# Patient Record
Sex: FEMALE | Race: WHITE | NOT HISPANIC OR LATINO | Employment: FULL TIME | URBAN - METROPOLITAN AREA
[De-identification: names, ages, dates, MRNs, and addresses within clinical notes are randomized per-mention and may not be internally consistent; named-entity substitution may affect disease eponyms.]

---

## 2017-01-03 ENCOUNTER — OFFICE VISIT (OUTPATIENT)
Dept: OBGYN | Facility: CLINIC | Age: 32
End: 2017-01-03
Attending: OBSTETRICS & GYNECOLOGY
Payer: COMMERCIAL

## 2017-01-03 VITALS
HEART RATE: 84 BPM | DIASTOLIC BLOOD PRESSURE: 59 MMHG | BODY MASS INDEX: 24.2 KG/M2 | WEIGHT: 131.5 LBS | HEIGHT: 62 IN | SYSTOLIC BLOOD PRESSURE: 88 MMHG

## 2017-01-03 DIAGNOSIS — O09.892 SUPERVISION OF OTHER HIGH RISK PREGNANCIES, SECOND TRIMESTER: Primary | ICD-10-CM

## 2017-01-03 DIAGNOSIS — Z90.49 S/P TOTAL COLECTOMY: ICD-10-CM

## 2017-01-03 DIAGNOSIS — O34.219 PREVIOUS CESAREAN DELIVERY, ANTEPARTUM CONDITION OR COMPLICATION: ICD-10-CM

## 2017-01-03 PROCEDURE — 99212 OFFICE O/P EST SF 10 MIN: CPT | Mod: ZF

## 2017-01-03 RX ORDER — ACETAMINOPHEN 325 MG/1
975 TABLET ORAL ONCE
Status: CANCELLED | OUTPATIENT
Start: 2017-01-03 | End: 2017-01-03

## 2017-01-03 RX ORDER — SODIUM CHLORIDE, SODIUM LACTATE, POTASSIUM CHLORIDE, CALCIUM CHLORIDE 600; 310; 30; 20 MG/100ML; MG/100ML; MG/100ML; MG/100ML
INJECTION, SOLUTION INTRAVENOUS CONTINUOUS
Status: CANCELLED | OUTPATIENT
Start: 2017-01-03

## 2017-01-03 RX ORDER — LIDOCAINE 40 MG/G
CREAM TOPICAL
Status: CANCELLED | OUTPATIENT
Start: 2017-01-03

## 2017-01-03 NOTE — Clinical Note
1/3/2017       RE: Amber Buckner  [address] [place] [address]     Dear Colleague,    Thank you for referring your patient, Amber Buckner, to the [facility] at [facility]. Please see a copy of my visit note below.    Overall doing well, feeling FM, no cramping or contractions.  Appetite has been good.  Level II was normal, has had a flu shot.  Scheduling request sent for repeat c/s-830 on wed 5/10/17, unsure about BTL at this time.      Again, thank you for allowing me to participate in the care of your patient.      Sincerely,    Michelle Jewell MD

## 2017-01-03 NOTE — NURSING NOTE
Chief Complaint   Patient presents with     Prenatal Care     21 weeks and 1 day     Patient has no concerns.

## 2017-01-03 NOTE — PROGRESS NOTES
Overall doing well, feeling FM, no cramping or contractions.  Appetite has been good.  Level II was normal, has had a flu shot.  Scheduling request sent for repeat c/s-830 on wed 5/10/17, unsure about BTL at this time.

## 2017-01-05 ENCOUNTER — TELEPHONE (OUTPATIENT)
Dept: OBGYN | Facility: CLINIC | Age: 32
End: 2017-01-05

## 2017-02-01 ENCOUNTER — OFFICE VISIT (OUTPATIENT)
Dept: OBGYN | Facility: CLINIC | Age: 32
End: 2017-02-01
Attending: OBSTETRICS & GYNECOLOGY
Payer: COMMERCIAL

## 2017-02-01 VITALS
HEIGHT: 62 IN | SYSTOLIC BLOOD PRESSURE: 113 MMHG | WEIGHT: 138 LBS | DIASTOLIC BLOOD PRESSURE: 70 MMHG | BODY MASS INDEX: 25.4 KG/M2 | HEART RATE: 90 BPM

## 2017-02-01 DIAGNOSIS — O09.92 HRP (HIGH RISK PREGNANCY), SECOND TRIMESTER: Primary | ICD-10-CM

## 2017-02-01 PROCEDURE — 99212 OFFICE O/P EST SF 10 MIN: CPT | Mod: ZF

## 2017-02-01 ASSESSMENT — PAIN SCALES - GENERAL: PAINLEVEL: NO PAIN (0)

## 2017-02-01 NOTE — PROGRESS NOTES
"Return Obstetric Visit Note    Amber Buckner is a 31 year old  at 25w2d with a history of ulcerative colitis s/p total colectomy who presents for regular follow up obstetric visit.     S: Patient has done very well since last being seen. She reports she has been in her usual state of health except for rupture of her rectal fistula, for which she is closely followed by GI. She feels continued fetal movements. She has had only mild contraction-like pain that she attributes to dehydration and stress. ROS is broadly negative.     O:   /70 mmHg  Pulse 90  Ht 1.575 m (5' 2\")  Wt 62.596 kg (138 lb)  BMI 25.23 kg/m2  LMP 2016 (Exact Date)    Gen: pleasant, conversational female patient.   CV: extremities well perfused.   Resp: breathing easily on room air.   Abd: gravid, well healed vertical midline surgical scar beneath umbilicus.     A/P:   Amber Buckner is a 31 year old  woman at 25w2d gestation with no evidence of complications with this pregnancy, whose ulcerative colitis is currently well managed by GI.   #Prenatal Care  - Follow up in 3 weeks for lab work, GCT  - Discussed scheduled C/S to be performed by Dr. Montoya  - Continue routine care    Scribe Disclosure:   I, Kian Miranda MS3, am serving as a scribe; to document services personally performed by Shawanda Luna MD - -based on data collection and the provider's statements to me.     Provider Disclosure:  I agree with above History, Review of Systems, Physical exam and Plan.  I have reviewed the content of the documentation and have edited it as needed. I have personally performed the services documented here and the documentation accurately represents those services and the decisions I have made.      Electronically signed by:  Shawanda Luna MD     "

## 2017-02-01 NOTE — Clinical Note
"2017       RE: Amber Buckner  [address] [place] [address]     Dear Colleague,    Thank you for referring your patient, Amber Buckner, to the [facility] at [facility]. Please see a copy of my visit note below.    Return Obstetric Visit Note    Amber Buckner is a 31 year old  at 25w2d with a history of ulcerative colitis s/p total colectomy who presents for regular follow up obstetric visit.     S: Patient has done very well since last being seen. She reports she has been in her usual state of health except for rupture of her rectal fistula, for which she is closely followed by GI. She feels continued fetal movements. She has had only mild contraction-like pain that she attributes to dehydration and stress. ROS is broadly negative.     O:   /70 mmHg  Pulse 90  Ht 1.575 m (5' 2\")  Wt 62.596 kg (138 lb)  BMI 25.23 kg/m2  LMP 2016 (Exact Date)    Gen: pleasant, conversational female patient.   CV: extremities well perfused.   Resp: breathing easily on room air.   Abd: gravid, well healed vertical midline surgical scar beneath umbilicus.     A/P:   Ambre Buckner is a 31 year old  woman at 25w2d gestation with no evidence of complications with this pregnancy, whose ulcerative colitis is currently well managed by GI.   #Prenatal Care  - Follow up in 3 weeks for lab work, GCT  - Discussed scheduled C/S to be performed by Dr. Montoya  - Continue routine care    Scribe Disclosure:   I, Kian Miranda MS3, am serving as a scribe; to document services personally performed by Shawanda Luna MD - -based on data collection and the provider's statements to me.     Provider Disclosure:  I agree with above History, Review of Systems, Physical exam and Plan.  I have reviewed the content of the documentation and have edited it as needed. I have personally performed the services documented here and the documentation " accurately represents those services and the decisions I have made.      Electronically signed by:  Shawanda Luna MD

## 2017-02-01 NOTE — NURSING NOTE
Chief Complaint   Patient presents with     Prenatal Care     JANETT 25 weeks and 2 days   Elicia Domingo LPN

## 2017-02-23 ENCOUNTER — OFFICE VISIT (OUTPATIENT)
Dept: OBGYN | Facility: CLINIC | Age: 32
End: 2017-02-23
Attending: ADVANCED PRACTICE MIDWIFE
Payer: COMMERCIAL

## 2017-02-23 VITALS
DIASTOLIC BLOOD PRESSURE: 64 MMHG | SYSTOLIC BLOOD PRESSURE: 96 MMHG | HEIGHT: 62 IN | WEIGHT: 139.3 LBS | BODY MASS INDEX: 25.64 KG/M2

## 2017-02-23 DIAGNOSIS — Z23 NEED FOR VACCINATION: ICD-10-CM

## 2017-02-23 DIAGNOSIS — O09.93 HIGH-RISK PREGNANCY, THIRD TRIMESTER: Primary | ICD-10-CM

## 2017-02-23 DIAGNOSIS — Z98.891 HISTORY OF C-SECTION: ICD-10-CM

## 2017-02-23 DIAGNOSIS — F41.8 DEPRESSION WITH ANXIETY: ICD-10-CM

## 2017-02-23 LAB
DEPRECATED CALCIDIOL+CALCIFEROL SERPL-MC: 34 UG/L (ref 20–75)
ERYTHROCYTE [DISTWIDTH] IN BLOOD BY AUTOMATED COUNT: 12.6 % (ref 10–15)
GLUCOSE 1H P 50 G GLC PO SERPL-MCNC: 102 MG/DL (ref 60–129)
HCT VFR BLD AUTO: 33.3 % (ref 35–47)
HGB BLD-MCNC: 11 G/DL (ref 11.7–15.7)
MCH RBC QN AUTO: 34.5 PG (ref 26.5–33)
MCHC RBC AUTO-ENTMCNC: 33 G/DL (ref 31.5–36.5)
MCV RBC AUTO: 104 FL (ref 78–100)
PLATELET # BLD AUTO: 299 10E9/L (ref 150–450)
RBC # BLD AUTO: 3.19 10E12/L (ref 3.8–5.2)
T PALLIDUM IGG+IGM SER QL: NEGATIVE
WBC # BLD AUTO: 5.9 10E9/L (ref 4–11)

## 2017-02-23 PROCEDURE — 82306 VITAMIN D 25 HYDROXY: CPT | Performed by: ADVANCED PRACTICE MIDWIFE

## 2017-02-23 PROCEDURE — 36415 COLL VENOUS BLD VENIPUNCTURE: CPT | Performed by: ADVANCED PRACTICE MIDWIFE

## 2017-02-23 PROCEDURE — 86780 TREPONEMA PALLIDUM: CPT | Performed by: ADVANCED PRACTICE MIDWIFE

## 2017-02-23 PROCEDURE — 90471 IMMUNIZATION ADMIN: CPT | Mod: ZF

## 2017-02-23 PROCEDURE — 99211 OFF/OP EST MAY X REQ PHY/QHP: CPT | Mod: ZF

## 2017-02-23 PROCEDURE — 85027 COMPLETE CBC AUTOMATED: CPT | Performed by: ADVANCED PRACTICE MIDWIFE

## 2017-02-23 PROCEDURE — 82950 GLUCOSE TEST: CPT | Performed by: ADVANCED PRACTICE MIDWIFE

## 2017-02-23 PROCEDURE — 25000125 ZZHC RX 250: Mod: ZF

## 2017-02-23 PROCEDURE — 90715 TDAP VACCINE 7 YRS/> IM: CPT | Mod: ZF

## 2017-02-23 ASSESSMENT — PAIN SCALES - GENERAL: PAINLEVEL: NO PAIN (0)

## 2017-02-23 NOTE — PROGRESS NOTES
" 31 year old, , 28w3d, presents for EOB visit.    Patient concerns: Feeling well. Feels that she has \"turned a corner\" re: her anxiety and depression. Was seeing therapist qweek; now going less frequently- mood is stable. 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 Denies cramping/contractions, vaginal bleeding, discharge or leakage of fluid. Reports +fetal movement - active in the early evening.    PHQ-9 SCORE 10/3/2014 2015 10/21/2016   Total Score 4 0 -   Total Score - - 10   17: PHQ9 =2, GAD7= 3    Education completed today includes breast feeding, [facility] hand out , contraception, counting movements, signs of pre-term labor, when to present to birthplace, post partum depression, GBS and getting enough iron.  Birth preferences reviewed: Repeat  section scheduled for May 8.  Support:     Feeding plans : Breastfeeding    Contraception planned:  Interested in \"semi-permanent\" contraception. Looking into IUD.  The following labs were ordered today:     1 hour glucose, vitamin d, cbc with plts, anti trep  Water birth consent form was not given.    Blood type:   ABO   Date Value Ref Range Status   10/03/2016 A  Final     RH(D)   Date Value Ref Range Status   10/03/2016  Pos  Final     Antibody Screen   Date Value Ref Range Status   10/03/2016 Neg  Final   Rhogam  was notgiven.  TDAP  wasgiven.    A/P:  Encounter Diagnoses   Name Primary?     History of       Need for vaccination      High-risk pregnancy, third trimester Yes     Depression with anxiety      Orders Placed This Encounter   Procedures     TDAP ( BOOSTRIX AGES 10-64)     Glucose 1 Hour     25- OH-Vitamin D     Anti Treponema     CBC with Platelets     EOB education reviewed.  EOB labs ordered- 1 hour glucose testing, cbc with plts, anti-trep, vitamin D.  Tdap given.  Reviewed  labor precautions, fetal movement counts and s/s to call/present to triage.  Continue scheduled prenatal " care, RTC in approx 4 weeks    Wale Purcell, SEPIDEH, EUGENIAM

## 2017-02-23 NOTE — MR AVS SNAPSHOT
After Visit Summary   2017    Amber Buckner    MRN: [id number]           Patient Information     Date Of Birth          1985        Visit Information        Provider Department      2017 8:15 AM Wale Purcell CNM [facility]        Today's Diagnoses     High-risk pregnancy in first trimester    -  1    History of            Follow-ups after your visit        Follow-up notes from your care team     Return in about 4 weeks (around 3/23/2017) for AUDRA WEST.      Your next 10 appointments already scheduled     May 08, 2017   Procedure with Shawanda Luna MD   [facility] (--)    [address]  [facility] [address] [phone]              Who to contact     Please call your clinic at [phone] to:    Ask questions about your health    Make or cancel appointments    Discuss your medicines    Learn about your test results    Speak to your doctor   If you have compliments or concerns about an experience at your clinic, or if you wish to file a complaint, please contact [facility] Patient Relations at [phone] or email us at [email or web address]         Additional Information About Your Visit        MyChart Information     Perform[employer] gives you secure access to your electronic health record. If you see a primary care provider, you can also send messages to your care team and make appointments. If you have questions, please call your primary care clinic.  If you do not have a primary care provider, please call [phone] and they will assist you.      Reclip.It is an electronic gateway that provides easy, online access to your medical records. With [employer]It, you can request a clinic appointment, read your test results, renew a prescription or communicate with your care team.     To access your existing account, please contact your [facility] or call [phone]  "for assistance.        Care EveryWhere ID     This is your Care EveryWhere ID. This could be used by other organizations to access your [place] medical records  [id number]        Your Vitals Were     Height Last Period BMI (Body Mass Index)             1.575 m (5' 2.01\") 08/08/2016 (Exact Date) 25.47 kg/m2          Blood Pressure from Last 3 Encounters:   02/23/17 96/64   02/01/17 113/70   01/03/17 (!) 88/59    Weight from Last 3 Encounters:   02/23/17 63.2 kg (139 lb 4.8 oz)   02/01/17 62.6 kg (138 lb)   01/03/17 59.6 kg (131 lb 8 oz)              We Performed the Following     25- OH-Vitamin D     Anti Treponema     CBC with Platelets     Glucose 1 Hour        Primary Care Provider Office Phone # Fax #    Cinthia Rhett Charles [phone]        [facility] [address] [address]  [facility] [address]        Thank you!     Thank you for choosing [facility]  for your care. Our goal is always to provide you with excellent care. Hearing back from our patients is one way we can continue to improve our services. Please take a few minutes to complete the written survey that you may receive in the mail after your visit with us. Thank you!             Your Updated Medication List - Protect others around you: Learn how to safely use, store and throw away your medicines at www.disposemymeds.org.          This list is accurate as of: 2/23/17  8:57 AM.  Always use your most recent med list.                   Brand Name Dispense Instructions for use    acetaminophen 325 MG tablet    TYLENOL    100 tablet    Take 2 tablets (650 mg) by mouth every 6 hours       cholecalciferol 5000 UNITS Caps      Take 1 capsule by mouth daily.       OMEGA-3 FISH OIL PO      Take 1 g by mouth       PRENATAL VITAMINS PO      Take  by mouth daily.         "

## 2017-02-23 NOTE — LETTER
"2017       RE: Amber Buckner  [address] [place] [address]     Dear Colleague,    Thank you for referring your patient, Amber Buckner, to the [facility] at [facility]. Please see a copy of my visit note below.     31 year old, , 28w3d, presents for EOB visit.    Patient concerns: Feeling well. Feels that she has \"turned a corner\" re: her anxiety and depression. Was seeing therapist qweek; now going less frequently- mood is stable. 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 Denies cramping/contractions, vaginal bleeding, discharge or leakage of fluid. Reports +fetal movement - active in the early evening.    PHQ-9 SCORE 10/3/2014 2015 10/21/2016   Total Score 4 0 -   Total Score - - 10   17: PHQ9 =2, GAD7= 3    Education completed today includes breast feeding, [facility] hand out , contraception, counting movements, signs of pre-term labor, when to present to birthplace, post partum depression, GBS and getting enough iron.  Birth preferences reviewed: Repeat  section scheduled for May 8.  Support:     Feeding plans : Breastfeeding    Contraception planned:  Interested in \"semi-permanent\" contraception. Looking into IUD.  The following labs were ordered today:     1 hour glucose, vitamin d, cbc with plts, anti trep  Water birth consent form was not given.    Blood type:   ABO   Date Value Ref Range Status   10/03/2016 A  Final     RH(D)   Date Value Ref Range Status   10/03/2016  Pos  Final     Antibody Screen   Date Value Ref Range Status   10/03/2016 Neg  Final   Rhogam  was notgiven.  TDAP  wasgiven.    A/P:  Encounter Diagnoses   Name Primary?     History of       Need for vaccination      High-risk pregnancy, third trimester Yes     Depression with anxiety      Orders Placed This Encounter   Procedures     TDAP ( BOOSTRIX AGES 10-64)     Glucose 1 Hour     " 25- OH-Vitamin D     Anti Treponema     CBC with Platelets     EOB education reviewed.  EOB labs ordered- 1 hour glucose testing, cbc with plts, anti-trep, vitamin D.  Tdap given.  Reviewed  labor precautions, fetal movement counts and s/s to call/present to triage.  Continue scheduled prenatal care, RTC in approx 4 weeks    Wale Purcell, SEPIDEH, CNM

## 2017-03-22 ENCOUNTER — OFFICE VISIT (OUTPATIENT)
Dept: OBGYN | Facility: CLINIC | Age: 32
End: 2017-03-22
Attending: MIDWIFE
Payer: COMMERCIAL

## 2017-03-22 ENCOUNTER — TELEPHONE (OUTPATIENT)
Dept: OBGYN | Facility: CLINIC | Age: 32
End: 2017-03-22

## 2017-03-22 VITALS
BODY MASS INDEX: 26 KG/M2 | HEIGHT: 62 IN | WEIGHT: 141.3 LBS | HEART RATE: 84 BPM | SYSTOLIC BLOOD PRESSURE: 104 MMHG | DIASTOLIC BLOOD PRESSURE: 65 MMHG

## 2017-03-22 DIAGNOSIS — O09.93 HIGH-RISK PREGNANCY, THIRD TRIMESTER: Primary | ICD-10-CM

## 2017-03-22 PROBLEM — K51.90 ULCERATIVE COLITIS (H): Status: ACTIVE | Noted: 2017-03-22

## 2017-03-22 PROCEDURE — 99212 OFFICE O/P EST SF 10 MIN: CPT | Mod: ZF

## 2017-03-22 ASSESSMENT — PAIN SCALES - GENERAL: PAINLEVEL: NO PAIN (0)

## 2017-03-22 NOTE — LETTER
"3/22/2017       RE: Amber Buckner  [address] [place] [address]     Dear Colleague,    Thank you for referring your patient, Amber Buckner, to the [facility] at [facility]. Please see a copy of my visit note below.    Subjective:      31 year old  at 32w2d presentst for a routine prenatal appointment.     vaginal bleeding or leakage of fluid.  Had an episode yesterday of  contractions. Good  fetal movement.       No HA, visual changes, RUQ or epigastric pain.   Patient concerns:    Feeling well overall.  Reviewed EOB labs with patient.  TDAP last visit   Objective:  Vitals:    17 0820   BP: 104/65   BP Location: Left arm   Patient Position: Chair   Cuff Size: Adult Regular   Pulse: 84   Weight: 64.1 kg (141 lb 4.8 oz)   Height: 1.575 m (5' 2\")   , see ob flowsheet  Assessment/Plan       ABO   Date Value Ref Range Status   10/03/2016 A  Final     RH(D)   Date Value Ref Range Status   10/03/2016  Pos  Final     Antibody Screen   Date Value Ref Range Status   10/03/2016 Neg  Final   , Rhogam  was notgiven.    - Reviewed total weight gain, encouraged continued healthy diet and exercise.  .  Reviewed importance of daily fetal kick count and why/how to contact provider.    - Reviewed why/how to contact provider if headache/visual changes/RUQ or epigastric pain, decreased fetal movement, vaginal bleeding, leakage of fluid or more than 4 contractions in an hour.     Patient education/orders or handouts today:  PTL signs/symptoms  Reviewed GBS screening at 35-36 wks.    Return to clinic in 3 weeks and prn if questions or concerns.     SEPIDEH Casarez CNM        "

## 2017-03-22 NOTE — TELEPHONE ENCOUNTER
Left message with patient fmla forms completed for her - no fax number-   Where should i send it to?    Forms at stacy's desk

## 2017-03-22 NOTE — MR AVS SNAPSHOT
After Visit Summary   3/22/2017    Amber Buckner    MRN: [id number]           Patient Information     Date Of Birth          1985        Visit Information        Provider Department      3/22/2017 8:15 AM Lis Villalba APRN [facility]        Today's Diagnoses     High-risk pregnancy, third trimester    -  1       Follow-ups after your visit        Follow-up notes from your care team     Return in about 3 weeks (around 4/12/2017).      Your next 10 appointments already scheduled     May 08, 2017   Procedure with Shawanda Luna MD   UR 4[facility] (--)    [address]  [facility] [address] [phone]              Who to contact     Please call your clinic at [phone] to:    Ask questions about your health    Make or cancel appointments    Discuss your medicines    Learn about your test results    Speak to your doctor   If you have compliments or concerns about an experience at your clinic, or if you wish to file a complaint, please contact [facility] Patient Relations at [phone] or email us at [email or web address]         Additional Information About Your Visit        MyChart Information     V[employer] gives you secure access to your electronic health record. If you see a primary care provider, you can also send messages to your care team and make appointments. If you have questions, please call your primary care clinic.  If you do not have a primary care provider, please call [phone] and they will assist you.      V[employer] is an electronic gateway that provides easy, online access to your medical records. With [employer], you can request a clinic appointment, read your test results, renew a prescription or communicate with your care team.     To access your existing account, please contact your [facility] or call [phone] for assistance.        Care EveryWhere ID     This is your  "Care EveryWhere ID. This could be used by other organizations to access your [place] medical records  [id number]        Your Vitals Were     Pulse Height Last Period BMI (Body Mass Index)          84 1.575 m (5' 2\") 08/08/2016 (Exact Date) 25.84 kg/m2         Blood Pressure from Last 3 Encounters:   03/22/17 104/65   02/23/17 96/64   02/01/17 113/70    Weight from Last 3 Encounters:   03/22/17 64.1 kg (141 lb 4.8 oz)   02/23/17 63.2 kg (139 lb 4.8 oz)   02/01/17 62.6 kg (138 lb)              Today, you had the following     No orders found for display       Primary Care Provider Office Phone # Fax #    Cinthia Rhett Charles [phone]        [facility] [address]  [facility] [address]        Thank you!     Thank you for choosing [facility]  for your care. Our goal is always to provide you with excellent care. Hearing back from our patients is one way we can continue to improve our services. Please take a few minutes to complete the written survey that you may receive in the mail after your visit with us. Thank you!             Your Updated Medication List - Protect others around you: Learn how to safely use, store and throw away your medicines at www.disposemymeds.org.          This list is accurate as of: 3/22/17  8:37 AM.  Always use your most recent med list.                   Brand Name Dispense Instructions for use    acetaminophen 325 MG tablet    TYLENOL    100 tablet    Take 2 tablets (650 mg) by mouth every 6 hours       cholecalciferol 5000 UNITS Caps      Take 1 capsule by mouth daily.       OMEGA-3 FISH OIL PO      Take 1 g by mouth       PRENATAL VITAMINS PO      Take  by mouth daily.         "

## 2017-03-22 NOTE — PROGRESS NOTES
"Subjective:      31 year old  at 32w2d presentst for a routine prenatal appointment.     vaginal bleeding or leakage of fluid.  Had an episode yesterday of  contractions. Good  fetal movement.       No HA, visual changes, RUQ or epigastric pain.   Patient concerns:    Feeling well overall.  Reviewed EOB labs with patient.  TDAP last visit   Objective:  Vitals:    17 0820   BP: 104/65   BP Location: Left arm   Patient Position: Chair   Cuff Size: Adult Regular   Pulse: 84   Weight: 64.1 kg (141 lb 4.8 oz)   Height: 1.575 m (5' 2\")   , see ob flowsheet  Assessment/Plan       ABO   Date Value Ref Range Status   10/03/2016 A  Final     RH(D)   Date Value Ref Range Status   10/03/2016  Pos  Final     Antibody Screen   Date Value Ref Range Status   10/03/2016 Neg  Final   , Rhogam  was notgiven.    - Reviewed total weight gain, encouraged continued healthy diet and exercise.  .  Reviewed importance of daily fetal kick count and why/how to contact provider.    - Reviewed why/how to contact provider if headache/visual changes/RUQ or epigastric pain, decreased fetal movement, vaginal bleeding, leakage of fluid or more than 4 contractions in an hour.     Patient education/orders or handouts today:  PTL signs/symptoms  Reviewed GBS screening at 35-36 wks.    Return to clinic in 3 weeks and prn if questions or concerns.     SEPIDEH Casarez CNM    "

## 2017-03-23 ENCOUNTER — TELEPHONE (OUTPATIENT)
Dept: OBGYN | Facility: CLINIC | Age: 32
End: 2017-03-23

## 2017-04-12 ENCOUNTER — OFFICE VISIT (OUTPATIENT)
Dept: OBGYN | Facility: CLINIC | Age: 32
End: 2017-04-12
Attending: ADVANCED PRACTICE MIDWIFE
Payer: COMMERCIAL

## 2017-04-12 VITALS
HEIGHT: 62 IN | WEIGHT: 144.5 LBS | BODY MASS INDEX: 26.59 KG/M2 | SYSTOLIC BLOOD PRESSURE: 103 MMHG | DIASTOLIC BLOOD PRESSURE: 68 MMHG

## 2017-04-12 DIAGNOSIS — O09.93 HIGH-RISK PREGNANCY, THIRD TRIMESTER: Primary | ICD-10-CM

## 2017-04-12 PROCEDURE — 99211 OFF/OP EST MAY X REQ PHY/QHP: CPT | Mod: ZF

## 2017-04-12 PROCEDURE — 87653 STREP B DNA AMP PROBE: CPT | Performed by: ADVANCED PRACTICE MIDWIFE

## 2017-04-12 ASSESSMENT — PAIN SCALES - GENERAL: PAINLEVEL: NO PAIN (0)

## 2017-04-12 NOTE — MR AVS SNAPSHOT
After Visit Summary   4/12/2017    Amber Buckner    MRN: [id number]           Patient Information     Date Of Birth          1985        Visit Information        Provider Department      4/12/2017 8:15 AM Wale Purcell CNM [facility]        Today's Diagnoses     High-risk pregnancy, third trimester    -  1       Follow-ups after your visit        Follow-up notes from your care team     Return in about 1 week (around 4/19/2017) for AUDRA WEST.      Your next 10 appointments already scheduled     Apr 26, 2017  8:45 AM CDT   RETURN OB with Wale Purcell CNM   [facility] ([facility])    [facility] [address]  [address] [facility] [address] [phone]            May 01, 2017  4:30 PM CDT   RETURN OB with Laurie Montoya MD   [facility] ([facility])    [facility] [address]  [address] [facility] [id number] [phone]            May 08, 2017   Procedure with Shawanda Luna MD   UR 4COB (--)    [address] [facility] [address] [phone]              Who to contact     Please call your clinic at [phone] to:    Ask questions about your health    Make or cancel appointments    Discuss your medicines    Learn about your test results    Speak to your doctor   If you have compliments or concerns about an experience at your clinic, or if you wish to file a complaint, please contact [facility] Patient Relations at [phone] or email us at [email or web address]         Additional Information About Your Visit        MyChart Information     [employer] gives you secure access to your electronic health record. If you see a primary care provider, you can also send messages to your care team and make appointments. If you have questions, please call  "your primary care clinic.  If you do not have a primary care provider, please call [phone] and they will assist you.      [employer] is an electronic gateway that provides easy, online access to your medical records. With [employer], you can request a clinic appointment, read your test results, renew a prescription or communicate with your care team.     To access your existing account, please contact your [facility] or call [phone] for assistance.        Care EveryWhere ID     This is your Care EveryWhere ID. This could be used by other organizations to access your [place] medical records  [id number]        Your Vitals Were     Height Last Period BMI (Body Mass Index)             1.575 m (5' 2.01\") 08/08/2016 (Exact Date) 26.42 kg/m2          Blood Pressure from Last 3 Encounters:   04/12/17 103/68   03/22/17 104/65   02/23/17 96/64    Weight from Last 3 Encounters:   04/12/17 65.5 kg (144 lb 8 oz)   03/22/17 64.1 kg (141 lb 4.8 oz)   02/23/17 63.2 kg (139 lb 4.8 oz)              We Performed the Following     Group B strep PCR        Primary Care Provider Office Phone # Fax #    Cinthia Rhett Charles [phone]       [facility] [address] [place] [address]        Thank you!     Thank you for choosing [facility]  for your care. Our goal is always to provide you with excellent care. Hearing back from our patients is one way we can continue to improve our services. Please take a few minutes to complete the written survey that you may receive in the mail after your visit with us. Thank you!             Your Updated Medication List - Protect others around you: Learn how to safely use, store and throw away your medicines at www.disposemymeds.org.          This list is accurate as of: 4/12/17 12:08 PM.  Always use your most recent med list.                   Brand Name Dispense Instructions for use    acetaminophen 325 MG " tablet    TYLENOL    100 tablet    Take 2 tablets (650 mg) by mouth every 6 hours       cholecalciferol 5000 UNITS Caps      Take 1 capsule by mouth daily.       OMEGA-3 FISH OIL PO      Take 1 g by mouth       PRENATAL VITAMINS PO      Take  by mouth daily.

## 2017-04-12 NOTE — NURSING NOTE
Chief Complaint   Patient presents with     Prenatal Care     JANETT 35 weeks and 2 days   35.2 weeks ob visit feeling

## 2017-04-12 NOTE — PROGRESS NOTES
"Subjective:      31 year old  at 35w2d presents for a routine prenatal appointment.     Denies cramping/contractions, vaginal bleeding,  leakage of fluid, or change in vaginal discharge. Reports + fetal movement.       No HA, visual changes, RUQ or epigastric pain.     Patient concerns:  Feeling well overall. Reports occasional dizziness after lunch time- relieved with sitting. Notes that it improves when she eats more nutritious items and has smaller, more frequent snacks throughout the morning. Also improves with increased hydration. Has repeat c/s scheduled for .     Objective:  Vitals:    17 0820   BP: 103/68   Weight: 65.5 kg (144 lb 8 oz)   Height: 1.575 m (5' 2.01\")     See OB flowsheet    Assessment/Plan     Encounter Diagnosis   Name Primary?     High-risk pregnancy, third trimester Yes       PHQ-9 SCORE 10/3/2014 2015 10/21/2016   Total Score 4 0 -   Total Score - - 10     GBS screening: Obtained.  Birth preferences reviewed: Scheduled repeat  section for May 8  Labor signs discussed. Reinforced daily fetal movement counts.  Reviewed why/how to contact provider if headache/visual changes/RUQ or epigastric pain, decreased fetal movement, vaginal bleeding, leakage of fluid.     Continue scheduled prenatal care, RTC in 1 week and prn if questions or concerns.     SEPIDEH Melgar, JENNA  "

## 2017-04-12 NOTE — LETTER
"2017       RE: Amber Buckner  [address] [place] [address]     Dear Colleague,    Thank you for referring your patient, Amber Buckner, to the [facility] at [facility]. Please see a copy of my visit note below.    Subjective:      31 year old  at 35w2d presents for a routine prenatal appointment.     Denies cramping/contractions, vaginal bleeding,  leakage of fluid, or change in vaginal discharge. Reports + fetal movement.       No HA, visual changes, RUQ or epigastric pain.     Patient concerns:  Feeling well overall. Reports occasional dizziness after lunch time- relieved with sitting. Notes that it improves when she eats more nutritious items and has smaller, more frequent snacks throughout the morning. Also improves with increased hydration. Has repeat c/s scheduled for .     Objective:  Vitals:    17 0820   BP: 103/68   Weight: 65.5 kg (144 lb 8 oz)   Height: 1.575 m (5' 2.01\")     See OB flowsheet    Assessment/Plan     Encounter Diagnosis   Name Primary?     High-risk pregnancy, third trimester Yes       PHQ-9 SCORE 10/3/2014 2015 10/21/2016   Total Score 4 0 -   Total Score - - 10     GBS screening: Obtained.  Birth preferences reviewed: Scheduled repeat  section for May 8  Labor signs discussed. Reinforced daily fetal movement counts.  Reviewed why/how to contact provider if headache/visual changes/RUQ or epigastric pain, decreased fetal movement, vaginal bleeding, leakage of fluid.     Continue scheduled prenatal care, RTC in 1 week and prn if questions or concerns.     SEPIDEH Melgar, CNM    "

## 2017-04-13 LAB
GP B STREP DNA SPEC QL NAA+PROBE: ABNORMAL
SPECIMEN SOURCE: ABNORMAL

## 2017-04-14 PROBLEM — O99.820 GBS (GROUP B STREPTOCOCCUS CARRIER), +RV CULTURE, CURRENTLY PREGNANT: Status: ACTIVE | Noted: 2017-04-14

## 2017-04-15 LAB
BACTERIA SPEC CULT: NORMAL
MICRO REPORT STATUS: NORMAL
SPECIMEN SOURCE: NORMAL

## 2017-04-20 ENCOUNTER — TELEPHONE (OUTPATIENT)
Dept: OBGYN | Facility: CLINIC | Age: 32
End: 2017-04-20

## 2017-04-20 NOTE — TELEPHONE ENCOUNTER
Telephone call from Amber and she reports that she is not sure if she is leaking amniotic fluid or urine. She got out of bed this morning and have a small amount of fluid come out of her vagina (she thinks), she smelled this and there was no odor. She then went back to bed and got up again and the same thing happened again but with some mucous in it. States baby is moving well, no bleeding or cramping. I instructed her to empty her bladder and to lie down for 30 minutes stand up and if this happens again she should go to the birth place for evaluation. She verbalized understanding.

## 2017-04-21 ENCOUNTER — TELEPHONE (OUTPATIENT)
Dept: OTHER | Facility: CLINIC | Age: 32
End: 2017-04-21

## 2017-04-21 NOTE — TELEPHONE ENCOUNTER
4/21/2017    Call Regarding Onboarding [employer]    Attempt 1    Message on voicemail     Comments:       Outreach   cnt

## 2017-04-26 ENCOUNTER — OFFICE VISIT (OUTPATIENT)
Dept: OBGYN | Facility: CLINIC | Age: 32
End: 2017-04-26
Attending: ADVANCED PRACTICE MIDWIFE
Payer: COMMERCIAL

## 2017-04-26 VITALS
DIASTOLIC BLOOD PRESSURE: 61 MMHG | WEIGHT: 145 LBS | BODY MASS INDEX: 26.68 KG/M2 | HEIGHT: 62 IN | SYSTOLIC BLOOD PRESSURE: 95 MMHG | HEART RATE: 91 BPM

## 2017-04-26 DIAGNOSIS — O99.820 GBS (GROUP B STREPTOCOCCUS CARRIER), +RV CULTURE, CURRENTLY PREGNANT: ICD-10-CM

## 2017-04-26 DIAGNOSIS — O09.93 HIGH-RISK PREGNANCY, THIRD TRIMESTER: Primary | ICD-10-CM

## 2017-04-26 PROCEDURE — 99212 OFFICE O/P EST SF 10 MIN: CPT | Mod: ZF

## 2017-04-26 ASSESSMENT — PAIN SCALES - GENERAL: PAINLEVEL: NO PAIN (0)

## 2017-04-26 NOTE — LETTER
"2017       RE: Amber Buckner  [email or web address] [place] [address]     Dear Colleague,    Thank you for referring your patient, Amber Buckner, to the [facility] at [facility]. Please see a copy of my visit note below.    Subjective:      31 year old  at 37w2d presents for a routine prenatal appointment.         Patient concerns: Feeling well overall.  Has c/s scheduled for  with Dr. Montoya.  Denies cramping/contractions, vaginal bleeding,  leakage of fluid, or change in vaginal discharge.  Reports + fetal movement. No HA, visual changes, RUQ or epigastric pain.     Objective:  Vitals:    17 0854   BP: 95/61   Pulse: 91   Weight: 65.8 kg (145 lb)   Height: 1.575 m (5' 2\")     See OB flowsheet    Assessment/Plan     Encounter Diagnoses   Name Primary?     High-risk pregnancy, third trimester Yes     GBS (group B Streptococcus carrier), +RV culture, currently pregnant      Discussed measles policy.   Reviewed why/how to contact provider if headache/visual changes/RUQ or epigastric pain, decreased fetal movement, vaginal bleeding, leakage of fluid.  Emphasized importance of presenting to triage for labor s/s or rom.   Reviewed GBS+, no indication for prophylaxis unless labor/rom prior to c/s.   section scheduled for .  Has appointment with md next week.  Continue scheduled prenatal care, RTC in 1 week and prn if questions or concerns.     SEPIDEH Melgar, JENNA      "

## 2017-04-26 NOTE — NURSING NOTE
Chief Complaint   Patient presents with     Prenatal Care     JANETT 37 weeks and 2 days   Elicia Domingo LPN

## 2017-04-26 NOTE — PROGRESS NOTES
"Subjective:      31 year old  at 37w2d presents for a routine prenatal appointment.         Patient concerns: Feeling well overall.  Has c/s scheduled for  with Dr. Montoya.  Denies cramping/contractions, vaginal bleeding,  leakage of fluid, or change in vaginal discharge.  Reports + fetal movement. No HA, visual changes, RUQ or epigastric pain.     Objective:  Vitals:    17 0854   BP: 95/61   Pulse: 91   Weight: 65.8 kg (145 lb)   Height: 1.575 m (5' 2\")     See OB flowsheet    Assessment/Plan     Encounter Diagnoses   Name Primary?     High-risk pregnancy, third trimester Yes     GBS (group B Streptococcus carrier), +RV culture, currently pregnant      Discussed measles policy.   Reviewed why/how to contact provider if headache/visual changes/RUQ or epigastric pain, decreased fetal movement, vaginal bleeding, leakage of fluid.  Emphasized importance of presenting to triage for labor s/s or rom.   Reviewed GBS+, no indication for prophylaxis unless labor/rom prior to c/s.   section scheduled for .  Has appointment with md next week.  Continue scheduled prenatal care, RTC in 1 week and prn if questions or concerns.     Wale Purcell, SEPIDEH, JENNA      "

## 2017-04-26 NOTE — MR AVS SNAPSHOT
After Visit Summary   4/26/2017    Amber Buckner    MRN: [id number]           Patient Information     Date Of Birth          1985        Visit Information        Provider Department      4/26/2017 8:45 AM Wale Purcell CNM [facility]        Today's Diagnoses     High-risk pregnancy, third trimester    -  1    GBS (group B Streptococcus carrier), +RV culture, currently pregnant           Follow-ups after your visit        Follow-up notes from your care team     Return in about 1 week (around 5/3/2017) for JANETT.      Your next 10 appointments already scheduled     May 01, 2017  4:30 PM CDT   RETURN OB with Laurie Montoya MD   [facility] ([facility])    [facility] [address]  [address]  [address] [facility] [address] [phone]            May 08, 2017   Procedure with Shawanda Luna MD   UR 4COB (--)    [address] [facility] [address] [phone]              Who to contact     Please call your clinic at [phone] to:    Ask questions about your health    Make or cancel appointments    Discuss your medicines    Learn about your test results    Speak to your doctor   If you have compliments or concerns about an experience at your clinic, or if you wish to file a complaint, please contact [facility] Patient Relations at [phone] or email us at [email or web address]         Additional Information About Your Visit        [employer] Information     [employer] gives you secure access to your electronic health record. If you see a primary care provider, you can also send messages to your care team and make appointments. If you have questions, please call your primary care clinic.  If you do not have a primary care provider, please call [phone] and they will assist you.      [employer] is an electronic gateway that provides easy, online  "access to your medical records. With [employer], you can request a clinic appointment, read your test results, renew a prescription or communicate with your care team.     To access your existing account, please contact your [facility] or call [phone] for assistance.        Care EveryWhere ID     This is your Care EveryWhere ID. This could be used by other organizations to access your [place] medical records  [id number]        Your Vitals Were     Pulse Height Last Period Breastfeeding? BMI (Body Mass Index)       91 1.575 m (5' 2\") 08/08/2016 (Exact Date) No 26.52 kg/m2        Blood Pressure from Last 3 Encounters:   04/26/17 95/61   04/12/17 103/68   03/22/17 104/65    Weight from Last 3 Encounters:   04/26/17 65.8 kg (145 lb)   04/12/17 65.5 kg (144 lb 8 oz)   03/22/17 64.1 kg (141 lb 4.8 oz)              Today, you had the following     No orders found for display       Primary Care Provider Office Phone # Fax #    Cinthia Rhett Charles [phone]       [facility] [address] [place] [address]        Thank you!     Thank you for choosing [facility]  for your care. Our goal is always to provide you with excellent care. Hearing back from our patients is one way we can continue to improve our services. Please take a few minutes to complete the written survey that you may receive in the mail after your visit with us. Thank you!             Your Updated Medication List - Protect others around you: Learn how to safely use, store and throw away your medicines at www.disposemymeds.org.          This list is accurate as of: 4/26/17  1:04 PM.  Always use your most recent med list.                   Brand Name Dispense Instructions for use    acetaminophen 325 MG tablet    TYLENOL    100 tablet    Take 2 tablets (650 mg) by mouth every 6 hours       cholecalciferol 5000 UNITS Caps      Take 1 capsule by mouth daily.       " OMEGA-3 FISH OIL PO      Take 1 g by mouth       PRENATAL VITAMINS PO      Take  by mouth daily.

## 2017-04-28 NOTE — TELEPHONE ENCOUNTER
4/28/2017    Call Regarding Onboarding [employer]    Attempt 2    Message on voicemail     Comments:       Outreach   cnt

## 2017-05-01 ENCOUNTER — OFFICE VISIT (OUTPATIENT)
Dept: OBGYN | Facility: CLINIC | Age: 32
End: 2017-05-01
Attending: OBSTETRICS & GYNECOLOGY
Payer: COMMERCIAL

## 2017-05-01 VITALS
BODY MASS INDEX: 27.05 KG/M2 | SYSTOLIC BLOOD PRESSURE: 113 MMHG | WEIGHT: 147 LBS | HEIGHT: 62 IN | DIASTOLIC BLOOD PRESSURE: 72 MMHG | HEART RATE: 96 BPM

## 2017-05-01 DIAGNOSIS — O09.93 HIGH-RISK PREGNANCY, THIRD TRIMESTER: Primary | ICD-10-CM

## 2017-05-01 PROCEDURE — 99212 OFFICE O/P EST SF 10 MIN: CPT | Mod: ZF

## 2017-05-01 ASSESSMENT — PAIN SCALES - GENERAL: PAINLEVEL: NO PAIN (0)

## 2017-05-01 NOTE — PROGRESS NOTES
"Subjective:     31 year old  at 38w0d presents for routine prenatal visit.           Denies vaginal bleeding or leakage of fluid.  Irregular, mild contractions intermittently.  + fetal movement, no changes in fetal movement.        No HA, visual changes, RUQ or epigastric pain.   Patient concerns: none.  She called in about ten days ago worrying about increased vaginal fluid, but says that has resolved and she has not had any increased fluid or bleeding since then. Feeling well overall. C/S scheduled for next Monday, 2017.    Objective:  Vitals:    17 1626   BP: 113/72   Pulse: 96   Weight: 66.7 kg (147 lb)   Height: 1.575 m (5' 2\")      Consitutional: pregnant female, pleasant, NAD  Resp: lungs clear to auscultation bilaterally, no increased work of breathing  CV: RRR. Normal S1, S2  Abdomen: abdomen is soft and non-tender.  Midline scar and scar in RLQ.  Fundus well above umbilicus    FHR: 140s    See OB flowsheet  Assessment/Plan  Amber Buckner is a 31 year old  female with history of UC s/p total colectomy with J pouch creation and two previous LTCS with vertical midline incision who presents for JANETT at 38w2d.    -GBS +; discussed what to do if water breaks prior to C/S  - Reviewed why/how to contact provider if headache/visual changes/RUQ or epigastric pain, decreased fetal movement, vaginal bleeding, leakage of fluid or strong/regular contractions.    -C/S scheduled 2017.   -reviewed C/S procedure, possible skin incisions, and anesthesia     I, Huseyin Mejia-MS3, served as a scribe for Dr. Montoya during this encounter.      Provider Disclosure:  I agree with above History, Review of Systems, Physical exam and Plan.  I have reviewed the content of the documentation and have edited it as needed. I have personally performed the services documented here and the documentation accurately represents those services and the decisions I have made.      Electronically signed " by:    I agree with the PFSH and ROS as completed by the MS, except for changes made by me. The remainder of the encounter was performed by me and scribed by the MS. The scribed note accurately reflects my personal services and the decisions made by me.    Laurie Montoya MD, FACOG  [facility]  OB/GYN    5/2/2017  3:43 PM

## 2017-05-01 NOTE — PROGRESS NOTES
"Subjective:     31 year old  at 38w0d presents for routine prenatal visit.           Denies vaginal bleeding or leakage of fluid.  Irregular, mild contractions intermittently.  + fetal movement, no changes in fetal movement.        No HA, visual changes, RUQ or epigastric pain.   Patient concerns: none.  She called in about ten days ago worrying about increased vaginal fluid, but says that has resolved and she has not had any increased fluid or bleeding since then. Feeling well overall. C/S scheduled for next Monday, 2017.    Objective:  Vitals:    17 1626   BP: 113/72   Pulse: 96   Weight: 66.7 kg (147 lb)   Height: 1.575 m (5' 2\")      Consitutional: pregnant female, pleasant, NAD  Resp: lungs clear to auscultation bilaterally, no increased work of breathing  CV: RRR. Normal S1, S2  Abdomen: abdomen is soft and non-tender.  Midline scar and scar in RLQ.  Fundus well above umbilicus    FHR: 140s    See OB flowsheet  Assessment/Plan  Amber Buckner is a 31 year old  female with history of UC s/p total colectomy with J pouch creation and two previous LTCS with vertical midline incision who presents for JANETT at 38w2d.    -GBS +; discussed what to do if water breaks prior to C/S  -discussed how patient is doing  - Reviewed why/how to contact provider if headache/visual changes/RUQ or epigastric pain, decreased fetal movement, vaginal bleeding, leakage of fluid or strong/regular contractions.    -C/S scheduled 2017.   -reviewed C/S procedure, possible skin incisions, and anesthesia     I, Huseyin Mejia-MS3, served as a scribe for Dr. Montoya during this encounter.      Provider Disclosure:  I agree with above History, Review of Systems, Physical exam and Plan.  I have reviewed the content of the documentation and have edited it as needed. I have personally performed the services documented here and the documentation accurately represents those services and the decisions I have made.  "     Electronically signed by:  {ALEK providers :[id number]}

## 2017-05-01 NOTE — MR AVS SNAPSHOT
After Visit Summary   5/1/2017    Amber Buckner    MRN: [id number]           Patient Information     Date Of Birth          1985        Visit Information        Provider Department      5/1/2017 4:30 PM Laurie Montoya MD [facility]        Today's Diagnoses     High-risk pregnancy, third trimester    -  1       Follow-ups after your visit        Your next 10 appointments already scheduled     May 08, 2017   Procedure with Shawanda Luna MD   UR 4COB (--)    [address]  [facility] [address] [phone]              Who to contact     Please call your clinic at [phone] to:    Ask questions about your health    Make or cancel appointments    Discuss your medicines    Learn about your test results    Speak to your doctor   If you have compliments or concerns about an experience at your clinic, or if you wish to file a complaint, please contact [facility] Patient Relations at [phone] or email us at [email or web address]         Additional Information About Your Visit        MyChart Information     [employer] gives you secure access to your electronic health record. If you see a primary care provider, you can also send messages to your care team and make appointments. If you have questions, please call your primary care clinic.  If you do not have a primary care provider, please call [phone] and they will assist you.      [employer] is an electronic gateway that provides easy, online access to your medical records. With [employer], you can request a clinic appointment, read your test results, renew a prescription or communicate with your care team.     To access your existing account, please contact your [facility] or call [phone] for assistance.        Care EveryWhere ID     This is your Care EveryWhere ID. This could be used by other organizations to access your [place]  "medical records  [id number]        Your Vitals Were     Pulse Height Last Period Breastfeeding? BMI (Body Mass Index)       96 1.575 m (5' 2\") 08/08/2016 (Exact Date) No 26.89 kg/m2        Blood Pressure from Last 3 Encounters:   05/01/17 113/72   04/26/17 95/61   04/12/17 103/68    Weight from Last 3 Encounters:   05/01/17 66.7 kg (147 lb)   04/26/17 65.8 kg (145 lb)   04/12/17 65.5 kg (144 lb 8 oz)              Today, you had the following     No orders found for display       Primary Care Provider Office Phone # Fax #    Cinthia Rhett Charles [phone]       [facility] [address] [place] [address]        Thank you!     Thank you for choosing [facility]  for your care. Our goal is always to provide you with excellent care. Hearing back from our patients is one way we can continue to improve our services. Please take a few minutes to complete the written survey that you may receive in the mail after your visit with us. Thank you!             Your Updated Medication List - Protect others around you: Learn how to safely use, store and throw away your medicines at www.disposemymeds.org.          This list is accurate as of: 5/1/17 11:59 PM.  Always use your most recent med list.                   Brand Name Dispense Instructions for use    acetaminophen 325 MG tablet    TYLENOL    100 tablet    Take 2 tablets (650 mg) by mouth every 6 hours       cholecalciferol 5000 UNITS Caps      Take 1 capsule by mouth daily.       OMEGA-3 FISH OIL PO      Take 1 g by mouth       PRENATAL VITAMINS PO      Take  by mouth daily.         "

## 2017-05-01 NOTE — NURSING NOTE
Chief Complaint   Patient presents with     Prenatal Care     JANETT 38 weeks ,  consult   Elicia Domingo LPN

## 2017-05-01 NOTE — LETTER
"2017       RE: Amber Buckner  [address] [place] [address]     Dear Colleague,    Thank you for referring your patient, Amber Buckner, to the [facility] at [facility]. Please see a copy of my visit note below.    Subjective:     31 year old  at 38w0d presents for routine prenatal visit.           Denies vaginal bleeding or leakage of fluid.  Irregular, mild contractions intermittently.  + fetal movement, no changes in fetal movement.        No HA, visual changes, RUQ or epigastric pain.   Patient concerns: none.  She called in about ten days ago worrying about increased vaginal fluid, but says that has resolved and she has not had any increased fluid or bleeding since then. Feeling well overall. C/S scheduled for next Monday, 2017.    Objective:  Vitals:    17 1626   BP: 113/72   Pulse: 96   Weight: 66.7 kg (147 lb)   Height: 1.575 m (5' 2\")      Consitutional: pregnant female, pleasant, NAD  Resp: lungs clear to auscultation bilaterally, no increased work of breathing  CV: RRR. Normal S1, S2  Abdomen: abdomen is soft and non-tender.  Midline scar and scar in RLQ.  Fundus well above umbilicus    FHR: 140s    See OB flowsheet  Assessment/Plan  Amber Buckner is a 31 year old  female with history of UC s/p total colectomy with J pouch creation and two previous LTCS with vertical midline incision who presents for JANETT at 38w2d.    -GBS +; discussed what to do if water breaks prior to C/S  - Reviewed why/how to contact provider if headache/visual changes/RUQ or epigastric pain, decreased fetal movement, vaginal bleeding, leakage of fluid or strong/regular contractions.    -C/S scheduled 2017.   -reviewed C/S procedure, possible skin incisions, and anesthesia     I, Huseyin Mejia-MS3, served as a scribe for Dr. Montoya during this encounter.      Provider Disclosure:  I agree with above History, " Review of Systems, Physical exam and Plan.  I have reviewed the content of the documentation and have edited it as needed. I have personally performed the services documented here and the documentation accurately represents those services and the decisions I have made.      Electronically signed by:    I agree with the PFSH and ROS as completed by the MS, except for changes made by me. The remainder of the encounter was performed by me and scribed by the MS. The scribed note accurately reflects my personal services and the decisions made by me.    Laurie Montoya MD, FACOG  [facility]  OB/GYN    5/2/2017  3:43 PM

## 2017-05-04 NOTE — TELEPHONE ENCOUNTER
Call Regarding Onboarding [employer]    Attempt 3    Message on voicemail     Comments:       Outreach   Lou Padron

## 2017-05-06 ENCOUNTER — ANESTHESIA EVENT (OUTPATIENT)
Dept: OBGYN | Facility: CLINIC | Age: 32
End: 2017-05-06
Payer: COMMERCIAL

## 2017-05-07 NOTE — ANESTHESIA PREPROCEDURE EVALUATION
Anesthesia Evaluation     . Pt has had prior anesthetic. Type: MAC, Regional and General    History of anesthetic complications   - PONV        ROS/MED HX    ENT/Pulmonary:  - neg pulmonary ROS     Neurologic:  - neg neurologic ROS     Cardiovascular:  - neg cardiovascular ROS   (+) ----. : . . . :. . No previous cardiac testing       METS/Exercise Tolerance:  >4 METS   Hematologic:  - neg hematologic  ROS       Musculoskeletal:  - neg musculoskeletal ROS       GI/Hepatic:     (+) GERD Inflammatory bowel disease (Ulcerative Colitis s/p total colectomy with J-pouch),       Renal/Genitourinary:  - ROS Renal section negative       Endo:     (+) Other Endocrine Disorder (Polycystic Ovarian Syndrome) .      Psychiatric:     (+) psychiatric history depression and anxiety      Infectious Disease:   (+) Other Infectious Disease (+GBS)       Malignancy:      - no malignancy   Other: Comment:     31 year old      Prior  x2 (last ) LTCS with vertical midline incision                         Physical Exam  Normal systems: dental    Airway   Mallampati: I  TM distance: >3 FB  Neck ROM: full    Dental     Cardiovascular   Rhythm and rate: regular and normal      Pulmonary    breath sounds clear to auscultation                    Anesthesia Plan      History & Physical Review  History and physical reviewed and following examination; no interval change.    ASA Status:  2 .    NPO Status:  > 8 hours    Plan for Spinal   PONV prophylaxis:  Ondansetron (or other 5HT-3)       Postoperative Care  Postoperative pain management:  IV analgesics and Neuraxial analgesia.      Consents  Anesthetic plan, risks, benefits and alternatives discussed with:  Patient.  Use of blood products discussed: Yes.   Use of blood products discussed with Patient.  Consented to blood products.  .        ________________________________________________________________    Assessment: Amber Murdock Sita is a 31 year old female  who is  scheduled for Procedure(s):  Repeat  Section  - Wound Class:  with Dr. Montoya.    Plan: To be discussed with staff.   - Spinal with General Anesthesia as Backup with standard ASA monitors  - Monitoring and Access: PIV  - Previous Anesthesia:  x2, GA, Sedation with reported PONV in chart  - Antibiotics per surgery  - PONV prophylaxis  - Consent for TAP Blocks    Willian Guevara, CA-1  [phone]    Procedure: Procedure(s):  Repeat  Section  - Wound Class:     PMHx/PSHx:  Past Medical History:   Diagnosis Date     Adolescent depression in college     Anxiety      Fitting and adjustment of other gastrointestinal appliance and device      Perineal abscess      PONV (postoperative nausea and vomiting)      S/P total colectomy      Ulcerative colitis      Past Surgical History:   Procedure Laterality Date      SECTION  2012    Procedure: SECTION; Surgeon:JAVI BELL; [facility]      SECTION N/A 2014    Procedure:  SECTION;  Surgeon: Shawanda Luna MD;  [facility]     COLECTOMY SUBTOTAL       COLONOSCOPY N/A 4/15/2015    Procedure: COLONOSCOPY;  Surgeon: Al Santo MD;  [facility]     EXAM UNDER ANESTHESIA ANUS N/A 4/15/2015    Procedure: EXAM UNDER ANESTHESIA ANUS;  Surgeon: Al Santo MD;  [facility]     FISTULOTOMY RECTUM N/A 4/15/2015    Procedure: FISTULOTOMY RECTUM;  Surgeon: Al Santo MD;  [facility]     TAKEDOWN ILEOSTOMY       tonsilectomy       TRANSPERINEAL REPAIR FISTULA RECTAL URETHRAL       Social History   Substance Use Topics     Smoking status: Never Smoker     Smokeless tobacco: Never Used     Alcohol use No      Comment: not since pregnancy     No Known Allergies  No prescriptions prior to admission.     Current Outpatient Prescriptions   Medication Sig Dispense Refill     Omega-3 Fatty Acids (OMEGA-3 FISH OIL PO) Take 1 g by mouth       acetaminophen (TYLENOL) 325 MG  tablet Take 2 tablets (650 mg) by mouth every 6 hours 100 tablet 0     cholecalciferol 5000 UNITS CAPS Take 1 capsule by mouth daily.        PRENATAL VITAMINS PO Take  by mouth daily.       Objective:   Lab Results   Component Value Date    WBC 5.9 02/23/2017    HGB 11.0 (L) 02/23/2017    HCT 33.3 (L) 02/23/2017     02/23/2017     07/26/2016    POTASSIUM 4.3 07/26/2016    CHLORIDE 103 07/26/2016    CO2 24 07/26/2016    BUN 17 07/26/2016    CR 0.72 07/26/2016     (H) 07/26/2016    AST 10 11/04/2014    ALT 12 11/04/2014    ALKPHOS 78 11/04/2014    BILITOTAL 0.5 11/04/2014     I have personally seen and evaluated patient.  Agree with above assessment and plan as documented by anesthesia resident.  Plan for IT, Duramorph and TAP blocks with Exparel.    Vanda Gaspar MD  May 8, 2017

## 2017-05-08 ENCOUNTER — HOSPITAL ENCOUNTER (INPATIENT)
Facility: CLINIC | Age: 32
LOS: 6 days | Discharge: HOME | End: 2017-05-14
Attending: OBSTETRICS & GYNECOLOGY | Admitting: OBSTETRICS & GYNECOLOGY
Payer: COMMERCIAL

## 2017-05-08 ENCOUNTER — ANESTHESIA (OUTPATIENT)
Dept: OBGYN | Facility: CLINIC | Age: 32
End: 2017-05-08
Payer: COMMERCIAL

## 2017-05-08 ENCOUNTER — SURGERY (OUTPATIENT)
Age: 32
End: 2017-05-08

## 2017-05-08 DIAGNOSIS — F41.8 DEPRESSION WITH ANXIETY: ICD-10-CM

## 2017-05-08 DIAGNOSIS — Z98.891 S/P CESAREAN SECTION: Primary | ICD-10-CM

## 2017-05-08 LAB
ABO + RH BLD: NORMAL
ABO + RH BLD: NORMAL
BLD GP AB SCN SERPL QL: NORMAL
BLOOD BANK CMNT PATIENT-IMP: NORMAL
HGB BLD-MCNC: 10.1 G/DL (ref 11.7–15.7)
SPECIMEN EXP DATE BLD: NORMAL
T PALLIDUM IGG+IGM SER QL: NEGATIVE

## 2017-05-08 PROCEDURE — 12000030 ZZH R&B OB INTERMEDIATE UMMC

## 2017-05-08 PROCEDURE — 25000128 H RX IP 250 OP 636: Performed by: NURSE ANESTHETIST, CERTIFIED REGISTERED

## 2017-05-08 PROCEDURE — 86900 BLOOD TYPING SEROLOGIC ABO: CPT | Performed by: OBSTETRICS & GYNECOLOGY

## 2017-05-08 PROCEDURE — 25000128 H RX IP 250 OP 636: Performed by: STUDENT IN AN ORGANIZED HEALTH CARE EDUCATION/TRAINING PROGRAM

## 2017-05-08 PROCEDURE — 25000125 ZZHC RX 250: Performed by: ANESTHESIOLOGY

## 2017-05-08 PROCEDURE — 71000014 ZZH RECOVERY PHASE 1 LEVEL 2 FIRST HR: Performed by: OBSTETRICS & GYNECOLOGY

## 2017-05-08 PROCEDURE — 25000125 ZZHC RX 250: Performed by: NURSE ANESTHETIST, CERTIFIED REGISTERED

## 2017-05-08 PROCEDURE — 25000132 ZZH RX MED GY IP 250 OP 250 PS 637: Performed by: OBSTETRICS & GYNECOLOGY

## 2017-05-08 PROCEDURE — 85018 HEMOGLOBIN: CPT | Performed by: OBSTETRICS & GYNECOLOGY

## 2017-05-08 PROCEDURE — 25000125 ZZHC RX 250: Performed by: OBSTETRICS & GYNECOLOGY

## 2017-05-08 PROCEDURE — 40000010 ZZH STATISTIC ANES STAT CODE-CRNA PER MINUTE: Performed by: OBSTETRICS & GYNECOLOGY

## 2017-05-08 PROCEDURE — 86901 BLOOD TYPING SEROLOGIC RH(D): CPT | Performed by: OBSTETRICS & GYNECOLOGY

## 2017-05-08 PROCEDURE — 36415 COLL VENOUS BLD VENIPUNCTURE: CPT | Performed by: OBSTETRICS & GYNECOLOGY

## 2017-05-08 PROCEDURE — C9290 INJ, BUPIVACAINE LIPOSOME: HCPCS | Performed by: STUDENT IN AN ORGANIZED HEALTH CARE EDUCATION/TRAINING PROGRAM

## 2017-05-08 PROCEDURE — 40000170 ZZH STATISTIC PRE-PROCEDURE ASSESSMENT II: Performed by: OBSTETRICS & GYNECOLOGY

## 2017-05-08 PROCEDURE — 27210794 ZZH OR GENERAL SUPPLY STERILE: Performed by: OBSTETRICS & GYNECOLOGY

## 2017-05-08 PROCEDURE — 86780 TREPONEMA PALLIDUM: CPT | Performed by: OBSTETRICS & GYNECOLOGY

## 2017-05-08 PROCEDURE — 37000009 ZZH ANESTHESIA TECHNICAL FEE, EACH ADDTL 15 MIN: Performed by: OBSTETRICS & GYNECOLOGY

## 2017-05-08 PROCEDURE — 27110028 ZZH OR GENERAL SUPPLY NON-STERILE: Performed by: OBSTETRICS & GYNECOLOGY

## 2017-05-08 PROCEDURE — 86850 RBC ANTIBODY SCREEN: CPT | Performed by: OBSTETRICS & GYNECOLOGY

## 2017-05-08 PROCEDURE — 25000128 H RX IP 250 OP 636: Performed by: OBSTETRICS & GYNECOLOGY

## 2017-05-08 PROCEDURE — 36000057 ZZH SURGERY LEVEL 3 1ST 30 MIN - UMMC: Performed by: OBSTETRICS & GYNECOLOGY

## 2017-05-08 PROCEDURE — 71000015 ZZH RECOVERY PHASE 1 LEVEL 2 EA ADDTL HR: Performed by: OBSTETRICS & GYNECOLOGY

## 2017-05-08 PROCEDURE — 25800025 ZZH RX 258: Performed by: NURSE ANESTHETIST, CERTIFIED REGISTERED

## 2017-05-08 PROCEDURE — 36000059 ZZH SURGERY LEVEL 3 EA 15 ADDTL MIN UMMC: Performed by: OBSTETRICS & GYNECOLOGY

## 2017-05-08 PROCEDURE — 25800025 ZZH RX 258

## 2017-05-08 PROCEDURE — 25800025 ZZH RX 258: Performed by: OBSTETRICS & GYNECOLOGY

## 2017-05-08 PROCEDURE — 37000008 ZZH ANESTHESIA TECHNICAL FEE, 1ST 30 MIN: Performed by: OBSTETRICS & GYNECOLOGY

## 2017-05-08 PROCEDURE — 25000125 ZZHC RX 250: Performed by: STUDENT IN AN ORGANIZED HEALTH CARE EDUCATION/TRAINING PROGRAM

## 2017-05-08 RX ORDER — CITRIC ACID/SODIUM CITRATE 334-500MG
30 SOLUTION, ORAL ORAL
Status: DISCONTINUED | OUTPATIENT
Start: 2017-05-08 | End: 2017-05-08 | Stop reason: HOSPADM

## 2017-05-08 RX ORDER — ACETAMINOPHEN 325 MG/1
975 TABLET ORAL EVERY 8 HOURS
Status: DISPENSED | OUTPATIENT
Start: 2017-05-08 | End: 2017-05-11

## 2017-05-08 RX ORDER — NALOXONE HYDROCHLORIDE 0.4 MG/ML
.1-.4 INJECTION, SOLUTION INTRAMUSCULAR; INTRAVENOUS; SUBCUTANEOUS
Status: DISCONTINUED | OUTPATIENT
Start: 2017-05-08 | End: 2017-05-11

## 2017-05-08 RX ORDER — CEFAZOLIN SODIUM 1 G/3ML
1 INJECTION, POWDER, FOR SOLUTION INTRAMUSCULAR; INTRAVENOUS
Status: DISCONTINUED | OUTPATIENT
Start: 2017-05-08 | End: 2017-05-08 | Stop reason: HOSPADM

## 2017-05-08 RX ORDER — OXYCODONE HYDROCHLORIDE 5 MG/1
5-10 TABLET ORAL EVERY 4 HOURS PRN
Qty: 25 TABLET | Refills: 0 | Status: SHIPPED | OUTPATIENT
Start: 2017-05-08 | End: 2017-06-22

## 2017-05-08 RX ORDER — SODIUM CHLORIDE, SODIUM LACTATE, POTASSIUM CHLORIDE, CALCIUM CHLORIDE 600; 310; 30; 20 MG/100ML; MG/100ML; MG/100ML; MG/100ML
INJECTION, SOLUTION INTRAVENOUS CONTINUOUS PRN
Status: DISCONTINUED | OUTPATIENT
Start: 2017-05-08 | End: 2017-05-08

## 2017-05-08 RX ORDER — FLUMAZENIL 0.1 MG/ML
0.2 INJECTION, SOLUTION INTRAVENOUS
Status: DISCONTINUED | OUTPATIENT
Start: 2017-05-08 | End: 2017-05-08 | Stop reason: HOSPADM

## 2017-05-08 RX ORDER — OXYTOCIN 10 [USP'U]/ML
10 INJECTION, SOLUTION INTRAMUSCULAR; INTRAVENOUS
Status: DISCONTINUED | OUTPATIENT
Start: 2017-05-08 | End: 2017-05-14 | Stop reason: HOSPADM

## 2017-05-08 RX ORDER — MORPHINE SULFATE 1 MG/ML
INJECTION, SOLUTION EPIDURAL; INTRATHECAL; INTRAVENOUS PRN
Status: DISCONTINUED | OUTPATIENT
Start: 2017-05-08 | End: 2017-05-08

## 2017-05-08 RX ORDER — BUPIVACAINE HYDROCHLORIDE 7.5 MG/ML
INJECTION, SOLUTION INTRASPINAL PRN
Status: DISCONTINUED | OUTPATIENT
Start: 2017-05-08 | End: 2017-05-08

## 2017-05-08 RX ORDER — ONDANSETRON 2 MG/ML
INJECTION INTRAMUSCULAR; INTRAVENOUS PRN
Status: DISCONTINUED | OUTPATIENT
Start: 2017-05-08 | End: 2017-05-08

## 2017-05-08 RX ORDER — OXYTOCIN/0.9 % SODIUM CHLORIDE 30/500 ML
PLASTIC BAG, INJECTION (ML) INTRAVENOUS CONTINUOUS PRN
Status: DISCONTINUED | OUTPATIENT
Start: 2017-05-08 | End: 2017-05-08

## 2017-05-08 RX ORDER — ACETAMINOPHEN 325 MG/1
650 TABLET ORAL EVERY 4 HOURS PRN
Status: DISCONTINUED | OUTPATIENT
Start: 2017-05-11 | End: 2017-05-13

## 2017-05-08 RX ORDER — ONDANSETRON 2 MG/ML
4 INJECTION INTRAMUSCULAR; INTRAVENOUS EVERY 6 HOURS PRN
Status: DISCONTINUED | OUTPATIENT
Start: 2017-05-08 | End: 2017-05-10

## 2017-05-08 RX ORDER — NALOXONE HYDROCHLORIDE 0.4 MG/ML
.1-.4 INJECTION, SOLUTION INTRAMUSCULAR; INTRAVENOUS; SUBCUTANEOUS
Status: DISCONTINUED | OUTPATIENT
Start: 2017-05-08 | End: 2017-05-14 | Stop reason: HOSPADM

## 2017-05-08 RX ORDER — ONDANSETRON 4 MG/1
4 TABLET, ORALLY DISINTEGRATING ORAL EVERY 30 MIN PRN
Status: DISCONTINUED | OUTPATIENT
Start: 2017-05-08 | End: 2017-05-08 | Stop reason: HOSPADM

## 2017-05-08 RX ORDER — OXYCODONE HYDROCHLORIDE 5 MG/1
5-10 TABLET ORAL
Status: DISCONTINUED | OUTPATIENT
Start: 2017-05-08 | End: 2017-05-13

## 2017-05-08 RX ORDER — FENTANYL CITRATE 50 UG/ML
25-50 INJECTION, SOLUTION INTRAMUSCULAR; INTRAVENOUS
Status: DISCONTINUED | OUTPATIENT
Start: 2017-05-08 | End: 2017-05-08 | Stop reason: HOSPADM

## 2017-05-08 RX ORDER — AMOXICILLIN 250 MG
1-2 CAPSULE ORAL 2 TIMES DAILY PRN
Qty: 50 TABLET | Refills: 1 | Status: SHIPPED | OUTPATIENT
Start: 2017-05-08 | End: 2017-06-22

## 2017-05-08 RX ORDER — SODIUM CHLORIDE, SODIUM LACTATE, POTASSIUM CHLORIDE, CALCIUM CHLORIDE 600; 310; 30; 20 MG/100ML; MG/100ML; MG/100ML; MG/100ML
INJECTION, SOLUTION INTRAVENOUS CONTINUOUS
Status: DISCONTINUED | OUTPATIENT
Start: 2017-05-08 | End: 2017-05-08 | Stop reason: HOSPADM

## 2017-05-08 RX ORDER — ONDANSETRON 2 MG/ML
4 INJECTION INTRAMUSCULAR; INTRAVENOUS EVERY 30 MIN PRN
Status: DISCONTINUED | OUTPATIENT
Start: 2017-05-08 | End: 2017-05-08 | Stop reason: HOSPADM

## 2017-05-08 RX ORDER — MORPHINE SULFATE 1 MG/ML
0.15 INJECTION, SOLUTION EPIDURAL; INTRATHECAL; INTRAVENOUS ONCE
Status: DISCONTINUED | OUTPATIENT
Start: 2017-05-08 | End: 2017-05-14 | Stop reason: HOSPADM

## 2017-05-08 RX ORDER — DEXTROSE, SODIUM CHLORIDE, SODIUM LACTATE, POTASSIUM CHLORIDE, AND CALCIUM CHLORIDE 5; .6; .31; .03; .02 G/100ML; G/100ML; G/100ML; G/100ML; G/100ML
INJECTION, SOLUTION INTRAVENOUS CONTINUOUS
Status: DISCONTINUED | OUTPATIENT
Start: 2017-05-08 | End: 2017-05-10

## 2017-05-08 RX ORDER — CEFAZOLIN SODIUM 2 G/100ML
2 INJECTION, SOLUTION INTRAVENOUS
Status: COMPLETED | OUTPATIENT
Start: 2017-05-08 | End: 2017-05-08

## 2017-05-08 RX ORDER — PRENATAL VIT/IRON FUM/FOLIC AC 27MG-0.8MG
1 TABLET ORAL DAILY
Status: DISCONTINUED | OUTPATIENT
Start: 2017-05-08 | End: 2017-05-11

## 2017-05-08 RX ORDER — BUPIVACAINE HYDROCHLORIDE AND EPINEPHRINE 2.5; 5 MG/ML; UG/ML
INJECTION, SOLUTION INFILTRATION; PERINEURAL PRN
Status: DISCONTINUED | OUTPATIENT
Start: 2017-05-08 | End: 2017-05-08

## 2017-05-08 RX ORDER — NALBUPHINE HYDROCHLORIDE 10 MG/ML
2.5-5 INJECTION, SOLUTION INTRAMUSCULAR; INTRAVENOUS; SUBCUTANEOUS EVERY 6 HOURS PRN
Status: DISCONTINUED | OUTPATIENT
Start: 2017-05-08 | End: 2017-05-14 | Stop reason: HOSPADM

## 2017-05-08 RX ORDER — AMOXICILLIN 250 MG
1-2 CAPSULE ORAL 2 TIMES DAILY
Status: DISCONTINUED | OUTPATIENT
Start: 2017-05-08 | End: 2017-05-11

## 2017-05-08 RX ORDER — SIMETHICONE 80 MG
80 TABLET,CHEWABLE ORAL 4 TIMES DAILY PRN
Status: DISCONTINUED | OUTPATIENT
Start: 2017-05-08 | End: 2017-05-10

## 2017-05-08 RX ORDER — LIDOCAINE 40 MG/G
CREAM TOPICAL
Status: DISCONTINUED | OUTPATIENT
Start: 2017-05-08 | End: 2017-05-14 | Stop reason: HOSPADM

## 2017-05-08 RX ORDER — EPHEDRINE SULFATE 50 MG/ML
5 INJECTION, SOLUTION INTRAMUSCULAR; INTRAVENOUS; SUBCUTANEOUS
Status: DISCONTINUED | OUTPATIENT
Start: 2017-05-08 | End: 2017-05-14 | Stop reason: HOSPADM

## 2017-05-08 RX ORDER — DIPHENHYDRAMINE HYDROCHLORIDE 50 MG/ML
25 INJECTION INTRAMUSCULAR; INTRAVENOUS EVERY 6 HOURS PRN
Status: DISCONTINUED | OUTPATIENT
Start: 2017-05-08 | End: 2017-05-14 | Stop reason: HOSPADM

## 2017-05-08 RX ORDER — LIDOCAINE 40 MG/G
CREAM TOPICAL
Status: DISCONTINUED | OUTPATIENT
Start: 2017-05-08 | End: 2017-05-11

## 2017-05-08 RX ORDER — FENTANYL CITRATE 50 UG/ML
INJECTION, SOLUTION INTRAMUSCULAR; INTRAVENOUS PRN
Status: DISCONTINUED | OUTPATIENT
Start: 2017-05-08 | End: 2017-05-08

## 2017-05-08 RX ORDER — OXYTOCIN/0.9 % SODIUM CHLORIDE 30/500 ML
100 PLASTIC BAG, INJECTION (ML) INTRAVENOUS CONTINUOUS
Status: DISCONTINUED | OUTPATIENT
Start: 2017-05-08 | End: 2017-05-11

## 2017-05-08 RX ORDER — HYDROMORPHONE HYDROCHLORIDE 1 MG/ML
.3-.5 INJECTION, SOLUTION INTRAMUSCULAR; INTRAVENOUS; SUBCUTANEOUS EVERY 30 MIN PRN
Status: DISCONTINUED | OUTPATIENT
Start: 2017-05-08 | End: 2017-05-14 | Stop reason: HOSPADM

## 2017-05-08 RX ORDER — DIPHENHYDRAMINE HCL 25 MG
25 CAPSULE ORAL EVERY 6 HOURS PRN
Status: DISCONTINUED | OUTPATIENT
Start: 2017-05-08 | End: 2017-05-14 | Stop reason: HOSPADM

## 2017-05-08 RX ORDER — NALOXONE HYDROCHLORIDE 0.4 MG/ML
.1-.4 INJECTION, SOLUTION INTRAMUSCULAR; INTRAVENOUS; SUBCUTANEOUS
Status: DISCONTINUED | OUTPATIENT
Start: 2017-05-08 | End: 2017-05-08 | Stop reason: HOSPADM

## 2017-05-08 RX ORDER — SODIUM CHLORIDE, SODIUM LACTATE, POTASSIUM CHLORIDE, CALCIUM CHLORIDE 600; 310; 30; 20 MG/100ML; MG/100ML; MG/100ML; MG/100ML
INJECTION, SOLUTION INTRAVENOUS
Status: COMPLETED
Start: 2017-05-08 | End: 2017-05-08

## 2017-05-08 RX ORDER — HYDROCORTISONE 2.5 %
CREAM (GRAM) TOPICAL 3 TIMES DAILY PRN
Status: DISCONTINUED | OUTPATIENT
Start: 2017-05-08 | End: 2017-05-14 | Stop reason: HOSPADM

## 2017-05-08 RX ORDER — LANOLIN 100 %
OINTMENT (GRAM) TOPICAL
Status: DISCONTINUED | OUTPATIENT
Start: 2017-05-08 | End: 2017-05-14 | Stop reason: HOSPADM

## 2017-05-08 RX ORDER — MISOPROSTOL 200 UG/1
400 TABLET ORAL
Status: DISCONTINUED | OUTPATIENT
Start: 2017-05-08 | End: 2017-05-14 | Stop reason: HOSPADM

## 2017-05-08 RX ORDER — OXYTOCIN/0.9 % SODIUM CHLORIDE 30/500 ML
340 PLASTIC BAG, INJECTION (ML) INTRAVENOUS CONTINUOUS PRN
Status: DISCONTINUED | OUTPATIENT
Start: 2017-05-08 | End: 2017-05-14 | Stop reason: HOSPADM

## 2017-05-08 RX ORDER — BISACODYL 10 MG
10 SUPPOSITORY, RECTAL RECTAL DAILY PRN
Status: DISCONTINUED | OUTPATIENT
Start: 2017-05-10 | End: 2017-05-14 | Stop reason: HOSPADM

## 2017-05-08 RX ADMIN — BUPIVACAINE 20 ML: 13.3 INJECTION, SUSPENSION, LIPOSOMAL INFILTRATION at 12:05

## 2017-05-08 RX ADMIN — PHENYLEPHRINE HYDROCHLORIDE 100 MCG: 10 INJECTION, SOLUTION INTRAMUSCULAR; INTRAVENOUS; SUBCUTANEOUS at 10:32

## 2017-05-08 RX ADMIN — SODIUM CHLORIDE, POTASSIUM CHLORIDE, SODIUM LACTATE AND CALCIUM CHLORIDE: 600; 310; 30; 20 INJECTION, SOLUTION INTRAVENOUS at 06:26

## 2017-05-08 RX ADMIN — PHENYLEPHRINE HYDROCHLORIDE 100 MCG: 10 INJECTION, SOLUTION INTRAMUSCULAR; INTRAVENOUS; SUBCUTANEOUS at 10:01

## 2017-05-08 RX ADMIN — NALBUPHINE HYDROCHLORIDE 2.5 MG: 10 INJECTION, SOLUTION INTRAMUSCULAR; INTRAVENOUS; SUBCUTANEOUS at 12:57

## 2017-05-08 RX ADMIN — BUPIVACAINE HYDROCHLORIDE AND EPINEPHRINE BITARTRATE 20 ML: 2.5; .005 INJECTION, SOLUTION INFILTRATION; PERINEURAL at 12:05

## 2017-05-08 RX ADMIN — ONDANSETRON 4 MG: 2 INJECTION INTRAMUSCULAR; INTRAVENOUS at 17:54

## 2017-05-08 RX ADMIN — PHENYLEPHRINE HYDROCHLORIDE 100 MCG: 10 INJECTION, SOLUTION INTRAMUSCULAR; INTRAVENOUS; SUBCUTANEOUS at 09:59

## 2017-05-08 RX ADMIN — PHENYLEPHRINE HYDROCHLORIDE 150 MCG: 10 INJECTION, SOLUTION INTRAMUSCULAR; INTRAVENOUS; SUBCUTANEOUS at 10:00

## 2017-05-08 RX ADMIN — CEFAZOLIN SODIUM 2 G: 2 INJECTION, SOLUTION INTRAVENOUS at 08:22

## 2017-05-08 RX ADMIN — PHENYLEPHRINE HYDROCHLORIDE 100 MCG: 10 INJECTION, SOLUTION INTRAMUSCULAR; INTRAVENOUS; SUBCUTANEOUS at 10:16

## 2017-05-08 RX ADMIN — BUPIVACAINE HYDROCHLORIDE IN DEXTROSE 1.6 ML: 7.5 INJECTION, SOLUTION SUBARACHNOID at 09:52

## 2017-05-08 RX ADMIN — ONDANSETRON 4 MG: 2 INJECTION INTRAMUSCULAR; INTRAVENOUS at 11:19

## 2017-05-08 RX ADMIN — OXYTOCIN-SODIUM CHLORIDE 0.9% IV SOLN 30 UNIT/500ML 100 ML/HR: 30-0.9/5 SOLUTION at 14:09

## 2017-05-08 RX ADMIN — NALBUPHINE HYDROCHLORIDE 2.5 MG: 10 INJECTION, SOLUTION INTRAMUSCULAR; INTRAVENOUS; SUBCUTANEOUS at 12:21

## 2017-05-08 RX ADMIN — PHENYLEPHRINE HYDROCHLORIDE 50 MCG: 10 INJECTION, SOLUTION INTRAMUSCULAR; INTRAVENOUS; SUBCUTANEOUS at 09:58

## 2017-05-08 RX ADMIN — PHENYLEPHRINE HYDROCHLORIDE 50 MCG: 10 INJECTION, SOLUTION INTRAMUSCULAR; INTRAVENOUS; SUBCUTANEOUS at 10:10

## 2017-05-08 RX ADMIN — ACETAMINOPHEN 975 MG: 325 TABLET, FILM COATED ORAL at 23:14

## 2017-05-08 RX ADMIN — ONDANSETRON 4 MG: 2 INJECTION INTRAMUSCULAR; INTRAVENOUS at 14:31

## 2017-05-08 RX ADMIN — SODIUM CHLORIDE, SODIUM LACTATE, POTASSIUM CHLORIDE, CALCIUM CHLORIDE AND DEXTROSE MONOHYDRATE: 5; 600; 310; 30; 20 INJECTION, SOLUTION INTRAVENOUS at 19:25

## 2017-05-08 RX ADMIN — OXYTOCIN-SODIUM CHLORIDE 0.9% IV SOLN 30 UNIT/500ML 300 ML/HR: 30-0.9/5 SOLUTION at 10:22

## 2017-05-08 RX ADMIN — FENTANYL CITRATE 15 MCG: 50 INJECTION, SOLUTION INTRAMUSCULAR; INTRAVENOUS at 09:52

## 2017-05-08 RX ADMIN — ACETAMINOPHEN 975 MG: 325 TABLET, FILM COATED ORAL at 15:29

## 2017-05-08 RX ADMIN — CEFAZOLIN SODIUM 1 G: 2 INJECTION, SOLUTION INTRAVENOUS at 09:55

## 2017-05-08 RX ADMIN — MORPHINE SULFATE 0.15 MG: 1 INJECTION EPIDURAL; INTRATHECAL; INTRAVENOUS at 09:52

## 2017-05-08 RX ADMIN — SODIUM CHLORIDE, POTASSIUM CHLORIDE, SODIUM LACTATE AND CALCIUM CHLORIDE: 600; 310; 30; 20 INJECTION, SOLUTION INTRAVENOUS at 09:40

## 2017-05-08 RX ADMIN — PHENYLEPHRINE HYDROCHLORIDE 0.5 MCG/KG/MIN: 10 INJECTION, SOLUTION INTRAMUSCULAR; INTRAVENOUS; SUBCUTANEOUS at 09:52

## 2017-05-08 RX ADMIN — SODIUM CHLORIDE, POTASSIUM CHLORIDE, SODIUM LACTATE AND CALCIUM CHLORIDE: 600; 310; 30; 20 INJECTION, SOLUTION INTRAVENOUS at 09:36

## 2017-05-08 RX ADMIN — PHENYLEPHRINE HYDROCHLORIDE 100 MCG: 10 INJECTION, SOLUTION INTRAMUSCULAR; INTRAVENOUS; SUBCUTANEOUS at 10:28

## 2017-05-08 RX ADMIN — SENNOSIDES AND DOCUSATE SODIUM 1 TABLET: 8.6; 5 TABLET ORAL at 19:25

## 2017-05-08 NOTE — PROGRESS NOTES
Pt was OB OR 1 and prepped for spinal.  Returned to T1 via wheelchair and placed on monitor.  Case is pending an emergent need.  Pt is with her  and mother.  Belongings remained with  throughout.

## 2017-05-08 NOTE — ANESTHESIA PROCEDURE NOTES
Peripheral Nerve Block Procedure Note    Staff:     Anesthesiologist:  RAEANN STEVE    Resident/CRNA:  DOUGLAS LOPES    Block performed by resident/CRNA in the presence of a teaching physician    Location: PACU  Procedure Start/Stop TImes:      5/8/2017 12:00 PM     5/8/2017 12:10 PM    patient identified, IV checked, site marked, risks and benefits discussed, informed consent, monitors and equipment checked, pre-op evaluation, at physician/surgeon's request and post-op pain management      Correct Patient: Yes      Correct Position: Yes      Correct Site: Yes      Correct Procedure: Yes      Correct Laterality:  Yes    Site Marked:  Yes  Procedure details:     Procedure:  TAP    ASA:  2    Diagnosis:  Post op pain    Laterality:  Bilateral    Position:  Supine    Sterile Prep: chloraprep, mask and sterile gloves      Needle:  Short bevel    Needle gauge:  22    Needle length (inches):  4    Ultrasound: Yes      Ultrasound used to identify targeted nerve, plexus, or vascular structure and placed a needle adjacent to it      Permanent Image entered into patiient's record      Abnormal pain on injection: No      Blood Aspirated: No      Paresthesias:  No    Bleeding at site: No      Infusion Method:  Single Shot    Complications:  None

## 2017-05-08 NOTE — OP NOTE
Section Operative Report    Surgery Date: 2017     Pre-op Diagnosis:  1. Intrauterine pregnancy at 39w0d  2. Ulcerative colitis s/p total colectomy with J-pouch  3. 2x LTCS with vertical midline incision with last CS     Post-op Diagnosis:  1. Same   2. Liveborn female infant      Procedure:  Repeat low-transverse  section with double layer uterine closure via Vertical Midline incision     Surgeon:  Dr. Montoya  Assistants:  Suly Rene MD MPH, PGY-2     Anesthesia: Spinal with duramorph and TAP blocks     EBL:  00 mL  IVF:  1500 mL crystalloid  UOP:  250 mL clear urine at the end of the case  Drains: Rosado Catheter      Findings:   1. Liveborn female infant in ROT presentation delivered at 1021 on 2017. Apgars 9 at 1 minute & 9 at 5 minutes. Weight 3714g.  2. Clear amniotic fluid  3. Placenta intact and with 3-vessel cord.   4. Moderate fascial-rectal adhesions with more extensive adhesions inferiorly. Minor bladder adhesions to lower uterine segment that did not require take down prior to hysterotomy. Minor 2cm serosal defect on anterior aspect of bladder repaired with running stitch of 3-0 vicryl.   5. Normal uterus, fallopian tubes, and ovaries.      Specimens: none     Complications: None apparent    Indications:  Amber Buckner is a 31 year old  at 39w0d by LMP c/w 7w3d US who presented for a repeat  section for history of ulcerative colitis s/p total colectomy with J-pouch and prior  section x2.  She was counseled on the risks and benefits of the procedure and agreed to proceed.  She signed written consent at bedside.     Procedures:  The patient was taken to the operating room where spinal anesthesia was placed and found to be adequate. 2 gms of Ancef was given by anesthesia, however there was a two hour delay to the start of the procedure so another 1g of ancef was rebolused at the time of surgery. SCDs and a rosado catheter were placed.  The patient was  then prepared and draped in the normal sterile fashion in the dorsal supine position with a leftward tilt.  A safety timeout was performed. A vertical mildine skin incision was made with the scalpel from the umbilicus to a 1cm above the pubic symphysis and carried through to the underlying layer of fascia and dense adhesions with electrocautery. During the careful dissection through the fascia-rectal adhesions, the peritoneum was entered.  The peritoneal opening was extended carefully using a dee to dissect and electrocautery to extend the incision. The rectus muscles were then  in the midline, and the peritoneum extended bluntly and sharply with electrocautery.  The bladder blade was inserted with good visualization and the bladder away from the proposed site of the hysterotomy.  The lower uterine segment was incised in a transverse fashion with the scalpel. The uterine incision was then extended bluntly in a cranial caudad fashion. The bladder blade was removed and the infant's head delivered atraumatically with fundal pressure from above. The cord was clamped and cut in a delayed fashion.  The infant was handed off to the waiting pediatrics nurse. A segment of cord was cut and clamped and held for for cord gases and blood.    The placenta was then removed with traction on the umbilical cord. The uterus was exteriorized and cleared of all clots and debris with a wet lap sponge.  20 units of IV pitocin was given by anesthesia with good uterine tone.  The uterine incision was repaired with 0-vicryl in a running, locked fashion. 0-Monocryl was used for a second imbricating layer.  The posterior cul-de-sac was suctioned to clear all clot and debris.  The uterus was placed back into the abdominal cavity and found to have excellent hemostasis. The pericolic gutters were cleared of all clots and debries using suction. A serosal bladder defect was noted with some minor oozing and this was repaired with a  running stitch of 3-0 vicryl on a SH needle with adequate hemostasis. The abdominal wall was inspected and noted to be hemostatic. The fascia was reapproximated with Looped O-PDS in a running suture. The subcutaneous layer was irrigated and areas of bleeding were cauterized.  The skin was closed with 4-0 moncryl. Steri strips and an ABD pad were used for dressing. Fundus was firm at the end of case.     The patient tolerated the procedure well. Sponge, lap, and needle counts were correct x2. The patient was taken to the recovery room in stable condition.      Dr. Montoya was scrubbed and present for the entire procedure.    Suly Rene MD MPH  OB/GYN PGY2  5/8/2017  11:30 AM

## 2017-05-08 NOTE — PLAN OF CARE
Problem: Perioperative Period (Adult)  Goal: Signs and Symptoms of Listed Potential Problems Will be Absent or Manageable (Perioperative Period)  Signs and symptoms of listed potential problems will be absent or manageable by discharge/transition of care (reference Perioperative Period (Adult) CPG).  Outcome: No Change  Pt is here for scheduled repeat C/S today at 0830.  Prepped for c/s.  Pt is here with her , Benjamín.  Hx of ulcerative colitis.  Plans to breast fed.  Call light is within reach.

## 2017-05-08 NOTE — H&P
[facility]  OB History and Physical      Amber Buckner MRN# [id number]   Age: 31 year old YOB: 1985     CC:  Scheduled  section    HPI:  Ms. Amber Buckner is a 31 year old  at 39w0d by LMP c/w 7w3d U/S, who presents for scheduled repeat CS for history of CS x2 s/p total colectomy with J-pouch. .      Today, she states that she has been feeling well overall. She denies contractions, vaginal bleeding, or leakage of fluid.  She confirms fetal movement. She denies any headache, changes in vision, nausea/vomiting, chest pain, shortness of breath, RUQ or epigastric pain, or worsening edema.    Pregnancy Notable for:  1.  Ulcerative colitis s/p total colectomy with J-pouch - last seen GI in   2. 2x LTCS with vertical midline incision with last CS  3. Anxiety/depression on no meds    Prenatal Labs:   Lab Results   Component Value Date    ABO A 10/03/2016    RH  Pos 10/03/2016    AS Neg 10/03/2016    HEPBANG Nonreactive 10/03/2016    CHPCRT  10/21/2016     Negative   Negative for C. trachomatis rRNA by transcription mediated amplification.   A negative result by transcription mediated amplification does not preclude the   presence of C. trachomatis infection because results are dependent on proper   and adequate collection, absence of inhibitors, and sufficient rRNA to be   detected.      GCPCRT  10/21/2016     Negative   Negative for N. gonorrhoeae rRNA by transcription mediated amplification.   A negative result by transcription mediated amplification does not preclude the   presence of N. gonorrhoeae infection because results are dependent on proper   and adequate collection, absence of inhibitors, and sufficient rRNA to be   detected.      TREPAB Negative 2017    RUBELLAABIGG 13 2011    HGB 11.0 (L) 2017    HIV Negative 2011       GBS Status:   Lab Results   Component Value Date    GBS (A) 2017     Positive  Positive: GBS  DNA detected, presumed positive for GBS.   Assay performed on incubated broth culture of specimen using [employer] real-time   PCR.             OBHX:   Obstetric History       T2      TAB0   SAB1   E0   M0   L2       # Outcome Date GA Lbr Leonid/2nd Weight Sex Delivery Anes PTL Lv   4 Current            3 Term 14 39w0d  3.572 kg (7 lb 14 oz) M CS-LTranv Spinal  Y      Apgar1:  9                Apgar5: 9   2 Term 12 38w5d  4.423 kg (9 lb 12 oz) M CS-LTranv   Y      Name: Yoan       Apgar1:  8                Apgar5: 9   1 SAB                   MedicalHX:   Past Medical History:   Diagnosis Date     Adolescent depression in college     Anxiety      Fitting and adjustment of other gastrointestinal appliance and device      Perineal abscess      PONV (postoperative nausea and vomiting)      S/P total colectomy      Ulcerative colitis        SurgicalHX:   Past Surgical History:   Procedure Laterality Date      SECTION  2012    Procedure: SECTION; Surgeon:JAVI BELL; [facility]      SECTION N/A 2014    Procedure:  SECTION;  Surgeon: Shawanda Luna MD;  [facility]     COLECTOMY SUBTOTAL       COLONOSCOPY N/A 4/15/2015    Procedure: COLONOSCOPY;  Surgeon: Al Santo MD;  [facility]     EXAM UNDER ANESTHESIA ANUS N/A 4/15/2015    Procedure: EXAM UNDER ANESTHESIA ANUS;  Surgeon: Al Santo MD;  [facility]     FISTULOTOMY RECTUM N/A 4/15/2015    Procedure: FISTULOTOMY RECTUM;  Surgeon: Al Santo MD;  [facility]     TAKEDOWN ILEOSTOMY       tonsilectomy       TRANSPERINEAL REPAIR FISTULA RECTAL URETHRAL         Medications:     No current facility-administered medications on file prior to encounter.   Current Outpatient Prescriptions on File Prior to Encounter:  Omega-3 Fatty Acids (OMEGA-3 FISH OIL PO) Take 1 g by mouth   acetaminophen (TYLENOL) 325 MG tablet Take 2 tablets (650 mg) by mouth  every 6 hours   cholecalciferol 5000 UNITS CAPS Take 1 capsule by mouth daily.    PRENATAL VITAMINS PO Take  by mouth daily.       Allergies:  No Known Allergies    FamilyHX:  Family History   Problem Relation Age of Onset     DIABETES Maternal Grandmother      DIABETES Paternal Grandfather      Genetic Disorder Father      chron's     GASTROINTESTINAL DISEASE Sister      ulcerative colitis       SocialHX:   Social History     Social History     Marital status:      Spouse name: N/A     Number of children: N/A     Years of education: N/A     Occupational History     Casual RN Oncology      [employer]     Social History Main Topics     Smoking status: Never Smoker     Smokeless tobacco: Never Used     Alcohol use No      Comment: not since pregnancy     Drug use: No     Sexual activity: Not Currently     Partners: Male     Birth control/ protection: Inserts, Condom     Other Topics Concern     Parent/Sibling W/ Cabg, Mi Or Angioplasty Before 65f 55m? No      Service No     Blood Transfusions Yes     ?     Caffeine Concern No     Occupational Exposure Yes     RN casual Oncology/student       Hobby Hazards No     Sleep Concern Yes     doesn't sleep     Stress Concern Yes     Weight Concern No     Special Diet No     Back Care No     Exercise No     Bike Helmet Yes     Seat Belt Yes     Self-Exams Yes     Social History Narrative    How much exercise per week? Walk a lot     How much calcium per day? dairy       How much caffeine per day? 0    How much vitamin D per day? 5000 IU    Do you/your family wear seatbelts?  Yes    Do you/your family use safety helmets? Yes    Do you/your family use sunscreen? Yes    Do you/your family keep firearms in the home? Yes    Do you/your family have a smoke detector(s)? Yes        Do you feel safe in your home? Yes    Has anyone ever touched you in an unwanted manner?No     Explain Gene SMALL           ROS:   Complete 10-point ROS negative except as noted in  "HPI.She denies headache, blurry vision, chest pain, shortness of breath, RUQ pain, nausea, vomiting, dysuria, hematuria or extremity edema.    PE:  Vit: Patient Vitals for the past 4 hrs:   BP Temp Temp src Pulse Resp Height Weight   05/08/17 0601 105/62 97.4  F (36.3  C) Oral 84 18 1.575 m (5' 2\") 63.5 kg (140 lb)      Gen: Well-appearing, NAD, comfortable   CV: RRR, no mrg   Pulm: CTAB, no wheezes or crackles. Normal respiratory effort.  Abd: Soft, gravid, non-tender, EFW 7.5lbs by [place]  Ext: trace LE edema b/l  Cx: Deferred    Presentation:  cephalic by BSUS  Membranes: intact    FHT: Baseline 130s, moderate variability, accelerations present, decelerations absent   [place]: 1 contractions in 20 mins, mostly uterine irritability    Labs:   Lab Results   Component Value Date    WBC 5.9 02/23/2017     Lab Results   Component Value Date    RBC 3.19 02/23/2017     Lab Results   Component Value Date    HGB 10.1 05/08/2017     Lab Results   Component Value Date    HCT 33.3 02/23/2017     No components found for: MCT  Lab Results   Component Value Date     02/23/2017     Lab Results   Component Value Date    MCH 34.5 02/23/2017     Lab Results   Component Value Date    MCHC 33.0 02/23/2017     Lab Results   Component Value Date    RDW 12.6 02/23/2017     Lab Results   Component Value Date     02/23/2017        Lab Results   Component Value Date    ABO A 10/03/2016    RH  Pos 10/03/2016    AS Neg 10/03/2016    HEPBANG Nonreactive 10/03/2016    CHPCRT  10/21/2016     Negative   Negative for C. trachomatis rRNA by transcription mediated amplification.   A negative result by transcription mediated amplification does not preclude the   presence of C. trachomatis infection because results are dependent on proper   and adequate collection, absence of inhibitors, and sufficient rRNA to be   detected.      GCPCRT  10/21/2016     Negative   Negative for N. gonorrhoeae rRNA by transcription mediated amplification.   " A negative result by transcription mediated amplification does not preclude the   presence of N. gonorrhoeae infection because results are dependent on proper   and adequate collection, absence of inhibitors, and sufficient rRNA to be   detected.      TREPAB Negative 2017    RUBELLAABIGG 13 2011    HGB 11.0 (L) 2017    HIV Negative 2011       GBS Status:   Lab Results   Component Value Date    GBS (A) 2017     Positive  Positive: GBS DNA detected, presumed positive for GBS.   Assay performed on incubated broth culture of specimen using [employer] real-time   PCR.         Lab Results   Component Value Date    PAP NIL 2014       Assessment: 31 year old  at 39w0d by LMP c/w 7w3d US, who presents for scheduled repeat  section. Pregnancy complicated by: history of 2 prior LTCS with last vertical midline incision and history of UC s/p total colectomy with J-pouch creation.     Plan:  - Admit to labor and delivery for scheduled repeat  section:   - Consent obtained: The risks, benefits, and alternatives of  section were discussed, including the risks of bleeding, infection, injury to surrounding organs, fetal injury, and remote risks of hysterectomy. She consented to a blood transfusion in the event of a life threatening amount of bleeding. She had time to ask questions and agreed to proceed. Surgical consent was signed.   - Labs: CBC, T&S   - Fen/GI: NPO, IVFs, Rodriguez.   - Pain: Spinal per anesthesia.    - ID: ancef for irena-op antibiotics.   - PPX: SCDs prior to surgery    - FWB:  Cat I tracing, reactive   - cephalic by BSUS   - EFW 7.5lbs    - PNC: Rh pos, Rubella immune, , GBS positive, placenta posterior    Staffed with Dr. Montoya.     Suly Rene MD MPH  OB/GYN PGY2  2017  6:43 AM    [facility] staff:  Appreciate note by Dr. Campos.  I have seen and examined the patient without the resident. I have reviewed, edited, and  agree with the note.        Laurie Montoya MD, FACOG  5/8/2017  4:38 PM

## 2017-05-08 NOTE — ADDENDUM NOTE
Addendum  created 05/08/17 1511 by Aba Sommers MD    Anesthesia Attestations filed, Anesthesia Intra Blocks edited, Sign clinical note

## 2017-05-08 NOTE — PLAN OF CARE
Problem: Goal Outcome Summary  Goal: Goal Outcome Summary  Outcome: No Change  Patient transferred to [facility] from OR with  and baby girl. VS and full assessment WDL. Was c/o nausea upon arrivial. Zofran given after 200cc emesis. Some relief, but then did give second dose of zofran 4mg IV early upon Dr. Page's order. Good relief after given. Bonding well with baby and breastfeeding independently. Bleeding minimal. Still haven't gotten patient out of bed yet.

## 2017-05-08 NOTE — BRIEF OP NOTE
[facility]   Brief Operative Note     Surgery Date: 2017    Pre-op Diagnosis:  1. Intrauterine pregnancy at 39w0d     2. Ulcerative colitis s/p total colectomy with J-pouch     3. 2x LTCS with vertical midline incision with last CS    Post-op Diagnosis:  1. Same      2. Liveborn female infant     Procedure:  Repeat low-transverse  section with double layer uterine closure via Vertical Midline incision    Surgeon:  Dr. Montoya  Assistants:  Suly Rene MD MPH, PGY-2    Anesthesia: Spinal with duramorph and TAP blocks    EBL:  00 mL  IVF:  1500 mL crystalloid  UOP:  250 mL clear urine at the end of the case  Drains: Rodriguez Catheter     Findings:   1. Liveborn female infant in ROT presentation delivered at 1021 on 2017. Apgars 9 at 1 minute & 9 at 5 minutes. Weight pending.  2. Clear amniotic fluid  3. Placenta intact and with 3-vessel cord.   4. Moderate fascial-rectal adhesions with more extensive adhesions inferiorly.  Minor bladder adhesions to lower uterine segment that did not require take down prior to hysterotomy.  Minor 2cm serosal defect on anterior aspect of bladder repaired with running stitch of 3-0 vicryl.   5. Normal uterus, fallopian tubes, and ovaries.     Specimens: none    Complications: None apparent    Disposition:  Transferred in stable condition to PACU    Suly Rene MD, MPH  PGY2, OB/GYN  Pager: [phone]  2017  11:13 AM

## 2017-05-08 NOTE — PROGRESS NOTES
Data: Patient presented to [facility] at 0550.   Reason for maternal/fetal assessment per patient is Scheduled  Section  .  Patient is a . Prenatal record reviewed.      Obstetric History       T2      TAB0   SAB1   E0   M0   L2       # Outcome Date GA Lbr Leonid/2nd Weight Sex Delivery Anes PTL Lv   4 Current            3 Term 14 39w0d  3.572 kg (7 lb 14 oz) M CS-LTranv Spinal  Y      Apgar1:  9                Apgar5: 9   2 Term 12 38w5d  4.423 kg (9 lb 12 oz) M CS-LTranv   Y      Name: Yoan       Apgar1:  8                Apgar5: 9   1 SAB               . Medical history:   Past Medical History:   Diagnosis Date     Adolescent depression in college     Anxiety      Fitting and adjustment of other gastrointestinal appliance and device      Perineal abscess      PONV (postoperative nausea and vomiting)      S/P total colectomy      Ulcerative colitis    . Gestational Age 39w0d. VSS. Fetal movement present. Patient denies cramping, backache, vaginal discharge, pelvic pressure, UTI symptoms, GI problems, bloody show, vaginal bleeding, edema, headache, visual disturbances, epigastric or URQ pain, abdominal pain, rupture of membranes. Support person present.  Action: Verbal consent for EFM. Triage assessment completed. EFM applied for fetal wellbeing. Noted reactive FHT. Uterine assessment done, ctxs x2. Fetal assessment: Presumed adequate fetal oxygenation documented (see flow record).   Response: Dr. Blood informed of pt arrival. Pre-op orders placed. IV started. Pt shaved and ramu wipes performed. Awaiting visits from surgical team.

## 2017-05-08 NOTE — PROGRESS NOTES
At 1315, pt holding her  in her arms transferred to [id number] via cart.  Stable and comfortable.  Pt had experienced waves of nausea/itching relieved with Nubain.  Pt had tap block in PACU.  Bedside report and id bands matched/checked with Lou Hedrick RN.  Call light is within pt's reach.

## 2017-05-08 NOTE — IP AVS SNAPSHOT
[facility]    [address] [address]    Phone:  [phone]                                       After Visit Summary   5/8/2017    Amber Buckner    MRN: [id number]           After Visit Summary Signature Page     I have received my discharge instructions, and my questions have been answered. I have discussed any challenges I see with this plan with the nurse or doctor.    ..........................................................................................................................................  Patient/Patient Representative Signature      ..........................................................................................................................................  Patient Representative Print Name and Relationship to Patient    ..................................................               ................................................  Date                                            Time    ..........................................................................................................................................  Reviewed by Signature/Title    ...................................................              ..............................................  Date                                                            Time

## 2017-05-08 NOTE — PROGRESS NOTES
[facility]  OB History and Physical      Amber Buckner MRN# [id number]   Age: 31 year old YOB: 1985     CC:  Scheduled  section    HPI:  Ms. Amber Buckner is a 31 year old  at 39w0d by LMP c/w 7w3d U/S, who presents for scheduled repeat CS for history of CS x2 s/p total colectomy with J-pouch. .      Today, she states that she has been feeling well overall. She denies contractions, vaginal bleeding, or leakage of fluid.  She confirms fetal movement. She denies any headache, changes in vision, nausea/vomiting, chest pain, shortness of breath, RUQ or epigastric pain, or worsening edema.    Pregnancy Notable for:  1.  Ulcerative colitis s/p total colectomy with J-pouch - last seen GI in   2. 2x LTCS with vertical midline incision with last CS  3. Anxiety/depression on no meds    Prenatal Labs:   Lab Results   Component Value Date    ABO A 10/03/2016    RH  Pos 10/03/2016    AS Neg 10/03/2016    HEPBANG Nonreactive 10/03/2016    CHPCRT  10/21/2016     Negative   Negative for C. trachomatis rRNA by transcription mediated amplification.   A negative result by transcription mediated amplification does not preclude the   presence of C. trachomatis infection because results are dependent on proper   and adequate collection, absence of inhibitors, and sufficient rRNA to be   detected.      GCPCRT  10/21/2016     Negative   Negative for N. gonorrhoeae rRNA by transcription mediated amplification.   A negative result by transcription mediated amplification does not preclude the   presence of N. gonorrhoeae infection because results are dependent on proper   and adequate collection, absence of inhibitors, and sufficient rRNA to be   detected.      TREPAB Negative 2017    RUBELLAABIGG 13 2011    HGB 11.0 (L) 2017    HIV Negative 2011       GBS Status:   Lab Results   Component Value Date    GBS (A) 2017     Positive  Positive: GBS  DNA detected, presumed positive for GBS.   Assay performed on incubated broth culture of specimen using [employer] real-time   PCR.             OBHX:   Obstetric History       T2      TAB0   SAB1   E0   M0   L2       # Outcome Date GA Lbr Leonid/2nd Weight Sex Delivery Anes PTL Lv   4 Current            3 Term 14 39w0d  3.572 kg (7 lb 14 oz) M CS-LTranv Spinal  Y      Apgar1:  9                Apgar5: 9   2 Term 12 38w5d  4.423 kg (9 lb 12 oz) M CS-LTranv   Y      Name: Yoan       Apgar1:  8                Apgar5: 9   1 SAB                   MedicalHX:   Past Medical History:   Diagnosis Date     Adolescent depression in college     Anxiety      Fitting and adjustment of other gastrointestinal appliance and device      Perineal abscess      PONV (postoperative nausea and vomiting)      S/P total colectomy      Ulcerative colitis        SurgicalHX:   Past Surgical History:   Procedure Laterality Date      SECTION  2012    Procedure: SECTION; Surgeon:JAVI BELL; Location:[facility] L[facility]      SECTION N/A 2014    Procedure:  SECTION;  Surgeon: Shawanda Luna MD;  Location: [facility] L[facility]     COLECTOMY SUBTOTAL       COLONOSCOPY N/A 4/15/2015    Procedure: COLONOSCOPY;  Surgeon: Al Santo MD;  Location: [facility] OR     EXAM UNDER ANESTHESIA ANUS N/A 4/15/2015    Procedure: EXAM UNDER ANESTHESIA ANUS;  Surgeon: Al Santo MD;  Location: [facility] OR     FISTULOTOMY RECTUM N/A 4/15/2015    Procedure: FISTULOTOMY RECTUM;  Surgeon: Al Santo MD;  Location: [facility] OR     TAKEDOWN ILEOSTOMY       tonsilectomy       TRANSPERINEAL REPAIR FISTULA RECTAL URETHRAL         Medications:     No current facility-administered medications on file prior to encounter.   Current Outpatient Prescriptions on File Prior to Encounter:  Omega-3 Fatty Acids (OMEGA-3 FISH OIL PO) Take 1 g by mouth   acetaminophen (TYLENOL) 325 MG tablet Take 2 tablets (650 mg) by mouth  every 6 hours   cholecalciferol 5000 UNITS CAPS Take 1 capsule by mouth daily.    PRENATAL VITAMINS PO Take  by mouth daily.       Allergies:  No Known Allergies    FamilyHX:  Family History   Problem Relation Age of Onset     DIABETES Maternal Grandmother      DIABETES Paternal Grandfather      Genetic Disorder Father      chron's     GASTROINTESTINAL DISEASE Sister      ulcerative colitis       SocialHX:   Social History     Social History     Marital status:      Spouse name: N/A     Number of children: N/A     Years of education: N/A     Occupational History     Casual RN Oncology      [employer]     Social History Main Topics     Smoking status: Never Smoker     Smokeless tobacco: Never Used     Alcohol use No      Comment: not since pregnancy     Drug use: No     Sexual activity: Not Currently     Partners: Male     Birth control/ protection: Inserts, Condom     Other Topics Concern     Parent/Sibling W/ Cabg, Mi Or Angioplasty Before 65f 55m? No      Service No     Blood Transfusions Yes     ?     Caffeine Concern No     Occupational Exposure Yes     RN casual Oncology/student       Hobby Hazards No     Sleep Concern Yes     doesn't sleep     Stress Concern Yes     Weight Concern No     Special Diet No     Back Care No     Exercise No     Bike Helmet Yes     Seat Belt Yes     Self-Exams Yes     Social History Narrative    How much exercise per week? Walk a lot     How much calcium per day? dairy       How much caffeine per day? 0    How much vitamin D per day? 5000 IU    Do you/your family wear seatbelts?  Yes    Do you/your family use safety helmets? Yes    Do you/your family use sunscreen? Yes    Do you/your family keep firearms in the home? Yes    Do you/your family have a smoke detector(s)? Yes        Do you feel safe in your home? Yes    Has anyone ever touched you in an unwanted manner?No     Explain Gene SMALL           ROS:   Complete 10-point ROS negative except as noted in  "HPI.She denies headache, blurry vision, chest pain, shortness of breath, RUQ pain, nausea, vomiting, dysuria, hematuria or extremity edema.    PE:  Vit: Patient Vitals for the past 4 hrs:   BP Temp Temp src Pulse Resp Height Weight   05/08/17 0601 105/62 97.4  F (36.3  C) Oral 84 18 1.575 m (5' 2\") 63.5 kg (140 lb)      Gen: Well-appearing, NAD, comfortable   CV: RRR, no mrg   Pulm: CTAB, no wheezes or crackles. Normal respiratory effort.  Abd: Soft, gravid, non-tender, EFW 7.5lbs by [place]  Ext: trace LE edema b/l  Cx: Deferred    Presentation:  cephalic by BSUS  Membranes: intact    FHT: Baseline 130s, moderate variability, accelerations present, decelerations absent   [place]: 1 contractions in 20 mins, mostly uterine irritability    Labs:   Lab Results   Component Value Date    WBC 5.9 02/23/2017     Lab Results   Component Value Date    RBC 3.19 02/23/2017     Lab Results   Component Value Date    HGB 10.1 05/08/2017     Lab Results   Component Value Date    HCT 33.3 02/23/2017     No components found for: MCT  Lab Results   Component Value Date     02/23/2017     Lab Results   Component Value Date    MCH 34.5 02/23/2017     Lab Results   Component Value Date    MCHC 33.0 02/23/2017     Lab Results   Component Value Date    RDW 12.6 02/23/2017     Lab Results   Component Value Date     02/23/2017        Lab Results   Component Value Date    ABO A 10/03/2016    RH  Pos 10/03/2016    AS Neg 10/03/2016    HEPBANG Nonreactive 10/03/2016    CHPCRT  10/21/2016     Negative   Negative for C. trachomatis rRNA by transcription mediated amplification.   A negative result by transcription mediated amplification does not preclude the   presence of C. trachomatis infection because results are dependent on proper   and adequate collection, absence of inhibitors, and sufficient rRNA to be   detected.      GCPCRT  10/21/2016     Negative   Negative for N. gonorrhoeae rRNA by transcription mediated amplification.   " A negative result by transcription mediated amplification does not preclude the   presence of N. gonorrhoeae infection because results are dependent on proper   and adequate collection, absence of inhibitors, and sufficient rRNA to be   detected.      TREPAB Negative 2017    RUBELLAABIGG 13 2011    HGB 11.0 (L) 2017    HIV Negative 2011       GBS Status:   Lab Results   Component Value Date    GBS (A) 2017     Positive  Positive: GBS DNA detected, presumed positive for GBS.   Assay performed on incubated broth culture of specimen using [employer] real-time   PCR.         Lab Results   Component Value Date    PAP NIL 2014       Assessment: 31 year old  at 39w0d by LMP c/w 7w3d US, who presents for scheduled repeat  section. Pregnancy complicated by: history of 2 prior LTCS with last vertical midline incision and history of UC s/p total colectomy with J-pouch creation.     Plan:  - Admit to labor and delivery for scheduled repeat  section:   - Consent obtained: The risks, benefits, and alternatives of  section were discussed, including the risks of bleeding, infection, injury to surrounding organs, fetal injury, and remote risks of hysterectomy. She consented to a blood transfusion in the event of a life threatening amount of bleeding. She had time to ask questions and agreed to proceed. Surgical consent was signed.   - Labs: CBC, T&S   - Fen/GI: NPO, IVFs, Rodriguez.   - Pain: Spinal per anesthesia.    - ID: ancef for irena-op antibiotics.   - PPX: SCDs prior to surgery    - FWB:  Cat I tracing, reactive   - cephalic by BSUS   - EFW 7.5lbs    - PNC: Rh pos, Rubella immune, , GBS positive, placenta posterior    Staffed with Dr. Montoya.     Suly Rene MD MPH  OB/GYN PGY2  2017  6:43 AM      [facility] staff:  Appreciate note by Dr. Rene.  I have seen and examined the patient without the resident. I have reviewed, edited, and  agree with the note.    \    Laurie Montoya MD, FACOG  5/8/2017  4:38 PM

## 2017-05-08 NOTE — ANESTHESIA POSTPROCEDURE EVALUATION
Patient: Amber Buckner    Procedure(s):  Repeat  Section  - Wound Class: I-Clean    Diagnosis:Previous   Diagnosis Additional Information: No value filed.    Anesthesia Type:  Spinal    Note:  Anesthesia Post Evaluation    Patient location during evaluation: OB PACU  Patient participation: Able to participate in evaluation but full recovery from regional anesthesia has not yet ocurrred but is anticipated to occur within 48 hours  Level of consciousness: awake and alert  Pain management: adequate  Airway patency: patent  Cardiovascular status: acceptable  Respiratory status: acceptable  Hydration status: acceptable  PONV: none     Anesthetic complications: None          Last vitals:  Vitals:    17 1141 17 1155 17 1211   BP: 107/71 107/74 104/59   Pulse:      Resp: 16 16 16   Temp:      SpO2: 100% 100%          Electronically Signed By: Vanda Gaspar MD  May 8, 2017  12:15 PM

## 2017-05-08 NOTE — ANESTHESIA PROCEDURE NOTES
Spinal/LP Procedure Note    Spinal Block  Staff:     Anesthesiologist:  ELTON PETERSON    Resident/CRNA:  MARTÍNEZ ISIDRO    Spinal/LP performed by resident/CRNA in presence of a teaching physician.    Location: In OR BEFORE Induction  Procedure Start/Stop Times:      5/8/2017 9:50 AM     5/8/2017 9:53 AM    patient identified, IV checked, site marked, risks and benefits discussed, informed consent, monitors and equipment checked, pre-op evaluation, at physician/surgeon's request and post-op pain management      Correct Patient: Yes      Correct Position: Yes      Correct Site: Yes      Correct Procedure: Yes      Correct Laterality:  Yes    Site Marked:  Yes  Procedure:     Procedure:  Intrathecal    ASA:  2    Position:  Sitting    Sterile Prep: Betadine      Insertion site:  L3-4    Approach:  Midline    Needle Type:  Wicho    Needle gauge (G):  20    Local Skin Infiltration:  1% lidocaine    Needle Length (in):  3.5    Introducer used: Yes      Introducer gauge:  20 G    Attempts:  1    Redirects:  0    CSF:  Clear    Paresthesias:  No    Time injected:  09:52

## 2017-05-08 NOTE — PROGRESS NOTES
"[facility]   Brief Post-partum Note    Name:  Amber Buckner  MRN: [id number]    S: Patient is doing OK this AM.  Reports infant was up much of the night so she didn't get a lot of sleep.  Pain is controlled, however, she is feeling quite sore this AM.  Tolerating regular diet without nausea or vomiting (did have one episode of emesis shortly after coming to the floor).  Ambulating without dizziness.  Voiding spontaneously, passing flatus but no bowel movement as of yet. Lochia similar to menstrual flow.  Breast feeding.  Desires IUD for contraception.     O:   Patient Vitals for the past 24 hrs:   BP Temp Temp src Pulse Resp SpO2 Height Weight   17 1600 106/68 97.5  F (36.4  C) Oral 70 16 100 % - -   17 1500 106/70 97.6  F (36.4  C) Oral 72 16 100 % - -   17 1400 109/70 97.4  F (36.3  C) Oral 70 16 100 % - -   17 1312 94/64 - - - 16 100 % - -   17 1255 106/73 - - - 16 - - -   17 1241 105/78 - - - 16 100 % - -   17 1225 113/78 97.5  F (36.4  C) Oral - 16 100 % - -   17 1211 104/59 - - - 16 100 % - -   17 1203 - - - - - 100 % - -   17 1155 107/74 - - - 16 100 % - -   17 1141 107/71 - - - 16 100 % - -   17 1125 100/70 - - - - - - -   17 1119 112/57 98.2  F (36.8  C) Oral - 18 98 % - -   17 0728 98/60 97.9  F (36.6  C) Oral 77 16 - - -   17 0601 105/62 97.4  F (36.3  C) Oral 84 18 - 1.575 m (5' 2\") 63.5 kg (140 lb)     Gen:  Resting comfortably, NAD  CV:  Regular rate and rhythm   Pulm:  Non-labored breathing.  No cough or wheezing.   Abd:  Soft, appropriately tender to palpation, non-distended.  Fundus at umbilicus, firm and non-tender.  Incision: VM incision, overlying pressure dressing C/D/I  Ext:  Non-tender, 1+ LE edema b/l    Assessment/Plan:  31 year old  on POD #1 s/p RLTCS.  Continue with routine postpartum management.     Ulcerative colitis:   - s/p colectomy w/ J-pouch  - Avoid NSAIDs    H/o " depression/anxiety:   - Previously on Celexa  - Monitor closely, 1 week PP mood check     Pain: S/p TAP block. Controlled with Tylenol and Roxicodone.  Hgb: 10.1>> 10.0. VSS as noted above, asymptomatic.   GI:  BID Senna/Colace.  PRN Simethicone.   PPx:  Encouraged ambulation   Rh: Positive  Rubella: Immune  Feed: Breast  BC: IUD  Dispo: Plan for home on POD#3-4.     Dahiana Pandya MD   OB/Gyn Resident, PGY-3  May 9, 2017    OB/GYN Staff -- Pt seen and examined by me. Agree with note as above.  Bridgette

## 2017-05-08 NOTE — DISCHARGE SUMMARY
[facility] Discharge Summary    Amber Buckner MRN# [id number]   Age: 31 year old YOB: 1985     Date of Admission:  2017  Date of Discharge::  2017  Admitting Physician:  Laurie Montoya MD  Discharge Physician:  Elyssa Boyce MD             Admission Diagnoses:   Intrauterine pregnancy at 39w0d  Ulcerative colitis s/p total colectomy with J-pouch  Two prior LTCS with vertical midline incision with last CS         Discharge Diagnosis:   Small bowel obstruction, now resolved   Same, delivered           Procedures:   Procedure(s): - Repeat low transverse  section with two layer closure via vertical skin incision  - Spinal  - Placement and removal of NG tube   - Serial laboratory monitoring                 Medications Prior to Admission:     Prescriptions Prior to Admission   Medication Sig Dispense Refill Last Dose     acetaminophen (TYLENOL) 325 MG tablet Take 2 tablets (650 mg) by mouth every 6 hours 100 tablet 0 Past Month at Unknown time     cholecalciferol 5000 UNITS CAPS Take 1 capsule by mouth daily.    Past Week at Unknown time     PRENATAL VITAMINS PO Take  by mouth daily.   Past Week at Unknown time     [DISCONTINUED] Omega-3 Fatty Acids (OMEGA-3 FISH OIL PO) Take 1 g by mouth   Past Month at Unknown time             Discharge Medications:      Amber Buckner   Home Medication Instructions AMANDA:[id number]    Printed on:05/10/17 7085   Medication Information                      acetaminophen (TYLENOL) 325 MG tablet  Take 2 tablets (650 mg) by mouth every 6 hours             cholecalciferol 5000 UNITS CAPS  Take 1 capsule by mouth daily.              citalopram (CELEXA) 20 MG tablet  Take 1 tablet (20 mg) by mouth daily             oxyCODONE (ROXICODONE) 5 MG IR tablet  Take 1-2 tablets (5-10 mg) by mouth every 4 hours as needed for moderate to severe pain             polyethylene glycol (MIRALAX/GLYCOLAX) Packet  Take 17 g by mouth daily as needed for  constipation             PRENATAL VITAMINS PO  Take  by mouth daily.             senna-docusate (SENOKOT-S;PERICOLACE) 8.6-50 MG per tablet  Take 1-2 tablets by mouth 2 times daily as needed for constipation             simethicone (MYLICON) 80 MG chewable tablet  Take 1 tablet (80 mg) by mouth every 6 hours as needed for flatulence or cramping                     Consultations:   None          Brief Admission History:   Amber Buckner is a 31 year old  at 39w0d by LMP c/w 7w3d US who presented for a repeat  section for history of ulcerative colitis s/p total colectomy with J-pouch and prior  section x2. She was counseled on the risks and benefits of the procedure and agreed to proceed. She signed written consent at bedside.        Intraoperative course   The procedure was uncomplicated.   mL.  See operative report for details.     Findings:   1. Liveborn female infant in ROT presentation delivered at 1021 on 2017. Apgars 9 at 1 minute & 9 at 5 minutes. Weight 3714g.  2. Clear amniotic fluid  3. Placenta intact and with 3-vessel cord.   4. Moderate fascial-rectal adhesions with more extensive adhesions inferiorly. Minor bladder adhesions to lower uterine segment that did not require take down prior to hysterotomy. Minor 2cm serosal defect on anterior aspect of bladder repaired with running stitch of 3-0 vicryl.   5. Normal uterus, fallopian tubes, and ovaries.        Postpartum Course   The patient's hospital course was remarkable for post-operative N/V and increasing abdominal distention.  AXR on POD#2 showed evidence of distal SBO for which NGT was placed on POD#3 with moderate output.  She continued to have large output until evening of POD#5.  On the evening of POD#5, clamp trial was initiated as the patient was passing flatus and she tolerated clears and full liquid diets around NGT.  On POD#6, the patient's diet was advanced and her NGT was removed.  She tolerated soft diet  and had no nausea or emesis with soft abdomen and continued flatus.    On discharge, her pain was well controlled. Vaginal bleeding is similar to peak menstrual flow.  Voiding without difficulty.  Ambulating well and tolerating a normal diet.  No fever or significant wound drainage.  Breastfeeding well.  Infant is stable. She was discharged on post-partum day #6.    Contraception plan: IUD  Post-partum hemoglobin:   Hemoglobin   Date Value Ref Range Status   05/09/2017 10.0 (L) 11.7 - 15.7 g/dL Final             Discharge Instructions and Follow-Up:   Discharge diet: Regular   Discharge activity: No lifting greater than 20 lbs, pushing, pulling, or other strenuous activity for 6 weeks. Pelvic rest for 6 weeks including no sexual intercourse, tampons, or douching. No driving until you can slam on the breaks without pain or while on narcotic pain medications.      Discharge follow-up: Follow up with primary OB for routine postpartum visit in 6 weeks and in 1 week for an incision and mood check.   Wound care: Keep incision clean and dry           Discharge Disposition:   Discharged to home      Elyssa Boyce MD

## 2017-05-08 NOTE — ANESTHESIA CARE TRANSFER NOTE
Patient: Amber Buckner    Procedure(s):  Repeat  Section  - Wound Class: I-Clean    Diagnosis: Previous   Diagnosis Additional Information: No value filed.    Anesthesia Type:   Spinal     Note:  Airway :Room Air  Patient transferred to:PACU        Vitals: (Last set prior to Anesthesia Care Transfer)    CRNA VITALS  2017 1045 - 2017 1119      2017             Resp Rate (set): 10                Electronically Signed By: SEPIDEH Corbett CRNA  May 8, 2017  11:19 AM

## 2017-05-08 NOTE — IP AVS SNAPSHOT
MRN:[id number]                      After Visit Summary   5/8/2017    Amber Buckner    MRN: [id number]           Thank you!     Thank you for choosing [facility] for your care. Our goal is always to provide you with excellent care. Hearing back from our patients is one way we can continue to improve our services. Please take a few minutes to complete the written survey that you may receive in the mail after you visit with us. Thank you!        Patient Information     Date Of Birth          1985        Designated Caregiver       Most Recent Value    Caregiver    Will someone help with your care after discharge? yes    Name of designated caregiver Benjamín    Phone number of caregiver [phone]    Caregiver address same address as pt      About your hospital stay     You were admitted on:  May 8, 2017 You last received care in the:  [facility]    You were discharged on:  May 14, 2017       Who to Call     For medical emergencies, please call 911.  For non-urgent questions about your medical care, please call your primary care provider or clinic, [phone]  For questions related to your surgery, please call your surgery clinic        Attending Provider     Provider Specialty    Laurie Montoya MD OB/Gyn       Primary Care Provider Office Phone # Fax #    Cinthia Rhett Charles [phone]       [facility] [address] [place] [address]        After Care Instructions     Activity       Review discharge instructions            Diet       Resume previous diet            Discharge Instructions - Postpartum visit       Schedule postpartum visit with your provider and return to clinic in 6 weeks.   Schedule a mood and incision check in 1 week after discharge.                  Further instructions from your care team       Postpartum Vaginal Delivery Instructions    Activity       Ask family and friends for help when you need it.    Do not place anything in your  vagina for 6 weeks.    You are not restricted on other activities, but take it easy for a few weeks to allow your body to recover from delivery.  You are able to do any activities you feel up to that point.    No driving until you have stopped taking your pain medications (usually two weeks after delivery).     Call your health care provider if you have any of these symptoms:       Increased pain, swelling, redness, or fluid around your stiches from an episiotomy or perineal tear.    A fever above 100.4 F (38 C) with or without chills when placing a thermometer under your tongue.    You soak a sanitary pad with blood within 1 hour, or you see blood clots larger than a golf ball.    Bleeding that lasts more than 6 weeks.    Vaginal discharge that smells bad.    Severe pain, cramping or tenderness in your lower belly area.    A need to urinate more frequently (use the toilet more often), more urgently (use the toilet very quickly), or it burns when you urinate.    Nausea and vomiting.    Redness, swelling or pain around a vein in your leg.    Problems breastfeeding or a red or painful area on your breast.    Chest pain and cough or are gasping for air.    Problems coping with sadness, anxiety, or depression.  If you have any concerns about hurting yourself or the baby, call your provider immediately.     You have questions or concerns after you return home.     Keep your hands clean:  Always wash your hands before touching your perineal area and stitches.  This helps reduce your risk of infection.  If your hands aren't dirty, you may use an alcohol hand-rub to clean your hands. Keep your nails clean and short.        Pending Results     No orders found from 5/6/2017 to 5/9/2017.            Statement of Approval     Ordered          05/14/17 1340  I have reviewed and agree with all the recommendations and orders detailed in this document.  EFFECTIVE NOW     Approved and electronically signed by:  Elyssa Boyce MD   "           Admission Information     Date & Time Provider Department Dept. Phone    5/8/2017 Laurie Montoya MD [facility] [phone]      Your Vitals Were     Blood Pressure Pulse Temperature Respirations Height Weight    108/75 69 98.3  F (36.8  C) (Oral) 18 1.575 m (5' 2\") 63.5 kg (140 lb)    Last Period Pulse Oximetry BMI (Body Mass Index)             08/08/2016 (Exact Date) 99% 25.61 kg/m2         [employer] Information     [employer] gives you secure access to your electronic health record. If you see a primary care provider, you can also send messages to your care team and make appointments. If you have questions, please call your primary care clinic.  If you do not have a primary care provider, please call [phone] and they will assist you.        Care EveryWhere ID     This is your Care EveryWhere ID. This could be used by other organizations to access your [place] medical records  [id number]           Review of your medicines      START taking        Dose / Directions    breast pump Misc        Dose:  1 each   1 each once for 1 dose   Quantity:  1 each   Refills:  0       citalopram 20 MG tablet   Commonly known as:  celeXA   Used for:  Depression with anxiety        Dose:  20 mg   Take 1 tablet (20 mg) by mouth daily   Quantity:  60 tablet   Refills:  1       oxyCODONE 5 MG IR tablet   Commonly known as:  ROXICODONE        Dose:  5-10 mg   Take 1-2 tablets (5-10 mg) by mouth every 4 hours as needed for moderate to severe pain   Quantity:  25 tablet   Refills:  0       polyethylene glycol Packet   Commonly known as:  MIRALAX/GLYCOLAX        Dose:  17 g   Take 17 g by mouth daily as needed for constipation   Quantity:  14 packet   Refills:  1       senna-docusate 8.6-50 MG per tablet   Commonly known as:  SENOKOT-S;PERICOLACE        Dose:  1-2 tablet   Take 1-2 tablets by mouth 2 times daily as needed for constipation   Quantity:  50 tablet   Refills:  1       simethicone 80 MG chewable tablet "   Commonly known as:  MYLICON        Dose:  80 mg   Take 1 tablet (80 mg) by mouth every 6 hours as needed for flatulence or cramping   Quantity:  40 tablet   Refills:  1         CONTINUE these medicines which have NOT CHANGED        Dose / Directions    acetaminophen 325 MG tablet   Commonly known as:  TYLENOL   Used for:  Anal fistula        Dose:  650 mg   Take 2 tablets (650 mg) by mouth every 6 hours   Quantity:  100 tablet   Refills:  0       cholecalciferol 5000 UNITS Caps        Dose:  1 capsule   Take 1 capsule by mouth daily.   Refills:  0       PRENATAL VITAMINS PO        Take  by mouth daily.   Refills:  0         STOP taking     OMEGA-3 FISH OIL PO                Where to get your medicines      These medications were sent to [facility] [place], [place] - [address]  [address] [address]     Phone:  [phone]     citalopram 20 MG tablet    polyethylene glycol Packet    senna-docusate 8.6-50 MG per tablet    simethicone 80 MG chewable tablet         Some of these will need a paper prescription and others can be bought over the counter. Ask your nurse if you have questions.     Bring a paper prescription for each of these medications     breast pump Misc    oxyCODONE 5 MG IR tablet                Protect others around you: Learn how to safely use, store and throw away your medicines at www.disposemymeds.org.             Medication List: This is a list of all your medications and when to take them. Check marks below indicate your daily home schedule. Keep this list as a reference.      Medications           Morning Afternoon Evening Bedtime As Needed    acetaminophen 325 MG tablet   Commonly known as:  TYLENOL   Take 2 tablets (650 mg) by mouth every 6 hours   Last time this was given:  650 mg on 5/14/2017 10:02 AM                                breast pump Misc   1 each once for 1 dose                                cholecalciferol 5000 UNITS Caps   Take 1  capsule by mouth daily.                                citalopram 20 MG tablet   Commonly known as:  celeXA   Take 1 tablet (20 mg) by mouth daily   Last time this was given:  20 mg on 5/14/2017  8:24 AM                                oxyCODONE 5 MG IR tablet   Commonly known as:  ROXICODONE   Take 1-2 tablets (5-10 mg) by mouth every 4 hours as needed for moderate to severe pain   Last time this was given:  5 mg on 5/14/2017 10:02 AM                                polyethylene glycol Packet   Commonly known as:  MIRALAX/GLYCOLAX   Take 17 g by mouth daily as needed for constipation   Last time this was given:  17 g on 5/10/2017  4:58 AM                                PRENATAL VITAMINS PO   Take  by mouth daily.                                senna-docusate 8.6-50 MG per tablet   Commonly known as:  SENOKOT-S;PERICOLACE   Take 1-2 tablets by mouth 2 times daily as needed for constipation   Last time this was given:  2 tablets on 5/10/2017  7:45 PM                                simethicone 80 MG chewable tablet   Commonly known as:  MYLICON   Take 1 tablet (80 mg) by mouth every 6 hours as needed for flatulence or cramping   Last time this was given:  80 mg on 5/14/2017  5:07 AM

## 2017-05-09 LAB — HGB BLD-MCNC: 10 G/DL (ref 11.7–15.7)

## 2017-05-09 PROCEDURE — 25800025 ZZH RX 258: Performed by: OBSTETRICS & GYNECOLOGY

## 2017-05-09 PROCEDURE — 36415 COLL VENOUS BLD VENIPUNCTURE: CPT | Performed by: OBSTETRICS & GYNECOLOGY

## 2017-05-09 PROCEDURE — 85018 HEMOGLOBIN: CPT | Performed by: OBSTETRICS & GYNECOLOGY

## 2017-05-09 PROCEDURE — 25000128 H RX IP 250 OP 636: Performed by: OBSTETRICS & GYNECOLOGY

## 2017-05-09 PROCEDURE — 12000028 ZZH R&B OB UMMC

## 2017-05-09 PROCEDURE — 25000132 ZZH RX MED GY IP 250 OP 250 PS 637: Performed by: OBSTETRICS & GYNECOLOGY

## 2017-05-09 RX ORDER — METOCLOPRAMIDE HYDROCHLORIDE 5 MG/ML
10 INJECTION INTRAMUSCULAR; INTRAVENOUS EVERY 6 HOURS
Status: DISCONTINUED | OUTPATIENT
Start: 2017-05-09 | End: 2017-05-11

## 2017-05-09 RX ORDER — SODIUM CHLORIDE, SODIUM LACTATE, POTASSIUM CHLORIDE, CALCIUM CHLORIDE 600; 310; 30; 20 MG/100ML; MG/100ML; MG/100ML; MG/100ML
INJECTION, SOLUTION INTRAVENOUS CONTINUOUS
Status: DISCONTINUED | OUTPATIENT
Start: 2017-05-09 | End: 2017-05-10

## 2017-05-09 RX ADMIN — PRENATAL VIT W/ FE FUMARATE-FA TAB 27-0.8 MG 1 TABLET: 27-0.8 TAB at 07:46

## 2017-05-09 RX ADMIN — SODIUM CHLORIDE, POTASSIUM CHLORIDE, SODIUM LACTATE AND CALCIUM CHLORIDE: 600; 310; 30; 20 INJECTION, SOLUTION INTRAVENOUS at 14:23

## 2017-05-09 RX ADMIN — OXYCODONE HYDROCHLORIDE 10 MG: 5 TABLET ORAL at 16:28

## 2017-05-09 RX ADMIN — ACETAMINOPHEN 975 MG: 325 TABLET, FILM COATED ORAL at 16:28

## 2017-05-09 RX ADMIN — OXYCODONE HYDROCHLORIDE 5 MG: 5 TABLET ORAL at 03:15

## 2017-05-09 RX ADMIN — HYDROMORPHONE HYDROCHLORIDE 0.5 MG: 1 INJECTION, SOLUTION INTRAMUSCULAR; INTRAVENOUS; SUBCUTANEOUS at 14:27

## 2017-05-09 RX ADMIN — OXYCODONE HYDROCHLORIDE 5 MG: 5 TABLET ORAL at 02:09

## 2017-05-09 RX ADMIN — OXYCODONE HYDROCHLORIDE 10 MG: 5 TABLET ORAL at 09:47

## 2017-05-09 RX ADMIN — ACETAMINOPHEN 975 MG: 325 TABLET, FILM COATED ORAL at 06:27

## 2017-05-09 RX ADMIN — OXYCODONE HYDROCHLORIDE 10 MG: 5 TABLET ORAL at 13:04

## 2017-05-09 RX ADMIN — OXYCODONE HYDROCHLORIDE 10 MG: 5 TABLET ORAL at 06:27

## 2017-05-09 RX ADMIN — SENNOSIDES AND DOCUSATE SODIUM 2 TABLET: 8.6; 5 TABLET ORAL at 07:46

## 2017-05-09 RX ADMIN — OXYCODONE HYDROCHLORIDE 10 MG: 5 TABLET ORAL at 19:51

## 2017-05-09 RX ADMIN — SIMETHICONE CHEW TAB 80 MG 80 MG: 80 TABLET ORAL at 12:32

## 2017-05-09 RX ADMIN — SIMETHICONE CHEW TAB 80 MG 80 MG: 80 TABLET ORAL at 19:58

## 2017-05-09 RX ADMIN — SODIUM CHLORIDE, SODIUM LACTATE, POTASSIUM CHLORIDE, CALCIUM CHLORIDE AND DEXTROSE MONOHYDRATE 125 ML/HR: 5; 600; 310; 30; 20 INJECTION, SOLUTION INTRAVENOUS at 22:26

## 2017-05-09 RX ADMIN — OXYCODONE HYDROCHLORIDE 10 MG: 5 TABLET ORAL at 23:03

## 2017-05-09 RX ADMIN — ONDANSETRON 4 MG: 2 INJECTION INTRAMUSCULAR; INTRAVENOUS at 10:41

## 2017-05-09 RX ADMIN — HYDROMORPHONE HYDROCHLORIDE 0.3 MG: 1 INJECTION, SOLUTION INTRAMUSCULAR; INTRAVENOUS; SUBCUTANEOUS at 11:47

## 2017-05-09 RX ADMIN — SENNOSIDES AND DOCUSATE SODIUM 2 TABLET: 8.6; 5 TABLET ORAL at 19:54

## 2017-05-09 RX ADMIN — ONDANSETRON 4 MG: 2 INJECTION INTRAMUSCULAR; INTRAVENOUS at 18:15

## 2017-05-09 NOTE — PLAN OF CARE
Problem: Goal Outcome Summary  Goal: Goal Outcome Summary  Outcome: Improving  Data: Teary eyed this afternoon. States she is having some pain. Abdomin is distended and she is not passing flatus yet. Thinks it could be hormonal too.  I: Dilaudid ivp given along with hot packs.  She states she has not really slept all night so she is exhausted. Vital signs within normal limits. Postpartum checks within normal limits - see flow record. Patient eating and drinking normally. Patient able to empty bladder independently and is up ambulating. No apparent signs of infection. Incision healing well. Patient performing self cares and is able to care for infant.  Action: Patient medicated during the shift for pain and cramping. See MAR. Patient reassessed within 1 hour after each medication and pain was improved - patient stated she was comfortable. Patient education done about breastfeeding, rest and activity. See flow record.  Response: Positive attachment behaviors observed with infant. Support persons spouse present.   Plan: Anticipate discharge POD # 3.

## 2017-05-09 NOTE — PROGRESS NOTES
"Post  Anesthesia Follow Up Note    Patient: Amber Buckner    Patient location: Postpartum floor.    Chief complaint: Acute postoperative pain managment    Procedure(s) Performed:   SECTION    Anesthesia type: Spinal Block  and transversus abdominus plane (TAP) nerve block        Subjective:   The patient reports good pain control.  She denies pruritus. The patient denies back pain, headache, weakness, paresthesia, numbness or tingling lips, tinnitus, metallic taste, difficulties breathing or voiding, nausea or vomiting. She is able to ambulate and tolerates regular diet.      Objective:  Respiratory Function (RR / SpO2 / Airway Patency): Satisfactory    Cardiac Function (HR / Rhythm / BP): Satisfactory    Strength and sensation lower extremities: Strength 5/5 and grossly symmetric bilateral LE    Site of spinal/epidural insertion: clean and intact, no tenderness, swelling or erythema    Last Vitals: /73  Pulse 92  Temp 97.8  F (36.6  C) (Oral)  Resp 16  Ht 1.575 m (5' 2\")  Wt 63.5 kg (140 lb)  LMP 2016 (Exact Date)  SpO2 100%  Breastfeeding? Unknown  BMI 25.61 kg/m2      Assessment and Plan:   Amber Buckner is a 31 year old female POD #1 s/p   SECTION with L3-4 spinal and single shot B/L TAP nerve block injections bupivacaine 0.25% with epinephrine 1:200,000 20mL total, then liposome bupivacaine (Exparel) long-acting 1.3% 20mL total given on 17 for postoperative analgesia.  Pt is ambulating without difficulty, no weakness or paresthesias.  No evidence of adverse side effects associated with spinal and B/L TAP nerve block injections. Pt is receiving adequate incisional pain control.  Anticipate up to 72 hours of incisional pain control.  Anticipate patient will require opioid/nonopioid analgesics for visceral and muscle pain not controlled with local anesthetic.  Pt lying in bed breastfeeding baby and has a lot of visitors present.    - NO other local anesthetic " use within 96 hours of liposome bupivacaine (Exparel) long acting  - patient received verbal and written instructions about liposome bupivacaine   - please call if questions or concerns  - discussed plan with attending anesthesiologist    SEPIDEH Keane CNP  [facility]  5/9/2017 8:58 AM    If questions or concerns, please contact OB Anesthesiologist  Phone [phone]

## 2017-05-09 NOTE — PROGRESS NOTES
Postpartum Note    S: The patient was requesting evaluation of abdominal distention given hx of UC and subtotal colectomy.  The patient reports that after her last , her postoperative course was easy and she was able to go home on day #2.  She is concerned because she has had pain, nausea, and has not yet passed gas.  She reports one episode of emesis last night, but was able to keep cheerios down after that. This AM, she tolerated scrambled eggs and toast, but had nausea and has been self-limiting all day.  She was having a lot of pain today and has not been ambulating today.  She reports one episode of loose watery stool, but has not had gas.  After this, she felt better.     O:  Vitals:    17 0530 17 0751 17 1400 17 1607   BP: 95/57 103/73 111/74 108/77   Pulse: 85 92 100 90   Resp: 16 16 20 20   Temp: 97.9  F (36.6  C) 97.8  F (36.6  C) 98.1  F (36.7  C) 98.2  F (36.8  C)   TempSrc: Oral Oral Oral Oral   SpO2: 98% 100%  97%   Weight:       Height:         Gen: Resting quietly, NAD   Abd: mildly distended, hypoactive bowel sounds, mildly tympanic to percussion, appropriately TTP  Incision: C/D/I      Hemoglobin   Date Value Ref Range Status   2017 10.0 (L) 11.7 - 15.7 g/dL Final   2017 10.1 (L) 11.7 - 15.7 g/dL Final       A/P: 31 year old  POD#1 s/p RLTCS now feeling distended and concerned about lack of bowel function.  The patient is only day one after her surgery, therefore we are less concerned that she has not passed gas, she has had some return of bowel function with loose stool earlier today.  The patient has been having nausea, but it has been well controlled.  The patient is most likely slow to postop, less concerned for developing ileus.     -Discussed small frequent meals and stopping when patient feels full or nauseated  -Cont IVF while patient self-limiting   -Encouraged ambulation  -Zofran and Reglan ordered for patient  -Pt reassured, will  continue to monitor closely     Anticipate discharge home POD#3-4    Vicky Jeffries MD   OB/GYN Resident PGY1

## 2017-05-09 NOTE — PLAN OF CARE
Problem: Goal Outcome Summary  Goal: Goal Outcome Summary  Outcome: Improving  Breastfeeding independently on cue, good latch noted. Fundus firm and midline, U/U. Incisional dressing C/D/I. No complaints of pain reported. Vss

## 2017-05-09 NOTE — PROVIDER NOTIFICATION
Focus: Physio  D: Very uncomfortable. Nauseated  Abdomin distended.  Not passing gas yet. Has history of ulcerative colitis with bowel obstruction.  I: Dr. Rene notified via text that pt. Would like to see her.  P: She will be up after c/s.  Start ivf for now.  Pt. Refusing reglan.

## 2017-05-10 ENCOUNTER — APPOINTMENT (OUTPATIENT)
Dept: GENERAL RADIOLOGY | Facility: CLINIC | Age: 32
End: 2017-05-10
Payer: COMMERCIAL

## 2017-05-10 PROCEDURE — 25000128 H RX IP 250 OP 636: Performed by: OBSTETRICS & GYNECOLOGY

## 2017-05-10 PROCEDURE — 25000125 ZZHC RX 250: Performed by: OBSTETRICS & GYNECOLOGY

## 2017-05-10 PROCEDURE — 25800025 ZZH RX 258: Performed by: OBSTETRICS & GYNECOLOGY

## 2017-05-10 PROCEDURE — 25000132 ZZH RX MED GY IP 250 OP 250 PS 637: Performed by: OBSTETRICS & GYNECOLOGY

## 2017-05-10 PROCEDURE — 12000028 ZZH R&B OB UMMC

## 2017-05-10 PROCEDURE — 74020 XR ABDOMEN 2 VW: CPT

## 2017-05-10 RX ORDER — SIMETHICONE 80 MG
80 TABLET,CHEWABLE ORAL EVERY 6 HOURS PRN
Qty: 40 TABLET | Refills: 1 | Status: SHIPPED | OUTPATIENT
Start: 2017-05-10 | End: 2017-07-24

## 2017-05-10 RX ORDER — SIMETHICONE 80 MG
80 TABLET,CHEWABLE ORAL 4 TIMES DAILY
Status: DISCONTINUED | OUTPATIENT
Start: 2017-05-10 | End: 2017-05-11

## 2017-05-10 RX ORDER — POLYETHYLENE GLYCOL 3350 17 G/17G
17 POWDER, FOR SOLUTION ORAL DAILY
Status: DISCONTINUED | OUTPATIENT
Start: 2017-05-10 | End: 2017-05-10

## 2017-05-10 RX ORDER — CITALOPRAM HYDROBROMIDE 20 MG/1
20 TABLET ORAL DAILY
Qty: 60 TABLET | Refills: 1 | Status: SHIPPED | OUTPATIENT
Start: 2017-05-10 | End: 2017-06-22

## 2017-05-10 RX ORDER — SODIUM CHLORIDE, SODIUM LACTATE, POTASSIUM CHLORIDE, CALCIUM CHLORIDE 600; 310; 30; 20 MG/100ML; MG/100ML; MG/100ML; MG/100ML
INJECTION, SOLUTION INTRAVENOUS CONTINUOUS
Status: DISCONTINUED | OUTPATIENT
Start: 2017-05-10 | End: 2017-05-11

## 2017-05-10 RX ORDER — POLYETHYLENE GLYCOL 3350 17 G/17G
17 POWDER, FOR SOLUTION ORAL DAILY PRN
Qty: 14 PACKET | Refills: 1 | Status: SHIPPED | OUTPATIENT
Start: 2017-05-10 | End: 2017-06-22

## 2017-05-10 RX ORDER — ONDANSETRON 4 MG/1
4 TABLET, FILM COATED ORAL EVERY 6 HOURS PRN
Status: DISCONTINUED | OUTPATIENT
Start: 2017-05-10 | End: 2017-05-14 | Stop reason: HOSPADM

## 2017-05-10 RX ORDER — CITALOPRAM HYDROBROMIDE 10 MG/1
20 TABLET ORAL DAILY
Status: DISCONTINUED | OUTPATIENT
Start: 2017-05-10 | End: 2017-05-14 | Stop reason: HOSPADM

## 2017-05-10 RX ORDER — POLYETHYLENE GLYCOL 3350 17 G/17G
17 POWDER, FOR SOLUTION ORAL DAILY
Status: DISCONTINUED | OUTPATIENT
Start: 2017-05-10 | End: 2017-05-11

## 2017-05-10 RX ORDER — ONDANSETRON 2 MG/ML
8 INJECTION INTRAMUSCULAR; INTRAVENOUS EVERY 6 HOURS PRN
Status: DISCONTINUED | OUTPATIENT
Start: 2017-05-10 | End: 2017-05-14 | Stop reason: HOSPADM

## 2017-05-10 RX ADMIN — OXYCODONE HYDROCHLORIDE 10 MG: 5 TABLET ORAL at 02:03

## 2017-05-10 RX ADMIN — ACETAMINOPHEN 975 MG: 325 TABLET, FILM COATED ORAL at 16:05

## 2017-05-10 RX ADMIN — ONDANSETRON 4 MG: 2 INJECTION INTRAMUSCULAR; INTRAVENOUS at 23:01

## 2017-05-10 RX ADMIN — SENNOSIDES AND DOCUSATE SODIUM 2 TABLET: 8.6; 5 TABLET ORAL at 08:24

## 2017-05-10 RX ADMIN — SIMETHICONE CHEW TAB 80 MG 80 MG: 80 TABLET ORAL at 11:51

## 2017-05-10 RX ADMIN — SIMETHICONE CHEW TAB 80 MG 80 MG: 80 TABLET ORAL at 08:23

## 2017-05-10 RX ADMIN — OXYCODONE HYDROCHLORIDE 5 MG: 5 TABLET ORAL at 15:00

## 2017-05-10 RX ADMIN — ACETAMINOPHEN 975 MG: 325 TABLET, FILM COATED ORAL at 08:24

## 2017-05-10 RX ADMIN — BISACODYL 10 MG: 10 SUPPOSITORY RECTAL at 08:23

## 2017-05-10 RX ADMIN — SIMETHICONE CHEW TAB 80 MG 80 MG: 80 TABLET ORAL at 16:05

## 2017-05-10 RX ADMIN — OXYCODONE HYDROCHLORIDE 10 MG: 5 TABLET ORAL at 08:23

## 2017-05-10 RX ADMIN — ONDANSETRON HYDROCHLORIDE 4 MG: 4 TABLET, FILM COATED ORAL at 05:11

## 2017-05-10 RX ADMIN — ACETAMINOPHEN 975 MG: 325 TABLET, FILM COATED ORAL at 00:25

## 2017-05-10 RX ADMIN — ONDANSETRON 4 MG: 2 INJECTION INTRAMUSCULAR; INTRAVENOUS at 21:34

## 2017-05-10 RX ADMIN — OXYCODONE HYDROCHLORIDE 10 MG: 5 TABLET ORAL at 05:00

## 2017-05-10 RX ADMIN — CITALOPRAM HYDROBROMIDE 20 MG: 10 TABLET ORAL at 16:13

## 2017-05-10 RX ADMIN — OXYCODONE HYDROCHLORIDE 5 MG: 5 TABLET ORAL at 11:51

## 2017-05-10 RX ADMIN — OXYCODONE HYDROCHLORIDE 5 MG: 5 TABLET ORAL at 22:55

## 2017-05-10 RX ADMIN — SIMETHICONE CHEW TAB 80 MG 80 MG: 80 TABLET ORAL at 19:45

## 2017-05-10 RX ADMIN — SODIUM CHLORIDE, POTASSIUM CHLORIDE, SODIUM LACTATE AND CALCIUM CHLORIDE: 600; 310; 30; 20 INJECTION, SOLUTION INTRAVENOUS at 09:27

## 2017-05-10 RX ADMIN — POLYETHYLENE GLYCOL 3350 17 G: 17 POWDER, FOR SOLUTION ORAL at 04:58

## 2017-05-10 RX ADMIN — HYDROMORPHONE HYDROCHLORIDE 0.5 MG: 1 INJECTION, SOLUTION INTRAMUSCULAR; INTRAVENOUS; SUBCUTANEOUS at 21:56

## 2017-05-10 RX ADMIN — OXYCODONE HYDROCHLORIDE 5 MG: 5 TABLET ORAL at 21:35

## 2017-05-10 RX ADMIN — MAGNESIUM HYDROXIDE 30 ML: 400 SUSPENSION ORAL at 00:54

## 2017-05-10 RX ADMIN — SENNOSIDES AND DOCUSATE SODIUM 2 TABLET: 8.6; 5 TABLET ORAL at 19:45

## 2017-05-10 NOTE — PLAN OF CARE
Problem: Postpartum ( Delivery) (Adult)  Goal: Signs and Symptoms of Listed Potential Problems Will be Absent or Manageable (Postpartum)  Signs and symptoms of listed potential problems will be absent or manageable by discharge/transition of care (reference Postpartum ( Delivery) (Adult) CPG).   Outcome: Therapy, progress towards functional goals is fair  Vital signs stable. Postpartum assessment WDL. Incision clean, dry, intact and open to air. Pain manageable with Tylenol & oxycodone. Patient ambulating independently. Patient voiding independently. Patient still unable to pass gas and still has abdominal discomfort and distention. Patient has tried milk of magnesia and miralax tonight as well as ambulating.  Breastfeeding on cue independently. Patient and infant bonding well. Will continue with current plan of care.

## 2017-05-10 NOTE — PLAN OF CARE
Problem: Goal Outcome Summary  Goal: Goal Outcome Summary  Outcome: No Change  VS stable.  Postpartum assessment WNL.  Breastfeeding, good latch and suck, infant tolerates feeding.  Infant up more and wants to feed more often.  Voiding, no BM.  Abdomen distended, faint BS and nausea.   Came to see patient for possible bowel obstruction.  It was decided to wait and see if she could have a BM.  Patient has been walking independently in the hallway.  Fluids still running in IV, patient thinks they are helping.  She is unable to eat, had a few bites of applesauce and felt nauseous.  Incision site open to air, no new drainage.  Taking tylenol and oxycodone for pain, minimal comfort.  Has been taking Zofran and simethicone.  Mother independent with cares and bonding with baby.  Patient's mother spending the night with her.

## 2017-05-10 NOTE — PROGRESS NOTES
[facility]  MATERNAL CHILD HEALTH   SOCIAL WORK PROGRESS NOTE    DATA:     SW attempted to meet with pt throughout the day. Pt had visitors during attempted visit this afternoon. SW and pt made plan for SW to return in the morning.    INTERVENTION:     SW reviewed the chart and coordinated with care team. This SW introduced myself and my role as their Maternal-Child Health , including role and scope of practice. SW made plan to follow up with pt in the morning. Provided SW contact information.    ASSESSMENT:     Pt receptive to social work visit. Pt are aware of social work support and availability.    PLAN:     SW intends to meet with pt tomorrow morning to complete a psychosocial assessment.  SW to follow for needs and support during hospitalization.    KIRSTIN Arnold, [facility]  Clinical   Maternal Child Health  [facility]  Phone:   [phone]  Pager:    [phone]

## 2017-05-10 NOTE — PROGRESS NOTES
[facility]   Brief Post-partum Note    Name:  Amber Buckner  MRN: [id number]    S: Patient is doing OK this AM.  Pain is better controlled this AM when compared to yesterday, however, still having gas pains.  She is tolerating small amounts of diet without emesis; having some nausea relieved with Zofran.  Ambulating without dizziness.  Voiding spontaneously.  No gas of BM as of yet. Lochia less then menstrual flow.  Breast feeding continues to go well.     O:   Patient Vitals for the past 24 hrs:   BP Temp Temp src Pulse Resp SpO2   05/10/17 0033 102/59 98.2  F (36.8  C) Oral 88 18 -   17 1607 108/77 98.2  F (36.8  C) Oral 90 20 97 %   17 1400 111/74 98.1  F (36.7  C) Oral 100 20 -   17 0751 103/73 97.8  F (36.6  C) Oral 92 16 100 %     Gen:  Resting comfortably, NAD  CV:  Regular rate and rhythm   Pulm:  Non-labored breathing.  No cough or wheezing.   Abd:  Soft, appropriately tender to palpation, moderately-distended.  Fundus at -1 umbilicus, firm and non-tender. + BS.   Incision:  Vertical midline incision well approximated with overlying steri-strips. No erythema, warmth, induration or drainage.     Ext:  Non-tender, trcae edema     Assessment/Plan:  31 year old  on POD #2 s/p RLTCS via vertical midline.  Continue with routine postpartum management.     Ulcerative colitis:   - s/p colectomy w/ J-pouch  - Avoid NSAIDs    H/o depression/anxiety:   - Previously on Celexa  - Monitor closely, 1 week PP mood check     Pain: S/p TAP block. Controlled with Tylenol and Roxicodone.  Hgb: 10.1>> 10.0. VSS as noted above, asymptomatic.   GI:  Distention, concern for post-op ileus.  Continue with clears, encourage frequent ambulation.  Continue BID Senna and Miralax.  PRN Simethicone and MOM.  PPx:  Encouraged ambulation   Rh: Positive  Rubella: Immune  Feed: Breast  BC: IUD at PP visit   Dispo: Plan for home on POD#3-4; once meeting postpartum discharge goals: voiding &  ambulating without difficulty, tolerating a regular diet without nausea and vomiting, passing flatus, pain well controlled on oral pain medicines, lochia appropriate, afebrile with vital signs within normal limits.       Dahiana Pandya MD   OB/Gyn Resident, PGY-3  May 10, 2017    [facility] staff:  Appreciate note by Dr. Pandya.  I have seen and examined the patient without the resident. I have reviewed, edited, and agree with the note.      Abd: moderate tympany in abdomen, c/w gas.  Encourage slow return to bowel fct.  Anticipate d/c tomorrow if bowels return to baseline.     Laurie Montoya MD, FACOG  5/10/2017  8:24 AM

## 2017-05-10 NOTE — PLAN OF CARE
Problem: Postpartum ( Delivery) (Adult)  Goal: Signs and Symptoms of Listed Potential Problems Will be Absent or Manageable (Postpartum)  Signs and symptoms of listed potential problems will be absent or manageable by discharge/transition of care (reference Postpartum ( Delivery) (Adult) CPG).   Outcome: Improving  [id number]  Data: Vital signs within normal limits. Postpartum checks within normal limits - see flow record. Patient eating and drinking normally. Patient able to empty bladder independently and is up ambulating. No apparent signs of infection.  Pt continues to complain abdominal pian, abdomen distended and tender with touch, hypoactive bowls.   Action: Patient medicated during the shift for abdomen pain and bowl regimen medication given as well. See MAR. Patient reassessed within 1 hour after each medication and pain was improved - patient stated she was comfortable. Patient education done on pain.   Response: Positive attachment behaviors observed with infant. Pt had some relief post bowl regimen. Support persons present.   Plan: Anticipate discharge when medically ready.

## 2017-05-11 ENCOUNTER — APPOINTMENT (OUTPATIENT)
Dept: GENERAL RADIOLOGY | Facility: CLINIC | Age: 32
End: 2017-05-11
Payer: COMMERCIAL

## 2017-05-11 LAB
ANION GAP SERPL CALCULATED.3IONS-SCNC: 10 MMOL/L (ref 3–14)
BUN SERPL-MCNC: 7 MG/DL (ref 7–30)
CALCIUM SERPL-MCNC: 8.3 MG/DL (ref 8.5–10.1)
CHLORIDE SERPL-SCNC: 104 MMOL/L (ref 94–109)
CO2 SERPL-SCNC: 26 MMOL/L (ref 20–32)
CREAT SERPL-MCNC: 0.41 MG/DL (ref 0.52–1.04)
GFR SERPL CREATININE-BSD FRML MDRD: ABNORMAL ML/MIN/1.7M2
GLUCOSE SERPL-MCNC: 93 MG/DL (ref 70–99)
POTASSIUM SERPL-SCNC: 3.7 MMOL/L (ref 3.4–5.3)
SODIUM SERPL-SCNC: 140 MMOL/L (ref 133–144)

## 2017-05-11 PROCEDURE — 74000 XR ABDOMEN PORT F1 VW: CPT

## 2017-05-11 PROCEDURE — 25000128 H RX IP 250 OP 636: Performed by: OBSTETRICS & GYNECOLOGY

## 2017-05-11 PROCEDURE — 25800025 ZZH RX 258: Performed by: OBSTETRICS & GYNECOLOGY

## 2017-05-11 PROCEDURE — 12000030 ZZH R&B OB INTERMEDIATE UMMC

## 2017-05-11 PROCEDURE — 36415 COLL VENOUS BLD VENIPUNCTURE: CPT | Performed by: OBSTETRICS & GYNECOLOGY

## 2017-05-11 PROCEDURE — 25000132 ZZH RX MED GY IP 250 OP 250 PS 637: Performed by: OBSTETRICS & GYNECOLOGY

## 2017-05-11 PROCEDURE — 80048 BASIC METABOLIC PNL TOTAL CA: CPT | Performed by: OBSTETRICS & GYNECOLOGY

## 2017-05-11 RX ORDER — PROCHLORPERAZINE MALEATE 5 MG
5-10 TABLET ORAL EVERY 6 HOURS PRN
Status: DISCONTINUED | OUTPATIENT
Start: 2017-05-11 | End: 2017-05-14 | Stop reason: HOSPADM

## 2017-05-11 RX ORDER — OXYCODONE HCL 5 MG/5 ML
5-10 SOLUTION, ORAL ORAL
Status: DISCONTINUED | OUTPATIENT
Start: 2017-05-11 | End: 2017-05-11

## 2017-05-11 RX ORDER — DEXTROSE, SODIUM CHLORIDE, SODIUM LACTATE, POTASSIUM CHLORIDE, AND CALCIUM CHLORIDE 5; .6; .31; .03; .02 G/100ML; G/100ML; G/100ML; G/100ML; G/100ML
INJECTION, SOLUTION INTRAVENOUS CONTINUOUS
Status: DISCONTINUED | OUTPATIENT
Start: 2017-05-11 | End: 2017-05-14 | Stop reason: HOSPADM

## 2017-05-11 RX ORDER — POLYETHYLENE GLYCOL 3350 17 G/17G
17 POWDER, FOR SOLUTION ORAL DAILY PRN
Status: DISCONTINUED | OUTPATIENT
Start: 2017-05-11 | End: 2017-05-14 | Stop reason: HOSPADM

## 2017-05-11 RX ORDER — PROCHLORPERAZINE 25 MG
25 SUPPOSITORY, RECTAL RECTAL EVERY 12 HOURS PRN
Status: DISCONTINUED | OUTPATIENT
Start: 2017-05-11 | End: 2017-05-14 | Stop reason: HOSPADM

## 2017-05-11 RX ORDER — AMOXICILLIN 250 MG
1-2 CAPSULE ORAL
Status: DISCONTINUED | OUTPATIENT
Start: 2017-05-11 | End: 2017-05-14 | Stop reason: HOSPADM

## 2017-05-11 RX ORDER — SIMETHICONE 80 MG
80 TABLET,CHEWABLE ORAL 4 TIMES DAILY PRN
Status: DISCONTINUED | OUTPATIENT
Start: 2017-05-11 | End: 2017-05-14 | Stop reason: HOSPADM

## 2017-05-11 RX ORDER — HYDROMORPHONE HYDROCHLORIDE 1 MG/ML
0.5 INJECTION, SOLUTION INTRAMUSCULAR; INTRAVENOUS; SUBCUTANEOUS
Status: DISCONTINUED | OUTPATIENT
Start: 2017-05-11 | End: 2017-05-11

## 2017-05-11 RX ORDER — DEXTROSE, SODIUM CHLORIDE, SODIUM LACTATE, POTASSIUM CHLORIDE, AND CALCIUM CHLORIDE 5; .6; .31; .03; .02 G/100ML; G/100ML; G/100ML; G/100ML; G/100ML
250 INJECTION, SOLUTION INTRAVENOUS EVERY 4 HOURS PRN
Status: DISCONTINUED | OUTPATIENT
Start: 2017-05-11 | End: 2017-05-14 | Stop reason: HOSPADM

## 2017-05-11 RX ORDER — ACETAMINOPHEN 650 MG/1
650 SUPPOSITORY RECTAL EVERY 4 HOURS PRN
Status: DISCONTINUED | OUTPATIENT
Start: 2017-05-11 | End: 2017-05-14 | Stop reason: HOSPADM

## 2017-05-11 RX ADMIN — HYDROMORPHONE HYDROCHLORIDE 0.3 MG: 1 INJECTION, SOLUTION INTRAMUSCULAR; INTRAVENOUS; SUBCUTANEOUS at 07:47

## 2017-05-11 RX ADMIN — HYDROMORPHONE HYDROCHLORIDE 0.3 MG: 1 INJECTION, SOLUTION INTRAMUSCULAR; INTRAVENOUS; SUBCUTANEOUS at 18:35

## 2017-05-11 RX ADMIN — SODIUM CHLORIDE, POTASSIUM CHLORIDE, SODIUM LACTATE AND CALCIUM CHLORIDE: 600; 310; 30; 20 INJECTION, SOLUTION INTRAVENOUS at 00:10

## 2017-05-11 RX ADMIN — ACETAMINOPHEN 650 MG: 650 SUPPOSITORY RECTAL at 02:05

## 2017-05-11 RX ADMIN — SODIUM CHLORIDE, SODIUM LACTATE, POTASSIUM CHLORIDE, CALCIUM CHLORIDE AND DEXTROSE MONOHYDRATE 1000 ML: 5; 600; 310; 30; 20 INJECTION, SOLUTION INTRAVENOUS at 19:22

## 2017-05-11 RX ADMIN — ACETAMINOPHEN 650 MG: 650 SUPPOSITORY RECTAL at 11:28

## 2017-05-11 RX ADMIN — HYDROMORPHONE HYDROCHLORIDE 0.5 MG: 1 INJECTION, SOLUTION INTRAMUSCULAR; INTRAVENOUS; SUBCUTANEOUS at 22:53

## 2017-05-11 RX ADMIN — SODIUM CHLORIDE, SODIUM LACTATE, POTASSIUM CHLORIDE, CALCIUM CHLORIDE, AND DEXTROSE MONOHYDRATE: 600; 310; 30; 20; 5 INJECTION, SOLUTION INTRAVENOUS at 09:17

## 2017-05-11 RX ADMIN — ACETAMINOPHEN 650 MG: 650 SUPPOSITORY RECTAL at 06:35

## 2017-05-11 RX ADMIN — HYDROMORPHONE HYDROCHLORIDE 0.5 MG: 1 INJECTION, SOLUTION INTRAMUSCULAR; INTRAVENOUS; SUBCUTANEOUS at 05:56

## 2017-05-11 RX ADMIN — HYDROMORPHONE HYDROCHLORIDE 0.5 MG: 1 INJECTION, SOLUTION INTRAMUSCULAR; INTRAVENOUS; SUBCUTANEOUS at 04:28

## 2017-05-11 RX ADMIN — BENZOCAINE 1 ML: 220 SPRAY, METERED PERIODONTAL at 05:20

## 2017-05-11 RX ADMIN — HYDROMORPHONE HYDROCHLORIDE 0.3 MG: 1 INJECTION, SOLUTION INTRAMUSCULAR; INTRAVENOUS; SUBCUTANEOUS at 16:10

## 2017-05-11 RX ADMIN — HYDROMORPHONE HYDROCHLORIDE 0.3 MG: 1 INJECTION, SOLUTION INTRAMUSCULAR; INTRAVENOUS; SUBCUTANEOUS at 00:05

## 2017-05-11 RX ADMIN — ACETAMINOPHEN 650 MG: 650 SUPPOSITORY RECTAL at 15:17

## 2017-05-11 RX ADMIN — BISACODYL 10 MG: 10 SUPPOSITORY RECTAL at 15:17

## 2017-05-11 RX ADMIN — HYDROMORPHONE HYDROCHLORIDE 0.3 MG: 1 INJECTION, SOLUTION INTRAMUSCULAR; INTRAVENOUS; SUBCUTANEOUS at 19:29

## 2017-05-11 RX ADMIN — ONDANSETRON 4 MG: 2 INJECTION INTRAMUSCULAR; INTRAVENOUS at 04:42

## 2017-05-11 RX ADMIN — HYDROMORPHONE HYDROCHLORIDE 0.3 MG: 1 INJECTION, SOLUTION INTRAMUSCULAR; INTRAVENOUS; SUBCUTANEOUS at 11:28

## 2017-05-11 RX ADMIN — RANITIDINE HYDROCHLORIDE 50 MG: 25 INJECTION INTRAMUSCULAR; INTRAVENOUS at 21:42

## 2017-05-11 RX ADMIN — HYDROMORPHONE HYDROCHLORIDE 0.3 MG: 1 INJECTION, SOLUTION INTRAMUSCULAR; INTRAVENOUS; SUBCUTANEOUS at 09:32

## 2017-05-11 RX ADMIN — HYDROMORPHONE HYDROCHLORIDE 0.5 MG: 1 INJECTION, SOLUTION INTRAMUSCULAR; INTRAVENOUS; SUBCUTANEOUS at 02:11

## 2017-05-11 RX ADMIN — HYDROMORPHONE HYDROCHLORIDE 0.5 MG: 1 INJECTION, SOLUTION INTRAMUSCULAR; INTRAVENOUS; SUBCUTANEOUS at 03:11

## 2017-05-11 NOTE — PLAN OF CARE
Problem: Goal Outcome Summary  Goal: Goal Outcome Summary  Outcome: Improving  [id number]  Amber had NG adjustment this morning, verbalized relief. continue to complain incisional pian and cramping, administered dilaudid 0.3 and tylenol sup  as patient request. Pt up ad beth in room and outside of the room. Bowl sounds hypoactive, ng tube to LIS. PIV infusing and pt to remain NPO. Emptying bladder independently, able to care for infant and support at bedside. Currently patient resting, call light in reach, will continue to monitor patient, intervene as needed and notify providers with any change of condition.

## 2017-05-11 NOTE — PROVIDER NOTIFICATION
Text-paged G2 resident with pt status update:  Pt woke up from nap with intolerable abdominal pain and nausea with 500cc emesis.  Administered PO oxycodone, IV Zofran, and IV hydrocodone.  Please call to advise on plan for pt.  Thanks!

## 2017-05-11 NOTE — PROGRESS NOTES
"Paged by RN to evaluate patient with increasing abdominal pain and distention.       S: Patient states the discomfort continues to get worse (pressure).  No flatus since around 1300 this afternoon, brown watery stool only since delivery.  Pain medications are only minimally helpful.  Wondering if NGT could be helpful.       O:   /73 (BP Location: Right arm)  Pulse 114  Temp 98.1  F (36.7  C) (Oral)  Resp 18  Ht 1.575 m (5' 2\")  Wt 63.5 kg (140 lb)  LMP 2016 (Exact Date)  SpO2 99%  Breastfeeding? Unknown  BMI 25.61 kg/m2     Gen: Sitting up in bed hand expression, looks uncomfortable   Abd: Severely distended without signs of acuate abdomen but moderately tender throughout.  Hypoactive BS.     A/P: Ms. Amber Buckner is a 32 yo  POD#3 s/p RLTCS with pregnancy complicated by UC s/p total colectomy, now with findings concerning for small bowel obstruction on AXR with increasing abdominal distension and discomfort.   - Continue NPO, LR at 125cc/hr.  Consider switching to D5 if prolonged fluids are necessary.   - Pain: IV dilaudid and Tylenol suppositories   - Nausea: Compazine, Zofran with plans to avoid Reglan   - NGT to LIS order placed with portable AXR to confirm placement  - PRN hurricane spray ordered   - BMP in AM     Dahiana Pandya MD   OB/Gyn Resident, PGY-3  May 11, 2017, 3:50 AM       "

## 2017-05-11 NOTE — PLAN OF CARE
Problem: Goal Outcome Summary  Goal: Goal Outcome Summary  Outcome: No Change  Continue to have severe abdominal pain and nausea. Xray reveled a distal small bowel obstruction.  No emesis since late evening. NPO. PIV patent, IV fluids infusing as ordered. Abdomen firm and distended.  States pain is constant,sharp and shooting.  Received Dilaudid 0.3-0.5 mg IVP and Tylenol supp with partial relief. MD notified. Order for NG to be placed  After two attempts NG was placed by RN flyer. 300 ml dark brown fluid drained immediately. Suction is on low intermittent suction.  Xray here to check for proper placement. Continue to monitor.    Patient C/O pain was getting worse around 0600. She said it felt like the  NG tube was causing more pain and felt that it was no in the right place. Suction shut off for now, Waiting for flory to reposition tube.

## 2017-05-11 NOTE — PLAN OF CARE
Problem: Goal Outcome Summary  Goal: Goal Outcome Summary  Outcome: Improving  [id number]  Pt had no other issues with the exception of abdominal pain, requested LIS turn off for hr. Ambulating in the pierce way. Will continue with current plan of care.       PER orders    dextrose 5% in lactated ringers infusion Start: 05/11/17 1851, 250 mL, Intravenous, 250 mL/hr, EVERY 4 HOURS PRN, Routine       PRN Reasons: other (NGT replacement)     Admin Instructions: Every 4 hours measure NGT output.  Add half of the total NGT output for 4 hours to her maintenance fluids in a bolus fashion over 1 hour.  (i.e. If NGT out put is 300cc in 4 hours, her fluid for 1 hour needs to be 150cc (from NGT output) + 100cc maintenance fluids for a total of 250cc for 1 hour then back to 100cc.

## 2017-05-11 NOTE — PROGRESS NOTES
Post Partum Progress Note  POD#3    Subjective:  Amber is feeling still uncomfortable this AM. She reports that with placement of the NG tube she initially had a huge improvement in her symptoms but now she thinks that the NG tube is up against the wall of her stomach. She experienced intense pain when it was placed to suction so it was turned off. She continues to have watery brown stool without placement. She has not had any additional episodes of emesis. Her pain is now moderately well controlled with medications. Her bleeding is scant. She is voiding spontaneously. She denies dizziness, fevers, chills, chest pain, shortness of breath.    Objective:  Vitals:    05/10/17 0033 05/10/17 0828 05/10/17 1600 17 0222   BP: 102/59 109/66 104/62 108/73   BP Location:  Left arm  Right arm   Pulse: 88 114     Resp: 18 18 18 18   Temp: 98.2  F (36.8  C) 98.3  F (36.8  C) 98  F (36.7  C) 98.1  F (36.7  C)   TempSrc: Oral Oral Oral Oral   SpO2:  96% 100% 99%   Weight:       Height:           General: NAD. A&Ox3.  CV: Regular rate, well perfused  Pulm: Normal respiratory effort  Abd: moderately distended, moderately tender with palpation, hypoactive bowel sounds  Incision: vertical midline skin incision well healed with steri strips in place  Ext: trace lower extremtiy edema. No calf tenderness.    Assessment/Plan:  Amber Buckner is a 31 year old  female who is POD#3 s/p RLTCS via vertical midline skin incision for history of ulcerative colitis s/p total colectomy with postoperative course complicated by small bowel obstuction now with NGT in place..    - Encourage routine post-operative goals including ambulation and incentive spirometry  - PNC: Rh positive. Rubella immune. No intervention indicated.  - Pain: s/p TAPS block. Continue IV dilaudid, rectal tylenol  - Heme: Hgb 10.1> >10.0. Vital signs stable.  - GI: AXR concerning for small bowel obstruction. Now with NGT in place. Xray for confirmation of  placement pending. Per patient symptoms concern that it is pressed up against stomach wall. Will call flyer to reposition once results return. Continue antiemetics. Continue to monitor symptoms. BMP normal this AM.   - : Voiding spontaneously.  - Infant: Stable in room with mother  - Feeding: Plans on breastfeeding.  - BC: Plans on IUD at PP visit    Discharge to home pending resolution of her bowel symptoms    Kathryn Cole MD  OBGYN PGY-2  [phone]  7:37 AM 5/11/2017    The patient was seen and examined by me separately from the team.  I have reviewed and agree with the above note.  Her abdominal XR was reviewed by me and her NG tube advanced by the RN flory.  Plan to continue bowel rest with NG/NPO today.    Michelle Jewell MD, FACOG

## 2017-05-11 NOTE — PROVIDER NOTIFICATION
Text-paged G2 resident with pt status update:  Pt states abdominal pain worsening, not improving.  Administered another 4mg of Zofran to total 8mg, restarted LR and administered 5mg oxycodone.  Pt requesting xray.  Thanks.

## 2017-05-11 NOTE — PLAN OF CARE
Problem: Goal Outcome Summary  Goal: Goal Outcome Summary  Outcome: Improving  VSS and postpartum assessments WDL.  Up ad beth with steady gait and walked halls of unit.  Independent with cares.  Bonding well with infant.  Breastfeeding on cue independently with good latch observed followed by hand expression after each feeding and spoon or fingerfeeding expressed breastmilk to infant.  Pain managed with tylenol and mylicon per MAR.  Pt reports abdominal distention, discomfort and gas pains to be improving and having small bowel movements.  Tolerating regular diet, denies nausea.  Went over Cot[employer] and birth certificate and placed at bedside.  Will continue with postpartum cares and education per plan of care.    Update:  Pt napped from about 2015 to about 2115, woke up with intolerable abdominal pain and nausea with large emesis.  Administered zofran, oxycodone, and dilaudid per MAR and notified resident.  Resident evaluated pt at bedside and updated orders to clear liquid diet, restart maintenance IV fluids, and increase zofran dose.  Pt attempted to breastfeed infant but unable to due to pain and nausea.  This RN assisted with hand expression and fed infant expressed breastmilk.  Pt reported feeling worse, notified resident again.  Orders placed on NPO and STAT abdominal xray ordered.

## 2017-05-11 NOTE — PROGRESS NOTES
S: Called to evaluate patient with concern for ongoing nausea, vomiting, abdominal pain. Amber reports that earlier this afternoon she was feeling relatively well. Her pain had subsided enough that she could get up and ambulate. However, she attempted to advance her diet and ate some pears earlier this evening for dinner. After she woke up from a nap she reported acute onset 10/10 sharp abdominal pain, nausea and emesis. Since that time she reports her abdominal pain and nausea have been persistent. She is in such pain that she cannot hold her baby. She reports the pain medications are only slightly helpful with the pain and they make her too somnolent. She has not passed much gas since delivery. She is having every watery bowel movements. She feels weak overall.    Vitals:    17 1607 05/10/17 0033 05/10/17 0828 05/10/17 1600   BP: 108/77 102/59 109/66 104/62   BP Location:   Left arm    Pulse: 90 88 114    Resp: 20 18 18 18   Temp: 98.2  F (36.8  C) 98.2  F (36.8  C) 98.3  F (36.8  C) 98  F (36.7  C)   TempSrc: Oral Oral Oral Oral   SpO2: 97%  96% 100%   Weight:       Height:         Gen: lying in bed, appears somewhat uncomfortable  Abd: soft, moderately distended, exquisite tenderness with palpation in all 4 quadrants, hypoactive bowel sounds, uterus firm and below the umbilicus  Incision: clean, dry, intact pfannenstiel incision    AXR:  FINDINGS: Gas is present within moderately dilated small bowel in the  mid abdomen. Several air-fluid levels are present within the dilated  colon. There is a relative paucity of gas within the more distal small  bowel. No visualized bowel wall thickening, pneumatosis or free  intraperitoneal gas.         IMPRESSION: Distal small bowel obstruction.    Ms. Amber Buckner is a 32 yo  POD#3 s/p RLTCS with pregnancy complicated by UC with total colectomy, no with findings concerning for small bowel obstruction.  - Diet changed to NPO status  - Continue PRN antiemetics,  pain medications as per previously, addition of tylenol suppository  - Maintenance fluids restarted  - If patient continues to have emesis despite conservative interventions, consider placement of NGT  - Plan BMP in the AM    Kathryn Cole MD  OBGYN PGY-2  [phone]  2:00 AM 5/11/2017

## 2017-05-11 NOTE — PROGRESS NOTES
[facility]  [facility]   SOCIAL WORK ASSESSMENT    DATA:     Met with pt (Amber Sita / [birth year]SHANTI 1985), her  (Benjamín), and baby girl to assess needs and offer support. Baby girl is  couples third child (couple have two boys ages 4 y/o and 3 y/o). Family currently resides in [place], [place]. Family reports having strong social support, especially from family. Family report having reliable access to personal transportation.    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 During this hospitalization pt has begun her 12-week maternity leave. Pt reports having a supportive workplace and denied stressors related to coordinating her leave at this time. Pt recently graduated with her masters degree in nursing education.    FOB (contact [phone]) is employed full-time as a . During this hospitalization ARABELLA has taken a 4-week paternity leave. FOB reports having a supportive workplace and denied stressors related to coordinating his leave. When ARABELLA returns to work, he will begin a new position due to promotion that will change him to night shifts.    Pt reports a mental health history significant for anxiety and depression. Discussed pattern of coping and the importance of having additional mental health supports. Pt requested her anti-depressant medication (Celexa) be restarted during this admission. Pt reports wanting help to feel better and is ready to reengage with supports to alleviate her anxiety and depression symptoms. SW and pt discussed the importance of community mental health supports; pt reports having a strong relationship with her therapist who she has a long history with and who she saw 1-2 times per week during most of her pregnancy. Pt did endorse that her anxiety is interfering with her coping during this admission. Pt was receptive to team placing a consult for integrative medicine. Pt expressed ongoing intent to  communicate closely with her family and multidisciplinary team to express her emotions and proactively identify her care needs.    INTERVENTION:     Chart review. Initial psychosocial assessment completed. SW met with pt and her family to assess for needs, offer support, and assess for coping. SW provided supportive counseling and appropriate resources related to the impact of this hospitalization on pt and her family system. SW provided education on postpartum mood and anxiety disorders. SW shared information about hospital and community resources. Problem solved around identified stressors. Discussed the importance of managing her mental health needs. Discussed pattern of coping, coping skills. SW provided emotional support and active listening. Provided family with SW contact information. Encouraged family to continue to proactively communicate with the multidisciplinary team.    ASSESSMENT:     Pt and FOB were engaged with this SW throughout bedside visit. Family were open to hospital and community resource referrals. Pt was tearful as she discussed the anxiety associated with her GI complications postpartum and her need for a NG tube at this time. Pt was open in discussing her mental health history. Pt appears to be resilient and able to utilize her strengths to cope, specifically relying on support from her family. Pt was thoughtful in discussing her pattern of coping during this hospitalization. FOB was observed by this SW to be a comfort to pt throughout SW visit, rubbing her back and providing affirmation. Pt and FOB were easily engaged and able to verbally express themselves and identify needs. Family's support system appears to be stable and involved. Pt appears motivated to manage her anxiety symptoms; pt expressed a successful history of managing her anxiety through medication management and therapy. Pt aware that she will benefit from ongoing mental health support through medication and therapy. Pt  denies any current safety concerns. Pt and family are aware of SW support and availability.    PLAN:     SW will continue to collaborate with the multidisciplinary team. Re-consult for SW to follow if needs arise.    KIRSTIN Arnold, [facility]  Clinical   Maternal Child Health  [facility]  Phone:   [phone]  Pager:    [phone]

## 2017-05-12 PROCEDURE — 25800025 ZZH RX 258: Performed by: OBSTETRICS & GYNECOLOGY

## 2017-05-12 PROCEDURE — 25000132 ZZH RX MED GY IP 250 OP 250 PS 637: Performed by: OBSTETRICS & GYNECOLOGY

## 2017-05-12 PROCEDURE — 25000128 H RX IP 250 OP 636: Performed by: OBSTETRICS & GYNECOLOGY

## 2017-05-12 PROCEDURE — 12000030 ZZH R&B OB INTERMEDIATE UMMC

## 2017-05-12 RX ADMIN — HYDROMORPHONE HYDROCHLORIDE 0.5 MG: 1 INJECTION, SOLUTION INTRAMUSCULAR; INTRAVENOUS; SUBCUTANEOUS at 00:03

## 2017-05-12 RX ADMIN — HYDROMORPHONE HYDROCHLORIDE 0.5 MG: 1 INJECTION, SOLUTION INTRAMUSCULAR; INTRAVENOUS; SUBCUTANEOUS at 15:52

## 2017-05-12 RX ADMIN — HYDROMORPHONE HYDROCHLORIDE 0.5 MG: 1 INJECTION, SOLUTION INTRAMUSCULAR; INTRAVENOUS; SUBCUTANEOUS at 08:40

## 2017-05-12 RX ADMIN — HYDROMORPHONE HYDROCHLORIDE 0.5 MG: 1 INJECTION, SOLUTION INTRAMUSCULAR; INTRAVENOUS; SUBCUTANEOUS at 22:13

## 2017-05-12 RX ADMIN — ONDANSETRON 4 MG: 2 INJECTION INTRAMUSCULAR; INTRAVENOUS at 14:34

## 2017-05-12 RX ADMIN — SODIUM CHLORIDE, SODIUM LACTATE, POTASSIUM CHLORIDE, CALCIUM CHLORIDE, AND DEXTROSE MONOHYDRATE: 600; 310; 30; 20; 5 INJECTION, SOLUTION INTRAVENOUS at 14:00

## 2017-05-12 RX ADMIN — HYDROMORPHONE HYDROCHLORIDE 0.5 MG: 1 INJECTION, SOLUTION INTRAMUSCULAR; INTRAVENOUS; SUBCUTANEOUS at 19:38

## 2017-05-12 RX ADMIN — ACETAMINOPHEN 650 MG: 650 SUPPOSITORY RECTAL at 14:25

## 2017-05-12 RX ADMIN — RANITIDINE HYDROCHLORIDE 50 MG: 25 INJECTION INTRAMUSCULAR; INTRAVENOUS at 05:36

## 2017-05-12 RX ADMIN — HYDROMORPHONE HYDROCHLORIDE 0.5 MG: 1 INJECTION, SOLUTION INTRAMUSCULAR; INTRAVENOUS; SUBCUTANEOUS at 12:05

## 2017-05-12 RX ADMIN — ACETAMINOPHEN 650 MG: 650 SUPPOSITORY RECTAL at 00:03

## 2017-05-12 RX ADMIN — RANITIDINE HYDROCHLORIDE 50 MG: 25 INJECTION INTRAMUSCULAR; INTRAVENOUS at 22:15

## 2017-05-12 RX ADMIN — HYDROMORPHONE HYDROCHLORIDE 0.5 MG: 1 INJECTION, SOLUTION INTRAMUSCULAR; INTRAVENOUS; SUBCUTANEOUS at 03:39

## 2017-05-12 RX ADMIN — HYDROMORPHONE HYDROCHLORIDE 0.5 MG: 1 INJECTION, SOLUTION INTRAMUSCULAR; INTRAVENOUS; SUBCUTANEOUS at 06:36

## 2017-05-12 RX ADMIN — SODIUM CHLORIDE, SODIUM LACTATE, POTASSIUM CHLORIDE, CALCIUM CHLORIDE, AND DEXTROSE MONOHYDRATE: 600; 310; 30; 20; 5 INJECTION, SOLUTION INTRAVENOUS at 04:22

## 2017-05-12 RX ADMIN — SODIUM CHLORIDE, SODIUM LACTATE, POTASSIUM CHLORIDE, CALCIUM CHLORIDE, AND DEXTROSE MONOHYDRATE: 600; 310; 30; 20; 5 INJECTION, SOLUTION INTRAVENOUS at 22:13

## 2017-05-12 RX ADMIN — ACETAMINOPHEN 650 MG: 650 SUPPOSITORY RECTAL at 08:13

## 2017-05-12 NOTE — PROGRESS NOTES
SPIRITUAL HEALTH SERVICES    [facility] Unit [facility]  ON-CALL NOTE      REFERRAL SOURCE: Epic consult for support    Per unit  Chuck, pt requests [facility] from a , and he has arranged for request to be fulfilled by chaplain Knutson. I completed consult per unit 's request, as there is a plan in place for response.    PLAN: Pt will receive [facility] integrative care per her request.                                                                                                                            Collette Francois  Staff   Pager [phone]

## 2017-05-12 NOTE — PLAN OF CARE
Problem: Goal Outcome Summary  Goal: Goal Outcome Summary  Patient reported discomfort during intermittent suctioning, and disconnected the tube.MD notified, ordered to have flyer reposition as tube is advanced back in right position. Pt explained and agreed on plan.

## 2017-05-12 NOTE — PLAN OF CARE
Problem: Goal Outcome Summary  Goal: Goal Outcome Summary  Outcome: Improving  [id number]  Amber verbalized little relief today, continue to complain incisional pian and cramping, administered dilaudid 0.5 and tylenol sup as patient request. Pt up ad beth in room and outside of the room. Bowl sounds hypoactive, NG patient and to LIS. PIV infusing at 150 m/hr with bolus replacement, (see orders for detail info) and pt to remain NPO,   Emptying bladder independently, able to care for infant and support at bedside. Currently patient resting, call light in reach, will continue to monitor patient, intervene as needed and notify providers with any change of condition.

## 2017-05-12 NOTE — PROGRESS NOTES
[facility]  [facility]   SOCIAL WORK PROGRESS NOTE    DATA:     SW met alone with pt and baby bedside this morning at pt's request and due to pt's score of 14 on her EDS. Pt told SW that she anticipates remaining inpatient for a few more nights. Pt expressed feeling her medical team communication has improved. Pt told SW that she feels well supported by family and the team; pt reports a reduction in her anxiety symptoms since speaking with the team this morning. Pt expressed feeling her coping has improved throughout this admission, in large part due to close communication with her mother and . Pt told SW that she has refocused her focus from \"quickly discharging\" to \"going home when she is healed.\" Pt told SW that her EDS is linked to the stress and anxiety related to unexpected complications during this admission. Pt continues to express feeling supported by family and friends, feeling open to restarting her antidepressant prior to discharge, and being open to engaging her outpatient therapist as needed following discharge.    INTERVENTION:     Chart review. SW provided supportive counseling and appropriate resources related to the impact of this hospitalization on pt and her family system. Problem solved around identified stressors. Discussed the importance of managing her mental health needs. Discussed pattern of coping, coping skills. SW provided emotional support and active listening. Provided family with SW contact information. Encouraged family to continue to proactively communicate with the multidisciplinary team. Coordinated a referral for pt through integrative therapies.    ASSESSMENT:     Pt continues to be open and engaged with this SW during bedside visits. Pt is receptive to integrative therapy in supporting her coping with the unexpected stress of her health complications during this admission. Pt appears to continue to have strong social " support present bedside. Despite her unexpected health complications and increased anxiety symptoms, pt endorses overall stable mood at this time. Pt and family remain aware of SW support and availability.    PLAN:     SW will remain available to assess needs and provide psychosocial support and access to resources.   SW will continue to collaborate with the multidisciplinary team. Re-consult for SW to follow if needs arise.    KIRSTIN Arnold, [facility]  Clinical   Maternal Child Health  [facility]  Phone:   [phone]  Pager:    [phone]

## 2017-05-12 NOTE — PROGRESS NOTES
Per MD ordered to reposition/advance NG tube per Primary RN. (Writer tried turning NG tube and advancing it from 65 cm to 68 cm) Pt back on intermittent suction with signs of gastric content in canister, but still feeling discomfort. Pt detached, due complaining of discomfort again. Charge nurse paged MD for additional orders.

## 2017-05-12 NOTE — PLAN OF CARE
Problem: Goal Outcome Summary  Goal: Goal Outcome Summary  Outcome: Therapy, progress toward functional goals as expected  Resident here at 2330 to evaluate and talk with patient. Patients questions answered.VSS. Abdomen distended but soft. Bowel sounds positive in LLQ and RLQ. Patient periodically disconnects NG suction to relief gastric discomfort. Incision clean and dry.Dilaudid given with rectal Tylenol.Breastfeeding well and independently, with hand expression of 3-5 mls EBM. FOB here and supportive.IV infusing well, taking ice chips. Lavendar given for comfort,with some improvement in pain and nausea. Settled to sleep at 0115.

## 2017-05-12 NOTE — PROVIDER NOTIFICATION
05/12/17 1228   Provider Notification   Provider Name/Title G 2 resident    Method of Notification Electronic Page   Request Evaluate-Remote   Notification Reason Other

## 2017-05-12 NOTE — PROVIDER NOTIFICATION
05/11/17 2209   Provider Notification   Provider Name/Title Douglas RODRIGUEZ   Method of Notification Electronic Page   Request Evaluate-Remote   Notification Reason Other   Requesting order for xray to check NG placement

## 2017-05-12 NOTE — PROGRESS NOTES
05/12/17 1700   Visit Information   Visit Made By Staff    Type of Visit On-call;Staff consultation/triage;Follow-up   Visited Patient   Interventions   Plan of Care Review   With patient/family/proxy   Basic Spiritual Interventions   Assessment of spiritual needs/resources   Advanced Assessments/Interventions   Presenting Concerns/Issues Spiritual/[employer]/emotional support;Challenged coping;Stress/self-care   Healing Modalities Provided Energy Healing  (Healing Touch)   SPIRITUAL HEALTH SERVICES  [facility] ([facility]) 7th Peds  ON-CALL VISIT     REFERRAL SOURCE: Unit  request for Healing Touch for mom.  Stopped by around 2p, Amber was alone with baby.  Returned at 4:30p and completed Healing Touch session.  Amber was asleep when I left the room.          PLAN: Will let unit  know of visit.       Eamon Marrufo  Staff   Spiritual Health Services  Pgr: [phone]

## 2017-05-12 NOTE — PROVIDER NOTIFICATION
Dr Cole notified of decreased output.Replacing for 100cc NG returns. Plan to keep IV rate at 150cc/hr until seen in am.

## 2017-05-12 NOTE — PLAN OF CARE
Problem: Goal Outcome Summary  Goal: Goal Outcome Summary  Outcome: Therapy, progress towards functional goals is fair  Bladder scan showing 200ml in bladder. Assisted up to bathroom to void 200mls. Concentrated urine.IV running at 150cc/hr. Dilaudid given at 0400 with good relief of abdominal pain. NG patent.  baby with hand expression. Settled for sleep.

## 2017-05-12 NOTE — PROGRESS NOTES
Post Partum Progress Note  POD#4    Subjective:  Amber is feeling a bit better this AM. She denies flatus, but continues to have very watery brown stools. She states that her nausea has improved and now emesis with the NGT but she is unable to keep it to LIS all of the time as she gets some intense pain and feels as if it is pressed up against the wall of the stomach (has been ongoing since around 0300 this AM). Her abdominal pain is present but pain control is improving. Otherwise is doing well but very tired, overwhelmed. She has scant bleeding. She denies dizziness, fevers, chills, chest pain, shortness of breath. She is breast feeding.     Objective:  Vitals:    17 1214 17 1505 17 0000 17 0355   BP: 104/70 104/67 106/68 99/62   BP Location: Right arm      Pulse:       Resp: 20 20 20 18   Temp: 98.5  F (36.9  C) 97.7  F (36.5  C) 97.6  F (36.4  C)    TempSrc: Oral Axillary Oral    SpO2:   97% 96%   Weight:       Height:         I/O last 3 completed shifts:  In: 1871.67 [I.V.:1871.67]  Out: 600 [Emesis/NG output:600]       General: NAD. A&Ox3.  Pulm: Normal respiratory effort.  Abd: Soft, mild tenderness with palpation, improved but moderate distension, hypoactive bowel sounds  Incision: vertical midline skin incision well healed with steri strips in place  Ext: Trace lower extremity edema. No calf tenderness.    Assessment/Plan:  Amber Buckner is a 31 year old  female who is POD#4 s/p RLTCS via vertical midline skin incision for history of ulcerative colitis s/p total colectomy with postoperative course complicated by small bowel obstuction now with NGT in place.     - Encourage routine post-operative goals including ambulation and incentive spirometry. SCDs if not moving.   - PNC: Rh positive. Rubella immune. No intervention indicated.  - Pain: s/p TAPS block. Continue IV dilaudid, rectal tylenol.  - Heme: Hgb 10.1> >10.0. Vital signs stable.  - GI: AXR concerning for  small bowel obstruction. Now with NGT in place.  Still having large output from NGT.  Will await flatus prior to clamp trial at which time diet could be advanced to clears.  Will reassess this afternoon.  In regards to fluids, maintenance IV fluids at 150cc/hr with plan to replete with boluses measuring 50% of NGT every 4 hours in addition to maintenance fluids.  - : Voiding spontaneously.  - Infant: Stable in room with mother  - Feeding: Plans on breastfeeding.  - BC: Plans on IUD at PP visit     Discharge to home pending resolution of her bowel symptoms    Dahiana Pandya MD   OB/Gyn Resident, PGY-3  May 12, 2017, 6:59 AM     Staff MD Note    I appreciate the note by Dr. Pandya.  Any necessary changes have been made by me.  I evaluated the patient with the resident and agree with the assessment and plan.    Elyssa Boyce MD

## 2017-05-13 LAB
ANION GAP SERPL CALCULATED.3IONS-SCNC: 5 MMOL/L (ref 3–14)
BUN SERPL-MCNC: 7 MG/DL (ref 7–30)
CALCIUM SERPL-MCNC: 8.2 MG/DL (ref 8.5–10.1)
CHLORIDE SERPL-SCNC: 110 MMOL/L (ref 94–109)
CO2 SERPL-SCNC: 31 MMOL/L (ref 20–32)
CREAT SERPL-MCNC: 0.45 MG/DL (ref 0.52–1.04)
GFR SERPL CREATININE-BSD FRML MDRD: ABNORMAL ML/MIN/1.7M2
GLUCOSE SERPL-MCNC: 109 MG/DL (ref 70–99)
MAGNESIUM SERPL-MCNC: 1.9 MG/DL (ref 1.6–2.3)
PHOSPHATE SERPL-MCNC: 2.8 MG/DL (ref 2.5–4.5)
POTASSIUM SERPL-SCNC: 3.4 MMOL/L (ref 3.4–5.3)
SODIUM SERPL-SCNC: 146 MMOL/L (ref 133–144)

## 2017-05-13 PROCEDURE — 84100 ASSAY OF PHOSPHORUS: CPT | Performed by: OBSTETRICS & GYNECOLOGY

## 2017-05-13 PROCEDURE — 36415 COLL VENOUS BLD VENIPUNCTURE: CPT | Performed by: OBSTETRICS & GYNECOLOGY

## 2017-05-13 PROCEDURE — 25000132 ZZH RX MED GY IP 250 OP 250 PS 637: Performed by: OBSTETRICS & GYNECOLOGY

## 2017-05-13 PROCEDURE — 83735 ASSAY OF MAGNESIUM: CPT | Performed by: OBSTETRICS & GYNECOLOGY

## 2017-05-13 PROCEDURE — 25800025 ZZH RX 258: Performed by: OBSTETRICS & GYNECOLOGY

## 2017-05-13 PROCEDURE — 80048 BASIC METABOLIC PNL TOTAL CA: CPT | Performed by: OBSTETRICS & GYNECOLOGY

## 2017-05-13 PROCEDURE — 25000128 H RX IP 250 OP 636: Performed by: OBSTETRICS & GYNECOLOGY

## 2017-05-13 PROCEDURE — 12000030 ZZH R&B OB INTERMEDIATE UMMC

## 2017-05-13 RX ORDER — ACETAMINOPHEN 325 MG/1
650 TABLET ORAL EVERY 4 HOURS PRN
Status: DISCONTINUED | OUTPATIENT
Start: 2017-05-13 | End: 2017-05-14 | Stop reason: HOSPADM

## 2017-05-13 RX ORDER — OXYCODONE HYDROCHLORIDE 5 MG/1
5-10 TABLET ORAL EVERY 4 HOURS PRN
Status: DISCONTINUED | OUTPATIENT
Start: 2017-05-13 | End: 2017-05-14 | Stop reason: HOSPADM

## 2017-05-13 RX ADMIN — HYDROMORPHONE HYDROCHLORIDE 0.5 MG: 1 INJECTION, SOLUTION INTRAMUSCULAR; INTRAVENOUS; SUBCUTANEOUS at 11:36

## 2017-05-13 RX ADMIN — OXYCODONE HYDROCHLORIDE 5 MG: 5 TABLET ORAL at 23:45

## 2017-05-13 RX ADMIN — HYDROMORPHONE HYDROCHLORIDE 0.3 MG: 1 INJECTION, SOLUTION INTRAMUSCULAR; INTRAVENOUS; SUBCUTANEOUS at 20:47

## 2017-05-13 RX ADMIN — HYDROMORPHONE HYDROCHLORIDE 0.5 MG: 1 INJECTION, SOLUTION INTRAMUSCULAR; INTRAVENOUS; SUBCUTANEOUS at 15:39

## 2017-05-13 RX ADMIN — ACETAMINOPHEN 650 MG: 650 SUPPOSITORY RECTAL at 09:55

## 2017-05-13 RX ADMIN — RANITIDINE HYDROCHLORIDE 50 MG: 25 INJECTION INTRAMUSCULAR; INTRAVENOUS at 23:46

## 2017-05-13 RX ADMIN — RANITIDINE HYDROCHLORIDE 50 MG: 25 INJECTION INTRAMUSCULAR; INTRAVENOUS at 16:54

## 2017-05-13 RX ADMIN — ACETAMINOPHEN 650 MG: 650 SUPPOSITORY RECTAL at 04:11

## 2017-05-13 RX ADMIN — HYDROMORPHONE HYDROCHLORIDE 0.5 MG: 1 INJECTION, SOLUTION INTRAMUSCULAR; INTRAVENOUS; SUBCUTANEOUS at 04:11

## 2017-05-13 RX ADMIN — HYDROMORPHONE HYDROCHLORIDE 0.3 MG: 1 INJECTION, SOLUTION INTRAMUSCULAR; INTRAVENOUS; SUBCUTANEOUS at 18:50

## 2017-05-13 RX ADMIN — HYDROMORPHONE HYDROCHLORIDE 0.5 MG: 1 INJECTION, SOLUTION INTRAMUSCULAR; INTRAVENOUS; SUBCUTANEOUS at 01:05

## 2017-05-13 RX ADMIN — HYDROMORPHONE HYDROCHLORIDE 0.5 MG: 1 INJECTION, SOLUTION INTRAMUSCULAR; INTRAVENOUS; SUBCUTANEOUS at 06:42

## 2017-05-13 RX ADMIN — SODIUM CHLORIDE, SODIUM LACTATE, POTASSIUM CHLORIDE, CALCIUM CHLORIDE, AND DEXTROSE MONOHYDRATE: 600; 310; 30; 20; 5 INJECTION, SOLUTION INTRAVENOUS at 20:40

## 2017-05-13 RX ADMIN — RANITIDINE HYDROCHLORIDE 50 MG: 25 INJECTION INTRAMUSCULAR; INTRAVENOUS at 06:42

## 2017-05-13 RX ADMIN — ACETAMINOPHEN 650 MG: 325 TABLET, FILM COATED ORAL at 23:44

## 2017-05-13 NOTE — PLAN OF CARE
Problem: Goal Outcome Summary  Goal: Goal Outcome Summary  Outcome: Improving  Focus: Pain and Activity.  D: Patient A&O times 4; VS with low BP; LS clear; abdomen distended, soft and BS hypoactive; c/o pain to upper abdomen; denies flatus.  I: Dilaudid IV given for pain; encouraged to ambulate.  A: Ambulated in pierce multiple times and patient verbalized she passed few flatus; spouse at bedside and assisting with infant care; infant discharged but in room as boarder status; NG tube patent and intact to low suction with moderate output; voiding small amounts; abdominal incision with steri strips CD&I.  P: Continue with plan of care.

## 2017-05-13 NOTE — PROVIDER NOTIFICATION
05/13/17 0849   Provider Notification   Provider Name/Title G 3 resident    Method of Notification Electronic Page   Request Evaluate in Person   Notification Reason Lab Results

## 2017-05-13 NOTE — PROVIDER NOTIFICATION
05/13/17 0429   Provider Notification   Provider Name/Title G3   Method of Notification Electronic Page   Request Evaluate-Remote   Notification Reason Medication Request   clarification on what pt's D5LR should be running at. Also requested possible nutrition consult.

## 2017-05-13 NOTE — PROVIDER NOTIFICATION
05/13/17 0717   Provider Notification   Provider Name/Title G3   Method of Notification Electronic Page   Request Evaluate-Remote   Notification Reason Other   would you like labs ordered for this pt?

## 2017-05-13 NOTE — PLAN OF CARE
Problem: Goal Outcome Summary  Goal: Goal Outcome Summary  [id number]  Amber verbalized some relief today, continue to complain incisional pian and cramping, administered dilaudid 0.5 and tylenol sup as patient request. Pt up ad beth in room and outside of the room. Bowl sounds hypoactive, NG patient and was clamped at 1100.  PIV infusing at 10 m/hr with bolus replacement, (see orders for detail info) and pt to remain NPO, Emptying bladder independently, able to care for infant and support at bedside. Currently patient resting, call light in reach, will continue to monitor patient, intervene as needed and notify providers with any change of condition

## 2017-05-13 NOTE — PROGRESS NOTES
"[facility]  Obstetrics Postpartum Note    S: Patient feeling a little better today, really wants to try clamping today because had \"real\" flatus last clemente.  Pain well controlled other than occ waves of \"gas pain\" that are pretty intense at times, requiring IV dilaudid (no NSAIDs per colorectal surgery).  Denies nausea or vomiting.  Ambulating without dizziness.  Lochia minimal. + flatus as of last clemente. Voiding spontaneously. Breastfeeding.  Wondering about feeding plan for baby as baby is discharged, must have someone in the room at all times.    O:   Patient Vitals for the past 24 hrs:   BP Temp Temp src Pulse Heart Rate Resp SpO2   17 0747 100/68 97.6  F (36.4  C) Oral 61 - 20 100 %   17 0059 95/65 - - - 73 18 97 %   17 1625 102/65 97.6  F (36.4  C) Oral - 66 18 97 %     I/O last 3 completed shifts:  In: . [I.V.:.]  Out: 1450 [Urine:675; Emesis/NG output:775]    Gen:  Resting comfortably, NAD  CV:  RRR  Pulm:  CTAB, no wheezes  Abd:  Moderately distended, soft, appropriately TTP, mod BS.  Fundus at umbilicus, firm and non-tender.  Inc: c/d/i, no erythema or drainage, steris  Ext:  non-tender, no edema bilaterally    Hgb:   Hemoglobin   Date Value Ref Range Status   2017 10.0 (L) 11.7 - 15.7 g/dL Final   2017 10.1 (L) 11.7 - 15.7 g/dL Final       Assessment/Plan:  31 year old  on POD #5 s/p RLTCS via vertical midline skin incision for history of ulcerative colitis s/p total colectomy with postoperative course complicated by small bowel obstuction now with NGT in place.    PP: Continue with routine postpartum management   Rh positive, Rubella immune   Infant Nutrition: Breastfeeding, in room (discharged)    Contraception: Likely interval IUD  Pain: Continue PO meds (oxycodone, tylenol, ibuprofen)  Heme: Hgb 10.1> >10.0, VSS  FEN/GI: AXR concerning for small bowel obstruction. Now with NGT in place. Still having moderate output from " NGT, pt endorses quite a bit of ice around the tube.  Flatus last clemente, clamping trial this mid-day, likely to LWS o/n and possible d/c NGT in AM 5/14/17 if outputs remain low o/n with continued flatus.  :   Voiding spontaneously  PPX: Ambulation for DVT ppx, SCDs while resting in bed    Dispo: Anticipate discharge once meeting postop goals, likely inpatient another 1-3 days depending upon resolution of SBO    Mallory Alexander MD, MPH  5/13/2017, 3:01 PM

## 2017-05-13 NOTE — PLAN OF CARE
Problem: Goal Outcome Summary  Goal: Goal Outcome Summary  Outcome: Improving  Focus: Plan of Care.  D: @1745, OB resident (Dr. Garay) responded to previous txt paged and informed Dr. Garay of NG output after clamp trial and also of concern of patient to start on PPN and Lipids tonight with only a peripheral IV in place.   I: While on phone with Dr. Garay, in patient's room and patient and spouse involved in conversation and patient informed MD and I that she tolerated clamp trial and denies nausea; Dr. Garay ordered to clamp NG, start Clear Liquids diet and hold PPN and Lipids infusion tonight; NG clamped and suction discontinued at 1745; patient encouraged to and started on warm chicken broth and tea; patient offered a suppository earlier in shift before MD call and patient declined; patient bolus with D5LR based on NG output at start of shift.  A: Patient up ambulating in pierce with spouse and infant and tolerated activity well; denies nausea; verbalized decreased in pain after IV Dilaudid administration.  P: Continue with plan of care and assess needs as needed.    @2150, spoke to OB G3 Resident, Dr. Garay after page and provided doctor with update status and informed doctor of patient's request to switch to PO pain medications and also of patient's request to remove NG tube; Informed doctor that patient tolerated clear liquids well, denies nausea; ambulated in pierce and passed more flatus and that patient tolerated clamped NG tube since 1745. Doctor recommended to keep in NG over night and see how patient tolerate breakfast with advanced diet and Dr. Garay verbalized she with switch patient to PO meds and advance diet to full liquids.   @2215, informed patient of plan of care and patient verbalized understanding; patient requested and given warm water and tea bag; tolerated well.

## 2017-05-13 NOTE — PROGRESS NOTES
CLINICAL NUTRITION SERVICES - ASSESSMENT NOTE     Nutrition Prescription    RECOMMENDATIONS FOR MDs/PROVIDERS TO ORDER:  1.  Advance diet as medically able  2.  D/c D5 infusion once PN bag running to prevent overhydration  3.  Pt at risk for refeeding syndrome given minimal POs x 6 days.  Recommend PRN K+, Mg++ and Phos replacements with frequent lab draws over next three days    Malnutrition Status:    Patient does not meet two of the above criteria necessary for diagnosing malnutrition    Recommendations already ordered by Registered Dietitian (RD):  1.  Recommend PPN initiation as follows:  3000 ml Dex 200 gm, AA 70 gm + 250 ml 20% lipids daily.  This provides 1460 kcal, 70 gm pro, GIR 2.48 mg/kg/min, and 34% kcal from fat daily (84% est low end kcal needs, 100% est pro needs)    Future/Additional Recommendations:  1.  Pending refeeding lytes WNL and good tolerance to above regimen, would recommend increasing Dex to 220 gm daily - provides 1528 kcal (88% est needs), 70 gm pro (100% est needs) and GIR 2.73 mg/kg/min  2.  Once able to advance diet >CLD, would recommend oral supplements (ensure plus, clear) to help meet needs orally.  Once tolerating a regular diet may consider d/c of PN    Pilar Quinones RD LD  Pgr [phone]     REASON FOR ASSESSMENT  Amber Buckner is a/an 31 year old female assessed by the dietitian for Provider Order - NPO since 5/7 with bowel obstruction, breastfeeding    Pt with h/o ulcerative colitis s/p total colectomy with J-pouch    NUTRITION HISTORY  Follows a regular diet at home.  Eating well PTA.      CURRENT NUTRITION ORDERS  Diet: NPO  Intake/Tolerance: Pt reports last consuming and tolerating meal in am before C section (3/7).  Has been unable to tolerate any POs since and now with NGT to LIS.      LABS  Labs reviewed  Mg++, Phos and K+ WNL this am    MEDICATIONS  Medications reviewed  D5 @ 125 ml/hr continuous providing 506 kcal daily from dex    ANTHROPOMETRICS  Height: 157.5 cm (5'  "2\")  Most Recent Weight: 63.5 kg (140 lb)    IBW: 50 kg  Prepregnancy BMI: Normal BMI  Weight History: Pt reports prepregnancy wt ~56 kg.  Appropriate wt gain over course of pregnancy  Wt Readings from Last 10 Encounters:   05/08/17 63.5 kg (140 lb)   05/01/17 66.7 kg (147 lb)   04/26/17 65.8 kg (145 lb)   04/12/17 65.5 kg (144 lb 8 oz)   03/22/17 64.1 kg (141 lb 4.8 oz)   02/23/17 63.2 kg (139 lb 4.8 oz)   02/01/17 62.6 kg (138 lb)   01/03/17 59.6 kg (131 lb 8 oz)   11/22/16 57.9 kg (127 lb 11.2 oz)   10/21/16 56.7 kg (125 lb 1.6 oz)   ]  Dosing Weight: 56 kg (prepregnancy wt)    ASSESSED NUTRITION NEEDS  Estimated Energy Needs: 2074-7330 kcals/day (25 - 30 kcals/kg + 330 kcal)  Justification: Increased needs with lactation  Estimated Protein Needs: 67-84 grams protein/day (1.2 - 1.5 grams of pro/kg)  Justification: lactation, acute illness  Estimated Fluid Needs: >3500 mL/day (lactation)   Justification: Increased needs with NGT to LIS and lactation    PHYSICAL FINDINGS  See malnutrition section below.    MALNUTRITION  % Intake: </= 50% for >/= 5 days (severe)  % Weight Loss: None noted  Subcutaneous Fat Loss: None observed  Muscle Loss: None observed  Fluid Accumulation/Edema: None noted  Malnutrition Diagnosis: Patient does not meet two of the above criteria necessary for diagnosing malnutrition    NUTRITION DIAGNOSIS  Inadequate protein-energy intake related to NPo status, lack of EN access as evidenced by no protein intakes x 6 days, <50% energy needs met via D5 infusion      INTERVENTIONS  Implementation  Nutrition Education: Provided education on role of RD and POC     Collaboration with other providers - discussed with resident and pharmacist.  Resident to order PN consult to start feeds today.  Reviewed POC with pharmacist.  Will start custom PN bag this pm   Parenteral Nutrition/IV Fluids - Initiate     Goals  Total avg nutritional intake to meet a minimum of 1730 kcal and 1.2 g PRO/kg daily (per dosing " wt 56 kg).   Diet tolerance >CLD     Monitoring/Evaluation  Progress toward goals will be monitored and evaluated per protocol.

## 2017-05-13 NOTE — PLAN OF CARE
Problem: Goal Outcome Summary  Goal: Goal Outcome Summary  Outcome: Improving  Pt vital signs remain stable. Abdomen continues to appear distended/soft. IV currently infusing D5LR @ 125ml/hr. Pt is voiding appropriately. NG remains in place-yellow/tan, clear fluid noted. Pt is tolerating ice chips. Up ad beth, steady gait. Passing flatus, hypoactive bowel sounds all 4 quadrants. Pain is managed with 0.5 dilaudid and rectal tylenol for abdominal discomfort- see MAR.  is in room, positive support system. Positive bonding behaviors noted with infant. Will continue to provide emotional support and monitor pt

## 2017-05-14 VITALS
HEIGHT: 62 IN | RESPIRATION RATE: 18 BRPM | TEMPERATURE: 98.3 F | WEIGHT: 140 LBS | SYSTOLIC BLOOD PRESSURE: 108 MMHG | OXYGEN SATURATION: 99 % | BODY MASS INDEX: 25.76 KG/M2 | HEART RATE: 69 BPM | DIASTOLIC BLOOD PRESSURE: 75 MMHG

## 2017-05-14 LAB
ALBUMIN SERPL-MCNC: 2.2 G/DL (ref 3.4–5)
ALP SERPL-CCNC: 66 U/L (ref 40–150)
ALT SERPL W P-5'-P-CCNC: 11 U/L (ref 0–50)
ANION GAP SERPL CALCULATED.3IONS-SCNC: 6 MMOL/L (ref 3–14)
AST SERPL W P-5'-P-CCNC: 8 U/L (ref 0–45)
BILIRUB SERPL-MCNC: 0.2 MG/DL (ref 0.2–1.3)
BUN SERPL-MCNC: 3 MG/DL (ref 7–30)
CALCIUM SERPL-MCNC: 8.4 MG/DL (ref 8.5–10.1)
CHLORIDE SERPL-SCNC: 110 MMOL/L (ref 94–109)
CO2 SERPL-SCNC: 28 MMOL/L (ref 20–32)
CREAT SERPL-MCNC: 0.49 MG/DL (ref 0.52–1.04)
GFR SERPL CREATININE-BSD FRML MDRD: ABNORMAL ML/MIN/1.7M2
GLUCOSE SERPL-MCNC: 110 MG/DL (ref 70–99)
INR PPP: 1.06 (ref 0.86–1.14)
MAGNESIUM SERPL-MCNC: 1.6 MG/DL (ref 1.6–2.3)
PHOSPHATE SERPL-MCNC: 3.7 MG/DL (ref 2.5–4.5)
POTASSIUM SERPL-SCNC: 3.4 MMOL/L (ref 3.4–5.3)
PREALB SERPL IA-MCNC: 14 MG/DL (ref 15–45)
PROT SERPL-MCNC: 5.9 G/DL (ref 6.8–8.8)
SODIUM SERPL-SCNC: 144 MMOL/L (ref 133–144)

## 2017-05-14 PROCEDURE — 25000132 ZZH RX MED GY IP 250 OP 250 PS 637: Performed by: OBSTETRICS & GYNECOLOGY

## 2017-05-14 PROCEDURE — 84100 ASSAY OF PHOSPHORUS: CPT | Performed by: OBSTETRICS & GYNECOLOGY

## 2017-05-14 PROCEDURE — 85610 PROTHROMBIN TIME: CPT | Performed by: OBSTETRICS & GYNECOLOGY

## 2017-05-14 PROCEDURE — 80053 COMPREHEN METABOLIC PANEL: CPT | Performed by: OBSTETRICS & GYNECOLOGY

## 2017-05-14 PROCEDURE — 25800025 ZZH RX 258: Performed by: OBSTETRICS & GYNECOLOGY

## 2017-05-14 PROCEDURE — 36415 COLL VENOUS BLD VENIPUNCTURE: CPT | Performed by: OBSTETRICS & GYNECOLOGY

## 2017-05-14 PROCEDURE — 84134 ASSAY OF PREALBUMIN: CPT | Performed by: OBSTETRICS & GYNECOLOGY

## 2017-05-14 PROCEDURE — 25000128 H RX IP 250 OP 636: Performed by: OBSTETRICS & GYNECOLOGY

## 2017-05-14 PROCEDURE — 83735 ASSAY OF MAGNESIUM: CPT | Performed by: OBSTETRICS & GYNECOLOGY

## 2017-05-14 RX ORDER — BREAST PUMP
1 EACH MISCELLANEOUS ONCE
Qty: 1 EACH | Refills: 0 | Status: SHIPPED | OUTPATIENT
Start: 2017-05-14 | End: 2017-05-14

## 2017-05-14 RX ADMIN — ACETAMINOPHEN 650 MG: 325 TABLET, FILM COATED ORAL at 05:06

## 2017-05-14 RX ADMIN — SIMETHICONE CHEW TAB 80 MG 80 MG: 80 TABLET ORAL at 05:07

## 2017-05-14 RX ADMIN — ACETAMINOPHEN 650 MG: 325 TABLET, FILM COATED ORAL at 10:02

## 2017-05-14 RX ADMIN — CITALOPRAM HYDROBROMIDE 20 MG: 10 TABLET ORAL at 08:24

## 2017-05-14 RX ADMIN — OXYCODONE HYDROCHLORIDE 5 MG: 5 TABLET ORAL at 10:02

## 2017-05-14 RX ADMIN — RANITIDINE HYDROCHLORIDE 50 MG: 25 INJECTION INTRAMUSCULAR; INTRAVENOUS at 08:24

## 2017-05-14 RX ADMIN — SODIUM CHLORIDE, SODIUM LACTATE, POTASSIUM CHLORIDE, CALCIUM CHLORIDE, AND DEXTROSE MONOHYDRATE: 600; 310; 30; 20; 5 INJECTION, SOLUTION INTRAVENOUS at 04:50

## 2017-05-14 RX ADMIN — OXYCODONE HYDROCHLORIDE 5 MG: 5 TABLET ORAL at 01:03

## 2017-05-14 RX ADMIN — OXYCODONE HYDROCHLORIDE 10 MG: 5 TABLET ORAL at 05:06

## 2017-05-14 NOTE — PROGRESS NOTES
[facility]  Obstetrics Postpartum Note    S: Feeling much better with NGT removed earlier this morning.  Pain well controlled with oral medications.  Minimal lochia.  Has tolerated clears last night around NGT, fulls this morning without NGT and full s/p NGT removal.  No nausea or emesis, not feeling sick at all.  Would really like to go home.  Urinating without issues.  + Flatus, continued loose stools.  Ambulating without dizziness.  Breastfeeding going well.    O:   Patient Vitals for the past 24 hrs:   BP Temp Temp src Pulse Heart Rate Resp SpO2   17 0817 108/75 98.3  F (36.8  C) Oral 69 - - -   17 0005 101/72 97.6  F (36.4  C) Oral - 60 18 99 %   17 1637 104/69 98  F (36.7  C) Oral - 66 18 98 %     Gen:  Resting comfortably, NAD  CV:  RRR  Pulm:  CTAB, no wheezes  Abd:  Moderately distended, soft, appropriately TTP, mod BS.  Fundus at umbilicus, firm and non-tender.  Inc: c/d/i, no erythema or drainage, steris  Ext:  non-tender, no edema bilaterally    Hgb:   Hemoglobin   Date Value Ref Range Status   2017 10.0 (L) 11.7 - 15.7 g/dL Final   2017 10.1 (L) 11.7 - 15.7 g/dL Final     I/O last 3 completed shifts:  In: .33 [P.O.:1149; I.V.:886.33]  Out: 2700 [Urine:2500; Emesis/NG output:200]     Assessment/Plan:  31 year old  on POD#6 s/p RLTCS via vertical midline skin incision for history of ulcerative colitis s/p total colectomy with postoperative course complicated by small bowel obstruction, s/p NGT.    PP: Continue with routine postpartum management   Rh positive, Rubella immune   Infant Nutrition: Breastfeeding, in room (discharged)    Contraception: Likely interval IUD  Pain: Continue PO meds (oxycodone, tylenol, ibuprofen)  Heme: Hgb 10.1> >10.0, VSS  FEN/GI: Post-operative periods complicated by SBO, passed clamp trial last night, s/p NGT, removed this morning and tolerating diet now without nausea or emesis, desires discharge home  today and feels comfortable this has resolved  :   Voiding spontaneously  PPX: Ambulation for DVT ppx, SCDs while resting in bed  Dispo: Discharge home today    Elyssa Boyce MD

## 2017-05-14 NOTE — PROVIDER NOTIFICATION
05/13/17 1801   Provider Notification   Provider Name/Title Catherine KHAN   Method of Notification Electronic Page   Notification Reason Other  (Please call RN regarding possible GI consult.)   Spoke with Dr. Alexander. No consult needed @ this time per MD.

## 2017-05-14 NOTE — PLAN OF CARE
Problem: Goal Outcome Summary  Goal: Goal Outcome Summary  Outcome: Adequate for Discharge Date Met:  05/14/17  Patient's vital signs WDL. Incision is clean, dry and without signs of infection. Patients abdomen remains mildly distended, but is passing flatulence.  Patient report pain is controlled with medication. Patient moving and voiding without difficulty. Family at bedside and supportive. Patient ready for discharge.

## 2017-05-14 NOTE — PLAN OF CARE
Problem: Goal Outcome Summary  Goal: Goal Outcome Summary  Outcome: Improving  Focus: Pain and Elimination.  D: Patient A&O times 4; VS with low BP; LS clear and BS hypoactive; abdomen distended; c/o pain to upper abdomen.  I: Tylenol and Oxycodone given for pain; warm pack applied to abdomen for comfort; scheduled Zantac given.  A: Encouraged to and using abdominal binder when up ambulating; voiding good amounts; had a small soft BM; denies nausea; NG intact and clamped; tolerating clear liquids well; PIV patent, intact and IV fluids infusing; up ambulating independently in room and breastfeed infant independently.  P: Continue to monitor I&O and assess pain.    After dose of Oxycodone, patient fell asleep; about an hour and 20 minutes later, woke and requested more pain med; another dose of Oxycodone given and now patient is asleep upon reassessment.    @0506, patient awake and complaining of sharp, stinging pain on left upper abdomen; Tylenol, Oxycodone and Mylicon given for pain; hot packs applied to upper abdomen; encouraged to ambulate when awake; ambulated to BR and voiding good amounts; denies nausea and tolerated PO water intake; reminded to order Full Liquids for breakfast and menu given.   Patient asleep upon reassessment of pain. Continue with plan of care.

## 2017-05-17 ENCOUNTER — TRANSFERRED RECORDS (OUTPATIENT)
Dept: HEALTH INFORMATION MANAGEMENT | Facility: CLINIC | Age: 32
End: 2017-05-17

## 2017-06-22 ENCOUNTER — OFFICE VISIT (OUTPATIENT)
Dept: OBGYN | Facility: CLINIC | Age: 32
End: 2017-06-22
Attending: OBSTETRICS & GYNECOLOGY
Payer: COMMERCIAL

## 2017-06-22 VITALS
SYSTOLIC BLOOD PRESSURE: 93 MMHG | BODY MASS INDEX: 23.06 KG/M2 | HEART RATE: 78 BPM | WEIGHT: 125.3 LBS | HEIGHT: 62 IN | DIASTOLIC BLOOD PRESSURE: 61 MMHG

## 2017-06-22 DIAGNOSIS — Z12.4 SCREENING FOR MALIGNANT NEOPLASM OF CERVIX: ICD-10-CM

## 2017-06-22 DIAGNOSIS — N95.2 VAGINAL ATROPHY: ICD-10-CM

## 2017-06-22 DIAGNOSIS — Z30.430 ENCOUNTER FOR INSERTION OF INTRAUTERINE CONTRACEPTIVE DEVICE: ICD-10-CM

## 2017-06-22 PROCEDURE — 25000125 ZZHC RX 250: Mod: ZF

## 2017-06-22 PROCEDURE — G0145 SCR C/V CYTO,THINLAYER,RESCR: HCPCS | Performed by: OBSTETRICS & GYNECOLOGY

## 2017-06-22 PROCEDURE — 99213 OFFICE O/P EST LOW 20 MIN: CPT | Mod: ZF

## 2017-06-22 PROCEDURE — 58300 INSERT INTRAUTERINE DEVICE: CPT | Mod: ZF | Performed by: OBSTETRICS & GYNECOLOGY

## 2017-06-22 PROCEDURE — 87624 HPV HI-RISK TYP POOLED RSLT: CPT | Performed by: OBSTETRICS & GYNECOLOGY

## 2017-06-22 RX ORDER — ESTRADIOL 10 UG/1
10 INSERT VAGINAL DAILY
Qty: 36 TABLET | Refills: 1 | Status: SHIPPED | OUTPATIENT
Start: 2017-06-22 | End: 2019-05-14

## 2017-06-22 ASSESSMENT — PAIN SCALES - GENERAL: PAINLEVEL: NO PAIN (0)

## 2017-06-22 NOTE — PROGRESS NOTES
"Post-partum Visit Note  2017    SUBJECTIVE:   Amber Buckner is a 33 yo  who presents today for her post-partum exam.  Amber overall is doing very well.  She has not had any further issues since her immediate post-operative course which was complicated by obstruction.  She denies any further pain.  She stopped bleeding 4 weeks after surgery, no menses yet.  She is tolerating regular diet.  She denies issues with urination.  Her bowel continue to be abnormal but always are with her Crohn's disease, she denies continued issues wsith obstruction and has followed up already with her GI regarding that issue.  She is breastfeeding and that is going well.  She would like to get an IUD today, not sure which one, possibly  will have vasectomy in future, but would like something else for right now.     PHQ-9 score:  2      Delivery Date: 17.    Delivering provider:  Laurie Montoya MD.    Type of delivery:  Repeat .     Delivery complications: None  Infant gender:  girl, weight 8 pounds 3 oz.  Feeding Method:  .  Complications reported with feeding:  none, infant thriving .    Bleeding:  None.  Duration:  4 wks.  Menses resumed:  No  Bowel/Urinary problems:  Yes bowel obstruction. No concerns today    Contraception Planned:  IUD, unsure which one  She has not had intercourse since delivery..    ================================================================  ROS: 10 point ROS neg other than the symptoms noted above in the HPI.     EXAM:  BP 93/61  Pulse 78  Ht 1.575 m (5' 2\")  Wt 56.8 kg (125 lb 4.8 oz)  LMP 2016 (Exact Date)  BMI 22.92 kg/m2    General: healthy, alert and no distress  Psych: NEGATIVE  Last PHQ-9 score on record= 2  Breasts:  Lactating, Nipples intact with no lesions, Non-tender and No S/S of yeast or mastitis  Abdomen: Benign, Soft, flat, non-tender, No masses, organomegaly and Diastasis less than 1-2 FB  Incision:  well healed and dry and intact " "  Vulva:  Normal genitalia and Bartholin's, Urethra, [place]'s normal  Vagina:  Slight atrophy and minimal rugae noted, with good muscle tone, without discharge  Cervix:  Nulliparous, no lesions and pink, moist, closed, without lesion or CMT.  Pap collected today.  Uterus:  fully involuted and non-tender    Adnexa:  Within normal limits and No masses, nodularity, tenderness    IUD Insertion Note:      Time Out - \"Pause for the Cause\"  Just before the procedure begins, through verbal and active participation of team members, verify:    Initials   Patient Name    SLH   Patient Date of Birth SLH   Procedure to be performed  SLH     Consent:  Verbal consent obtained from patient. , Risks, benefits of treatment, and no treatment were discussed.  Patient's questions were elicited and answered. , Written consent signed and scanned into medical record. and Patient received and verbalized understanding of discharge instructions    After informed consent was obtained from the patient, a speculum was placed in the vagina to visualized the cervix.  The cervix was then swabbed with a betadine prep x 3.  Tenaculum was placed at the 12 o'clock position on the cervix and the uterus sounded to 7.5 cm.  The Mirena  IUD was then placed in the usual fashion under sterile technique.  Strings were clipped about 2 cm from the cervical os.  Tenaculum was removed and cervix was hemostatic.  There were no complications.  The patient tolerated the procedure well.    ASSESSMENT:   Encounter Diagnoses   Name Primary?     Routine postpartum follow-up Yes     Screening for malignant neoplasm of cervix      Encounter for insertion of intrauterine contraceptive device      Vaginal atrophy      PLAN:  Orders Placed This Encounter   Procedures     IUD (Insertion Intrauterine Device) [23001]     Obtaining, preparing and conveyance of cervical or vaginal smear to laboratory.     Pap imaged thin layer screen with HPV - recommended age 30 - 65 years " (select HPV order below)     HPV High Risk Types DNA Cervical      Orders Placed This Encounter   Medications     levonorgestrel (MIRENA, 52 MG,) 20 MCG/24HR IUD     Si each (20 mcg) by Intrauterine route once for 1 dose     Dispense:  1 each     Refill:  0     estradiol (VAGIFEM) 10 MCG TABS vaginal tablet     Sig: Place 1 tablet (10 mcg) vaginally daily Use daily for 2 weeks, then twice weekly thereafter     Dispense:  36 tablet     Refill:  1      1) Normal breast exam today  2) Screening for malignant neoplasm of cervix: Due for pap today, pap with cotesting completed today, no history of abnormal pap smears  3) Vaginal atrophy: Patient with atrophy on exam, has long-standing issues with dyspareunia and discussed option of trial of local estrogen supplementation.  She denies to try this.  Discussed cream, tablets and ring, she desires to try vaginal tablets.  Rx given for Vagifem today.  4) Desires LARC: Discussed different types of IUD today with patient and risks and benefits of each form, as well as risks and benefits of IUD insertion.  After discussion she chose to have  Mirena IUD placed today.  This was completed without difficulty.  Discussed checking for strings. Plan for RTC in 4 weeks for string check.    Elyssa Boyce MD

## 2017-06-22 NOTE — NURSING NOTE
Chief Complaint   Patient presents with     Postpartum Care     SUBJECTIVE:   Amber Buckner is here for her 6-week postpartum checkup.     PHQ-9 score:    Hx of Abuse:       Delivery Date: 17.    Delivering provider:  Laurie Montoay MD.    Type of delivery:  Repeat .     Delivery complications: None  Infant gender:  girl, weight 8 pounds 3 oz.  Feeding Method:  .  Complications reported with feeding:  none, infant thriving .    Bleeding:  None.  Duration:  4 wks.  Menses resumed:  No  Bowel/Urinary problems:  Yes bowel obstruction. No concerns today    Contraception Planned:  IUD, unsure which one  She  has not had intercourse since delivery..

## 2017-06-22 NOTE — MR AVS SNAPSHOT
After Visit Summary   6/22/2017    Amber Buckner    MRN: [id number]           Patient Information     Date Of Birth          1985        Visit Information        Provider Department      6/22/2017 1:45 PM Elyssa Boyce MD [facility]        Today's Diagnoses     Routine postpartum follow-up    -  1    Screening for malignant neoplasm of cervix        Encounter for insertion of intrauterine contraceptive device        Vaginal atrophy           Follow-ups after your visit        Follow-up notes from your care team     Return in about 4 weeks (around 7/20/2017) for IUD Check.      Your next 10 appointments already scheduled     Jul 24, 2017  9:30 AM CDT   Return Visit with Elyssa Boyce MD   [facility] ([facility])    [facility] [address]  [address]  [facility] [address] [phone]              Who to contact     Please call your clinic at [phone] to:    Ask questions about your health    Make or cancel appointments    Discuss your medicines    Learn about your test results    Speak to your doctor   If you have compliments or concerns about an experience at your clinic, or if you wish to file a complaint, please contact [facility] Patient Relations at [phone] or email us at [email or web address]         Additional Information About Your Visit        MyChart Information     Gamelet gives you secure access to your electronic health record. If you see a primary care provider, you can also send messages to your care team and make appointments. If you have questions, please call your primary care clinic.  If you do not have a primary care provider, please call [phone] and they will assist you.      Gamelet is an electronic gateway that provides easy, online access to your medical records. With Gamelet, you can request a clinic appointment, read your  "test results, renew a prescription or communicate with your care team.     To access your existing account, please contact your [facility] or call [phone] for assistance.        Care EveryWhere ID     This is your Care EveryWhere ID. This could be used by other organizations to access your [facility] medical records  [id number]        Your Vitals Were     Pulse Height Last Period BMI (Body Mass Index)          78 1.575 m (5' 2\") 08/08/2016 (Exact Date) 22.92 kg/m2         Blood Pressure from Last 3 Encounters:   06/22/17 93/61   05/14/17 108/75   05/01/17 113/72    Weight from Last 3 Encounters:   06/22/17 56.8 kg (125 lb 4.8 oz)   05/08/17 63.5 kg (140 lb)   05/01/17 66.7 kg (147 lb)              We Performed the Following     HPV High Risk Types DNA Cervical     IUD (Insertion Intrauterine Device) [62473]     Obtaining, preparing and conveyance of cervical or vaginal smear to laboratory.     Pap imaged thin layer screen with HPV - recommended age 30 - 65 years (select HPV order below)          Today's Medication Changes          These changes are accurate as of: 6/22/17 11:59 PM.  If you have any questions, ask your nurse or doctor.               Start taking these medicines.        Dose/Directions    estradiol 10 MCG Tabs vaginal tablet   Commonly known as:  VAGIFEM   Used for:  Vaginal atrophy   Started by:  Elyssa Boyce MD        Dose:  10 mcg   Place 1 tablet (10 mcg) vaginally daily Use daily for 2 weeks, then twice weekly thereafter   Quantity:  36 tablet   Refills:  1       levonorgestrel 20 MCG/24HR IUD   Commonly known as:  MIRENA (52 MG)   Used for:  Encounter for insertion of intrauterine contraceptive device   Started by:  Elyssa Boyce MD        Dose:  1 each   1 each (20 mcg) by Intrauterine route once for 1 dose   Quantity:  1 each   Refills:  0            Where to get your medicines      These medications were sent to [id number] - [place], [place] - [address] AT [facility] [address]  [address], [place] [address]     Phone:  [phone]     estradiol 10 MCG Tabs vaginal tablet         Some of these will need a paper prescription and others can be bought over the counter.  Ask your nurse if you have questions.     You don't need a prescription for these medications     levonorgestrel 20 MCG/24HR IUD                Primary Care Provider Office Phone # Fax #    Cinthia Rhett Charles [phone]       [facility] [address]  [facility] [address]        Equal Access to Services     EMERITA FRAUSTO : Hadii yvon Menard, waaxda lumary, qamadhavta kaalmada na, tiff sinclair. So [facility] [phone].    ATENCIÓN: Si habla español, tiene a quiroz disposición servicios gratuitos de asistencia lingüística. [facility] [phone].    We comply with applicable federal civil rights laws and [place] laws. We do not discriminate on the basis of race, color, national origin, age, disability sex, sexual orientation or gender identity.            Thank you!     Thank you for choosing [facility]  for your care. Our goal is always to provide you with excellent care. Hearing back from our patients is one way we can continue to improve our services. Please take a few minutes to complete the written survey that you may receive in the mail after your visit with us. Thank you!             Your Updated Medication List - Protect others around you: Learn how to safely use, store and throw away your medicines at www.disposemymeds.org.          This list is accurate as of: 6/22/17 11:59 PM.  Always use your most recent med list.                   Brand Name Dispense Instructions for use Diagnosis    acetaminophen 325 MG tablet    TYLENOL    100 tablet    Take 2 tablets (650 mg) by mouth every 6 hours    Anal fistula       cholecalciferol 5000 UNITS  Caps      Take 1 capsule by mouth daily.        estradiol 10 MCG Tabs vaginal tablet    VAGIFEM    36 tablet    Place 1 tablet (10 mcg) vaginally daily Use daily for 2 weeks, then twice weekly thereafter    Vaginal atrophy       levonorgestrel 20 MCG/24HR IUD    MIRENA (52 MG)    1 each    1 each (20 mcg) by Intrauterine route once for 1 dose    Encounter for insertion of intrauterine contraceptive device       PRENATAL VITAMINS PO      Take  by mouth daily.        simethicone 80 MG chewable tablet    MYLICON    40 tablet    Take 1 tablet (80 mg) by mouth every 6 hours as needed for flatulence or cramping    S/P  section

## 2017-06-27 LAB
COPATH REPORT: NORMAL
PAP: NORMAL

## 2017-06-28 LAB
FINAL DIAGNOSIS: NORMAL
HPV HR 12 DNA CVX QL NAA+PROBE: NEGATIVE
HPV16 DNA SPEC QL NAA+PROBE: NEGATIVE
HPV18 DNA SPEC QL NAA+PROBE: NEGATIVE
SPECIMEN DESCRIPTION: NORMAL

## 2017-07-24 ENCOUNTER — OFFICE VISIT (OUTPATIENT)
Dept: OBGYN | Facility: CLINIC | Age: 32
End: 2017-07-24
Attending: OBSTETRICS & GYNECOLOGY
Payer: COMMERCIAL

## 2017-07-24 VITALS
HEART RATE: 70 BPM | HEIGHT: 62 IN | SYSTOLIC BLOOD PRESSURE: 100 MMHG | BODY MASS INDEX: 23.17 KG/M2 | WEIGHT: 125.9 LBS | DIASTOLIC BLOOD PRESSURE: 68 MMHG

## 2017-07-24 DIAGNOSIS — Z30.431 IUD CHECK UP: Primary | ICD-10-CM

## 2017-07-24 PROBLEM — O99.820 GBS (GROUP B STREPTOCOCCUS CARRIER), +RV CULTURE, CURRENTLY PREGNANT: Status: RESOLVED | Noted: 2017-04-14 | Resolved: 2017-07-24

## 2017-07-24 PROCEDURE — 99212 OFFICE O/P EST SF 10 MIN: CPT | Mod: ZF

## 2017-07-24 NOTE — PROGRESS NOTES
"Gynecology Visit Note  7/24/17    Reason for visit: IUD check    S: Patient is a 31 yo  who presents today for IUD check.  Patient had Mirena IUD placed at her postpartum visit on 6/22/17.  Since then she has been doing well without concerns.  She had minimal bleeding after placement and minimal cramping.  She denies fevers/chills or other concerning discharge.  She has not had intercourse since placement so unable to say if  can appreciate strings or any discomfort with intercourse.  She is returning to work next week and is looking forward to getting back to routine.    O:  Vitals:    07/24/17 0936   BP: 100/68   Pulse: 70   Weight: 57.1 kg (125 lb 14.4 oz)   Height: 1.575 m (5' 2\")      General: NAD, A&Ox3  Resp: Non-labored breathing  Pelvic: EGUBS wnl. Vagina well rugated with normal physiologic discharge in vault.  IUD strings visualized and measuring 2 cm curving around 9 o'clock position of the cervix, no plastic portion of IUD seen.    A/P: 31 yo  presents for IUD check  1) IUD check: Asymptomatic, appears correctly positioned on exam today, RTC in 1 year for annual, earlier with any concerns    Elyssa Boyce MD  "

## 2017-07-24 NOTE — MR AVS SNAPSHOT
"              After Visit Summary   7/24/2017    Amber Buckner    MRN: [id number]           Patient Information     Date Of Birth          1985        Visit Information        Provider Department      7/24/2017 9:30 AM Elyssa Boyce MD [facility]        Today's Diagnoses     IUD check up    -  1       Follow-ups after your visit        Who to contact     Please call your clinic at [phone] to:    Ask questions about your health    Make or cancel appointments    Discuss your medicines    Learn about your test results    Speak to your doctor   If you have compliments or concerns about an experience at your clinic, or if you wish to file a complaint, please contact [facility] Patient Relations at [phone] or email us at [email or web address]         Additional Information About Your Visit        MyChart Information     [employer] gives you secure access to your electronic health record. If you see a primary care provider, you can also send messages to your care team and make appointments. If you have questions, please call your primary care clinic.  If you do not have a primary care provider, please call [phone] and they will assist you.      TagMii is an electronic gateway that provides easy, online access to your medical records. With TagMii, you can request a clinic appointment, read your test results, renew a prescription or communicate with your care team.     To access your existing account, please contact your [facility] or call [phone] for assistance.        Care EveryWhere ID     This is your Care EveryWhere ID. This could be used by other organizations to access your [facility] medical records  [id number]        Your Vitals Were     Pulse Height Last Period BMI (Body Mass Index)          70 1.575 m (5' 2\") 08/08/2016 (Exact Date) 23.03 kg/m2         Blood Pressure from Last 3 Encounters: "   07/24/17 100/68   06/22/17 93/61   05/14/17 108/75    Weight from Last 3 Encounters:   07/24/17 57.1 kg (125 lb 14.4 oz)   06/22/17 56.8 kg (125 lb 4.8 oz)   05/08/17 63.5 kg (140 lb)              Today, you had the following     No orders found for display         Today's Medication Changes          These changes are accurate as of: 7/24/17 10:36 AM.  If you have any questions, ask your nurse or doctor.               Stop taking these medicines if you haven't already. Please contact your care team if you have questions.     cholecalciferol 5000 UNITS Caps   Stopped by:  Elyssa Boyce MD           simethicone 80 MG chewable tablet   Commonly known as:  MYLICON   Stopped by:  Elyssa Boyce MD                    Primary Care Provider Office Phone # Fax #    Cinthia Rhett Charles [phone]       [facility] [address] [place] [address]        Equal Access to Services     [facility] AH: Hadii yvon ku hadasho Soomaali, waaxda luqadaha, qaybta kaalmada adeegyada, tiff bullard . So [facility] [phone].    ATENCIÓN: Si habla español, tiene a quiroz disposición servicios gratuitos de asistencia lingüística. Llame al [phone].    We comply with applicable federal civil rights laws and [place] laws. We do not discriminate on the basis of race, color, national origin, age, disability sex, sexual orientation or gender identity.            Thank you!     Thank you for choosing [facility]  for your care. Our goal is always to provide you with excellent care. Hearing back from our patients is one way we can continue to improve our services. Please take a few minutes to complete the written survey that you may receive in the mail after your visit with us. Thank you!             Your Updated Medication List - Protect others around you: Learn how to safely use, store and throw away your medicines at www.disposemymeds.org.          This  list is accurate as of: 7/24/17 10:36 AM.  Always use your most recent med list.                   Brand Name Dispense Instructions for use Diagnosis    acetaminophen 325 MG tablet    TYLENOL    100 tablet    Take 2 tablets (650 mg) by mouth every 6 hours    Anal fistula       estradiol 10 MCG Tabs vaginal tablet    VAGIFEM    36 tablet    Place 1 tablet (10 mcg) vaginally daily Use daily for 2 weeks, then twice weekly thereafter    Vaginal atrophy       levonorgestrel 20 MCG/24HR IUD    MIRENA (52 MG)    1 each    1 each (20 mcg) by Intrauterine route once for 1 dose    Encounter for insertion of intrauterine contraceptive device       PRENATAL VITAMINS PO      Take  by mouth daily.

## 2017-07-24 NOTE — LETTER
"7/24/2017       RE: Amber Buckner  [address] [place] [address]     Dear Colleague,    Thank you for referring your patient, Amber Buckner, to the [facility] at [facility]. Please see a copy of my visit note below.    Gynecology Visit Note  7/24/17    Reason for visit: IUD check    S: Patient is a 33 yo  who presents today for IUD check.  Patient had Mirena IUD placed at her postpartum visit on 6/22/17.  Since then she has been doing well without concerns.  She had minimal bleeding after placement and minimal cramping.  She denies fevers/chills or other concerning discharge.  She has not had intercourse since placement so unable to say if  can appreciate strings or any discomfort with intercourse.  She is returning to work next week and is looking forward to getting back to routine.    O:  Vitals:    07/24/17 0936   BP: 100/68   Pulse: 70   Weight: 57.1 kg (125 lb 14.4 oz)   Height: 1.575 m (5' 2\")      General: NAD, A&Ox3  Resp: Non-labored breathing  Pelvic: EGUBS wnl. Vagina well rugated with normal physiologic discharge in vault.  IUD strings visualized and measuring 2 cm curving around 9 o'clock position of the cervix, no plastic portion of IUD seen.    A/P: 33 yo  presents for IUD check  1) IUD check: Asymptomatic, appears correctly positioned on exam today, RTC in 1 year for annual, earlier with any concerns    Elyssa Boyce MD    "

## 2018-09-10 ENCOUNTER — TRANSFERRED RECORDS (OUTPATIENT)
Dept: HEALTH INFORMATION MANAGEMENT | Facility: CLINIC | Age: 33
End: 2018-09-10

## 2018-12-11 ASSESSMENT — ANXIETY QUESTIONNAIRES
GAD7 TOTAL SCORE: 3
7. FEELING AFRAID AS IF SOMETHING AWFUL MIGHT HAPPEN: NOT AT ALL
GAD7 TOTAL SCORE: 3
2. NOT BEING ABLE TO STOP OR CONTROL WORRYING: NOT AT ALL
6. BECOMING EASILY ANNOYED OR IRRITABLE: SEVERAL DAYS
4. TROUBLE RELAXING: SEVERAL DAYS
1. FEELING NERVOUS, ANXIOUS, OR ON EDGE: NOT AT ALL
7. FEELING AFRAID AS IF SOMETHING AWFUL MIGHT HAPPEN: NOT AT ALL
5. BEING SO RESTLESS THAT IT IS HARD TO SIT STILL: SEVERAL DAYS
3. WORRYING TOO MUCH ABOUT DIFFERENT THINGS: NOT AT ALL

## 2018-12-12 ENCOUNTER — OFFICE VISIT (OUTPATIENT)
Dept: OBGYN | Facility: CLINIC | Age: 33
End: 2018-12-12
Attending: ADVANCED PRACTICE MIDWIFE
Payer: COMMERCIAL

## 2018-12-12 VITALS
HEIGHT: 62 IN | HEART RATE: 75 BPM | WEIGHT: 125 LBS | SYSTOLIC BLOOD PRESSURE: 101 MMHG | BODY MASS INDEX: 23 KG/M2 | DIASTOLIC BLOOD PRESSURE: 66 MMHG

## 2018-12-12 DIAGNOSIS — Z97.5 UNSUCCESSFUL REMOVAL OF INTRAUTERINE DEVICE: Primary | ICD-10-CM

## 2018-12-12 DIAGNOSIS — Z53.8 UNSUCCESSFUL REMOVAL OF INTRAUTERINE DEVICE: Primary | ICD-10-CM

## 2018-12-12 PROCEDURE — G0463 HOSPITAL OUTPT CLINIC VISIT: HCPCS | Mod: ZF

## 2018-12-12 ASSESSMENT — MIFFLIN-ST. JEOR: SCORE: 1220.25

## 2018-12-12 ASSESSMENT — ANXIETY QUESTIONNAIRES: GAD7 TOTAL SCORE: 3

## 2018-12-12 NOTE — LETTER
2018       RE: Amber Buckner  [address]  [facility] [place] [place] [address]     Dear Colleague,    Thank you for referring your patient, Amber Buckner, to the [facility] at [facility]. Please see a copy of my visit note below.    IUD Removal Note    Subjective:  Amber Buckner is a 33 year old female,, No LMP recorded. Patient is not currently having periods (Reason: IUD). who presents today for IUD removal. Her current IUD was placed 18 months ago. She has not had problems with the IUD. She requests removal of the IUD because desires menstruation, denies any other issues or side effects.     Not currently seeking another pregnancy.  Happily , but not sexually active at this time due to chronic dyspareunia.  Sees another provider for this. May be interested in natural family planning.     Today's PHQ-2 Score:   PHQ-2 (  [employer]) 2018   Q1: Little interest or pleasure in doing things 0   Q2: Feeling down, depressed or hopeless 0   PHQ-2 Score 0   Q1: Little interest or pleasure in doing things Not at all   Q2: Feeling down, depressed or hopeless Not at all   PHQ-2 Score 0     PROCEDURE:    A speculum exam was performed and the cervix was visualized. The IUD string was not visualized. Attempted to retrieve strings with cytobrush and IUD hook without success. Alligator forceps were not available in clinic.    POST PROCEDURE:    The patient tolerated the procedure well. Patient was discharged in stable condition.    Assessment:  Unsuccessful IUD removal  Contraceptive counseling    Plan:  - Discussed other contraceptive methods, including natural family planning methods and combined hormonal methods. Information given for both.   - Plan to follow-up with MD for IUD removal. Consider ultrasound prior to confirm placement.     Answers for HPI/ROS submitted by the patient on 2018   LAZARO 7 TOTAL SCORE: 3        Again, thank  you for allowing me to participate in the care of your patient.      Sincerely,    SEPIDEH Rosado CNM

## 2018-12-12 NOTE — PROGRESS NOTES
IUD Removal Note    Subjective:  Amber Buckner is a 33 year old female,, No LMP recorded. Patient is not currently having periods (Reason: IUD). who presents today for IUD removal. Her current IUD was placed 18 months ago. She has not had problems with the IUD. She requests removal of the IUD because desires menstruation, denies any other issues or side effects.     Not currently seeking another pregnancy.  Happily , but not sexually active at this time due to chronic dyspareunia.  Sees another provider for this. May be interested in natural family planning.     Today's PHQ-2 Score:   PHQ-2 (  [employer]) 2018   Q1: Little interest or pleasure in doing things 0   Q2: Feeling down, depressed or hopeless 0   PHQ-2 Score 0   Q1: Little interest or pleasure in doing things Not at all   Q2: Feeling down, depressed or hopeless Not at all   PHQ-2 Score 0     PROCEDURE:    A speculum exam was performed and the cervix was visualized. The IUD string was not visualized. Attempted to retrieve strings with cytobrush and IUD hook without success. Alligator forceps were not available in clinic.    POST PROCEDURE:    The patient tolerated the procedure well. Patient was discharged in stable condition.    Assessment:  Unsuccessful IUD removal  Contraceptive counseling    Plan:  - Discussed other contraceptive methods, including natural family planning methods and combined hormonal methods. Information given for both.   - Plan to follow-up with MD for IUD removal. Consider ultrasound prior to confirm placement.     Answers for HPI/ROS submitted by the patient on 2018   LAZARO 7 TOTAL SCORE: 3

## 2018-12-27 DIAGNOSIS — Z30.432 ENCOUNTER FOR IUD REMOVAL: Primary | ICD-10-CM

## 2019-01-02 ENCOUNTER — ANCILLARY PROCEDURE (OUTPATIENT)
Dept: ULTRASOUND IMAGING | Facility: CLINIC | Age: 34
End: 2019-01-02
Attending: ADVANCED PRACTICE MIDWIFE
Payer: COMMERCIAL

## 2019-01-02 DIAGNOSIS — Z30.432 ENCOUNTER FOR IUD REMOVAL: ICD-10-CM

## 2019-01-02 PROCEDURE — 76857 US EXAM PELVIC LIMITED: CPT

## 2019-03-14 ENCOUNTER — TRANSFERRED RECORDS (OUTPATIENT)
Dept: HEALTH INFORMATION MANAGEMENT | Facility: CLINIC | Age: 34
End: 2019-03-14

## 2019-05-01 ENCOUNTER — TRANSFERRED RECORDS (OUTPATIENT)
Dept: HEALTH INFORMATION MANAGEMENT | Facility: CLINIC | Age: 34
End: 2019-05-01

## 2019-05-01 ENCOUNTER — HOSPITAL ENCOUNTER (EMERGENCY)
Facility: CLINIC | Age: 34
Discharge: HOME | End: 2019-05-01
Attending: FAMILY MEDICINE | Admitting: FAMILY MEDICINE
Payer: COMMERCIAL

## 2019-05-01 ENCOUNTER — OFFICE VISIT (OUTPATIENT)
Dept: INTERNAL MEDICINE | Facility: CLINIC | Age: 34
End: 2019-05-01
Attending: INTERNAL MEDICINE
Payer: COMMERCIAL

## 2019-05-01 VITALS
HEART RATE: 83 BPM | BODY MASS INDEX: 23.05 KG/M2 | WEIGHT: 126 LBS | DIASTOLIC BLOOD PRESSURE: 66 MMHG | SYSTOLIC BLOOD PRESSURE: 101 MMHG

## 2019-05-01 VITALS
HEART RATE: 71 BPM | TEMPERATURE: 96.5 F | DIASTOLIC BLOOD PRESSURE: 59 MMHG | WEIGHT: 126 LBS | OXYGEN SATURATION: 97 % | SYSTOLIC BLOOD PRESSURE: 95 MMHG | BODY MASS INDEX: 23.05 KG/M2 | RESPIRATION RATE: 16 BRPM

## 2019-05-01 DIAGNOSIS — F41.9 ANXIETY: ICD-10-CM

## 2019-05-01 DIAGNOSIS — F32.A DEPRESSION, UNSPECIFIED DEPRESSION TYPE: ICD-10-CM

## 2019-05-01 DIAGNOSIS — G47.00 INSOMNIA, UNSPECIFIED TYPE: ICD-10-CM

## 2019-05-01 DIAGNOSIS — F33.2 SEVERE EPISODE OF RECURRENT MAJOR DEPRESSIVE DISORDER, WITHOUT PSYCHOTIC FEATURES (H): Primary | ICD-10-CM

## 2019-05-01 PROCEDURE — 99285 EMERGENCY DEPT VISIT HI MDM: CPT | Mod: 25 | Performed by: FAMILY MEDICINE

## 2019-05-01 PROCEDURE — 90791 PSYCH DIAGNOSTIC EVALUATION: CPT

## 2019-05-01 PROCEDURE — 99284 EMERGENCY DEPT VISIT MOD MDM: CPT | Mod: Z6 | Performed by: FAMILY MEDICINE

## 2019-05-01 PROCEDURE — G0463 HOSPITAL OUTPT CLINIC VISIT: HCPCS | Mod: ZF

## 2019-05-01 RX ORDER — CITALOPRAM HYDROBROMIDE 10 MG/1
20 TABLET ORAL DAILY
Qty: 60 TABLET | Refills: 3 | Status: SHIPPED | OUTPATIENT
Start: 2019-05-01 | End: 2019-05-14

## 2019-05-01 RX ORDER — PROPRANOLOL HYDROCHLORIDE 10 MG/1
10 TABLET ORAL 2 TIMES DAILY PRN
Qty: 30 TABLET | Refills: 1 | Status: SHIPPED | OUTPATIENT
Start: 2019-05-01 | End: 2019-10-17

## 2019-05-01 RX ORDER — HYDROXYZINE HYDROCHLORIDE 10 MG/1
10 TABLET, FILM COATED ORAL 3 TIMES DAILY PRN
Qty: 30 TABLET | Refills: 3 | Status: SHIPPED | OUTPATIENT
Start: 2019-05-01 | End: 2019-05-01

## 2019-05-01 RX ORDER — HYDROXYZINE HYDROCHLORIDE 10 MG/1
10 TABLET, FILM COATED ORAL 3 TIMES DAILY PRN
Qty: 30 TABLET | Refills: 3 | Status: SHIPPED | OUTPATIENT
Start: 2019-05-01 | End: 2019-05-14

## 2019-05-01 RX ORDER — CITALOPRAM HYDROBROMIDE 10 MG/1
20 TABLET ORAL DAILY
Qty: 60 TABLET | Refills: 3 | Status: SHIPPED | OUTPATIENT
Start: 2019-05-01 | End: 2019-05-01

## 2019-05-01 RX ORDER — HYDROXYZINE PAMOATE 25 MG/1
CAPSULE ORAL
Qty: 30 CAPSULE | Refills: 0 | Status: SHIPPED | OUTPATIENT
Start: 2019-05-01 | End: 2019-05-14

## 2019-05-01 ASSESSMENT — PATIENT HEALTH QUESTIONNAIRE - PHQ9
5. POOR APPETITE OR OVEREATING: NEARLY EVERY DAY
SUM OF ALL RESPONSES TO PHQ QUESTIONS 1-9: 22

## 2019-05-01 ASSESSMENT — ANXIETY QUESTIONNAIRES
6. BECOMING EASILY ANNOYED OR IRRITABLE: NEARLY EVERY DAY
1. FEELING NERVOUS, ANXIOUS, OR ON EDGE: NEARLY EVERY DAY
3. WORRYING TOO MUCH ABOUT DIFFERENT THINGS: NEARLY EVERY DAY
7. FEELING AFRAID AS IF SOMETHING AWFUL MIGHT HAPPEN: MORE THAN HALF THE DAYS
2. NOT BEING ABLE TO STOP OR CONTROL WORRYING: NEARLY EVERY DAY
GAD7 TOTAL SCORE: 19
5. BEING SO RESTLESS THAT IT IS HARD TO SIT STILL: MORE THAN HALF THE DAYS

## 2019-05-01 ASSESSMENT — PAIN SCALES - GENERAL: PAINLEVEL: NO PAIN (0)

## 2019-05-01 NOTE — PROGRESS NOTES
HPI  Patient is here for evaluation of anxiety and depression. She reports that she has started a new job recently. It is associated with increased stress which has been very difficult for her to manage. Her coworkers and her  have been worried about her. She has not been sleeping well. She also reports a lot of anger that has been more difficult to control.     Review of Systems     Constitutional:  Positive for fatigue. Negative for fever and chills.   Respiratory:   Negative for cough and dyspnea on exertion.    Cardiovascular:  Negative for chest pain, dyspnea on exertion and edema.   Gastrointestinal:  Negative for nausea, vomiting, abdominal pain, diarrhea, constipation and melena.   Skin:  Negative for itching and rash.   Endo/Heme:  Negative for anemia, swollen glands and bruises/bleeds easily.   Psychiatric/Behavioral:  Positive for depression.    Endocrine:  Negative for altered temperature regulation, polyphagia, polydipsia, unwanted hair growth and change in facial hair.    Current Outpatient Medications   Medication     acetaminophen (TYLENOL) 325 MG tablet     estradiol (VAGIFEM) 10 MCG TABS vaginal tablet     PRENATAL VITAMINS PO     levonorgestrel (MIRENA, 52 MG,) 20 MCG/24HR IUD     No current facility-administered medications for this visit.      Vitals:    05/01/19 1532 05/01/19 1533 05/01/19 1534 05/01/19 1535   BP: 100/64 102/67 100/65 101/66   Pulse: 83 83 83 83   Weight: 57.2 kg (126 lb)          Physical Exam   Constitutional: She is oriented to person, place, and time. She appears well-developed and well-nourished.   HENT:   Head: Normocephalic and atraumatic.   Eyes: Pupils are equal, round, and reactive to light. EOM are normal.   Cardiovascular: Normal rate and regular rhythm.   Musculoskeletal: She exhibits no edema.   Neurological: She is alert and oriented to person, place, and time.   Skin: Skin is warm and dry.   Nursing note and vitals reviewed.    Assessment and Plan:  Amber  was seen today for follow up.    Diagnoses and all orders for this visit:    Severe episode of recurrent major depressive disorder, without psychotic features (H). Patient was advised management of anxiety and depression.  Given the severity of the disease, recommend urgent evaluation at the behavioral health center.  Patient was referred to day treatment program.  As citalopram has worked for her in the past prescription for it was sent to patient's pharmacy.  Also recommend further evaluation for hypothyroidism and anemia.  Patient was advised to follow-up in 2 weeks to review progress.  -     Discontinue: citalopram (CELEXA) 10 MG tablet; Take 2 tablets (20 mg) by mouth daily Start with 10 mg daily, increase to 20 mg after 1 week.  -     Cancel: Comprehensive metabolic panel  -     Cancel: TSH with free T4 reflex  -     Cancel: CBC with Platelets  -     MENTAL HEALTH REFERRAL  - Adult; Outpatient Treatment, Psychiatry and Medication Management; Day Treatment/Dual Disorder/Partial Hospitalization Program; [facility]: Day Treatment (Provider Order Required to Assess & Treat) [phone]; We will contact...  -     Discontinue: hydrOXYzine (ATARAX) 10 MG tablet; Take 1 tablet (10 mg) by mouth 3 times daily as needed for itching  -     hydrOXYzine (ATARAX) 10 MG tablet; Take 1 tablet (10 mg) by mouth 3 times daily as needed for itching  -     citalopram (CELEXA) 10 MG tablet; Take 2 tablets (20 mg) by mouth daily Start with 10 mg daily, increase to 20 mg after 1 week.  -     Comprehensive metabolic panel (BMP + Alb, Alk Phos, ALT, AST, Total. Bili, TP); Future  -     Cancel: CBC with platelets and differential; Future  -     TSH with free T4 reflex; Future  -     CBC with platelets; Future    Total time spent 25 minutes.  More than 50% of the time spent with Ms. Buckner on counseling / coordinating her care    Cinthia Charles MD

## 2019-05-01 NOTE — ED AVS SNAPSHOT
[facility], Emergency Department  [address]  [facility] [address]  Phone:  [phone]  Fax:  [phone]                                    Amber Buckner   MRN: [id number]    Department:  [facility], Emergency Department   Date of Visit:  5/1/2019           After Visit Summary Signature Page    I have received my discharge instructions, and my questions have been answered. I have discussed any challenges I see with this plan with the nurse or doctor.    ..........................................................................................................................................  Patient/Patient Representative Signature      ..........................................................................................................................................  Patient Representative Print Name and Relationship to Patient    ..................................................               ................................................  Date                                   Time    ..........................................................................................................................................  Reviewed by Signature/Title    ...................................................              ..............................................  Date                                               Time          22EPIC Rev 08/18

## 2019-05-01 NOTE — LETTER
Date:May 6, 2019      Patient was self referred, no letter generated. Do not send.        [facility] Information

## 2019-05-01 NOTE — LETTER
5/1/2019       RE: Amber Buckner  [address] [facility] [address]     Dear Colleague,    Thank you for referring your patient, Amber Buckner, to the [facility]  at [facility]. Please see a copy of my visit note below.    HPI  Patient is here for evaluation of anxiety and depression. She reports that she has started a new job recently. It is associated with increased stress which has been very difficult for her to manage. Her coworkers and her  have been worried about her. She has not been sleeping well. She also reports a lot of anger that has been more difficult to control.     Review of Systems     Constitutional:  Positive for fatigue. Negative for fever and chills.   Respiratory:   Negative for cough and dyspnea on exertion.    Cardiovascular:  Negative for chest pain, dyspnea on exertion and edema.   Gastrointestinal:  Negative for nausea, vomiting, abdominal pain, diarrhea, constipation and melena.   Skin:  Negative for itching and rash.   Endo/Heme:  Negative for anemia, swollen glands and bruises/bleeds easily.   Psychiatric/Behavioral:  Positive for depression.    Endocrine:  Negative for altered temperature regulation, polyphagia, polydipsia, unwanted hair growth and change in facial hair.    Current Outpatient Medications   Medication     acetaminophen (TYLENOL) 325 MG tablet     estradiol (VAGIFEM) 10 MCG TABS vaginal tablet     PRENATAL VITAMINS PO     levonorgestrel (MIRENA, 52 MG,) 20 MCG/24HR IUD     No current facility-administered medications for this visit.      Vitals:    05/01/19 1532 05/01/19 1533 05/01/19 1534 05/01/19 1535   BP: 100/64 102/67 100/65 101/66   Pulse: 83 83 83 83   Weight: 57.2 kg (126 lb)          Physical Exam   Constitutional: She is oriented to person, place, and time. She appears well-developed and well-nourished.   HENT:   Head: Normocephalic and atraumatic.   Eyes: Pupils are equal,  round, and reactive to light. EOM are normal.   Cardiovascular: Normal rate and regular rhythm.   Musculoskeletal: She exhibits no edema.   Neurological: She is alert and oriented to person, place, and time.   Skin: Skin is warm and dry.   Nursing note and vitals reviewed.    Assessment and Plan:  Amber was seen today for follow up.    Diagnoses and all orders for this visit:    Severe episode of recurrent major depressive disorder, without psychotic features (H). Patient was advised management of anxiety and depression.  Given the severity of the disease, recommend urgent evaluation at the behavioral health center.  Patient was referred to day treatment program.  As citalopram has worked for her in the past prescription for it was sent to patient's pharmacy.  Also recommend further evaluation for hypothyroidism and anemia.  Patient was advised to follow-up in 2 weeks to review progress.  -     Discontinue: citalopram (CELEXA) 10 MG tablet; Take 2 tablets (20 mg) by mouth daily Start with 10 mg daily, increase to 20 mg after 1 week.  -     Cancel: Comprehensive metabolic panel  -     Cancel: TSH with free T4 reflex  -     Cancel: CBC with Platelets  -     MENTAL HEALTH REFERRAL  - Adult; Outpatient Treatment, Psychiatry and Medication Management; Day Treatment/Dual Disorder/Partial Hospitalization Program; [facility]: Day Treatment (Provider Order Required to Assess & Treat) [phone]; We will contact...  -     Discontinue: hydrOXYzine (ATARAX) 10 MG tablet; Take 1 tablet (10 mg) by mouth 3 times daily as needed for itching  -     hydrOXYzine (ATARAX) 10 MG tablet; Take 1 tablet (10 mg) by mouth 3 times daily as needed for itching  -     citalopram (CELEXA) 10 MG tablet; Take 2 tablets (20 mg) by mouth daily Start with 10 mg daily, increase to 20 mg after 1 week.  -     Comprehensive metabolic panel (BMP + Alb, Alk Phos, ALT, AST, Total. Bili, TP); Future  -     Cancel: CBC with platelets and differential; Future  -      TSH with free T4 reflex; Future  -     CBC with platelets; Future    Total time spent 25 minutes.  More than 50% of the time spent with Ms. Buckner on counseling / coordinating her care    Cinthia Charles MD                Again, thank you for allowing me to participate in the care of your patient.      Sincerely,    Cinthia Charles MD

## 2019-05-02 ASSESSMENT — ENCOUNTER SYMPTOMS
DYSPHORIC MOOD: 1
ABDOMINAL PAIN: 0
SHORTNESS OF BREATH: 0
NERVOUS/ANXIOUS: 1
FEVER: 0

## 2019-05-02 ASSESSMENT — ANXIETY QUESTIONNAIRES: GAD7 TOTAL SCORE: 19

## 2019-05-02 NOTE — DISCHARGE INSTRUCTIONS
Discharge to home with medications as discussed as well as therapy with follow-up with primary MD as well.  Return to the emergency room if any thoughts of harming self or others.

## 2019-05-03 ENCOUNTER — NURSE TRIAGE (OUTPATIENT)
Dept: NURSING | Facility: CLINIC | Age: 34
End: 2019-05-03

## 2019-05-03 NOTE — ED PROVIDER NOTES
"  History     Chief Complaint   Patient presents with     Depression     increasing stress and depression, pt went to OB/GYN visit today for depression and \"scored high on depression scale. sent for eval in [facility]. pt denies any SI     HPI  Amber Buckner is a 33 year old female who presents with ongoing increased depression and anxiety.  Patient was seen today in her OB/GYN clinic and tested positive for severe depression and was sent to the emergency room for further evaluation and treatment.  Patient notes that she has had a history of depression in the past she had previously been treated with antidepressant but had been taken off of it about a year and half ago had been doing well she has been under an increased amount of stress and along with that has had marked increase in her depressive symptoms and anxiety patient has had a difficult time taking care of her normal activities secondary to her severe depression.  Patient is a mother of 3 and is concerned that she is not able to function is highly she would like.  Patient states that she has episodes of crying having difficult time managing however she denies any suicidal intent.  Associated symptoms do include decreased appetite and difficulty sleeping.    I have reviewed the Medications, Allergies, Past Medical and Surgical History, and Social History in the Epic system.    PERSONAL MEDICAL HISTORY  Past Medical History:   Diagnosis Date     Adolescent depression in college     Anxiety      Fit/adjust GI carole-device NEC      Perineal abscess      PONV (postoperative nausea and vomiting)      S/P total colectomy      Ulcerative colitis      PAST SURGICAL HISTORY  Past Surgical History:   Procedure Laterality Date      SECTION  2012    Procedure: SECTION; Surgeon:JAVI BELL; Location:[facility] L[facility]      SECTION N/A 2014    Procedure:  SECTION;  Surgeon: Shawanda Luna MD;  Location: [facility] L+[facility]      " SECTION N/A 2017    Procedure:  SECTION;  Repeat  Section ;  Surgeon: Laurie Montoya MD;  Location: [facility] L[facility]     COLECTOMY SUBTOTAL       COLONOSCOPY N/A 4/15/2015    Procedure: COLONOSCOPY;  Surgeon: Al Santo MD;  Location: [facility] OR     EXAM UNDER ANESTHESIA ANUS N/A 4/15/2015    Procedure: EXAM UNDER ANESTHESIA ANUS;  Surgeon: Al Santo MD;  Location: [facility]     FISTULOTOMY RECTUM N/A 4/15/2015    Procedure: FISTULOTOMY RECTUM;  Surgeon: Al Santo MD;  Location: [facility]     TAKEDOWN ILEOSTOMY       tonsilectomy       TRANSPERINEAL REPAIR FISTULA RECTAL URETHRAL       FAMILY HISTORY  Family History   Problem Relation Age of Onset     Diabetes Maternal Grandmother      Diabetes Paternal Grandfather      Genetic Disorder Father         chron's     Gastrointestinal Disease Sister         ulcerative colitis     SOCIAL HISTORY  Social History     Tobacco Use     Smoking status: Never Smoker     Smokeless tobacco: Never Used   Substance Use Topics     Alcohol use: No     Alcohol/week: 0.0 oz     Comment: not since pregnancy     MEDICATIONS  No current facility-administered medications for this encounter.      Current Outpatient Medications   Medication     hydrOXYzine (VISTARIL) 25 MG capsule     propranolol (INDERAL) 10 MG tablet     [START ON 2019] sertraline (ZOLOFT) 50 MG tablet     acetaminophen (TYLENOL) 325 MG tablet     citalopram (CELEXA) 10 MG tablet     estradiol (VAGIFEM) 10 MCG TABS vaginal tablet     hydrOXYzine (ATARAX) 10 MG tablet     levonorgestrel (MIRENA, 52 MG,) 20 MCG/24HR IUD     PRENATAL VITAMINS PO     ALLERGIES  No Known Allergies      Review of Systems   Constitutional: Negative for fever.   Respiratory: Negative for shortness of breath.    Cardiovascular: Negative for chest pain.   Gastrointestinal: Negative for abdominal pain.   Psychiatric/Behavioral: Positive for dysphoric mood. The patient is nervous/anxious.    All other systems reviewed  and are negative.      Physical Exam   BP: 99/67  Pulse: 68  Temp: 97.9  F (36.6  C)  Resp: 14  Weight: 57.2 kg (126 lb)  SpO2: 98 %      Physical Exam   Constitutional: She is oriented to person, place, and time. No distress.   HENT:   Head: Atraumatic.   Mouth/Throat: Oropharynx is clear and moist. No oropharyngeal exudate.   Eyes: Pupils are equal, round, and reactive to light. No scleral icterus.   Cardiovascular: Normal heart sounds and intact distal pulses.   Pulmonary/Chest: Breath sounds normal. No respiratory distress.   Abdominal: Soft. Bowel sounds are normal. There is no tenderness.   Musculoskeletal: She exhibits no edema or tenderness.   Neurological: She is alert and oriented to person, place, and time. She exhibits normal muscle tone. Coordination normal.   Skin: Skin is warm. No rash noted. She is not diaphoretic.   Psychiatric: Her mood appears anxious. She exhibits a depressed mood.       ED Course        Procedures    Patient was seen and evaluated by the  please refer to the documentation in the note section of the epic chart dated 5/1/2019    Critical Care time:  none              Assessments & Plan (with Medical Decision Making)       I have reviewed the nursing notes.    I have reviewed the findings, diagnosis, plan and need for follow up with the patient.  She will be started on Zoloft as well as initially being started on propranolol 10 mg twice a day to help with some of the physical symptoms.  Patient will be utilizing hydroxyzine as needed for bedtime and once again follow-up with primary psychiatry for recheck or return if any increased risk of harming self.         Medication List      Started    hydrOXYzine 25 MG capsule  Commonly known as:  VISTARIL  1-2 tablets as needed for anxiety q day     propranolol 10 MG tablet  Commonly known as:  INDERAL  10 mg, Oral, 2 TIMES DAILY PRN     sertraline 50 MG tablet  Commonly known as:  ZOLOFT  Take 1 tablet (50 mg) by mouth daily for 7  days, THEN 2 tablets (100 mg) daily for 21 days.  Start taking on:  5/8/2019        Patient with underlying depression and anxiety as well as insomnia    Final diagnoses:   Depression, unspecified depression type   Anxiety   Insomnia, unspecified type       5/1/2019   [facility], [place], EMERGENCY DEPARTMENT     Elías Bhagat MD  05/02/19 7864

## 2019-05-03 NOTE — TELEPHONE ENCOUNTER
"Patient calling reporting she was discharged from mental health 2 days ago. Reporting she started on Zoloft and experienced a panic attack \"like nothing I had experienced before.\" Patient reporting she has taken a Hydroxyzine and symptoms have improved. Denies suicidal thoughts. Reporting in past 48 hours symptoms have increased with panic attacks.      Per guidelines advised to follow up with in 3 days. Caller verbalized understanding. Denies further questions.    Connected patient to [facility].    Elyssa Dunlap RN  [facility]      Additional Information    Negative: Severe difficulty breathing (e.g., struggling for each breath, speaks in single words)    Negative: Bluish (or gray) lips or face    Negative: Difficult to awaken or acting confused  (e.g., disoriented, slurred speech)    Negative: Hysterical or combative behavior    Negative: Sounds like a life-threatening emergency to the triager    Negative: Chest pain    Negative: Palpitations, skipped heart beat, or rapid heart beat    Negative: Cough is main symptom    Negative: Suicide thoughts, threats, attempts, or questions    Negative: Depression is main problem or symptom (e.g., feelings of sadness or hopelessness)    Negative: Difficulty breathing and persists > 10 minutes and not relieved by reassurance provided by triager    Negative: Lightheadedness or dizziness and persists > 10 minutes and not relieved by reassurance provided by triager    Negative: Alcohol or drug abuse, known or suspected, and feeling very shaky (i.e., visible tremors of hands)    Negative: Patient sounds very sick or weak to the triager    Negative: Patient sounds very upset or troubled to the triager    Negative: Symptoms interfere with work or school    Negative: Symptoms of anxiety or panic and has not been evaluated for this by physician    Started on anti-anxiety medication and no relief    Protocols used: ANXIETY AND PANIC ATTACK-A-OH      "

## 2019-05-05 ASSESSMENT — ENCOUNTER SYMPTOMS
NERVOUS/ANXIOUS: 1
POLYDIPSIA: 0
CONSTIPATION: 0
ALTERED TEMPERATURE REGULATION: 0
NAUSEA: 0
INSOMNIA: 1
CHILLS: 0
SWOLLEN GLANDS: 0
VOMITING: 0
DYSPNEA ON EXERTION: 0
DEPRESSION: 1
BRUISES/BLEEDS EASILY: 0
DIARRHEA: 0
COUGH: 0
FATIGUE: 1
POLYPHAGIA: 0
ABDOMINAL PAIN: 0
FEVER: 0

## 2019-05-13 ASSESSMENT — ANXIETY QUESTIONNAIRES
5. BEING SO RESTLESS THAT IT IS HARD TO SIT STILL: SEVERAL DAYS
4. TROUBLE RELAXING: MORE THAN HALF THE DAYS
7. FEELING AFRAID AS IF SOMETHING AWFUL MIGHT HAPPEN: NEARLY EVERY DAY
6. BECOMING EASILY ANNOYED OR IRRITABLE: SEVERAL DAYS
GAD7 TOTAL SCORE: 14
1. FEELING NERVOUS, ANXIOUS, OR ON EDGE: NEARLY EVERY DAY
3. WORRYING TOO MUCH ABOUT DIFFERENT THINGS: MORE THAN HALF THE DAYS
2. NOT BEING ABLE TO STOP OR CONTROL WORRYING: MORE THAN HALF THE DAYS
GAD7 TOTAL SCORE: 14
7. FEELING AFRAID AS IF SOMETHING AWFUL MIGHT HAPPEN: NEARLY EVERY DAY

## 2019-05-14 ENCOUNTER — OFFICE VISIT (OUTPATIENT)
Dept: INTERNAL MEDICINE | Facility: CLINIC | Age: 34
End: 2019-05-14
Attending: INTERNAL MEDICINE
Payer: COMMERCIAL

## 2019-05-14 VITALS
SYSTOLIC BLOOD PRESSURE: 99 MMHG | BODY MASS INDEX: 22.86 KG/M2 | WEIGHT: 125 LBS | DIASTOLIC BLOOD PRESSURE: 66 MMHG | HEART RATE: 74 BPM

## 2019-05-14 DIAGNOSIS — F32.2 CURRENT SEVERE EPISODE OF MAJOR DEPRESSIVE DISORDER WITHOUT PSYCHOTIC FEATURES WITHOUT PRIOR EPISODE (H): Primary | ICD-10-CM

## 2019-05-14 RX ORDER — HYDROXYZINE PAMOATE 25 MG/1
CAPSULE ORAL
Qty: 30 CAPSULE | Refills: 3 | Status: SHIPPED | OUTPATIENT
Start: 2019-05-14 | End: 2019-10-17

## 2019-05-14 RX ORDER — SERTRALINE HYDROCHLORIDE 100 MG/1
100 TABLET, FILM COATED ORAL DAILY
Qty: 30 TABLET | Refills: 3 | Status: SHIPPED | OUTPATIENT
Start: 2019-05-14 | End: 2019-09-15

## 2019-05-14 ASSESSMENT — ENCOUNTER SYMPTOMS
DIARRHEA: 0
COUGH: 0
SWOLLEN GLANDS: 0
CONSTIPATION: 0
POLYPHAGIA: 0
FEVER: 0
POLYDIPSIA: 0
DEPRESSION: 1
ALTERED TEMPERATURE REGULATION: 0
CHILLS: 0
NERVOUS/ANXIOUS: 1
WEAKNESS: 0
FATIGUE: 1
ABDOMINAL PAIN: 0
MEMORY LOSS: 0
DYSPNEA ON EXERTION: 0
BRUISES/BLEEDS EASILY: 0
LOSS OF CONSCIOUSNESS: 0
TREMORS: 0

## 2019-05-14 ASSESSMENT — ANXIETY QUESTIONNAIRES: GAD7 TOTAL SCORE: 14

## 2019-05-14 NOTE — LETTER
Date:May 15, 2019      Patient was self referred, no letter generated. Do not send.        [facility] Health Information

## 2019-05-14 NOTE — PROGRESS NOTES
"HPI  Patient is here for follow up on anxiety and depression. She reports that she is feeling a little better. She feels a bit \"spacey\". She is currently working with a therapist. She is happy with the progress she has made to date. Patient states that she is using both hydroxyzine and propranolol less often. Her family has been very supportive.     Review of Systems     Constitutional:  Positive for fatigue. Negative for fever and chills.   Eyes:  Negative for decreased vision.   Respiratory:   Negative for cough and dyspnea on exertion.    Cardiovascular:  Negative for dyspnea on exertion.   Gastrointestinal:  Negative for abdominal pain, diarrhea and constipation.   Skin:  Negative for itching and rash.   Neurological:  Negative for tremors, loss of consciousness, weakness and memory loss.   Endo/Heme:  Negative for anemia, swollen glands and bruises/bleeds easily.   Psychiatric/Behavioral:  Positive for depression. Negative for memory loss.    Endocrine:  Negative for altered temperature regulation, polyphagia, polydipsia, unwanted hair growth and change in facial hair.    Current Outpatient Medications   Medication     acetaminophen (TYLENOL) 325 MG tablet     hydrOXYzine (VISTARIL) 25 MG capsule     propranolol (INDERAL) 10 MG tablet     sertraline (ZOLOFT) 100 MG tablet     levonorgestrel (MIRENA, 52 MG,) 20 MCG/24HR IUD     No current facility-administered medications for this visit.      Vitals:    05/14/19 0849 05/14/19 0850 05/14/19 0851   BP: 98/66 98/62 99/66   Pulse: 74 74 74   Weight: 56.7 kg (125 lb)           Physical Exam   Constitutional: She is oriented to person, place, and time. She appears well-developed and well-nourished.   HENT:   Head: Normocephalic and atraumatic.   Eyes: Pupils are equal, round, and reactive to light. EOM are normal.   Neck: Normal range of motion.   Cardiovascular: Normal rate.   Pulmonary/Chest: Effort normal.   Neurological: She is alert and oriented to person, place, " and time.   Psychiatric: She has a normal mood and affect. Her behavior is normal. Judgment and thought content normal.   Nursing note and vitals reviewed.        Answers for HPI/ROS submitted by the patient on 5/13/2019   LAZARO 7 TOTAL SCORE: 14  PHQ-9 score:    PHQ-9 SCORE 5/1/2019   PHQ-9 Total Score -   PHQ-9 Total Score 22       Assessment and Plan:  Amber was seen today for follow up.    Diagnoses and all orders for this visit:    Current severe episode of major depressive disorder without psychotic features without prior episode (H).  Discussed management of depression and anxiety with patient.  Recommend to continue with current dose of sertraline as she has experienced some relief of symptoms.  Will follow-up in 3 to 4 weeks.  Patient was also encouraged to continue with her follow-up with psychology.  Discussed possible day treatment program, patient may consider this option if she does not have any improvement over the next few weeks.  -     sertraline (ZOLOFT) 100 MG tablet; Take 1 tablet (100 mg) by mouth daily  -     hydrOXYzine (VISTARIL) 25 MG capsule; 1-2 tablets as needed for anxiety q day    Total time spent 25 minutes.  More than 50% of the time spent with Ms. Buckner on counseling / coordinating her care    Cinthia Charles MD

## 2019-05-14 NOTE — LETTER
"5/14/2019       RE: Amber Buckner  [address] [facility] [address]     Dear Colleague,    Thank you for referring your patient, Amber Buckner, to the [facility]  at [facility]. Please see a copy of my visit note below.    HPI  Patient is here for follow up on anxiety and depression. She reports that she is feeling a little better. She feels a bit \"spacey\". She is currently working with a therapist. She is happy with the progress she has made to date. Patient states that she is using both hydroxyzine and propranolol less often. Her family has been very supportive.     Review of Systems     Constitutional:  Positive for fatigue. Negative for fever and chills.   Eyes:  Negative for decreased vision.   Respiratory:   Negative for cough and dyspnea on exertion.    Cardiovascular:  Negative for dyspnea on exertion.   Gastrointestinal:  Negative for abdominal pain, diarrhea and constipation.   Skin:  Negative for itching and rash.   Neurological:  Negative for tremors, loss of consciousness, weakness and memory loss.   Endo/Heme:  Negative for anemia, swollen glands and bruises/bleeds easily.   Psychiatric/Behavioral:  Positive for depression. Negative for memory loss.    Endocrine:  Negative for altered temperature regulation, polyphagia, polydipsia, unwanted hair growth and change in facial hair.    Current Outpatient Medications   Medication     acetaminophen (TYLENOL) 325 MG tablet     hydrOXYzine (VISTARIL) 25 MG capsule     propranolol (INDERAL) 10 MG tablet     sertraline (ZOLOFT) 100 MG tablet     levonorgestrel (MIRENA, 52 MG,) 20 MCG/24HR IUD     No current facility-administered medications for this visit.      Vitals:    05/14/19 0849 05/14/19 0850 05/14/19 0851   BP: 98/66 98/62 99/66   Pulse: 74 74 74   Weight: 56.7 kg (125 lb)           Physical Exam   Constitutional: She is oriented to person, place, and time. She appears " well-developed and well-nourished.   HENT:   Head: Normocephalic and atraumatic.   Eyes: Pupils are equal, round, and reactive to light. EOM are normal.   Neck: Normal range of motion.   Cardiovascular: Normal rate.   Pulmonary/Chest: Effort normal.   Neurological: She is alert and oriented to person, place, and time.   Psychiatric: She has a normal mood and affect. Her behavior is normal. Judgment and thought content normal.   Nursing note and vitals reviewed.        Answers for HPI/ROS submitted by the patient on 5/13/2019   LAZARO 7 TOTAL SCORE: 14  PHQ-9 score:    PHQ-9 SCORE 5/1/2019   PHQ-9 Total Score -   PHQ-9 Total Score 22       Assessment and Plan:  Amber was seen today for follow up.    Diagnoses and all orders for this visit:    Current severe episode of major depressive disorder without psychotic features without prior episode (H).  Discussed management of depression and anxiety with patient.  Recommend to continue with current dose of sertraline as she has experienced some relief of symptoms.  Will follow-up in 3 to 4 weeks.  Patient was also encouraged to continue with her follow-up with psychology.  Discussed possible day treatment program, patient may consider this option if she does not have any improvement over the next few weeks.  -     sertraline (ZOLOFT) 100 MG tablet; Take 1 tablet (100 mg) by mouth daily  -     hydrOXYzine (VISTARIL) 25 MG capsule; 1-2 tablets as needed for anxiety q day    Total time spent 25 minutes.  More than 50% of the time spent with Ms. Buckner on counseling / coordinating her care    Cinthia Charles MD              Again, thank you for allowing me to participate in the care of your patient.      Sincerely,    Cinthia Charles MD

## 2019-07-03 ENCOUNTER — OFFICE VISIT (OUTPATIENT)
Dept: PODIATRY | Facility: CLINIC | Age: 34
End: 2019-07-03
Payer: COMMERCIAL

## 2019-07-03 VITALS
WEIGHT: 121.6 LBS | HEIGHT: 62 IN | SYSTOLIC BLOOD PRESSURE: 90 MMHG | BODY MASS INDEX: 22.38 KG/M2 | DIASTOLIC BLOOD PRESSURE: 62 MMHG

## 2019-07-03 DIAGNOSIS — B07.8 COMMON WART: Primary | ICD-10-CM

## 2019-07-03 DIAGNOSIS — L84 CALLUS: ICD-10-CM

## 2019-07-03 PROCEDURE — 11056 PARNG/CUTG B9 HYPRKR LES 2-4: CPT | Mod: 51 | Performed by: PODIATRIST

## 2019-07-03 PROCEDURE — 99203 OFFICE O/P NEW LOW 30 MIN: CPT | Mod: 25 | Performed by: PODIATRIST

## 2019-07-03 PROCEDURE — 17110 DESTRUCTION B9 LES UP TO 14: CPT | Performed by: PODIATRIST

## 2019-07-03 ASSESSMENT — MIFFLIN-ST. JEOR: SCORE: 1199.82

## 2019-07-03 NOTE — LETTER
7/3/2019         RE: Amber Buckner  [address]  [place] [address]        Dear Colleague,    Thank you for referring your patient, Amber Buckner, to the [facility]. Please see a copy of my visit note below.    PATIENT HISTORY:  Amber Buckner is a 34 year old female who presents to clinic for R foot warts, possibly calluses on L 1st toe.  5 years of issues.  Reports surgical removal of warts from her R great toe many years ago.  No recent treatments.  No pain.      Review of Systems:  Patient denies fever, chills, rash, wound, stiffness, limping, numbness, weakness, heart burn, blood in stool, chest pain with activity, calf pain when walking, shortness of breath with activity, chronic cough, easy bleeding/bruising, swelling of ankles, excessive thirst, fatigue.  Patient admits to depression, anxiety.     PAST MEDICAL HISTORY:   Past Medical History:   Diagnosis Date     Adolescent depression in college     Anxiety      Fit/adjust GI carole-device NEC      Perineal abscess      PONV (postoperative nausea and vomiting)      S/P total colectomy      Ulcerative colitis         PAST SURGICAL HISTORY:   Past Surgical History:   Procedure Laterality Date      SECTION  2012    Procedure: SECTION; Surgeon:JAVI BELL; [facility]      SECTION N/A 2014    Procedure:  SECTION;  Surgeon: Shawanda Luna MD;  [facility]: [facility]      SECTION N/A 2017    Procedure:  SECTION;  Repeat  Section ;  Surgeon: Laurie Montoya MD;  [facility]     COLECTOMY SUBTOTAL       COLONOSCOPY N/A 4/15/2015    Procedure: COLONOSCOPY;  Surgeon: Al Santo MD;  [facility]     EXAM UNDER ANESTHESIA ANUS N/A 4/15/2015    Procedure: EXAM UNDER ANESTHESIA ANUS;  Surgeon: Al Santo MD;  [facility]     FISTULOTOMY RECTUM N/A 4/15/2015    Procedure: FISTULOTOMY RECTUM;  Surgeon:  Al Santo MD;  [facility]     TAKEDOWN ILEOSTOMY       tonsilectomy       TRANSPERINEAL REPAIR FISTULA RECTAL URETHRAL  2015        MEDICATIONS:   Current Outpatient Medications:      acetaminophen (TYLENOL) 325 MG tablet, Take 2 tablets (650 mg) by mouth every 6 hours, Disp: 100 tablet, Rfl: 0     levonorgestrel (MIRENA, 52 MG,) 20 MCG/24HR IUD, 1 each (20 mcg) by Intrauterine route once for 1 dose, Disp: 1 each, Rfl: 0     sertraline (ZOLOFT) 100 MG tablet, Take 1 tablet (100 mg) by mouth daily, Disp: 30 tablet, Rfl: 3     hydrOXYzine (VISTARIL) 25 MG capsule, 1-2 tablets as needed for anxiety q day (Patient not taking: Reported on 7/3/2019), Disp: 30 capsule, Rfl: 3     propranolol (INDERAL) 10 MG tablet, Take 1 tablet (10 mg) by mouth 2 times daily as needed (anxiety) (Patient not taking: Reported on 7/3/2019), Disp: 30 tablet, Rfl: 1     ALLERGIES:  No Known Allergies     SOCIAL HISTORY:   Social History     Socioeconomic History     Marital status:      Spouse name: Not on file     Number of children: Not on file     Years of education: Not on file     Highest education level: Not on file   Occupational History     Occupation: Casual RN Oncology     Comment: [employer]   Social Needs     Financial resource strain: Not on file     Food insecurity:     Worry: Not on file     Inability: Not on file     Transportation needs:     Medical: Not on file     Non-medical: Not on file   Tobacco Use     Smoking status: Never Smoker     Smokeless tobacco: Never Used   Substance and Sexual Activity     Alcohol use: No     Alcohol/week: 0.0 oz     Comment: not since pregnancy     Drug use: No     Sexual activity: Not Currently     Partners: Male     Birth control/protection: Inserts, Condom   Lifestyle     Physical activity:     Days per week: Not on file     Minutes per session: Not on file     Stress: Not on file   Relationships     Social connections:     Talks on phone: Not on file     Gets  "together: Not on file     Attends [employer] service: Not on file     Active member of club or organization: Not on file     Attends meetings of clubs or organizations: Not on file     Relationship status: Not on file     Intimate partner violence:     Fear of current or ex partner: Not on file     Emotionally abused: Not on file     Physically abused: Not on file     Forced sexual activity: Not on file   Other Topics Concern     Parent/sibling w/ CABG, MI or angioplasty before 65F 55M? No      Service No     Blood Transfusions Yes     Comment: ?     Caffeine Concern No     Occupational Exposure Yes     Comment: RN casual Oncology/student       Hobby Hazards No     Sleep Concern Yes     Comment: doesn't sleep     Stress Concern Yes     Weight Concern No     Special Diet No     Back Care No     Exercise No     Bike Helmet Yes     Seat Belt Yes     Self-Exams Yes   Social History Narrative    How much exercise per week? Walk a lot     How much calcium per day? dairy       How much caffeine per day? 0    How much vitamin D per day? 5000 IU    Do you/your family wear seatbelts?  Yes    Do you/your family use safety helmets? Yes    Do you/your family use sunscreen? Yes    Do you/your family keep firearms in the home? Yes    Do you/your family have a smoke detector(s)? Yes        Do you feel safe in your home? Yes    Has anyone ever touched you in an unwanted manner?No     Explain Gene SMALL        FAMILY HISTORY:   Family History   Problem Relation Age of Onset     Diabetes Maternal Grandmother      Diabetes Paternal Grandfather      Genetic Disorder Father         chron's     Gastrointestinal Disease Sister         ulcerative colitis        EXAM:Vitals: BP 90/62   Ht 1.575 m (5' 2\")   Wt 55.2 kg (121 lb 9.6 oz)   BMI 22.24 kg/m     BMI= Body mass index is 22.24 kg/m .    General appearance: Patient is alert and fully cooperative with history & exam.  No sign of distress is noted during the visit.   "   Psychiatric: Affect is pleasant & appropriate.  Patient appears motivated to improve health.     Respiratory: Breathing is regular & unlabored while sitting.     HEENT: Hearing is intact to spoken word.  Speech is clear.  No gross evidence of visual impairment that would impact ambulation.     Dermatologic: 2 verrucous lesions to plantar medial R 1st ray area.  L plantar 1st toe IPJ with small nucleated calluses x2.  No paronychia or evidence of soft tissue infection is noted.     Vascular: DP & PT pulses are intact & regular bilaterally.  No significant edema or varicosities noted.  CFT and skin temperature are normal to both lower extremities.     Neurologic: Lower extremity sensation is intact to light touch.  No evidence of weakness or contracture in the lower extremities.  No evidence of neuropathy.     Musculoskeletal: Patient is ambulatory without assistive device or brace.  No gross ankle deformity noted.  No foot or ankle joint effusion is noted.     ASSESSMENT:   R foot warts  L 1st toe calluses     PLAN:  Reviewed patient's chart in epic.  Discussed condition and treatment options including pros and cons.    Discussed wart treatment options. Warts are caused by a virus. There is no cure, only treatment. Treatment options include freezing, occlusion, home treatments. Patient agreed to try freezing.  Discussed risk of blistering, tissue damage, infection with wart treatment. Using a # 15 blade, the warts were debrided. 3 applications of liquid nitrogen applied for 5 seconds each.  Band aid(s) applied. Discussed care following application including home treatment.  If a blister forms, instructed not to pop; dress with antibiotic ointment and band aid if it opens; do not apply other topicals until healed.  Written instructions given. Call with any questions.  F/u 2-3 wks as needed.    Discussed causes of calluses/keratomas.  They are due to areas of increased friction.  Discussed treatments such as using  foot file, pumice stone, pads, orthotics, and not walking barefoot.      Pt requested debridement today.  I debrided the calluses x2 with a 15 blade.        Eloy Greenberg DPM, FACFAS          Again, thank you for allowing me to participate in the care of your patient.        Sincerely,        Eloy Greenberg DPM

## 2019-07-03 NOTE — PROGRESS NOTES
PATIENT HISTORY:  Amber Buckner is a 34 year old female who presents to clinic for R foot warts, possibly calluses on L 1st toe.  5 years of issues.  Reports surgical removal of warts from her R great toe many years ago.  No recent treatments.  No pain.      Review of Systems:  Patient denies fever, chills, rash, wound, stiffness, limping, numbness, weakness, heart burn, blood in stool, chest pain with activity, calf pain when walking, shortness of breath with activity, chronic cough, easy bleeding/bruising, swelling of ankles, excessive thirst, fatigue.  Patient admits to depression, anxiety.     PAST MEDICAL HISTORY:   Past Medical History:   Diagnosis Date     Adolescent depression in college     Anxiety      Fit/adjust GI carole-device NEC      Perineal abscess      PONV (postoperative nausea and vomiting)      S/P total colectomy      Ulcerative colitis         PAST SURGICAL HISTORY:   Past Surgical History:   Procedure Laterality Date      SECTION  2012    Procedure: SECTION; Surgeon:JAVI BELL; [facility]      SECTION N/A 2014    Procedure:  SECTION;  Surgeon: Shawanda Luna MD;  [facility]      SECTION N/A 2017    Procedure:  SECTION;  Repeat  Section ;  Surgeon: Laurie Montoya MD;  [facility]     COLECTOMY SUBTOTAL       COLONOSCOPY N/A 4/15/2015    Procedure: COLONOSCOPY;  Surgeon: Al Santo MD;  [facility]     EXAM UNDER ANESTHESIA ANUS N/A 4/15/2015    Procedure: EXAM UNDER ANESTHESIA ANUS;  Surgeon: Al Santo MD;  [facility]     FISTULOTOMY RECTUM N/A 4/15/2015    Procedure: FISTULOTOMY RECTUM;  Surgeon: Al Santo MD;  [facility]     TAKEDOWN ILEOSTOMY       tonsilectomy       TRANSPERINEAL REPAIR FISTULA RECTAL URETHRAL          MEDICATIONS:   Current Outpatient Medications:      acetaminophen (TYLENOL) 325 MG tablet, Take 2 tablets (650 mg) by  mouth every 6 hours, Disp: 100 tablet, Rfl: 0     levonorgestrel (MIRENA, 52 MG,) 20 MCG/24HR IUD, 1 each (20 mcg) by Intrauterine route once for 1 dose, Disp: 1 each, Rfl: 0     sertraline (ZOLOFT) 100 MG tablet, Take 1 tablet (100 mg) by mouth daily, Disp: 30 tablet, Rfl: 3     hydrOXYzine (VISTARIL) 25 MG capsule, 1-2 tablets as needed for anxiety q day (Patient not taking: Reported on 7/3/2019), Disp: 30 capsule, Rfl: 3     propranolol (INDERAL) 10 MG tablet, Take 1 tablet (10 mg) by mouth 2 times daily as needed (anxiety) (Patient not taking: Reported on 7/3/2019), Disp: 30 tablet, Rfl: 1     ALLERGIES:  No Known Allergies     SOCIAL HISTORY:   Social History     Socioeconomic History     Marital status:      Spouse name: Not on file     Number of children: Not on file     Years of education: Not on file     Highest education level: Not on file   Occupational History     Occupation: Casual RN Oncology     Comment: [employer]   Social Needs     Financial resource strain: Not on file     Food insecurity:     Worry: Not on file     Inability: Not on file     Transportation needs:     Medical: Not on file     Non-medical: Not on file   Tobacco Use     Smoking status: Never Smoker     Smokeless tobacco: Never Used   Substance and Sexual Activity     Alcohol use: No     Alcohol/week: 0.0 oz     Comment: not since pregnancy     Drug use: No     Sexual activity: Not Currently     Partners: Male     Birth control/protection: Inserts, Condom   Lifestyle     Physical activity:     Days per week: Not on file     Minutes per session: Not on file     Stress: Not on file   Relationships     Social connections:     Talks on phone: Not on file     Gets together: Not on file     Attends [employer] service: Not on file     Active member of club or organization: Not on file     Attends meetings of clubs or organizations: Not on file     Relationship status: Not on file     Intimate partner violence:     Fear of current  "or ex partner: Not on file     Emotionally abused: Not on file     Physically abused: Not on file     Forced sexual activity: Not on file   Other Topics Concern     Parent/sibling w/ CABG, MI or angioplasty before 65F 55M? No      Service No     Blood Transfusions Yes     Comment: ?     Caffeine Concern No     Occupational Exposure Yes     Comment: RN casual Oncology/student       Hobby Hazards No     Sleep Concern Yes     Comment: doesn't sleep     Stress Concern Yes     Weight Concern No     Special Diet No     Back Care No     Exercise No     Bike Helmet Yes     Seat Belt Yes     Self-Exams Yes   Social History Narrative    How much exercise per week? Walk a lot     How much calcium per day? dairy       How much caffeine per day? 0    How much vitamin D per day? 5000 IU    Do you/your family wear seatbelts?  Yes    Do you/your family use safety helmets? Yes    Do you/your family use sunscreen? Yes    Do you/your family keep firearms in the home? Yes    Do you/your family have a smoke detector(s)? Yes        Do you feel safe in your home? Yes    Has anyone ever touched you in an unwanted manner?No     Explain Gene SMALL        FAMILY HISTORY:   Family History   Problem Relation Age of Onset     Diabetes Maternal Grandmother      Diabetes Paternal Grandfather      Genetic Disorder Father         chron's     Gastrointestinal Disease Sister         ulcerative colitis        EXAM:Vitals: BP 90/62   Ht 1.575 m (5' 2\")   Wt 55.2 kg (121 lb 9.6 oz)   BMI 22.24 kg/m    BMI= Body mass index is 22.24 kg/m .    General appearance: Patient is alert and fully cooperative with history & exam.  No sign of distress is noted during the visit.     Psychiatric: Affect is pleasant & appropriate.  Patient appears motivated to improve health.     Respiratory: Breathing is regular & unlabored while sitting.     HEENT: Hearing is intact to spoken word.  Speech is clear.  No gross evidence of visual impairment that would " impact ambulation.     Dermatologic: 2 verrucous lesions to plantar medial R 1st ray area.  L plantar 1st toe IPJ with small nucleated calluses x2.  No paronychia or evidence of soft tissue infection is noted.     Vascular: DP & PT pulses are intact & regular bilaterally.  No significant edema or varicosities noted.  CFT and skin temperature are normal to both lower extremities.     Neurologic: Lower extremity sensation is intact to light touch.  No evidence of weakness or contracture in the lower extremities.  No evidence of neuropathy.     Musculoskeletal: Patient is ambulatory without assistive device or brace.  No gross ankle deformity noted.  No foot or ankle joint effusion is noted.     ASSESSMENT:   R foot warts  L 1st toe calluses     PLAN:  Reviewed patient's chart in epic.  Discussed condition and treatment options including pros and cons.    Discussed wart treatment options. Warts are caused by a virus. There is no cure, only treatment. Treatment options include freezing, occlusion, home treatments. Patient agreed to try freezing.  Discussed risk of blistering, tissue damage, infection with wart treatment. Using a # 15 blade, the warts were debrided. 3 applications of liquid nitrogen applied for 5 seconds each.  Band aid(s) applied. Discussed care following application including home treatment.  If a blister forms, instructed not to pop; dress with antibiotic ointment and band aid if it opens; do not apply other topicals until healed.  Written instructions given. Call with any questions.  F/u 2-3 wks as needed.    Discussed causes of calluses/keratomas.  They are due to areas of increased friction.  Discussed treatments such as using foot file, pumice stone, pads, orthotics, and not walking barefoot.      Pt requested debridement today.  I debrided the calluses x2 with a 15 blade.        Eloy Greenberg, RENALDO, FACFAS

## 2019-07-03 NOTE — PATIENT INSTRUCTIONS
"Thank you for choosing [facility]!    Follow up as needed    DR. LAYNE'S CLINIC LOCATIONS     MONDAY  [place] TUESDAY & FRIDAY AM  [place]   [address] [facility]   [address]   [place] [address] TE[place] [address]   [phone]  [phone]  [phone]       WEDNESDAY  [place] SCHEDULE SURGERY: [phone]   [address] [place] [address] APPOINTMENTS: [phone]   [place] [address] BILLING QUESTIONS: [phone]   [phone]         PLANTAR WARTS   Plantar warts are a viral skin infection. As with most viral infections, there is no cure, only treatment. The virus is also quite superficial, so the immune system cannot recognize it as a problem. Therefore, treatment is aimed at causing an insult that the immune system can recognize. Typically plantar warts are treated by applying liquid nitrogen, to cause a local frostbite injury, or a strong acid, to cause a blister. Sometimes medications or creams that boost immune response are prescribed.   If your treatment was with the strong acid, you will likely develop a very tender, brown fluid-filled blister. This is normal and the blister should be allowed to break on its own and dry out.  Once it is dried out, use a pumice stone to trim away the dry skin and use an over-the-counter salicylic acid product in between appointments. Call the clinic if you have any concerns regarding your blister.   The regimen between office visits should include: daily trimming with the pumice stone, application of an over the counter wart product if available, and dressing with a small band-aid or piece of duct tape.     Duct tape is a non invasive treatment to warts.  Usually requires reapplying it every night.  It helps to \"choke out\" the wart.  Trimming the wart down with a razor first may also help.     Again, because there is no cure for warts, you may need several visits depending on " how your wart responds. Please call the clinic if you have questions or concerns.   CALLUSES / CORNS / IPKs / POROKERATOSIS  When there is excessive friction or pressure on the skin, the body responds by making the skin thicker to protect the deeper structures from becoming exposed. While this works well to protect the deeper structures, the thickened skin can increase pressure and pain.    Flat, diffuse thickening are simple calluses and they are usually caused by friction. Often these are the result of rubbing on a shoe or going barefoot.    Calluses with a central core between the toes are called corns. These result from prominent joints on adjacent toes rubbing together.  Theses are a symptom of bone malalignment and will always recur unless the underlying bones are addressed surgically.    Calluses with a central core on the ball of the foot are usually IPKs (intractable plantar keratosis). These are caused by excessive pressure from the metatarsals, the bones that make up the ball of the foot. Often one of these bones is too long or too prominent. Again, these will always recur unless the underlying bone issue is addressed. There is no cure for these. They will either go away by themselves, recur, or more could develop.    Regardless of the diagnosis, most of these lesions can be kept comfortable with routine maintenance.   1.This consists of filing them with a pumice stone or callus file a couple of times per week.    2. Lotion can be applied to soften the callus. A urea based cream such as Kerasal or Vanicream or thicker cream with shea butter are good options.  3. Toe spacers or toe covers can be used for corns, gel pads can be used for other lesions on the bottom of the foot.   If there is a surgical pathology noted, such as a prominent bone, often this needs to be addressed surgically to minimize recurrence. However, sometimes the lesion simply migrates to another spot after surgery, so it is not a  guaranteed cure.     Please call with any additional questions.     We discussed the potential costs of callus trimming including the possibility this may not be covered under insurance. Cost of this can be around $150 if paying out of pocket.      BODY WEIGHT AND YOUR FEET  The following information is included in the after visit summary for all patients. Body weight can be a sensitive issue to discuss in clinic, but we think the following information is very important. Although we focus on the feet and ankles, we do support the overall health of our patients.     Many things can cause foot and ankle problems. Foot structure, activity level, foot mechanics and injuries are common causes of pain. One very important issue that often goes unmentioned, is body weight. Extra weight can cause increased stress on muscles, ligaments, bones and tendons. Sometimes just a few extra pounds is all it takes to put one over her/his threshold. Without reducing that stress, it can be difficult to alleviate pain. As Foot & Ankle specialists, our job is addressing the lower extremity problem and possible causes. Regarding extra body weight, we encourage patients to discuss diet and weight management plans with their primary care doctors. It is this team approach that gives you the best opportunity for pain relief and getting you back on your feet.      [place] has a Comprehensive Weight Management Program. This program includes counseling, education, non-surgical and surgical approaches to weight loss. If you are interested in learning more either talk to you primary care provider or call [phone].

## 2019-09-15 DIAGNOSIS — F32.2 CURRENT SEVERE EPISODE OF MAJOR DEPRESSIVE DISORDER WITHOUT PSYCHOTIC FEATURES WITHOUT PRIOR EPISODE (H): ICD-10-CM

## 2019-09-16 ENCOUNTER — MYC REFILL (OUTPATIENT)
Dept: INTERNAL MEDICINE | Facility: CLINIC | Age: 34
End: 2019-09-16

## 2019-09-16 DIAGNOSIS — F32.2 CURRENT SEVERE EPISODE OF MAJOR DEPRESSIVE DISORDER WITHOUT PSYCHOTIC FEATURES WITHOUT PRIOR EPISODE (H): ICD-10-CM

## 2019-09-16 RX ORDER — SERTRALINE HYDROCHLORIDE 100 MG/1
100 TABLET, FILM COATED ORAL DAILY
Qty: 30 TABLET | Refills: 3 | Status: CANCELLED | OUTPATIENT
Start: 2019-09-16

## 2019-09-16 RX ORDER — SERTRALINE HYDROCHLORIDE 100 MG/1
100 TABLET, FILM COATED ORAL DAILY
Qty: 90 TABLET | Refills: 1 | Status: SHIPPED | OUTPATIENT
Start: 2019-09-16 | End: 2019-11-25

## 2019-09-16 NOTE — TELEPHONE ENCOUNTER
Received refill request for zoloft. Called patient and discussed with her. She states she is doing well and stable on this dose. She will call to follow up with Araceli. Temporary Rx sent.

## 2019-09-28 ENCOUNTER — HEALTH MAINTENANCE LETTER (OUTPATIENT)
Age: 34
End: 2019-09-28

## 2019-10-17 ENCOUNTER — HOSPITAL ENCOUNTER (INPATIENT)
Facility: CLINIC | Age: 34
LOS: 3 days | Discharge: HOME | DRG: 389 | End: 2019-10-20
Attending: EMERGENCY MEDICINE | Admitting: INTERNAL MEDICINE
Payer: COMMERCIAL

## 2019-10-17 ENCOUNTER — APPOINTMENT (OUTPATIENT)
Dept: CT IMAGING | Facility: CLINIC | Age: 34
DRG: 389 | End: 2019-10-17
Attending: EMERGENCY MEDICINE
Payer: COMMERCIAL

## 2019-10-17 DIAGNOSIS — K56.609 SMALL BOWEL OBSTRUCTION (H): ICD-10-CM

## 2019-10-17 LAB
ALBUMIN SERPL-MCNC: 4.1 G/DL (ref 3.4–5)
ALBUMIN SERPL-MCNC: NORMAL G/DL (ref 3.4–5)
ALP SERPL-CCNC: 56 U/L (ref 40–150)
ALP SERPL-CCNC: NORMAL U/L (ref 40–150)
ALT SERPL W P-5'-P-CCNC: 11 U/L (ref 0–50)
ALT SERPL W P-5'-P-CCNC: NORMAL U/L (ref 0–50)
ANION GAP SERPL CALCULATED.3IONS-SCNC: 11 MMOL/L (ref 3–14)
ANION GAP SERPL CALCULATED.3IONS-SCNC: NORMAL MMOL/L (ref 6–17)
AST SERPL W P-5'-P-CCNC: 6 U/L (ref 0–45)
AST SERPL W P-5'-P-CCNC: NORMAL U/L (ref 0–45)
BASOPHILS # BLD AUTO: 0 10E9/L (ref 0–0.2)
BASOPHILS NFR BLD AUTO: 0.2 %
BILIRUB SERPL-MCNC: 0.5 MG/DL (ref 0.2–1.3)
BILIRUB SERPL-MCNC: NORMAL MG/DL (ref 0.2–1.3)
BUN SERPL-MCNC: 18 MG/DL (ref 7–30)
BUN SERPL-MCNC: NORMAL MG/DL (ref 7–30)
CALCIUM SERPL-MCNC: 8.3 MG/DL (ref 8.5–10.1)
CALCIUM SERPL-MCNC: NORMAL MG/DL (ref 8.5–10.1)
CHLORIDE SERPL-SCNC: 108 MMOL/L (ref 94–109)
CHLORIDE SERPL-SCNC: NORMAL MMOL/L (ref 94–109)
CO2 SERPL-SCNC: 17 MMOL/L (ref 20–32)
CO2 SERPL-SCNC: NORMAL MMOL/L (ref 20–32)
CREAT SERPL-MCNC: 0.55 MG/DL (ref 0.52–1.04)
CREAT SERPL-MCNC: NORMAL MG/DL (ref 0.52–1.04)
DIFFERENTIAL METHOD BLD: ABNORMAL
EOSINOPHIL # BLD AUTO: 0.1 10E9/L (ref 0–0.7)
EOSINOPHIL NFR BLD AUTO: 0.6 %
ERYTHROCYTE [DISTWIDTH] IN BLOOD BY AUTOMATED COUNT: 12.9 % (ref 10–15)
GFR SERPL CREATININE-BSD FRML MDRD: >90 ML/MIN/{1.73_M2}
GFR SERPL CREATININE-BSD FRML MDRD: NORMAL ML/MIN/{1.73_M2}
GLUCOSE SERPL-MCNC: 60 MG/DL (ref 70–99)
GLUCOSE SERPL-MCNC: NORMAL MG/DL (ref 70–99)
HCG SERPL QL: NEGATIVE
HCT VFR BLD AUTO: 45.4 % (ref 35–47)
HGB BLD-MCNC: 15.1 G/DL (ref 11.7–15.7)
IMM GRANULOCYTES # BLD: 0 10E9/L (ref 0–0.4)
IMM GRANULOCYTES NFR BLD: 0.2 %
LACTATE BLD-SCNC: 0.4 MMOL/L (ref 0.7–2)
LIPASE SERPL-CCNC: 79 U/L (ref 73–393)
LIPASE SERPL-CCNC: NORMAL U/L (ref 73–393)
LYMPHOCYTES # BLD AUTO: 1.4 10E9/L (ref 0.8–5.3)
LYMPHOCYTES NFR BLD AUTO: 11.3 %
MCH RBC QN AUTO: 34 PG (ref 26.5–33)
MCHC RBC AUTO-ENTMCNC: 33.3 G/DL (ref 31.5–36.5)
MCV RBC AUTO: 102 FL (ref 78–100)
MONOCYTES # BLD AUTO: 0.7 10E9/L (ref 0–1.3)
MONOCYTES NFR BLD AUTO: 5.8 %
NEUTROPHILS # BLD AUTO: 10 10E9/L (ref 1.6–8.3)
NEUTROPHILS NFR BLD AUTO: 81.9 %
NRBC # BLD AUTO: 0 10*3/UL
NRBC BLD AUTO-RTO: 0 /100
PLATELET # BLD AUTO: 286 10E9/L (ref 150–450)
POTASSIUM SERPL-SCNC: 4.2 MMOL/L (ref 3.4–5.3)
POTASSIUM SERPL-SCNC: NORMAL MMOL/L (ref 3.4–5.3)
PROT SERPL-MCNC: 8 G/DL (ref 6.8–8.8)
PROT SERPL-MCNC: NORMAL G/DL (ref 6.8–8.8)
RBC # BLD AUTO: 4.44 10E12/L (ref 3.8–5.2)
SODIUM SERPL-SCNC: 136 MMOL/L (ref 133–144)
SODIUM SERPL-SCNC: NORMAL MMOL/L (ref 133–144)
WBC # BLD AUTO: 12.2 10E9/L (ref 4–11)

## 2019-10-17 PROCEDURE — 84703 CHORIONIC GONADOTROPIN ASSAY: CPT | Performed by: EMERGENCY MEDICINE

## 2019-10-17 PROCEDURE — 99207 ZZC CDG-MDM COMPONENT: MEETS MODERATE - UP CODED: CPT | Performed by: INTERNAL MEDICINE

## 2019-10-17 PROCEDURE — 83690 ASSAY OF LIPASE: CPT | Performed by: EMERGENCY MEDICINE

## 2019-10-17 PROCEDURE — 99223 1ST HOSP IP/OBS HIGH 75: CPT | Mod: AI | Performed by: INTERNAL MEDICINE

## 2019-10-17 PROCEDURE — 96374 THER/PROPH/DIAG INJ IV PUSH: CPT | Mod: 59

## 2019-10-17 PROCEDURE — 12000000 ZZH R&B MED SURG/OB

## 2019-10-17 PROCEDURE — 83605 ASSAY OF LACTIC ACID: CPT | Performed by: INTERNAL MEDICINE

## 2019-10-17 PROCEDURE — 96375 TX/PRO/DX INJ NEW DRUG ADDON: CPT

## 2019-10-17 PROCEDURE — 96376 TX/PRO/DX INJ SAME DRUG ADON: CPT

## 2019-10-17 PROCEDURE — 25000125 ZZHC RX 250: Performed by: EMERGENCY MEDICINE

## 2019-10-17 PROCEDURE — 96361 HYDRATE IV INFUSION ADD-ON: CPT

## 2019-10-17 PROCEDURE — 25000128 H RX IP 250 OP 636: Performed by: INTERNAL MEDICINE

## 2019-10-17 PROCEDURE — 25800030 ZZH RX IP 258 OP 636: Performed by: INTERNAL MEDICINE

## 2019-10-17 PROCEDURE — 25000128 H RX IP 250 OP 636: Performed by: EMERGENCY MEDICINE

## 2019-10-17 PROCEDURE — 85025 COMPLETE CBC W/AUTO DIFF WBC: CPT | Performed by: EMERGENCY MEDICINE

## 2019-10-17 PROCEDURE — 25800030 ZZH RX IP 258 OP 636: Performed by: EMERGENCY MEDICINE

## 2019-10-17 PROCEDURE — 99285 EMERGENCY DEPT VISIT HI MDM: CPT | Mod: 25

## 2019-10-17 PROCEDURE — 36415 COLL VENOUS BLD VENIPUNCTURE: CPT | Performed by: INTERNAL MEDICINE

## 2019-10-17 PROCEDURE — 74177 CT ABD & PELVIS W/CONTRAST: CPT

## 2019-10-17 PROCEDURE — 80053 COMPREHEN METABOLIC PANEL: CPT | Performed by: EMERGENCY MEDICINE

## 2019-10-17 RX ORDER — SODIUM CHLORIDE 9 MG/ML
1000 INJECTION, SOLUTION INTRAVENOUS CONTINUOUS
Status: DISCONTINUED | OUTPATIENT
Start: 2019-10-17 | End: 2019-10-17

## 2019-10-17 RX ORDER — LIDOCAINE 40 MG/G
CREAM TOPICAL
Status: DISCONTINUED | OUTPATIENT
Start: 2019-10-17 | End: 2019-10-20 | Stop reason: HOSPADM

## 2019-10-17 RX ORDER — PROCHLORPERAZINE MALEATE 10 MG
10 TABLET ORAL EVERY 6 HOURS PRN
Status: DISCONTINUED | OUTPATIENT
Start: 2019-10-17 | End: 2019-10-20 | Stop reason: HOSPADM

## 2019-10-17 RX ORDER — IOPAMIDOL 755 MG/ML
64 INJECTION, SOLUTION INTRAVASCULAR ONCE
Status: COMPLETED | OUTPATIENT
Start: 2019-10-17 | End: 2019-10-17

## 2019-10-17 RX ORDER — MORPHINE SULFATE 2 MG/ML
1 INJECTION, SOLUTION INTRAMUSCULAR; INTRAVENOUS
Status: DISCONTINUED | OUTPATIENT
Start: 2019-10-17 | End: 2019-10-20 | Stop reason: HOSPADM

## 2019-10-17 RX ORDER — PROCHLORPERAZINE 25 MG
25 SUPPOSITORY, RECTAL RECTAL EVERY 12 HOURS PRN
Status: DISCONTINUED | OUTPATIENT
Start: 2019-10-17 | End: 2019-10-20 | Stop reason: HOSPADM

## 2019-10-17 RX ORDER — SODIUM CHLORIDE 9 MG/ML
INJECTION, SOLUTION INTRAVENOUS CONTINUOUS
Status: DISCONTINUED | OUTPATIENT
Start: 2019-10-17 | End: 2019-10-18

## 2019-10-17 RX ORDER — NALOXONE HYDROCHLORIDE 0.4 MG/ML
.1-.4 INJECTION, SOLUTION INTRAMUSCULAR; INTRAVENOUS; SUBCUTANEOUS
Status: DISCONTINUED | OUTPATIENT
Start: 2019-10-17 | End: 2019-10-20 | Stop reason: HOSPADM

## 2019-10-17 RX ORDER — ONDANSETRON 2 MG/ML
4 INJECTION INTRAMUSCULAR; INTRAVENOUS EVERY 6 HOURS PRN
Status: DISCONTINUED | OUTPATIENT
Start: 2019-10-17 | End: 2019-10-20 | Stop reason: HOSPADM

## 2019-10-17 RX ORDER — ONDANSETRON 2 MG/ML
4 INJECTION INTRAMUSCULAR; INTRAVENOUS EVERY 30 MIN PRN
Status: COMPLETED | OUTPATIENT
Start: 2019-10-17 | End: 2019-10-17

## 2019-10-17 RX ORDER — ONDANSETRON 4 MG/1
4 TABLET, ORALLY DISINTEGRATING ORAL EVERY 6 HOURS PRN
Status: DISCONTINUED | OUTPATIENT
Start: 2019-10-17 | End: 2019-10-20 | Stop reason: HOSPADM

## 2019-10-17 RX ORDER — HYDROMORPHONE HYDROCHLORIDE 1 MG/ML
0.5 INJECTION, SOLUTION INTRAMUSCULAR; INTRAVENOUS; SUBCUTANEOUS
Status: DISCONTINUED | OUTPATIENT
Start: 2019-10-17 | End: 2019-10-17

## 2019-10-17 RX ORDER — SERTRALINE HYDROCHLORIDE 100 MG/1
100 TABLET, FILM COATED ORAL DAILY
Status: DISCONTINUED | OUTPATIENT
Start: 2019-10-18 | End: 2019-10-20 | Stop reason: HOSPADM

## 2019-10-17 RX ADMIN — ONDANSETRON 4 MG: 2 INJECTION INTRAMUSCULAR; INTRAVENOUS at 09:44

## 2019-10-17 RX ADMIN — ONDANSETRON 4 MG: 2 INJECTION INTRAMUSCULAR; INTRAVENOUS at 19:41

## 2019-10-17 RX ADMIN — SODIUM CHLORIDE 1000 ML: 9 INJECTION, SOLUTION INTRAVENOUS at 09:43

## 2019-10-17 RX ADMIN — SODIUM CHLORIDE: 9 INJECTION, SOLUTION INTRAVENOUS at 20:11

## 2019-10-17 RX ADMIN — SODIUM CHLORIDE 1000 ML: 9 INJECTION, SOLUTION INTRAVENOUS at 10:55

## 2019-10-17 RX ADMIN — PROCHLORPERAZINE EDISYLATE 10 MG: 5 INJECTION, SOLUTION INTRAMUSCULAR; INTRAVENOUS at 22:28

## 2019-10-17 RX ADMIN — IOPAMIDOL 64 ML: 755 INJECTION, SOLUTION INTRAVENOUS at 11:10

## 2019-10-17 RX ADMIN — MORPHINE SULFATE 1 MG: 2 INJECTION, SOLUTION INTRAMUSCULAR; INTRAVENOUS at 19:41

## 2019-10-17 RX ADMIN — MORPHINE SULFATE 1 MG: 2 INJECTION, SOLUTION INTRAMUSCULAR; INTRAVENOUS at 17:26

## 2019-10-17 RX ADMIN — ONDANSETRON 4 MG: 2 INJECTION INTRAMUSCULAR; INTRAVENOUS at 12:57

## 2019-10-17 RX ADMIN — ONDANSETRON 4 MG: 2 INJECTION INTRAMUSCULAR; INTRAVENOUS at 13:41

## 2019-10-17 RX ADMIN — HYDROMORPHONE HYDROCHLORIDE 0.5 MG: 1 INJECTION, SOLUTION INTRAMUSCULAR; INTRAVENOUS; SUBCUTANEOUS at 13:40

## 2019-10-17 RX ADMIN — MORPHINE SULFATE 1 MG: 2 INJECTION, SOLUTION INTRAMUSCULAR; INTRAVENOUS at 22:48

## 2019-10-17 RX ADMIN — SODIUM CHLORIDE: 9 INJECTION, SOLUTION INTRAVENOUS at 16:07

## 2019-10-17 RX ADMIN — SODIUM CHLORIDE 60 ML: 9 INJECTION, SOLUTION INTRAVENOUS at 11:10

## 2019-10-17 ASSESSMENT — ENCOUNTER SYMPTOMS
VOMITING: 0
DIARRHEA: 1
BLOOD IN STOOL: 0
DYSURIA: 0
ABDOMINAL PAIN: 1
FATIGUE: 1
APPETITE CHANGE: 1
SLEEP DISTURBANCE: 1
NAUSEA: 1

## 2019-10-17 ASSESSMENT — ACTIVITIES OF DAILY LIVING (ADL)
SWALLOWING: 2-->DIFFICULTY SWALLOWING LIQUIDS
DRESS: 1-->ASSISTIVE EQUIPMENT
COGNITION: 0 - NO COGNITION ISSUES REPORTED
ADLS_ACUITY_SCORE: 17
ADLS_ACUITY_SCORE: 17
RETIRED_EATING: 1-->ASSISTIVE EQUIPMENT
BATHING: 1-->ASSISTIVE EQUIPMENT
FALL_HISTORY_WITHIN_LAST_SIX_MONTHS: NO
AMBULATION: 0-->INDEPENDENT
RETIRED_COMMUNICATION: 2-->DIFFICULTY UNDERSTANDING (NOT RELATED TO LANGUAGE BARRIER)
TRANSFERRING: 0-->INDEPENDENT
TOILETING: 1-->ASSISTIVE EQUIPMENT

## 2019-10-17 ASSESSMENT — MIFFLIN-ST. JEOR: SCORE: 1224.32

## 2019-10-17 NOTE — PROGRESS NOTES
RECEIVING UNIT ED HANDOFF REVIEW    ED Nurse Handoff Report was reviewed by: Camila Arshad RN on October 17, 2019 at 2:09 PM

## 2019-10-17 NOTE — CONSULTS
[facility]  Colon and Rectal Surgery Consult Note  Name: Abmer Buckner    MRN: [id number]  YOB: 1985    Age: 34 year old  Date of admission: 10/17/2019  Primary care provider: Cinthia Charles     Requesting Physician:  Dr Bird  Reason for consult:  SBO in patient with UC s/p colectomy. Dr Zuniga patient           History of Present Illness:   Amber Buckner is a 34 year old female with PMHx Ulcerative colitis and depression, seen at the request of Dr Bird, who presented to [facility] ED with abdominal pain and nausea since Tuesday morning 10/15. She is a known patient to CRS s/p total proctocolectomy with loop ileostomy and subsequent ileostomy reversal in 2001 with Dr. Zuniga. She has been feeling well over the past year. Her symptoms began early Tuesday morning with cramping abdominal pain. She believes it may have been sausage with casing or vegetable soup that triggered her pain. She took tylenol with some relief but any further food or liquids caused an increase in her pain. She also endorses decreased bowel movements and flatus over the past few days. She usually passes stools 8-12 times per day that are loose. Her last BM and flatus was yesterday, minimal. The patient spoke with another CRSAL PA on the phone this morning at which point we recommended she present to the hospital for further evaluation and treatment.     Upon arrival to the ED, labs were unremarkable aside for WBC=12.2. CT showing mildly distended small bowel loops measuring 3.6cm in diameter. Transition to non-dilated small bowel loops at anastomosis.     Currently, the patient says her pain has nearly resolved, rating it 2/10 without medication. Her nausea has improved with administration of Zofran.     Her last SBO was in 8/2018. She has had 4 SBOs since her surgery in 2001.        Colonoscopy History:  Flex/sig 2016 with Dr Malagon- Ulcerative pancolitis with. Superficial ulcerations of the pouch,  pouchitis vs. Crohn's. No progression of ulcers from prior scope.     Past abdominal surgery: TPC with ileostomy , ileostomy reversal ,  x 3            Past Medical History:     Past Medical History:   Diagnosis Date     Adolescent depression in college     Anxiety      Fit/adjust GI carole-device NEC      Perineal abscess      PONV (postoperative nausea and vomiting)      S/P total colectomy      Ulcerative colitis              Past Surgical History:     Past Surgical History:   Procedure Laterality Date      SECTION  2012    Procedure: SECTION; Surgeon:JAVI BELL; Location:[facility] L+[facility]      SECTION N/A 2014    Procedure:  SECTION;  Surgeon: Shawanda Luna MD;  Location: [facility] L+D      SECTION N/A 2017    Procedure:  SECTION;  Repeat  Section ;  Surgeon: Laurie Montoya MD;  Location: [facility] L[facility]     COLECTOMY SUBTOTAL       COLONOSCOPY N/A 4/15/2015    Procedure: COLONOSCOPY;  Surgeon: Al Santo MD;  Location: [facility] OR     EXAM UNDER ANESTHESIA ANUS N/A 4/15/2015    Procedure: EXAM UNDER ANESTHESIA ANUS;  Surgeon: Al Santo MD;  Location: [facility] OR     FISTULOTOMY RECTUM N/A 4/15/2015    Procedure: FISTULOTOMY RECTUM;  Surgeon: Al Santo MD;  Location: [facility] OR     TAKEDOWN ILEOSTOMY       tonsilectomy       TRANSPERINEAL REPAIR FISTULA RECTAL URETHRAL                 Social History:     Social History     Tobacco Use     Smoking status: Never Smoker     Smokeless tobacco: Never Used   Substance Use Topics     Alcohol use: No     Alcohol/week: 0.0 standard drinks     Comment: not since pregnancy             Family History:     Family History   Problem Relation Age of Onset     Diabetes Maternal Grandmother      Diabetes Paternal Grandfather      Genetic Disorder Father         chron's     Gastrointestinal Disease Sister         ulcerative colitis             Allergies:   No Known Allergies           "Medications:             Review of Systems:   A comprehensive greater than 10 system review of systems was carried out.  Pertinent positives and negatives are noted above.  Otherwise negative for contributory info.            Physical Exam:     Blood pressure 100/63, pulse 104, temperature 97.7  F (36.5  C), temperature source Oral, resp. rate 16, height 1.575 m (5' 2\"), weight 57.6 kg (127 lb), SpO2 100 %, not currently breastfeeding.    Intake/Output Summary (Last 24 hours) at 10/17/2019 1245  Last data filed at 10/17/2019 1052  Gross per 24 hour   Intake 1000 ml   Output --   Net 1000 ml     Exam:  General - Awake alert and oriented, appears stated age. Fatigued, pale.  Pulm - Non-labored breathing with normal respiratory effort  CVS - reg rate and rhythm, no peripheral edema  Abd - Prior surgical scars noted in midline and RLQ. Soft, appropriately tender, mildly distended.  No guarding, rigidity or peritoneal signs.   Neuro - CN II-XII grossly intact  Musculoskeletal - extremities with no clubbing, cyanosis or edema; able to ambulate  Psych - responsive, alert, cooperative; oriented x3; appropriate mood and affect  External/skin - inspection reveals no rashes, lesions or ulcers, normal coloring             Data Reviewed:     Recent Results (from the past 24 hour(s))   CT Abdomen Pelvis w Contrast    Narrative    CT ABDOMEN AND PELVIS WITH CONTRAST   10/17/2019 11:18 AM     HISTORY: History of colectomy, bowel obstructions, worsening pain,  nausea.    TECHNIQUE:  CT abdomen and pelvis with 64 mL Isovue-370 IV. Radiation  dose for this scan was reduced using automated exposure control,  adjustment of the mA and/or kV according to patient size, or iterative  reconstruction technique.    COMPARISON: None available    FINDINGS:  Visualized lung bases are unremarkable.    No focal hepatic lesion is seen. No intrahepatic or extra hepatic  biliary ductal dilatation is present. Liver is enlarged measuring 19.8  cm in " length. Gallbladder is unremarkable.    The spleen, adrenal glands and pancreas are unremarkable. Kidneys  enhance symmetrically. No hydronephrosis is seen. Tiny, 1-2 mm  calculus seen in the lower poles of both kidneys.    Stomach is moderately distended with ingested contents and air.  Postsurgical changes seen involving the rectosigmoid and sigmoid  colon. Small bowel anastomosis seen within the right lower quadrant.  Mildly distended bowel loop is noted within the lower abdomen  measuring up to 3.6 cm in diameter (series 4, image 12). Transition to  nondilated bowel appears to be at the small bowel anastomosis (series  2, image 50).    Abdominal aorta and IVC are of normal course and caliber. No  retroperitoneal, mesenteric or pelvic lymphadenopathy seen.    Uterus is enlarged with intrauterine device seen in the endometrial  canal. Enlarged, prominent periuterine and paraovarian vessels are  present. Left ovarian vein is dilated measuring up to 1.0 cm. Urinary  bladder is moderately distended.    No suspicious osseous lesions seen.      Impression    IMPRESSION:  1.  Suspected small bowel obstruction with dilated loops seen in the  lower abdomen. Transition to nondilated bowel appears to be seen near  the small bowel anastomosis within the right lower quadrant.  2.  Multiple dilated periuterine and paraovarian vessels with dilated  left ovarian vein. Findings can be seen in pelvic congestion syndrome.  3.  Nonspecific hepatomegaly.    GINNA ALLISON MD       Recent Labs   Lab 10/17/19  0939   WBC 12.2*   HGB 15.1   HCT 45.4   *        Recent Labs   Lab 10/17/19  1003 10/17/19  0939    Canceled, Test credited, specimen discarded   POTASSIUM 4.2 Canceled, Test credited, specimen discarded   CHLORIDE 108 Canceled, Test credited, specimen discarded   CO2 17* Canceled, Test credited, specimen discarded   ANIONGAP 11 Canceled, Test credited, specimen discarded   GLC 60* Canceled, Test credited,  specimen discarded   BUN 18 Canceled, Test credited, specimen discarded   CR 0.55 Canceled, Test credited, specimen discarded   GFRESTIMATED >90 Canceled, Test credited, specimen discarded   GFRESTBLACK >90 Canceled, Test credited, specimen discarded   JUDSON 8.3* Canceled, Test credited, specimen discarded     No results for input(s): LACT in the last 168 hours.  No results for input(s): COLOR, APPEARANCE, URINEGLC, URINEBILI, URINEKETONE, SG, UBLD, URINEPH, PROTEIN, UROBILINOGEN, NITRITE, LEUKEST, RBCU, WBCU in the last 168 hours.      Assessment and Plan:   Amber Buckner is a 34 year old female with PMHx ulcerative colitis s/p TPC with ileostomy and subsequent ileostomy reversal in 2001 with Dr Zuniga. She presented with abdominal pain, decreased PO intake and nausea since Tuesday 10/15. CT scan obtained showing mildly distended small bowel loops at the small bowel anastomosis. Currently, AVSS, pain and nausea controlled. No urgent indication for surgery. Recommend NPO for bowel rest, IV fluids, anti-emetics and pain medication. AROBF.    Plan:  1. Admit to hospitalist  2. Surgery: No indication for urgent surgery at this time. Recommend bowel rest. If develops further nausea/vomiting or clinically worsens, may need NG tube placed.  3. Diet: NPO  4. IV Fluids: per hospitalist  5. Antibiotics:  No indication from CRS  6. Medications:  No change from CRS  7. I&O s:  strict I&O s   8. Labs:   - Reviewed: Yes  - Ordered: None   9. Imaging:   - Dr. Link and myself have personally viewed: CT abd/pelvis  - Ordered:  None  10. Activity:  OOB, ambulate as able  11. DVT prophylaxis: SCD s  12. This plan has been discussed with Dr. Link    Patient specific identified risk factors considered as part of today s evaluation include: Prior abdominal surgery     Additional history obtained from ED notes.  Time spent on consultation: 30 minutes, greater than 50% spent on counseling and/or coordination of care.          Taryn  SOFI Jenkins  [facility]  [phone]

## 2019-10-17 NOTE — PHARMACY-ADMISSION MEDICATION HISTORY
Admission medication history interview status for the 10/17/2019  admission is complete. See EPIC admission navigator for prior to admission medications     Medication history source reliability:Good    Actions taken by pharmacist (provider contacted, etc): interviewed pt     Additional medication history information not noted on PTA med list :None    Medication reconciliation/reorder completed by provider prior to medication history? No    Time spent in this activity: 5 minutes    Prior to Admission medications    Medication Sig Last Dose Taking? Auth Provider   levonorgestrel (MIRENA) 20 MCG/24HR IUD 1 each by Intrauterine route once 2017 Yes Unknown, Entered By History   sertraline (ZOLOFT) 100 MG tablet Take 1 tablet (100 mg) by mouth daily 10/17/2019 at am Yes Cinthia Charles MD

## 2019-10-17 NOTE — ED NOTES
"[facility]  ED Nurse Handoff Report    ED Chief complaint: Abdominal Pain      ED Diagnosis:   Final diagnoses:   Small bowel obstruction (H)       Code Status: Full Code    Allergies: No Known Allergies    Activity level - Baseline/Home:  Independent  Activity Level - Current:   Independent    Patient's Preferred language: english   Needed?: No    Isolation: No  Infection: Not Applicable  Bariatric?: No    Vital Signs:   Vitals:    10/17/19 0921 10/17/19 1055   BP: 96/59 100/63   Pulse: 104    Resp: 18 16   Temp: 97.7  F (36.5  C)    TempSrc: Oral    SpO2: 98% 100%   Weight: 57.6 kg (127 lb)    Height: 1.575 m (5' 2\")        Cardiac Rhythm: ,        Pain level: 0-10 Pain Scale: 2    Is this patient confused?: No   Does this patient have a guardian?  No         If yes, is there guardianship documents in the Epic \"Code/ACP\" activity?  N/A         Guardian Notified?  N/A  [place] - Suicide Severity Rating Scale Completed?  Yes  If yes, what color did the patient score?  White    Patient Report: Initial Complaint: pt is a colorectal pt with hx of multiple surgeries including colectomy and ileostomy takedown. C/o abd pain since Monday with poor po intake and nausea that started at 3am today. Pt has hx of bowel obstruction and feels she has another one .    Focused Assessment: AOX4. Uncomfortable due to nausea and mid abd pain. BPs run low, but VSS. Denies any need for pain meds  Tests Performed: labs, ct abd  Abnormal Results: ^ wbc, ct shows probable obstruction  Treatments provided: 2L NS, zofran     Family Comments:  is at home with their 3 children    OBS brochure/video discussed/provided to patient/family: N/A              Name of person given brochure if not patient: na              Relationship to patient: na    ED Medications:   Medications   0.9% sodium chloride BOLUS (0 mLs Intravenous Stopped 10/17/19 1052)     Followed by   sodium chloride 0.9% infusion (1,000 mLs " Intravenous New Bag 10/17/19 1055)   ondansetron (ZOFRAN) injection 4 mg (4 mg Intravenous Given 10/17/19 0916)   HYDROmorphone (PF) (DILAUDID) injection 0.5 mg (has no administration in time range)   iopamidol (ISOVUE-370) solution 64 mL (64 mLs Intravenous Given 10/17/19 1110)   Saline Flush (60 mLs Intravenous Given 10/17/19 1110)       Drips infusing?:  No    For the majority of the shift this patient was Green.   Interventions performed were none.    Severe Sepsis OR Septic Shock Diagnosis Present: No    To be done/followed up on inpatient unit:  continued care    ED NURSE PHONE NUMBER: [phone]

## 2019-10-17 NOTE — ED PROVIDER NOTES
History     Chief Complaint:  Abdominal Pain     HPI   Amber Buckner is a 34 year old female with a history of ulcerative colitis, bowel obstruction, polycystic ovaries, s/p colectomy, ileostomy taken down, who presents with abdominal pain. The patient reports that 3 days ago she started to develop mid abdominal pain that improved after some time and the next day she just felt fatigued with mild pain. Since onset, the pain has returned intermittently and she has had a decreased appetite as well as diarrhea. The patient states that early this morning, 7 hours ago, she was awoken with nausea and the pain that prevented her from going back to sleep therefore prompting her presentation. She denies vomiting, blood in stool, dysuria, or other urinary complaints. Lastly, she notes that she had her complete colectomy through Dr. Mitchell, [facility] while she was in [facility].     Allergies:  No known drug allergies.       Medications:    Vistaril   Mirena  Inderal   Zoloft     Past Medical History:    Depression  Anxiety  Perineal abscess  PONV  Ulcerative colitis   Polycystic ovaries  Acne   angioma   Bowel obstruction     Past Surgical History:    c section x3  Colectomy subtotal  Colonoscopy  Exam under anesthesia anus  Fistulotomy rectum  Takedown ileostomy   Tonsillectomy   Transperineal repair fistula rectal urethral     Family History:    Maternal grandmother: diabetes  Paternal grandfather: diabetes  Father: Crohn's Disease  Sister: ulcerative colitis     Social History:  The patient was unaccompanied to the ED.  Smoking Status: Never Smoker  Smokeless Tobacco: Never Used  Alcohol Use: Negative   Drug Use: Negative  PCP: Cinthia Charles   Marital Status:        Review of Systems   Constitutional: Positive for appetite change and fatigue.   Gastrointestinal: Positive for abdominal pain, diarrhea and nausea. Negative for blood in stool and vomiting.   Genitourinary: Negative for dysuria.  "  Psychiatric/Behavioral: Positive for sleep disturbance.   All other systems reviewed and are negative.    Physical Exam     Patient Vitals for the past 24 hrs:   BP Temp Temp src Pulse Heart Rate Resp SpO2 Height Weight   10/17/19 1055 100/63 -- -- -- 94 16 100 % -- --   10/17/19 0921 96/59 97.7  F (36.5  C) Oral 104 -- 18 98 % 1.575 m (5' 2\") 57.6 kg (127 lb)      Physical Exam    VS: Reviewed per above  HENT: Mucous membranes moist  EYES: sclera anicteric  CV: Rate as noted, regular rhythm.   RESP: Effort normal. Breath sounds are normal bilaterally.  GI: general tenderness without rebound/guarding, not distended.  NEURO: Alert, moving all extremities  MSK: No deformity of the extremities  SKIN: Warm and dry    Emergency Department Course     Imaging:  Radiology findings were communicated with the patient who voiced understanding of the findings.    CT Abdomen Pelvis w Contrast  1.  Suspected small bowel obstruction with dilated loops seen in the  lower abdomen. Transition to nondilated bowel appears to be seen near  the small bowel anastomosis within the right lower quadrant.  2.  Multiple dilated periuterine and paraovarian vessels with dilated  left ovarian vein. Findings can be seen in pelvic congestion syndrome.  3.  Nonspecific hepatomegaly.  GINNA ALLISON MD  Reading per radiology      Laboratory:  Laboratory findings were communicated with the patient who voiced understanding of the findings.    CBC: WBC 12.2 (H), HGB 15.1,   CMP: Carbon Dioxide 17 (L), Glucose 60 (L), Calcium 8.3 (L), o/w WNL (Creatinine 0.55)    Lipase: 79    HCG Qualitative: Negative      Interventions:  0943 NS 1000 ml IV  0944 Zofran 4 mg IV  1055 NS 1000 mL IV      Emergency Department Course:    0925 Nursing notes and vitals reviewed. I performed an exam of the patient as documented above.     1003 IV was inserted and blood was drawn for laboratory testing, results above.     1105 The patient was sent for a CT while in the " emergency department, results above.      1140 Patient rechecked and updated.      1155 I spoke with Dr. Taryn Jenkins of the colorectal service from [facility] regarding patient's presentation, findings, and plan of care.     1221 I spoke with Dr. Bird of the hospitalist service from [facility] regarding patient's presentation, findings, and plan of care.     Prior to admission, I personally reviewed the results with the patient and all related questions were answered. The patient verbalized understanding and is amenable to plan.     Impression & Plan      Medical Decision Making:  Amber Buckner is a 34 year old female who presents to the emergency department today for evaluation of nausea, vomiting, abdominal pain.  Exam with generalized abdominal tenderness without peritoneal signs.  Based on history of recurrent bowel obstruction, I was worried for this process and did obtain CT of the abdomen.  Unfortunately this did reveal evidence of a small bowel obstruction.  Pregnancy testing is negative, other labs notable for CO2 of 17, white count of 12.2.  Discussed with colorectal surgery who will see the patient in the hospital.  Patient admitted to the hospital service for further management of bowel obstruction.  After multiple doses of IV antiemetics, patient did consent to NG tube which was placed here in the ER at the end of my shift.  Patient was awaiting inpatient bed upon signout to my colleague Dr. Glover.    Diagnosis:    ICD-10-CM    1. Small bowel obstruction (H) K56.609      Disposition:   The patient is admitted into the care of Dr. Bird.     Scribe Disclosure:  I, Orla Severson, am serving as a scribe at 10:56 AM on 10/17/2019 to document services personally performed by Soto Hamilton MD based on my observations and the provider's statements to me.     EMERGENCY DEPARTMENT       Soto Hamilton MD  10/17/19 [id number]

## 2019-10-17 NOTE — H&P
[facility]    History and Physical  Hospitalist       Date of Admission:  10/17/2019  Date of Service (when I saw the patient): 10/17/19    Assessment & Plan   Amber Buckner is a 34 year old female with a history of ulcerative colitis status post colectomy with ileostomy takedown now presenting with abdominal pain.  CT abdomen consistent with with mild small bowel obstruction.      Summary:    Active Problems:      Small bowel obstruction:  Due to prior surgery, mild.      Nasogastric tube to lower intermittent suction, patient would like trial without.    IV fluids    Follow lytes    Pain medications iv      Colorectal surgical consult, normally follows    Zofran      history ulcerative colitis:  Colectomy       DVT Prophylaxis: Pneumatic Compression Devices  Code Status: Full Code    Disposition Plan   Expected discharge in 2 days to prior living arrangement once sbo resolves and tolerating diet.     Entered: Aaron Bird 10/17/2019, 5:56 PM           Aaron Bird MD    Primary Care Physician   Cinthia Charles    Chief Complaint   34 year old woman with history of ulcerative colitis with colectomy presenting with abdominal pain, nausea and vomiting.     History is obtained from the patient, electronic health record and emergency department physician    History of Present Illness   Amber Buckner is a 34 year old female with a history of ulcerative colitis status post colectomy with ileostomy takedown now presenting with abdominal pain.  Patient has a history of small bowel obstructions in the past and this is similar to that.  This started 2 to 3 days ago with some nausea and vomiting.  She switched to liquid diet but still has not been able to keep much down at all.  She denies any hematemesis or black or bloody stools.  [facility].  She has some abdominal discomfort as well but denies any fevers, sweats, chills.  No shortness of breath or chest pain.   No dysuria.    Past Medical History    I have reviewed this patient's medical history and updated it with pertinent information if needed.   Past Medical History:   Diagnosis Date     Adolescent depression in college     Anxiety      Fit/adjust GI carole-device NEC      Perineal abscess      PONV (postoperative nausea and vomiting)      S/P total colectomy      Ulcerative colitis        Past Surgical History   I have reviewed this patient's surgical history and updated it with pertinent information if needed.  Past Surgical History:   Procedure Laterality Date      SECTION  2012    Procedure: SECTION; Surgeon:JAVI BELL; Location:[facility] L+D      SECTION N/A 2014    Procedure:  SECTION;  Surgeon: Shawanda Luna MD;  Location: [facility] L+D      SECTION N/A 2017    Procedure:  SECTION;  Repeat  Section ;  Surgeon: Laurie Montoya MD;  Location: [facility] L[facility]D     COLECTOMY SUBTOTAL       COLONOSCOPY N/A 4/15/2015    Procedure: COLONOSCOPY;  Surgeon: Al Santo MD;  Location: [facility] OR     EXAM UNDER ANESTHESIA ANUS N/A 4/15/2015    Procedure: EXAM UNDER ANESTHESIA ANUS;  Surgeon: Al Santo MD;  Location: [facility] OR     FISTULOTOMY RECTUM N/A 4/15/2015    Procedure: FISTULOTOMY RECTUM;  Surgeon: Al Santo MD;  Location: [facility] OR     TAKEDOWN ILEOSTOMY       tonsilectomy       TRANSPERINEAL REPAIR FISTULA RECTAL URETHRAL         Prior to Admission Medications   Prior to Admission Medications   Prescriptions Last Dose Informant Patient Reported? Taking?   levonorgestrel (MIRENA) 20 MCG/24HR IUD   Yes Yes   Si each by Intrauterine route once   sertraline (ZOLOFT) 100 MG tablet 10/17/2019 at am  No Yes   Sig: Take 1 tablet (100 mg) by mouth daily      Facility-Administered Medications: None     Allergies   No Known Allergies    Social History   I have reviewed this patient's social history and updated it with pertinent  information if needed. Amber Buckner  reports that she has never smoked. She has never used smokeless tobacco. She reports that she does not drink alcohol or use drugs.    Family History   I have reviewed this patient's family history and updated it with pertinent information if needed.   Family History   Problem Relation Age of Onset     Diabetes Maternal Grandmother      Diabetes Paternal Grandfather      Genetic Disorder Father         chron's     Gastrointestinal Disease Sister         ulcerative colitis       Review of Systems   The 10 point Review of Systems is negative other than noted in the HPI or here.     Physical Exam   Temp: 96  F (35.6  C) Temp src: Oral BP: 98/55 Pulse: 117 Heart Rate: 108 Resp: 16 SpO2: 97 % O2 Device: None (Room air)    Vital Signs with Ranges  Temp:  [96  F (35.6  C)-97.7  F (36.5  C)] 96  F (35.6  C)  Pulse:  [104-117] 117  Heart Rate:  [] 108  Resp:  [16-18] 16  BP: ()/(55-65) 98/55  SpO2:  [96 %-100 %] 97 %  127 lbs 0 oz    Constitutional: Awake, alert, cooperative, no apparent distress, looks fatigued  HEENT: tacky mucous membranes, throat normal, Conjunctiva and pupils normal.  Respiratory: Clear to auscultation bilaterally, no crackles or wheezing.  Cardiovascular: Regular rate and rhythm, normal S1 and S2, and no murmur noted.  GI: Soft, non-distended, mildly tender mid abd, normal bowel sounds, no hepatosplenomegaly   Skin: No rashes, no cyanosis, no edema.  Musculoskeletal: No joint swelling, erythema or tenderness.  Neurologic: Alert, oriented to person, place and time,  Psychiatric:  no obvious anxiety or depression. Normal affect    Data   Data reviewed today:  I personally reviewed the chest CT image(s) showing Dilated loops of small bowel seen in the lower abdomen with transition near the anastomosis in the right lower quadrant..  Recent Labs   Lab 10/17/19  1003 10/17/19  0939   WBC  --  12.2*   HGB  --  15.1   MCV  --  102*   PLT  --  286     Canceled, Test credited, specimen discarded   POTASSIUM 4.2 Canceled, Test credited, specimen discarded   CHLORIDE 108 Canceled, Test credited, specimen discarded   CO2 17* Canceled, Test credited, specimen discarded   BUN 18 Canceled, Test credited, specimen discarded   CR 0.55 Canceled, Test credited, specimen discarded   ANIONGAP 11 Canceled, Test credited, specimen discarded   JUDSON 8.3* Canceled, Test credited, specimen discarded   GLC 60* Canceled, Test credited, specimen discarded   ALBUMIN 4.1 Canceled, Test credited, specimen discarded   PROTTOTAL 8.0 Canceled, Test credited, specimen discarded   BILITOTAL 0.5 Canceled, Test credited, specimen discarded   ALKPHOS 56 Canceled, Test credited, specimen discarded   ALT 11 Canceled, Test credited, specimen discarded   AST 6 Canceled, Test credited, specimen discarded   LIPASE 79 Canceled, Test credited, specimen discarded       Imaging:  Recent Results (from the past 24 hour(s))   CT Abdomen Pelvis w Contrast    Narrative    CT ABDOMEN AND PELVIS WITH CONTRAST   10/17/2019 11:18 AM     HISTORY: History of colectomy, bowel obstructions, worsening pain,  nausea.    TECHNIQUE:  CT abdomen and pelvis with 64 mL Isovue-370 IV. Radiation  dose for this scan was reduced using automated exposure control,  adjustment of the mA and/or kV according to patient size, or iterative  reconstruction technique.    COMPARISON: None available    FINDINGS:  Visualized lung bases are unremarkable.    No focal hepatic lesion is seen. No intrahepatic or extra hepatic  biliary ductal dilatation is present. Liver is enlarged measuring 19.8  cm in length. Gallbladder is unremarkable.    The spleen, adrenal glands and pancreas are unremarkable. Kidneys  enhance symmetrically. No hydronephrosis is seen. Tiny, 1-2 mm  calculus seen in the lower poles of both kidneys.    Stomach is moderately distended with ingested contents and air.  Postsurgical changes seen involving the rectosigmoid and  sigmoid  colon. Small bowel anastomosis seen within the right lower quadrant.  Mildly distended bowel loop is noted within the lower abdomen  measuring up to 3.6 cm in diameter (series 4, image 12). Transition to  nondilated bowel appears to be at the small bowel anastomosis (series  2, image 50).    Abdominal aorta and IVC are of normal course and caliber. No  retroperitoneal, mesenteric or pelvic lymphadenopathy seen.    Uterus is enlarged with intrauterine device seen in the endometrial  canal. Enlarged, prominent periuterine and paraovarian vessels are  present. Left ovarian vein is dilated measuring up to 1.0 cm. Urinary  bladder is moderately distended.    No suspicious osseous lesions seen.      Impression    IMPRESSION:  1.  Suspected small bowel obstruction with dilated loops seen in the  lower abdomen. Transition to nondilated bowel appears to be seen near  the small bowel anastomosis within the right lower quadrant.  2.  Multiple dilated periuterine and paraovarian vessels with dilated  left ovarian vein. Findings can be seen in pelvic congestion syndrome.  3.  Nonspecific hepatomegaly.    GINNA ALLISON MD

## 2019-10-18 LAB
ALBUMIN SERPL-MCNC: 3.2 G/DL (ref 3.4–5)
ALBUMIN SERPL-MCNC: NORMAL G/DL (ref 3.4–5)
ALBUMIN UR-MCNC: 30 MG/DL
ALP SERPL-CCNC: 49 U/L (ref 40–150)
ALP SERPL-CCNC: NORMAL U/L (ref 40–150)
ALT SERPL W P-5'-P-CCNC: 10 U/L (ref 0–50)
ALT SERPL W P-5'-P-CCNC: NORMAL U/L (ref 0–50)
ANION GAP SERPL CALCULATED.3IONS-SCNC: 11 MMOL/L (ref 3–14)
ANION GAP SERPL CALCULATED.3IONS-SCNC: 12 MMOL/L (ref 3–14)
ANION GAP SERPL CALCULATED.3IONS-SCNC: NORMAL MMOL/L (ref 6–17)
APPEARANCE UR: CLEAR
AST SERPL W P-5'-P-CCNC: 7 U/L (ref 0–45)
AST SERPL W P-5'-P-CCNC: NORMAL U/L (ref 0–45)
BASE DEFICIT BLDV-SCNC: 17.4 MMOL/L
BASE DEFICIT BLDV-SCNC: 18.1 MMOL/L
BASOPHILS # BLD AUTO: 0 10E9/L (ref 0–0.2)
BASOPHILS NFR BLD AUTO: 0.1 %
BILIRUB DIRECT SERPL-MCNC: <0.1 MG/DL (ref 0–0.2)
BILIRUB DIRECT SERPL-MCNC: NORMAL MG/DL (ref 0–0.2)
BILIRUB SERPL-MCNC: 0.4 MG/DL (ref 0.2–1.3)
BILIRUB SERPL-MCNC: NORMAL MG/DL (ref 0.2–1.3)
BILIRUB UR QL STRIP: NEGATIVE
BUN SERPL-MCNC: 4 MG/DL (ref 7–30)
BUN SERPL-MCNC: 8 MG/DL (ref 7–30)
BUN SERPL-MCNC: NORMAL MG/DL (ref 7–30)
CALCIUM SERPL-MCNC: 7.7 MG/DL (ref 8.5–10.1)
CALCIUM SERPL-MCNC: 7.9 MG/DL (ref 8.5–10.1)
CALCIUM SERPL-MCNC: NORMAL MG/DL (ref 8.5–10.1)
CHLORIDE SERPL-SCNC: 114 MMOL/L (ref 94–109)
CHLORIDE SERPL-SCNC: 122 MMOL/L (ref 94–109)
CHLORIDE SERPL-SCNC: NORMAL MMOL/L (ref 94–109)
CO2 SERPL-SCNC: 12 MMOL/L (ref 20–32)
CO2 SERPL-SCNC: 16 MMOL/L (ref 20–32)
CO2 SERPL-SCNC: NORMAL MMOL/L (ref 20–32)
COLOR UR AUTO: ABNORMAL
CREAT SERPL-MCNC: 0.5 MG/DL (ref 0.52–1.04)
CREAT SERPL-MCNC: 0.67 MG/DL (ref 0.52–1.04)
CREAT SERPL-MCNC: NORMAL MG/DL (ref 0.52–1.04)
DIFFERENTIAL METHOD BLD: ABNORMAL
EOSINOPHIL # BLD AUTO: 0 10E9/L (ref 0–0.7)
EOSINOPHIL NFR BLD AUTO: 0 %
ERYTHROCYTE [DISTWIDTH] IN BLOOD BY AUTOMATED COUNT: 13.4 % (ref 10–15)
FLUAV+FLUBV RNA SPEC QL NAA+PROBE: NEGATIVE
FLUAV+FLUBV RNA SPEC QL NAA+PROBE: NEGATIVE
GFR SERPL CREATININE-BSD FRML MDRD: >90 ML/MIN/{1.73_M2}
GFR SERPL CREATININE-BSD FRML MDRD: >90 ML/MIN/{1.73_M2}
GFR SERPL CREATININE-BSD FRML MDRD: NORMAL ML/MIN/{1.73_M2}
GLUCOSE SERPL-MCNC: 115 MG/DL (ref 70–99)
GLUCOSE SERPL-MCNC: 72 MG/DL (ref 70–99)
GLUCOSE SERPL-MCNC: NORMAL MG/DL (ref 70–99)
GLUCOSE UR STRIP-MCNC: NEGATIVE MG/DL
HCO3 BLDV-SCNC: 11 MMOL/L (ref 21–28)
HCO3 BLDV-SCNC: 9 MMOL/L (ref 21–28)
HCT VFR BLD AUTO: 40.6 % (ref 35–47)
HGB BLD-MCNC: 13.1 G/DL (ref 11.7–15.7)
HGB UR QL STRIP: ABNORMAL
HYALINE CASTS #/AREA URNS LPF: 5 /LPF (ref 0–2)
IMM GRANULOCYTES # BLD: 0.1 10E9/L (ref 0–0.4)
IMM GRANULOCYTES NFR BLD: 0.7 %
KETONES UR STRIP-MCNC: >150 MG/DL
LACTATE BLD-SCNC: 0.7 MMOL/L (ref 0.7–2)
LEUKOCYTE ESTERASE UR QL STRIP: ABNORMAL
LYMPHOCYTES # BLD AUTO: 1.2 10E9/L (ref 0.8–5.3)
LYMPHOCYTES NFR BLD AUTO: 12.5 %
MCH RBC QN AUTO: 33.9 PG (ref 26.5–33)
MCHC RBC AUTO-ENTMCNC: 32.3 G/DL (ref 31.5–36.5)
MCV RBC AUTO: 105 FL (ref 78–100)
MONOCYTES # BLD AUTO: 0.9 10E9/L (ref 0–1.3)
MONOCYTES NFR BLD AUTO: 9.3 %
MUCOUS THREADS #/AREA URNS LPF: PRESENT /LPF
NEUTROPHILS # BLD AUTO: 7.3 10E9/L (ref 1.6–8.3)
NEUTROPHILS NFR BLD AUTO: 77.4 %
NITRATE UR QL: NEGATIVE
NRBC # BLD AUTO: 0 10*3/UL
NRBC BLD AUTO-RTO: 0 /100
O2/TOTAL GAS SETTING VFR VENT: 97 %
O2/TOTAL GAS SETTING VFR VENT: ABNORMAL %
OXYHGB MFR BLDV: 76 %
OXYHGB MFR BLDV: 81 %
PCO2 BLDV: 27 MM HG (ref 40–50)
PCO2 BLDV: 33 MM HG (ref 40–50)
PH BLDV: 7.12 PH (ref 7.32–7.43)
PH BLDV: 7.14 PH (ref 7.32–7.43)
PH UR STRIP: 5 PH (ref 5–7)
PLATELET # BLD AUTO: 257 10E9/L (ref 150–450)
PO2 BLDV: 38 MM HG (ref 25–47)
PO2 BLDV: 43 MM HG (ref 25–47)
POTASSIUM SERPL-SCNC: 3.2 MMOL/L (ref 3.4–5.3)
POTASSIUM SERPL-SCNC: 3.9 MMOL/L (ref 3.4–5.3)
POTASSIUM SERPL-SCNC: NORMAL MMOL/L (ref 3.4–5.3)
PROT SERPL-MCNC: 6.7 G/DL (ref 6.8–8.8)
PROT SERPL-MCNC: NORMAL G/DL (ref 6.8–8.8)
RBC # BLD AUTO: 3.87 10E12/L (ref 3.8–5.2)
RBC #/AREA URNS AUTO: 1 /HPF (ref 0–2)
RSV RNA SPEC NAA+PROBE: NEGATIVE
SODIUM SERPL-SCNC: 141 MMOL/L (ref 133–144)
SODIUM SERPL-SCNC: 146 MMOL/L (ref 133–144)
SODIUM SERPL-SCNC: NORMAL MMOL/L (ref 133–144)
SOURCE: ABNORMAL
SP GR UR STRIP: 1.01 (ref 1–1.03)
SPECIMEN SOURCE: NORMAL
SQUAMOUS #/AREA URNS AUTO: 1 /HPF (ref 0–1)
UROBILINOGEN UR STRIP-MCNC: NORMAL MG/DL (ref 0–2)
WBC # BLD AUTO: 9.4 10E9/L (ref 4–11)
WBC #/AREA URNS AUTO: 30 /HPF (ref 0–5)

## 2019-10-18 PROCEDURE — 80076 HEPATIC FUNCTION PANEL: CPT | Performed by: INTERNAL MEDICINE

## 2019-10-18 PROCEDURE — 25000132 ZZH RX MED GY IP 250 OP 250 PS 637: Performed by: INTERNAL MEDICINE

## 2019-10-18 PROCEDURE — 99233 SBSQ HOSP IP/OBS HIGH 50: CPT | Performed by: INTERNAL MEDICINE

## 2019-10-18 PROCEDURE — 87086 URINE CULTURE/COLONY COUNT: CPT | Performed by: INTERNAL MEDICINE

## 2019-10-18 PROCEDURE — 83605 ASSAY OF LACTIC ACID: CPT | Performed by: PHYSICIAN ASSISTANT

## 2019-10-18 PROCEDURE — 12000000 ZZH R&B MED SURG/OB

## 2019-10-18 PROCEDURE — 25800030 ZZH RX IP 258 OP 636: Performed by: INTERNAL MEDICINE

## 2019-10-18 PROCEDURE — 87631 RESP VIRUS 3-5 TARGETS: CPT | Performed by: INTERNAL MEDICINE

## 2019-10-18 PROCEDURE — 82805 BLOOD GASES W/O2 SATURATION: CPT | Performed by: INTERNAL MEDICINE

## 2019-10-18 PROCEDURE — 25000128 H RX IP 250 OP 636: Performed by: INTERNAL MEDICINE

## 2019-10-18 PROCEDURE — 85025 COMPLETE CBC W/AUTO DIFF WBC: CPT | Performed by: INTERNAL MEDICINE

## 2019-10-18 PROCEDURE — 25800030 ZZH RX IP 258 OP 636: Performed by: COLON & RECTAL SURGERY

## 2019-10-18 PROCEDURE — 87040 BLOOD CULTURE FOR BACTERIA: CPT | Performed by: INTERNAL MEDICINE

## 2019-10-18 PROCEDURE — 25000125 ZZHC RX 250: Performed by: INTERNAL MEDICINE

## 2019-10-18 PROCEDURE — 81001 URINALYSIS AUTO W/SCOPE: CPT | Performed by: INTERNAL MEDICINE

## 2019-10-18 PROCEDURE — 36415 COLL VENOUS BLD VENIPUNCTURE: CPT | Performed by: INTERNAL MEDICINE

## 2019-10-18 PROCEDURE — 80048 BASIC METABOLIC PNL TOTAL CA: CPT | Performed by: INTERNAL MEDICINE

## 2019-10-18 PROCEDURE — 36415 COLL VENOUS BLD VENIPUNCTURE: CPT | Performed by: PHYSICIAN ASSISTANT

## 2019-10-18 PROCEDURE — P9047 ALBUMIN (HUMAN), 25%, 50ML: HCPCS | Performed by: INTERNAL MEDICINE

## 2019-10-18 RX ORDER — POTASSIUM CL/LIDO/0.9 % NACL 10MEQ/0.1L
10 INTRAVENOUS SOLUTION, PIGGYBACK (ML) INTRAVENOUS
Status: DISCONTINUED | OUTPATIENT
Start: 2019-10-18 | End: 2019-10-20 | Stop reason: HOSPADM

## 2019-10-18 RX ORDER — POTASSIUM CHLORIDE 7.45 MG/ML
10 INJECTION INTRAVENOUS
Status: DISCONTINUED | OUTPATIENT
Start: 2019-10-18 | End: 2019-10-20 | Stop reason: HOSPADM

## 2019-10-18 RX ORDER — ALBUMIN, HUMAN INJ 5% 5 %
12.5 SOLUTION INTRAVENOUS EVERY 6 HOURS PRN
Status: DISCONTINUED | OUTPATIENT
Start: 2019-10-18 | End: 2019-10-20 | Stop reason: HOSPADM

## 2019-10-18 RX ORDER — DEXTROSE MONOHYDRATE, SODIUM CHLORIDE, AND POTASSIUM CHLORIDE 50; 1.49; 4.5 G/1000ML; G/1000ML; G/1000ML
INJECTION, SOLUTION INTRAVENOUS CONTINUOUS
Status: DISCONTINUED | OUTPATIENT
Start: 2019-10-18 | End: 2019-10-18

## 2019-10-18 RX ORDER — SODIUM CHLORIDE, SODIUM LACTATE, POTASSIUM CHLORIDE, CALCIUM CHLORIDE 600; 310; 30; 20 MG/100ML; MG/100ML; MG/100ML; MG/100ML
INJECTION, SOLUTION INTRAVENOUS CONTINUOUS
Status: DISCONTINUED | OUTPATIENT
Start: 2019-10-18 | End: 2019-10-18

## 2019-10-18 RX ORDER — POTASSIUM CHLORIDE 1.5 G/1.58G
20-40 POWDER, FOR SOLUTION ORAL
Status: DISCONTINUED | OUTPATIENT
Start: 2019-10-18 | End: 2019-10-20 | Stop reason: HOSPADM

## 2019-10-18 RX ORDER — ACETAMINOPHEN 325 MG/1
650 TABLET ORAL EVERY 4 HOURS PRN
Status: DISCONTINUED | OUTPATIENT
Start: 2019-10-18 | End: 2019-10-20 | Stop reason: HOSPADM

## 2019-10-18 RX ORDER — ALBUMIN (HUMAN) 12.5 G/50ML
12.5 SOLUTION INTRAVENOUS EVERY 8 HOURS
Status: COMPLETED | OUTPATIENT
Start: 2019-10-18 | End: 2019-10-19

## 2019-10-18 RX ORDER — PIPERACILLIN SODIUM, TAZOBACTAM SODIUM 3; .375 G/15ML; G/15ML
3.38 INJECTION, POWDER, LYOPHILIZED, FOR SOLUTION INTRAVENOUS EVERY 6 HOURS
Status: DISCONTINUED | OUTPATIENT
Start: 2019-10-18 | End: 2019-10-19

## 2019-10-18 RX ORDER — ACETAMINOPHEN 650 MG/1
650 SUPPOSITORY RECTAL EVERY 4 HOURS PRN
Status: DISCONTINUED | OUTPATIENT
Start: 2019-10-18 | End: 2019-10-20 | Stop reason: HOSPADM

## 2019-10-18 RX ORDER — POTASSIUM CHLORIDE 29.8 MG/ML
20 INJECTION INTRAVENOUS
Status: DISCONTINUED | OUTPATIENT
Start: 2019-10-18 | End: 2019-10-20 | Stop reason: HOSPADM

## 2019-10-18 RX ORDER — POTASSIUM CHLORIDE 1500 MG/1
20-40 TABLET, EXTENDED RELEASE ORAL
Status: DISCONTINUED | OUTPATIENT
Start: 2019-10-18 | End: 2019-10-20 | Stop reason: HOSPADM

## 2019-10-18 RX ADMIN — SODIUM CHLORIDE: 9 INJECTION, SOLUTION INTRAVENOUS at 06:15

## 2019-10-18 RX ADMIN — Medication 1 SPRAY: at 06:09

## 2019-10-18 RX ADMIN — Medication 10 MEQ: at 21:54

## 2019-10-18 RX ADMIN — SODIUM CHLORIDE 500 ML: 9 INJECTION, SOLUTION INTRAVENOUS at 12:16

## 2019-10-18 RX ADMIN — Medication 1 SPRAY: at 07:00

## 2019-10-18 RX ADMIN — ONDANSETRON 4 MG: 2 INJECTION INTRAMUSCULAR; INTRAVENOUS at 17:12

## 2019-10-18 RX ADMIN — MORPHINE SULFATE 1 MG: 2 INJECTION, SOLUTION INTRAMUSCULAR; INTRAVENOUS at 18:13

## 2019-10-18 RX ADMIN — MORPHINE SULFATE 1 MG: 2 INJECTION, SOLUTION INTRAMUSCULAR; INTRAVENOUS at 23:08

## 2019-10-18 RX ADMIN — PIPERACILLIN SODIUM AND TAZOBACTAM SODIUM 3.38 G: 3; .375 INJECTION, POWDER, LYOPHILIZED, FOR SOLUTION INTRAVENOUS at 21:19

## 2019-10-18 RX ADMIN — ALBUMIN HUMAN 12.5 G: 0.25 SOLUTION INTRAVENOUS at 18:06

## 2019-10-18 RX ADMIN — Medication 10 MEQ: at 23:55

## 2019-10-18 RX ADMIN — SODIUM CHLORIDE, POTASSIUM CHLORIDE, SODIUM LACTATE AND CALCIUM CHLORIDE: 600; 310; 30; 20 INJECTION, SOLUTION INTRAVENOUS at 08:06

## 2019-10-18 RX ADMIN — MORPHINE SULFATE 1 MG: 2 INJECTION, SOLUTION INTRAMUSCULAR; INTRAVENOUS at 02:54

## 2019-10-18 RX ADMIN — PIPERACILLIN SODIUM AND TAZOBACTAM SODIUM 3.38 G: 3; .375 INJECTION, POWDER, LYOPHILIZED, FOR SOLUTION INTRAVENOUS at 14:51

## 2019-10-18 RX ADMIN — Medication 10 MEQ: at 22:57

## 2019-10-18 RX ADMIN — ACETAMINOPHEN 650 MG: 325 TABLET, FILM COATED ORAL at 11:27

## 2019-10-18 RX ADMIN — SERTRALINE HYDROCHLORIDE 100 MG: 100 TABLET ORAL at 08:11

## 2019-10-18 RX ADMIN — ACETAMINOPHEN 650 MG: 325 TABLET, FILM COATED ORAL at 17:15

## 2019-10-18 RX ADMIN — SODIUM CHLORIDE, POTASSIUM CHLORIDE, SODIUM LACTATE AND CALCIUM CHLORIDE 1000 ML: 600; 310; 30; 20 INJECTION, SOLUTION INTRAVENOUS at 06:52

## 2019-10-18 RX ADMIN — POTASSIUM CHLORIDE: 149 INJECTION, SOLUTION, CONCENTRATE INTRAVENOUS at 13:21

## 2019-10-18 ASSESSMENT — ACTIVITIES OF DAILY LIVING (ADL)
ADLS_ACUITY_SCORE: 17

## 2019-10-18 ASSESSMENT — MIFFLIN-ST. JEOR: SCORE: 1204.81

## 2019-10-18 NOTE — PROVIDER NOTIFICATION
MD Notification    Notified Person: MD    Notified Person Name: Amanda    Notification Date/Time: 10/17/19 2200    Notification Interaction: telephone page    Purpose of Notification: Pt still nauseous after IV zofran, requesting order for IV compazine.    Orders Received:    Comments:

## 2019-10-18 NOTE — PROVIDER NOTIFICATION
Person Notified: MD      Person Name: Dr. Archibald      Date/Time: 10/18 1124      Interaction: Page      Purpose of Notification: FYI critical lab, pH venous = 7.12. Pt also requesting tylenol if possible for headache - please advise.       Orders Received:

## 2019-10-18 NOTE — CONSULTS
Consult Date:  10/18/2019      RENAL CONSULTATION       REQUESTING PROVIDER:  Dragan      REASON FOR CONSULTATION:  Metabolic acidosis.      HISTORY OF PRESENT ILLNESS:  This is a 34-year-old admitted yesterday with abdominal pain, nausea, vomiting and poor p.o. intake.  A CT scan was consistent with small bowel obstruction.  She has a history of ulcerative colitis, status post colectomy and ileostomy with ileostomy takedown in the past.  She has a history of recurrent small-bowel obstruction.  She was treated by making her n.p.o. and giving her IV fluids including normal saline and lactated Ringer's.  She was started on Zosyn.  She is currently on D5W with 100 mEq per liter of bicarbonate and 20 mEq per liter of potassium.  She currently feels improved.  Her abdomen does not hurt at this time.  She is having some nausea and vomiting which is persisting.  She is not short of breath.  She has no headache or chest pain.  She is not really ambulating much.  She has not been on steroids in recent history.      PAST SURGICAL HISTORY:  Includes , colectomy and tonsillectomy.      SOCIAL HISTORY:  She lives in [place], [place].  She does not smoke or drink.      FAMILY HISTORY:  Father with Crohn's disease, sister with ulcerative colitis and grandmother and grandfather with diabetes.      REVIEW OF SYSTEMS:  Negative except as noted above.      PHYSICAL EXAMINATION:   GENERAL:  She is alert, lying comfortably in bed.   VITAL SIGNS:  Her weight on admission was 57.6 and is now 55.7, blood pressure 90/52, heart rate 96.  She is afebrile, respiratory rate is 16.  Intake and output yesterday were 2711 in and 1000 out.   SKIN:  Negative.   HEENT:  Head shows no trauma.  The eyes show pupils round and react to light.  Mouth shows good dentition.  Posterior pharynx clear.   NECK:  Supple.   LUNGS:  Clear breath sounds.   CARDIAC:  Regular rhythm, normal S1, S2, no murmur.   ABDOMEN:  Normal bowel sounds, flat,  nontender.  There are well-healed surgical scars.   EXTREMITIES:  Normal nails, joints and pulses.  No edema.   NEUROLOGIC:  Normal mental status, symmetric cranial nerves, moves all 4 extremities well.      LABORATORY DATA:  VBG showed a pH of 7.14 and a bicarbonate of 9.  Her hemoglobin is 13.1, white count 9.4.  Urinalysis showed greater than 150 ketones, 30 protein, trace blood, 30 white cells, moderate leukocyte esterase, 1 red cell.  Her serum bicarbonate is 12 and anion gap 12.  Her potassium is 3.9 and sodium 146, creatinine 0.67.  A CT scan done showed an enlarged liver, normal kidneys and some lower pole kidney stones.      IMPRESSION AND PLAN:     1.  Ulcerative colitis, status post colectomy with possible small bowel obstruction:  She is being managed by Internal Medicine and Colorectal Surgery.   2.  Normal anion gap metabolic acidosis:  This is most likely due to GI bicarbonate loss from her small bowel obstruction.  She is also probably dehydrated.  She has gotten some hydration, but her weight is down and she is in a net negative fluid balance.  I am going to continue the D5W with bicarbonate, which is a hypotonic solution and help should help bring down the sodium.  It has potassium in it as well to maintain her potassium balance.  I am going to give her albumin 12.5 grams x3 doses to volume expand her.  We will recheck laboratories in the morning.         BROOKE GUTIERREZ MD             D: 10/18/2019   T: 10/18/2019   EDSON KIDD      Name:     SYLVIA HOYT   MRN:      [id number]        Account:       [id number]   :      1985           Consult Date:  10/18/2019      Document: [id number]

## 2019-10-18 NOTE — PROGRESS NOTES
"  Colon and Rectal Surgery Progress Note          Assessment and Plan:   HD #2 SBO    Feeling better.  Abdomen remains benign.  Remains tachycardic    Additional fluid bolus  Check labs this morning  Await return of bowel function  Cont NPO    May consider gastroview oral vs GGE challenge depending on lab results    Artie Link MD  Colorectal Surgery  [phone] (office)  [phone] (pager)  [email or web address]             Interval History:   Had nausea overnight.  NG place, which she didn't tolerate and removed.  Passed a small amount of stool but no gas.                Physical Exam:   Vitals were reviewed  Patient Vitals for the past 24 hrs:   BP Temp Temp src Pulse Heart Rate Resp SpO2 Height Weight   10/18/19 0240 -- -- -- -- -- 14 -- -- --   10/18/19 0211 92/45 97.7  F (36.5  C) Oral -- 113 16 97 % -- --   10/17/19 2005 -- -- -- -- -- 16 -- -- --   10/17/19 1941 -- -- -- -- -- 16 -- -- --   10/17/19 1530 98/55 96  F (35.6  C) Oral -- 108 16 97 % -- --   10/17/19 1513 103/65 -- -- 117 -- -- 96 % -- --   10/17/19 1340 -- -- -- -- -- 18 -- -- --   10/17/19 1304 100/62 -- -- -- 97 16 100 % -- --   10/17/19 1055 100/63 -- -- -- 94 16 100 % -- --   10/17/19 0921 96/59 97.7  F (36.5  C) Oral 104 -- 18 98 % 1.575 m (5' 2\") 57.6 kg (127 lb)         Intake/Output Summary (Last 24 hours) at 10/18/2019 0546  Last data filed at 10/17/2019 2256  Gross per 24 hour   Intake 2711 ml   Output 1000 ml   Net 1711 ml       Head - Nomocephalic atraumatic  Sclera anicteric  Extremities warm and well perfused  Lungs - breathing non labored,   Abdomen - soft, flat non tender  Rectal exam - deferred  Skin - no rash  Psych - affect appropriate  Neuro - no focal deficits             Data:   All laboratory and imaging data in the past 24 hours reviewed    CBC  Lab Results   Component Value Date    WBC 12.2 (H) 10/17/2019    WBC 5.9 02/23/2017    WBC 8.0 10/03/2016    HGB 15.1 10/17/2019    HGB 10.0 (L) 05/09/2017    HGB 10.1 (L) 05/08/2017 "    HCT 45.4 10/17/2019    HCT 33.3 (L) 02/23/2017    HCT 38.4 10/03/2016     10/17/2019     02/23/2017     10/03/2016       BMP  Recent Labs   Lab Test 10/17/19  1003 10/17/19  0939    Canceled, Test credited, specimen discarded   POTASSIUM 4.2 Canceled, Test credited, specimen discarded   CHLORIDE 108 Canceled, Test credited, specimen discarded   CO2 17* Canceled, Test credited, specimen discarded   ANIONGAP 11 Canceled, Test credited, specimen discarded   GLC 60* Canceled, Test credited, specimen discarded   BUN 18 Canceled, Test credited, specimen discarded   CR 0.55 Canceled, Test credited, specimen discarded   JUDSON 8.3* Canceled, Test credited, specimen discarded       Liver Function Studies -   Recent Labs   Lab Test 10/17/19  1003   PROTTOTAL 8.0   ALBUMIN 4.1   BILITOTAL 0.5   ALKPHOS 56   AST 6   ALT 11                      Medications:     Current Facility-Administered Medications Ordered in Epic   Medication Dose Route Frequency Last Rate Last Dose     artificial saliva (BIOTENE MT) spray 1 spray  1 spray Mouth/Throat Q6H PRN         lidocaine (LMX4) cream   Topical Q1H PRN         lidocaine 1 % 0.1-1 mL  0.1-1 mL Other Q1H PRN         melatonin tablet 1 mg  1 mg Oral At Bedtime PRN         morphine (PF) injection 1 mg  1 mg Intravenous Q2H PRN   1 mg at 10/18/19 0254     naloxone (NARCAN) injection 0.1-0.4 mg  0.1-0.4 mg Intravenous Q2 Min PRN         ondansetron (ZOFRAN-ODT) ODT tab 4 mg  4 mg Oral Q6H PRN        Or     ondansetron (ZOFRAN) injection 4 mg  4 mg Intravenous Q6H PRN   4 mg at 10/17/19 1941     Patient is already receiving mechanical prophylaxis   Does not apply Continuous PRN         phenol (CHLORASEPTIC) 1.4 % spray 1 mL  1 spray Mouth/Throat Q1H PRN         prochlorperazine (COMPAZINE) injection 10 mg  10 mg Intravenous Q6H PRN   10 mg at 10/17/19 2228    Or     prochlorperazine (COMPAZINE) tablet 10 mg  10 mg Oral Q6H PRN        Or     prochlorperazine  (COMPAZINE) Suppository 25 mg  25 mg Rectal Q12H PRN         sertraline (ZOLOFT) tablet 100 mg  100 mg Oral Daily         sodium chloride (PF) 0.9% PF flush 3 mL  3 mL Intracatheter q1 min prn         sodium chloride (PF) 0.9% PF flush 3 mL  3 mL Intracatheter Q8H         sodium chloride 0.9% infusion   Intravenous Continuous 100 mL/hr at 10/17/19 2011       No current Epic-ordered outpatient medications on file.     Addendum: Pt examined separately. Just woke up. Nausea and pain improving. Endorses bloating and belching. No flatus or BM yet.   Physical exam- abdomen soft, minimally tender, non-distended. No peritoneal signs, guarding or rebound tenderness.   Reviewed labs. Defer fluids to hospitalist given significant change in electrolytes.

## 2019-10-18 NOTE — PROVIDER NOTIFICATION
Person Notified: MD      Person Name: Dr. Leslie      Date/Time: 10/18 1835      Interaction: Page      Purpose of Notification: K 3.2, need replacement orders      Orders Received: Potassium replacement protocol

## 2019-10-18 NOTE — PROGRESS NOTES
[facility]    Medicine Progress Note - Hospitalist Service       Date of Admission:  10/17/2019  Assessment & Plan   Amber Buckner is a 34 year old yo woman with history of ulcerative colitis s/p colectomy with ileostomy takedown in 2001, has had 3 c-sections presenting with acute abdominal pain, nausea, no vomiting and decreased passage of gas. Last BM was day prior to admission. She denies any vomiting. She also describes overall malaise, light-headedness with diffuse aches in her muscles and joints, without synovitis on exam. No fevers, SOB, cough, chest pain, urinary symptoms, rash, sick contacts or recent travel.     At presentation, afebrile, , 96/59, 18, 98% on RA    CT:  IMPRESSION:  1.  Suspected small bowel obstruction with dilated loops seen in the  lower abdomen. Transition to nondilated bowel appears to be seen near  the small bowel anastomosis within the right lower quadrant.  2.  Multiple dilated periuterine and paraovarian vessels with dilated  left ovarian vein. Findings can be seen in pelvic congestion syndrome.  3.  Nonspecific hepatomegaly.    Labs significant for mildly elevated WBC at admission 12.2 > now nl at 9.4, MCV of 102, bicarbonate initially 17 > 12, with pH of 7.12/CO2 33 on VBG. Cr stable.     Mild Small Bowel Obstruction with benign exam  Acute NAG Metabolic Acidosis likely related to GI bicarbonate loss, but patient has had no diarrhea. AG and lactic acid remains normal. Prior bicarbonates have been around 28. K normal. Doubt renal tubular acidosis.   Overall Malaise with Myalgias and Arthralgias without synovitis     - Appreciate colorectal involvement and we have discussed case. Patient's abdominal pain is improving, abdomen soft without peritoneal signs. No N/V. No BM, minimal passage of gas. No indication of pathology requiring surgical intervention at this point but will be following closely.   - Given her overall malaise, low BP and acidosis, ? If  there is underlying infection? Other than abdominal symptoms, no localizing source.   -- Check UA with reflex to UCx  -- Get BCx x 2 and start zosyn.   -- If BP continues to dip below 90, give albumin bolus PRN  -- NAG acidosis, I have discussed case with nephrologist and started D5W + 100 meq/L HCO3 with K 20 meq at 150 ml/h. F/u BMP and lactic acid.     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 Patient denies any new pelvic pain or discharge. She has chronic discomfort cramping with sex, which is unchanged.   - Call placed with her OB to review this finding. I am not sure of its significance.      Elevated MCV, chronic  - Check B12, at risk for given surgical history.     Depression - continues on PTA sertraline.           Diet: NPO for Medical/Clinical Reasons Except for: Meds    DVT Prophylaxis: Pneumatic Compression Devices and ambulate. If prolonged stay will start lovenox given history of UC  Rodriguez Catheter: not present  Code Status: Full Code      Disposition Plan   Expected discharge: TBD, once we have a clearer picture what is going on.   Entered: Xuan Archibald MD 10/18/2019, 2:01 PM       The patient's care was discussed with the Bedside Nurse, Patient, Patient's Family and colorectal team and nephrologist.    Xuan Archibald MD  Hospitalist Service  [facility]    ______________________________________________________________________    Interval History   History as outlined above.     Data reviewed today: I reviewed all medications, new labs and imaging results over the last 24 hours. I personally reviewed the abdominal CT image(s) showing as above.    Physical Exam   Vital Signs: Temp: 97.6  F (36.4  C) Temp src: Oral BP: (!) 85/43 Pulse: 104 Heart Rate: 109 Resp: 16 SpO2: 97 % O2 Device: None (Room air)    Weight: 122 lbs 11.2 oz  Constitutional: Appears tired, NAD,   Neuropsyche: alert  and oriented, answers questions appropriately.   Respiratory:  Breathing comfortably, good air exchange, no wheezes, no crackles.   Cardiovascular:  Regular rate and rhythm, no edema.  GI:  soft, mild diffuse tenderness without guarding or rebound /ND, high pitched BS  Skin/Integumen:  No acute rash, bruising or sign of bleeding.       Data   Recent Labs   Lab 10/18/19  0656 10/17/19  1003 10/17/19  0939   WBC 9.4  --  12.2*   HGB 13.1  --  15.1   *  --  102*     --  286   * 136 Canceled, Test credited, specimen discarded   POTASSIUM 3.9 4.2 Canceled, Test credited, specimen discarded   CHLORIDE 122* 108 Canceled, Test credited, specimen discarded   CO2 12* 17* Canceled, Test credited, specimen discarded   BUN 8 18 Canceled, Test credited, specimen discarded   CR 0.67 0.55 Canceled, Test credited, specimen discarded   ANIONGAP 12 11 Canceled, Test credited, specimen discarded   JUDSON 7.9* 8.3* Canceled, Test credited, specimen discarded   GLC 72 60* Canceled, Test credited, specimen discarded   ALBUMIN  --  4.1 Canceled, Test credited, specimen discarded   PROTTOTAL  --  8.0 Canceled, Test credited, specimen discarded   BILITOTAL  --  0.5 Canceled, Test credited, specimen discarded   ALKPHOS  --  56 Canceled, Test credited, specimen discarded   ALT  --  11 Canceled, Test credited, specimen discarded   AST  --  6 Canceled, Test credited, specimen discarded   LIPASE  --  79 Canceled, Test credited, specimen discarded     No results found for this or any previous visit (from the past 24 hour(s)).  Medications     IV infusion builder WITH additives 150 mL/hr at 10/18/19 1321     - MEDICATION INSTRUCTIONS -         [START ON 10/19/2019] influenza vaccine adult (product based on age)  0.5 mL Intramuscular Prior to discharge     piperacillin-tazobactam  3.375 g Intravenous Q6H     sertraline  100 mg Oral Daily     sodium chloride (PF)  3 mL Intracatheter Q8H

## 2019-10-18 NOTE — PLAN OF CARE
Pt  A/Ox4, VSS on RA except BP soft.  C/o mild abdominal pain, IV morphine given x1. Nausea intermittent, declined any PRNs overnight, Seaband on both wrists. Cool rag to forehead helping.  Will hold off inserting a new NG tube as pt states decreased pain and decreased nausea with interventions.  Up independent in room, voiding adequately. Remains NPO. Discharge pending clinical improvement, continue to monitor

## 2019-10-18 NOTE — PLAN OF CARE
Pt arrived on unit from ED @1600.  A/Ox4, lethargic, VSS on RA except BP soft.  C/o  abdominal pain, IV morphine given x2. Nausea intermittent, IV zofran given x1 with no relief, IV compazine given x1, Seaband applied to wrist. Cool rag to forehead helping. Biotene spray ordered for drymouth.  NG-tube removed due to pt intolerance.  Up independent in room, voiding adequately. Remains NPO. Discharge pending improvement, nursing continue to monitor.

## 2019-10-18 NOTE — PLAN OF CARE
[id number]: A/Ox4. VSS ex soft BPs (asymptomatic) - unchanged after 500ml bolus. Pt reports soft BPs at baseline. PH venous 7.12, bicarb 11  - MD aware and IVF switched to D5 KCl 20 + sodium bicarb 150ml/hr. Zosyn initiated. Nephrology consulted - ordered albumin. Strict I/Os. UA collected - see results. Nasal swab for flu rule-out collected, pending. Droplet precautions initiated. Tylenol for headache. NPO + ice. Pain/nausea improving per pt initially, though at end of shift pt experienced emesis after eating some ice chips and increased abdominal pain - PRN Morphine, PRN zofran given with improvement. No flatus. Independent, ambulating in pierce. Slept between cares. Gyn/onc consulted. K 3.2, no replacement protocol - paged MD. Continue to monitor BP and manage pain/nausea.

## 2019-10-19 LAB
ALBUMIN SERPL-MCNC: 3.4 G/DL (ref 3.4–5)
ALP SERPL-CCNC: 41 U/L (ref 40–150)
ALT SERPL W P-5'-P-CCNC: 10 U/L (ref 0–50)
ANION GAP SERPL CALCULATED.3IONS-SCNC: 7 MMOL/L (ref 3–14)
AST SERPL W P-5'-P-CCNC: 6 U/L (ref 0–45)
BACTERIA SPEC CULT: NORMAL
BILIRUB DIRECT SERPL-MCNC: 0.1 MG/DL (ref 0–0.2)
BILIRUB SERPL-MCNC: 0.4 MG/DL (ref 0.2–1.3)
BUN SERPL-MCNC: 2 MG/DL (ref 7–30)
CALCIUM SERPL-MCNC: 7.7 MG/DL (ref 8.5–10.1)
CHLORIDE SERPL-SCNC: 116 MMOL/L (ref 94–109)
CO2 SERPL-SCNC: 21 MMOL/L (ref 20–32)
CREAT SERPL-MCNC: 0.53 MG/DL (ref 0.52–1.04)
ERYTHROCYTE [DISTWIDTH] IN BLOOD BY AUTOMATED COUNT: 13.4 % (ref 10–15)
GFR SERPL CREATININE-BSD FRML MDRD: >90 ML/MIN/{1.73_M2}
GLUCOSE SERPL-MCNC: 108 MG/DL (ref 70–99)
HCT VFR BLD AUTO: 32 % (ref 35–47)
HGB BLD-MCNC: 10.6 G/DL (ref 11.7–15.7)
Lab: NORMAL
MAGNESIUM SERPL-MCNC: 1.8 MG/DL (ref 1.6–2.3)
MCH RBC QN AUTO: 33.3 PG (ref 26.5–33)
MCHC RBC AUTO-ENTMCNC: 33.1 G/DL (ref 31.5–36.5)
MCV RBC AUTO: 101 FL (ref 78–100)
PHOSPHATE SERPL-MCNC: 0.4 MG/DL (ref 2.5–4.5)
PHOSPHATE SERPL-MCNC: 0.9 MG/DL (ref 2.5–4.5)
PLATELET # BLD AUTO: 200 10E9/L (ref 150–450)
POTASSIUM SERPL-SCNC: 3.4 MMOL/L (ref 3.4–5.3)
PROCALCITONIN SERPL-MCNC: <0.05 NG/ML
PROT SERPL-MCNC: 6.3 G/DL (ref 6.8–8.8)
RBC # BLD AUTO: 3.18 10E12/L (ref 3.8–5.2)
SODIUM SERPL-SCNC: 144 MMOL/L (ref 133–144)
SODIUM UR-SCNC: 74 MMOL/L
SPECIMEN SOURCE: NORMAL
URATE SERPL-MCNC: 2.9 MG/DL (ref 2.6–6)
VIT B12 SERPL-MCNC: 620 PG/ML (ref 193–986)
WBC # BLD AUTO: 3.8 10E9/L (ref 4–11)

## 2019-10-19 PROCEDURE — 82607 VITAMIN B-12: CPT | Performed by: INTERNAL MEDICINE

## 2019-10-19 PROCEDURE — 12000000 ZZH R&B MED SURG/OB

## 2019-10-19 PROCEDURE — 25000125 ZZHC RX 250: Performed by: INTERNAL MEDICINE

## 2019-10-19 PROCEDURE — P9047 ALBUMIN (HUMAN), 25%, 50ML: HCPCS | Performed by: INTERNAL MEDICINE

## 2019-10-19 PROCEDURE — 84550 ASSAY OF BLOOD/URIC ACID: CPT | Performed by: INTERNAL MEDICINE

## 2019-10-19 PROCEDURE — 99232 SBSQ HOSP IP/OBS MODERATE 35: CPT | Performed by: INTERNAL MEDICINE

## 2019-10-19 PROCEDURE — 84100 ASSAY OF PHOSPHORUS: CPT | Performed by: INTERNAL MEDICINE

## 2019-10-19 PROCEDURE — 25000132 ZZH RX MED GY IP 250 OP 250 PS 637: Performed by: INTERNAL MEDICINE

## 2019-10-19 PROCEDURE — 25800030 ZZH RX IP 258 OP 636: Performed by: INTERNAL MEDICINE

## 2019-10-19 PROCEDURE — 25000128 H RX IP 250 OP 636: Performed by: INTERNAL MEDICINE

## 2019-10-19 PROCEDURE — 85027 COMPLETE CBC AUTOMATED: CPT | Performed by: INTERNAL MEDICINE

## 2019-10-19 PROCEDURE — 80048 BASIC METABOLIC PNL TOTAL CA: CPT | Performed by: INTERNAL MEDICINE

## 2019-10-19 PROCEDURE — 90686 IIV4 VACC NO PRSV 0.5 ML IM: CPT | Performed by: INTERNAL MEDICINE

## 2019-10-19 PROCEDURE — 84145 PROCALCITONIN (PCT): CPT | Performed by: INTERNAL MEDICINE

## 2019-10-19 PROCEDURE — 80076 HEPATIC FUNCTION PANEL: CPT | Performed by: INTERNAL MEDICINE

## 2019-10-19 PROCEDURE — 84300 ASSAY OF URINE SODIUM: CPT | Performed by: INTERNAL MEDICINE

## 2019-10-19 PROCEDURE — 83735 ASSAY OF MAGNESIUM: CPT | Performed by: INTERNAL MEDICINE

## 2019-10-19 PROCEDURE — 36415 COLL VENOUS BLD VENIPUNCTURE: CPT | Performed by: INTERNAL MEDICINE

## 2019-10-19 RX ORDER — DEXTROSE MONOHYDRATE, SODIUM CHLORIDE, AND POTASSIUM CHLORIDE 50; 1.49; 4.5 G/1000ML; G/1000ML; G/1000ML
INJECTION, SOLUTION INTRAVENOUS CONTINUOUS
Status: DISCONTINUED | OUTPATIENT
Start: 2019-10-19 | End: 2019-10-20 | Stop reason: HOSPADM

## 2019-10-19 RX ORDER — KETOROLAC TROMETHAMINE 30 MG/ML
30 INJECTION, SOLUTION INTRAMUSCULAR; INTRAVENOUS EVERY 6 HOURS PRN
Status: DISCONTINUED | OUTPATIENT
Start: 2019-10-19 | End: 2019-10-20 | Stop reason: HOSPADM

## 2019-10-19 RX ADMIN — PIPERACILLIN SODIUM AND TAZOBACTAM SODIUM 3.38 G: 3; .375 INJECTION, POWDER, LYOPHILIZED, FOR SOLUTION INTRAVENOUS at 03:26

## 2019-10-19 RX ADMIN — INFLUENZA A VIRUS A/BRISBANE/02/2018 IVR-190 (H1N1) ANTIGEN (FORMALDEHYDE INACTIVATED), INFLUENZA A VIRUS A/KANSAS/14/2017 X-327 (H3N2) ANTIGEN (FORMALDEHYDE INACTIVATED), INFLUENZA B VIRUS B/PHUKET/3073/2013 ANTIGEN (FORMALDEHYDE INACTIVATED), AND INFLUENZA B VIRUS B/MARYLAND/15/2016 BX-69A ANTIGEN (FORMALDEHYDE INACTIVATED) 0.5 ML: 15; 15; 15; 15 INJECTION, SUSPENSION INTRAMUSCULAR at 10:37

## 2019-10-19 RX ADMIN — PIPERACILLIN SODIUM AND TAZOBACTAM SODIUM 3.38 G: 3; .375 INJECTION, POWDER, LYOPHILIZED, FOR SOLUTION INTRAVENOUS at 08:17

## 2019-10-19 RX ADMIN — POTASSIUM PHOSPHATE, MONOBASIC AND POTASSIUM PHOSPHATE, DIBASIC 25 MMOL: 224; 236 INJECTION, SOLUTION INTRAVENOUS at 22:35

## 2019-10-19 RX ADMIN — KETOROLAC TROMETHAMINE 30 MG: 30 INJECTION, SOLUTION INTRAMUSCULAR at 17:27

## 2019-10-19 RX ADMIN — ALBUMIN HUMAN 12.5 G: 0.25 SOLUTION INTRAVENOUS at 02:04

## 2019-10-19 RX ADMIN — POTASSIUM CHLORIDE, DEXTROSE MONOHYDRATE AND SODIUM CHLORIDE: 150; 5; 450 INJECTION, SOLUTION INTRAVENOUS at 18:26

## 2019-10-19 RX ADMIN — DIATRIZOATE MEGLUMINE AND DIATRIZOATE SODIUM 30 ML: 660; 100 SOLUTION ORAL; RECTAL at 08:25

## 2019-10-19 RX ADMIN — PROCHLORPERAZINE EDISYLATE 10 MG: 5 INJECTION, SOLUTION INTRAMUSCULAR; INTRAVENOUS at 20:57

## 2019-10-19 RX ADMIN — POTASSIUM PHOSPHATE, MONOBASIC AND POTASSIUM PHOSPHATE, DIBASIC 25 MMOL: 224; 236 INJECTION, SOLUTION INTRAVENOUS at 10:34

## 2019-10-19 RX ADMIN — SERTRALINE HYDROCHLORIDE 100 MG: 100 TABLET ORAL at 09:58

## 2019-10-19 RX ADMIN — MORPHINE SULFATE 1 MG: 2 INJECTION, SOLUTION INTRAMUSCULAR; INTRAVENOUS at 11:48

## 2019-10-19 RX ADMIN — Medication 10 MEQ: at 00:55

## 2019-10-19 RX ADMIN — ACETAMINOPHEN 650 MG: 325 TABLET, FILM COATED ORAL at 01:21

## 2019-10-19 RX ADMIN — MORPHINE SULFATE 1 MG: 2 INJECTION, SOLUTION INTRAMUSCULAR; INTRAVENOUS at 07:59

## 2019-10-19 RX ADMIN — ALBUMIN HUMAN 12.5 G: 0.25 SOLUTION INTRAVENOUS at 09:54

## 2019-10-19 RX ADMIN — ACETAMINOPHEN 650 MG: 325 TABLET, FILM COATED ORAL at 13:49

## 2019-10-19 RX ADMIN — POTASSIUM CHLORIDE: 149 INJECTION, SOLUTION, CONCENTRATE INTRAVENOUS at 06:01

## 2019-10-19 ASSESSMENT — ACTIVITIES OF DAILY LIVING (ADL)
ADLS_ACUITY_SCORE: 17

## 2019-10-19 ASSESSMENT — MIFFLIN-ST. JEOR: SCORE: 1228.85

## 2019-10-19 NOTE — PLAN OF CARE
A/Ox4. VSS on RA - BP improving. Independent, ambulating in halls frequently. Pain managed with PRN morphine x2. Tylenol ineffective, pt requested alternative such as ibuprofen, received order for IV toradol - effective. Gastrograffin this AM effective- BM (mucous/green) x5 total today, increasing as shift goes on. Pt stated she feels much better. Passing flatus. Diet advanced to clear liquid diet. Phos 0.9 - replaced and recheck at 2030. IVF switched to D5 1/2NS + KCL 20 at 100ml/hr. Antibiotics discontinued. Continue to monitor bowel function.

## 2019-10-19 NOTE — PROVIDER NOTIFICATION
MD Notification    Notified Person: MD    Notified Person Name: Dr Archibald    Notification Date/Time: 10/19 0743    Notification Interaction: Page    Purpose of Notification: Critical Phos 0.9, please advise    Orders Received: Phosphorus replacement protocol

## 2019-10-19 NOTE — PROGRESS NOTES
Assessment and Plan:   Metabolic acidosis, Nl AG: Labs today show K 3.4 and HCO3 21. Cr 0.53. Mg low nl, Low phos. S/P volume replacement. Getting D5W with 20 K and 100 HCO3.   Getting IV K, IV phos.    I/O 727/3475 yest.      Cont IVF, K and phos replacement  Re-check labs in am.             Interval History:   ?SBO: Surgery following.   UC:                  Review of Systems:   Feels improved. Still NPO. Some cramping abd pain. No N or V.           Medications:     On IVF with HCO3.  Not on ABs now.          Physical Exam:   Vitals were reviewed  Patient Vitals for the past 24 hrs:   BP Temp Temp src Pulse Heart Rate Resp SpO2 Weight   10/19/19 1350 103/65 96.1  F (35.6  C) Oral 67 -- 16 99 % --   10/19/19 0759 -- -- -- -- -- 16 -- --   10/19/19 0730 92/55 97.6  F (36.4  C) Oral 79 -- 16 99 % --   10/19/19 0500 -- -- -- -- -- -- -- 58.1 kg (128 lb)   10/18/19 2309 95/61 97.7  F (36.5  C) Oral -- 88 16 98 % --       Temp:  [96.1  F (35.6  C)-97.7  F (36.5  C)] 96.1  F (35.6  C)  Pulse:  [67-79] 67  Heart Rate:  [88] 88  Resp:  [16] 16  BP: ()/(55-65) 103/65  SpO2:  [98 %-99 %] 99 %    Temperatures:  Current - Temp: 96.1  F (35.6  C); Max - Temp  Av.1  F (36.2  C)  Min: 96.1  F (35.6  C)  Max: 97.7  F (36.5  C)  Respiration range: Resp  Av  Min: 16  Max: 16  Pulse range: Pulse  Av  Min: 67  Max: 79  Blood pressure range: Systolic (24hrs), Av , Min:92 , Max:103   ; Diastolic (24hrs), Av, Min:55, Max:65    Pulse oximetry range: SpO2  Av.7 %  Min: 98 %  Max: 99 %    I/O last 3 completed shifts:  In: 2454 [I.V.:2454]  Out: 1700 [Urine:1700]      Intake/Output Summary (Last 24 hours) at 10/19/2019 1506  Last data filed at 10/19/2019 1352  Gross per 24 hour   Intake 2454 ml   Output 1700 ml   Net 754 ml     Alert, no distress  Lungs with clear BS  Cor RRR nl S1 S2 no M  No JVD  LE no edema       Wt Readings from Last 4 Encounters:   10/19/19 58.1 kg (128 lb)   19 55.2 kg  (121 lb 9.6 oz)   05/14/19 56.7 kg (125 lb)   05/01/19 57.2 kg (126 lb)          Data:          Lab Results   Component Value Date     10/19/2019     10/18/2019    NA Canceled, Test credited, specimen discarded 10/18/2019    Lab Results   Component Value Date    CHLORIDE 116 10/19/2019    CHLORIDE 114 10/18/2019    CHLORIDE Canceled, Test credited, specimen discarded 10/18/2019    Lab Results   Component Value Date    BUN 2 10/19/2019    BUN 4 10/18/2019    BUN Canceled, Test credited, specimen discarded 10/18/2019      Lab Results   Component Value Date    POTASSIUM 3.4 10/19/2019    POTASSIUM 3.2 10/18/2019    POTASSIUM Canceled, Test credited, specimen discarded 10/18/2019    Lab Results   Component Value Date    CO2 21 10/19/2019    CO2 16 10/18/2019    CO2 Canceled, Test credited, specimen discarded 10/18/2019    Lab Results   Component Value Date    CR 0.53 10/19/2019    CR 0.50 10/18/2019    CR Canceled, Test credited, specimen discarded 10/18/2019        Recent Labs   Lab Test 10/19/19  0650 10/18/19  0656 10/17/19  0939   WBC 3.8* 9.4 12.2*   HGB 10.6* 13.1 15.1   HCT 32.0* 40.6 45.4   * 105* 102*    257 286     Recent Labs   Lab Test 10/19/19  0650 10/18/19  1729 10/18/19  1540   AST 6 7 Canceled, Test credited, specimen discarded   ALT 10 10 Canceled, Test credited, specimen discarded   ALKPHOS 41 49 Canceled, Test credited, specimen discarded   BILITOTAL 0.4 0.4 Canceled, Test credited, specimen discarded       Recent Labs   Lab Test 10/19/19  0650 05/14/17  0732 05/13/17  0741   MAG 1.8 1.6 1.9     Recent Labs   Lab Test 10/19/19  0650 05/14/17  0732 05/13/17  0741   PHOS 0.9* 3.7 2.8     Recent Labs   Lab Test 10/19/19  0650 10/18/19  1729 10/18/19  1540   JUDSON 7.7* 7.7* Canceled, Test credited, specimen discarded       Lab Results   Component Value Date    JUDSON 7.7 (L) 10/19/2019     Lab Results   Component Value Date    WBC 3.8 (L) 10/19/2019    HGB 10.6 (L) 10/19/2019    HCT  32.0 (L) 10/19/2019     (H) 10/19/2019     10/19/2019     Lab Results   Component Value Date     10/19/2019    POTASSIUM 3.4 10/19/2019    CHLORIDE 116 (H) 10/19/2019    CO2 21 10/19/2019     (H) 10/19/2019     Lab Results   Component Value Date    BUN 2 (L) 10/19/2019    CR 0.53 10/19/2019     Lab Results   Component Value Date    MAG 1.8 10/19/2019     Lab Results   Component Value Date    PHOS 0.9 (LL) 10/19/2019       Creatinine   Date Value Ref Range Status   10/19/2019 0.53 0.52 - 1.04 mg/dL Final   10/18/2019 0.50 (L) 0.52 - 1.04 mg/dL Final   10/18/2019 Canceled, Test credited, specimen discarded 0.52 - 1.04 mg/dL Final     Comment:     Questionable specimen  CALLED TO ROBERT MEYER     10/18/2019 0.67 0.52 - 1.04 mg/dL Final   10/17/2019 0.55 0.52 - 1.04 mg/dL Final   10/17/2019 Canceled, Test credited, specimen discarded 0.52 - 1.04 mg/dL Final     Comment:     Unsatisfactory specimen - hemolyzed  TEXTED RN 8 AT 1000 ME         Attestation:  I have reviewed today's vital signs, notes, medications, labs and imaging.     Tomi Aguirre MD

## 2019-10-19 NOTE — PROGRESS NOTES
[facility]    Medicine Progress Note - Hospitalist Service       Date of Admission:  10/17/2019  Assessment & Plan   Amber Buckner is a 34 year old yo woman with history of ulcerative colitis s/p colectomy with ileostomy takedown in 2001, has had 3 c-sections presenting with acute abdominal pain, nausea, no vomiting and decreased passage of gas. Last BM was day prior to admission. She denies any vomiting. She also describes overall malaise, light-headedness with diffuse aches in her muscles and joints, without synovitis on exam. No fevers, SOB, cough, chest pain, urinary symptoms, rash, sick contacts or recent travel.     At presentation, afebrile, , 96/59, 18, 98% on RA    CT:  IMPRESSION:  1.  Suspected small bowel obstruction with dilated loops seen in the  lower abdomen. Transition to nondilated bowel appears to be seen near  the small bowel anastomosis within the right lower quadrant.  2.  Multiple dilated periuterine and paraovarian vessels with dilated  left ovarian vein. Findings can be seen in pelvic congestion syndrome.  3.  Nonspecific hepatomegaly.    Labs significant for mildly elevated WBC at admission 12.2 > now nl at 9.4, MCV of 102, bicarbonate initially 17 > 12, with pH of 7.12/CO2 33 on VBG. Cr stable.     Mild Small Bowel Obstruction with benign exam  * Appreciate colorectal  - On 10/19/19, given gastrograffin challenge and has had some small BM, no longer nauseated, or distended. Feels hungry.   - Advance diet to clears.   - Tylenol and toradol available for pain (s/p colectomy so can take NSAIDS despite h/o UC    Acute NAG Metabolic Acidosis likely related to GI bicarbonate loss, but patient has had no diarrhea. AG and lactic acid remains normal. Prior bicarbonates have been around 28. K normal. Doubt renal tubular acidosis.  * Appreciate nephrology input.      - On 10/19/19, bicarbonate normalized post D5W + 100 meq/L HCO3 with K 20 meq at 150 ml/h. F/u BMP and lactic  acid.   - Patient overall feeling better, hopeful for discharge tomorrow. So change to D5 1/2 NS with 20 KCl @ 100 ml/h and f/u BMP in AM.     Overall Malaise with Myalgias and Arthralgias without synovitis   * Given her overall malaise, low BP and acidosis, ? If there is underlying infection? Other than abdominal symptoms, no localizing source. Started Zosyn on 10/18. UA showed 30 WBC, UCx NGTD. BCx NGTD. Procalcitonin low > stopped IV zosyn on 10/19/19.     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 Patient denies any new pelvic pain or discharge. She has chronic discomfort cramping with sex, which is unchanged.   - Call placed with her OB, no call back. Per my review in [employer] this is a problem that can be reviewed in clinic and not likely impacting current presentation. Gynecology consult cancelled.      Elevated MCV, chronic  - B12 nl.     Mild leukopenia and anemia - Suspect secondary to diluttion  Recent Labs   Lab 10/19/19  0650 10/18/19  0656 10/17/19  0939   WBC 3.8* 9.4 12.2*   HGB 10.6* 13.1 15.1    257 286   - Follow up CBC in AM.     Depression - continues on PTA sertraline.           Diet: NPO for Medical/Clinical Reasons Except for: Meds, Ice Chips    DVT Prophylaxis: Pneumatic Compression Devices and ambulate. If prolonged stay will start lovenox given history of UC  Rodriguez Catheter: not present  Code Status: Full Code      Disposition Plan   Expected discharge: 1-2 days, once diet is advanced and lytes normal off bicarbonate.   Entered: Xuan Archibald MD 10/19/2019, 3:07 PM       The patient's care was discussed with the Bedside Nurse, Patient, Patient's Family and colorectal team and nephrologist.    Xuan Archibald MD  Hospitalist Service  [facility]    ______________________________________________________________________    Interval History   Patient feeling much better  today. Does not have overall malaise anymore, no N/V, History as above.   No SOB, CP.     Data reviewed today: I reviewed all medications, new labs and imaging results over the last 24 hours. I personally reviewed no images or EKG's today.    Physical Exam   Vital Signs: Temp: 96.1  F (35.6  C) Temp src: Oral BP: 103/65 Pulse: 67 Heart Rate: 88 Resp: 16 SpO2: 99 % O2 Device: None (Room air)    Weight: 128 lbs 0 oz  Constitutional: Appears more upbeat, NAD   Neuropsyche: alert and oriented, answers questions appropriately.   Respiratory:  Breathing comfortably, good air exchange, no wheezes, no crackles.   Cardiovascular:  Regular rate and rhythm, no edema.  GI:  Soft, NT/ND, normal BS  Skin/Integumen:  No acute rash, bruising or sign of bleeding.       Data   Recent Labs   Lab 10/19/19  0650 10/18/19  1729 10/18/19  1540 10/18/19  0656 10/17/19  1003 10/17/19  0939   WBC 3.8*  --   --  9.4  --  12.2*   HGB 10.6*  --   --  13.1  --  15.1   *  --   --  105*  --  102*     --   --  257  --  286    141 Canceled, Test credited, specimen discarded 146* 136 Canceled, Test credited, specimen discarded   POTASSIUM 3.4 3.2* Canceled, Test credited, specimen discarded 3.9 4.2 Canceled, Test credited, specimen discarded   CHLORIDE 116* 114* Canceled, Test credited, specimen discarded 122* 108 Canceled, Test credited, specimen discarded   CO2 21 16* Canceled, Test credited, specimen discarded 12* 17* Canceled, Test credited, specimen discarded   BUN 2* 4* Canceled, Test credited, specimen discarded 8 18 Canceled, Test credited, specimen discarded   CR 0.53 0.50* Canceled, Test credited, specimen discarded 0.67 0.55 Canceled, Test credited, specimen discarded   ANIONGAP 7 11 Canceled, Test credited, specimen discarded 12 11 Canceled, Test credited, specimen discarded   JUDSON 7.7* 7.7* Canceled, Test credited, specimen discarded 7.9* 8.3* Canceled, Test credited, specimen discarded   * 115* Canceled, Test  credited, specimen discarded 72 60* Canceled, Test credited, specimen discarded   ALBUMIN 3.4 3.2* Canceled, Test credited, specimen discarded  --  4.1 Canceled, Test credited, specimen discarded   PROTTOTAL 6.3* 6.7* Canceled, Test credited, specimen discarded  --  8.0 Canceled, Test credited, specimen discarded   BILITOTAL 0.4 0.4 Canceled, Test credited, specimen discarded  --  0.5 Canceled, Test credited, specimen discarded   ALKPHOS 41 49 Canceled, Test credited, specimen discarded  --  56 Canceled, Test credited, specimen discarded   ALT 10 10 Canceled, Test credited, specimen discarded  --  11 Canceled, Test credited, specimen discarded   AST 6 7 Canceled, Test credited, specimen discarded  --  6 Canceled, Test credited, specimen discarded   LIPASE  --   --   --   --  79 Canceled, Test credited, specimen discarded     No results found for this or any previous visit (from the past 24 hour(s)).  Medications     dextrose 5% and 0.45% NaCl + KCl 20 mEq/L       - MEDICATION INSTRUCTIONS -         sertraline  100 mg Oral Daily     sodium chloride (PF)  3 mL Intracatheter Q8H

## 2019-10-19 NOTE — PROGRESS NOTES
Colon and Rectal Surgery Progress Note          Assessment and Plan:   HD #3    Tachycardia improved and feeling better  Abdomen soft and not tender    Will give gastrograffin challenge with 30 mL of gastroview diluted in 30 ml water, give one time.  Cont IV fluids    Artie Link MD  Colorectal Surgery  [phone] (office)  [phone] (pager)  [email or web address]             Interval History:   Feeling better.  Passed gas x 1 yesterday.  Had some cramping overnight and a small emesis. Feels good this am.  Slept well.              Physical Exam:   Vitals were reviewed  Patient Vitals for the past 24 hrs:   BP Temp Temp src Pulse Heart Rate Resp SpO2 Weight   10/19/19 0500 -- -- -- -- -- -- -- 58.1 kg (128 lb)   10/18/19 2309 95/61 97.7  F (36.5  C) Oral -- 88 16 98 % --   10/18/19 1500 90/52 98.4  F (36.9  C) Oral 96 -- 16 99 % --   10/18/19 1345 94/57 -- -- -- -- -- -- --   10/18/19 1323 (!) 85/43 -- -- -- -- -- -- --   10/18/19 1321 (!) 87/42 97.6  F (36.4  C) Oral 104 -- 16 97 % --   10/18/19 0856 92/47 96.7  F (35.9  C) Oral 107 -- 16 100 % --   10/18/19 0814 90/47 -- -- -- 109 -- 100 % --         Intake/Output Summary (Last 24 hours) at 10/19/2019 0651  Last data filed at 10/19/2019 0644  Gross per 24 hour   Intake --   Output 2475 ml   Net -2475 ml       Head - Nomocephalic atraumatic  Sclera anicteric  Extremities warm and well perfused  Lungs - breathing non labored,   Abdomen - soft, non distended, non tender  Rectal exam - deferred  Skin - no rash  Psych - affect appropriate  Neuro - no focal deficits             Data:   All laboratory and imaging data in the past 24 hours reviewed    CBC  Lab Results   Component Value Date    WBC 9.4 10/18/2019    WBC 12.2 (H) 10/17/2019    WBC 5.9 02/23/2017    HGB 13.1 10/18/2019    HGB 15.1 10/17/2019    HGB 10.0 (L) 05/09/2017    HCT 40.6 10/18/2019    HCT 45.4 10/17/2019    HCT 33.3 (L) 02/23/2017     10/18/2019     10/17/2019     02/23/2017        BMP  Recent Labs   Lab Test 10/18/19  1729 10/18/19  1540    Canceled, Test credited, specimen discarded   POTASSIUM 3.2* Canceled, Test credited, specimen discarded   CHLORIDE 114* Canceled, Test credited, specimen discarded   CO2 16* Canceled, Test credited, specimen discarded   ANIONGAP 11 Canceled, Test credited, specimen discarded   * Canceled, Test credited, specimen discarded   BUN 4* Canceled, Test credited, specimen discarded   CR 0.50* Canceled, Test credited, specimen discarded   JUDSON 7.7* Canceled, Test credited, specimen discarded       Liver Function Studies -   Recent Labs   Lab Test 10/18/19  1729   PROTTOTAL 6.7*   ALBUMIN 3.2*   BILITOTAL 0.4   ALKPHOS 49   AST 7   ALT 10                      Medications:     Current Facility-Administered Medications Ordered in Epic   Medication Dose Route Frequency Last Rate Last Dose     acetaminophen (TYLENOL) tablet 650 mg  650 mg Oral Q4H PRN   650 mg at 10/19/19 0121    Or     acetaminophen (TYLENOL) Suppository 650 mg  650 mg Rectal Q4H PRN         albumin human 25 % injection 12.5 g  12.5 g Intravenous Q8H   12.5 g at 10/19/19 0204     albumin human 5 % injection 12.5 g  12.5 g Intravenous Q6H PRN         artificial saliva (BIOTENE MT) spray 1 spray  1 spray Mouth/Throat Q6H PRN   1 spray at 10/18/19 0700     D5W 1,000 mL with potassium chloride 20 mEq/L, sodium bicarbonate 100 mEq/L infusion   Intravenous Continuous 150 mL/hr at 10/19/19 0601       diatrizoate meglumine-sodium (GASTROGRAFIN/GASTROVIEW) 66-10 % solution 30 mL  30 mL Oral Once         influenza quadrivalent (PF) vacc (FLUZONE) injection 0.5 mL  0.5 mL Intramuscular Prior to discharge         lidocaine (LMX4) cream   Topical Q1H PRN         lidocaine 1 % 0.1-1 mL  0.1-1 mL Other Q1H PRN         melatonin tablet 1 mg  1 mg Oral At Bedtime PRN         morphine (PF) injection 1 mg  1 mg Intravenous Q2H PRN   1 mg at 10/18/19 2308     naloxone (NARCAN) injection 0.1-0.4 mg   0.1-0.4 mg Intravenous Q2 Min PRN         ondansetron (ZOFRAN-ODT) ODT tab 4 mg  4 mg Oral Q6H PRN        Or     ondansetron (ZOFRAN) injection 4 mg  4 mg Intravenous Q6H PRN   4 mg at 10/18/19 1712     Patient is already receiving mechanical prophylaxis   Does not apply Continuous PRN         phenol (CHLORASEPTIC) 1.4 % spray 1 mL  1 spray Mouth/Throat Q1H PRN         piperacillin-tazobactam (ZOSYN) 3.375 g vial to attach to  mL bag  3.375 g Intravenous Q6H   3.375 g at 10/19/19 0326     potassium chloride (KLOR-CON) Packet 20-40 mEq  20-40 mEq Oral or Feeding Tube Q2H PRN         potassium chloride 10 mEq in 100 mL intermittent infusion with 10 mg lidocaine  10 mEq Intravenous Q1H PRN   10 mEq at 10/19/19 0055     potassium chloride 10 mEq in 100 mL sterile water intermittent infusion (premix)  10 mEq Intravenous Q1H PRN         potassium chloride 20 mEq in 50 mL intermittent infusion  20 mEq Intravenous Q2H PRN         potassium chloride ER (K-DUR/KLOR-CON M) CR tablet 20-40 mEq  20-40 mEq Oral Q2H PRN         prochlorperazine (COMPAZINE) injection 10 mg  10 mg Intravenous Q6H PRN   10 mg at 10/17/19 2228    Or     prochlorperazine (COMPAZINE) tablet 10 mg  10 mg Oral Q6H PRN        Or     prochlorperazine (COMPAZINE) Suppository 25 mg  25 mg Rectal Q12H PRN         sertraline (ZOLOFT) tablet 100 mg  100 mg Oral Daily   100 mg at 10/18/19 0811     sodium chloride (PF) 0.9% PF flush 3 mL  3 mL Intracatheter q1 min prn         sodium chloride (PF) 0.9% PF flush 3 mL  3 mL Intracatheter Q8H         No current Epic-ordered outpatient medications on file.

## 2019-10-19 NOTE — PLAN OF CARE
Pt A/O. VSS on RA, soft Bps. C/o abdominal pain, IV Morphine effective. C/o generalized body pain, Tylenol effective. PIV w/ D5 KCl 20 + sodium bicarb at 150ml/hr. K+ 3.2, replacement given. Redraw in AM. NPO+ice. Denied nausea. Pt reports small stools. Voiding adequately. Up independently.

## 2019-10-20 VITALS
HEART RATE: 69 BPM | SYSTOLIC BLOOD PRESSURE: 99 MMHG | OXYGEN SATURATION: 98 % | HEIGHT: 62 IN | DIASTOLIC BLOOD PRESSURE: 72 MMHG | TEMPERATURE: 96.5 F | WEIGHT: 125.9 LBS | RESPIRATION RATE: 16 BRPM | BODY MASS INDEX: 23.17 KG/M2

## 2019-10-20 LAB
ANION GAP SERPL CALCULATED.3IONS-SCNC: 7 MMOL/L (ref 3–14)
BUN SERPL-MCNC: 1 MG/DL (ref 7–30)
CALCIUM SERPL-MCNC: 8.4 MG/DL (ref 8.5–10.1)
CHLORIDE SERPL-SCNC: 110 MMOL/L (ref 94–109)
CO2 SERPL-SCNC: 23 MMOL/L (ref 20–32)
CREAT SERPL-MCNC: 0.4 MG/DL (ref 0.52–1.04)
ERYTHROCYTE [DISTWIDTH] IN BLOOD BY AUTOMATED COUNT: 13.1 % (ref 10–15)
GFR SERPL CREATININE-BSD FRML MDRD: >90 ML/MIN/{1.73_M2}
GLUCOSE SERPL-MCNC: 116 MG/DL (ref 70–99)
HCT VFR BLD AUTO: 33.4 % (ref 35–47)
HGB BLD-MCNC: 11.5 G/DL (ref 11.7–15.7)
MAGNESIUM SERPL-MCNC: 1.6 MG/DL (ref 1.6–2.3)
MCH RBC QN AUTO: 33.9 PG (ref 26.5–33)
MCHC RBC AUTO-ENTMCNC: 34.4 G/DL (ref 31.5–36.5)
MCV RBC AUTO: 99 FL (ref 78–100)
PHOSPHATE SERPL-MCNC: 2.3 MG/DL (ref 2.5–4.5)
PLATELET # BLD AUTO: 200 10E9/L (ref 150–450)
POTASSIUM SERPL-SCNC: 3.5 MMOL/L (ref 3.4–5.3)
RBC # BLD AUTO: 3.39 10E12/L (ref 3.8–5.2)
SODIUM SERPL-SCNC: 140 MMOL/L (ref 133–144)
WBC # BLD AUTO: 3.6 10E9/L (ref 4–11)

## 2019-10-20 PROCEDURE — 25000125 ZZHC RX 250: Performed by: INTERNAL MEDICINE

## 2019-10-20 PROCEDURE — 80048 BASIC METABOLIC PNL TOTAL CA: CPT | Performed by: INTERNAL MEDICINE

## 2019-10-20 PROCEDURE — 36415 COLL VENOUS BLD VENIPUNCTURE: CPT | Performed by: INTERNAL MEDICINE

## 2019-10-20 PROCEDURE — 83735 ASSAY OF MAGNESIUM: CPT | Performed by: INTERNAL MEDICINE

## 2019-10-20 PROCEDURE — 25000132 ZZH RX MED GY IP 250 OP 250 PS 637: Performed by: INTERNAL MEDICINE

## 2019-10-20 PROCEDURE — 99238 HOSP IP/OBS DSCHRG MGMT 30/<: CPT | Performed by: INTERNAL MEDICINE

## 2019-10-20 PROCEDURE — 84100 ASSAY OF PHOSPHORUS: CPT | Performed by: INTERNAL MEDICINE

## 2019-10-20 PROCEDURE — 25800030 ZZH RX IP 258 OP 636: Performed by: INTERNAL MEDICINE

## 2019-10-20 PROCEDURE — 85027 COMPLETE CBC AUTOMATED: CPT | Performed by: INTERNAL MEDICINE

## 2019-10-20 RX ADMIN — POTASSIUM PHOSPHATE, MONOBASIC AND POTASSIUM PHOSPHATE, DIBASIC 15 MMOL: 224; 236 INJECTION, SOLUTION INTRAVENOUS at 10:13

## 2019-10-20 RX ADMIN — SERTRALINE HYDROCHLORIDE 100 MG: 100 TABLET ORAL at 08:12

## 2019-10-20 ASSESSMENT — ACTIVITIES OF DAILY LIVING (ADL)
ADLS_ACUITY_SCORE: 17

## 2019-10-20 ASSESSMENT — MIFFLIN-ST. JEOR: SCORE: 1219.33

## 2019-10-20 NOTE — PROGRESS NOTES
Colon and Rectal Surgery Progress Note          Assessment and Plan:   HD #4    After gastrografin challenge yesterday with 5 BMs.  Feels better this AM and tolerating clears.    Ok to advance to low residue diet.    Will discuss with Dr. Coker covering for Dr. Nikolay Brown MD  Colorectal Surgery  [phone]             Interval History:   Feeling better.  Gastrografin yesterday helped with multiple liquid stools, passing gas, less distension.              Physical Exam:   Vitals were reviewed  Patient Vitals for the past 24 hrs:   BP Temp Temp src Pulse Heart Rate Resp SpO2 Weight   10/20/19 0729 109/65 97.1  F (36.2  C) Oral 70 -- 16 97 % --   10/20/19 0546 -- -- -- -- -- -- -- 57.1 kg (125 lb 14.4 oz)   10/19/19 2333 95/58 97.3  F (36.3  C) Oral -- 65 16 98 % --   10/19/19 1926 99/61 96.8  F (36  C) -- -- 79 16 97 % --   10/19/19 1635 103/59 97.5  F (36.4  C) -- -- 74 16 98 % --   10/19/19 1350 103/65 96.1  F (35.6  C) Oral 67 -- 16 99 % --         Intake/Output Summary (Last 24 hours) at 10/20/2019 1011  Last data filed at 10/20/2019 0609  Gross per 24 hour   Intake --   Output 2075 ml   Net -2075 ml         Head - Nomocephalic atraumatic  Sclera anicteric  Extremities warm and well perfused  Lungs - breathing non labored,   Abdomen - soft, non distended, non tender  Rectal exam - deferred  Skin - no rash  Psych - affect appropriate  Neuro - no focal deficits             Data:   All laboratory and imaging data in the past 24 hours reviewed    CBC  Lab Results   Component Value Date    WBC 3.6 (L) 10/20/2019    WBC 3.8 (L) 10/19/2019    WBC 9.4 10/18/2019    HGB 11.5 (L) 10/20/2019    HGB 10.6 (L) 10/19/2019    HGB 13.1 10/18/2019    HCT 33.4 (L) 10/20/2019    HCT 32.0 (L) 10/19/2019    HCT 40.6 10/18/2019     10/20/2019     10/19/2019     10/18/2019                  Medications:     Current Facility-Administered Medications Ordered in Epic   Medication Dose  Route Frequency Last Rate Last Dose     acetaminophen (TYLENOL) tablet 650 mg  650 mg Oral Q4H PRN   650 mg at 10/19/19 1349    Or     acetaminophen (TYLENOL) Suppository 650 mg  650 mg Rectal Q4H PRN         albumin human 5 % injection 12.5 g  12.5 g Intravenous Q6H PRN         artificial saliva (BIOTENE MT) spray 1 spray  1 spray Mouth/Throat Q6H PRN   1 spray at 10/18/19 0700     dextrose 5% and 0.45% NaCl + KCl 20 mEq/L infusion   Intravenous Continuous 100 mL/hr at 10/19/19 1826       ketorolac (TORADOL) injection 30 mg  30 mg Intravenous Q6H PRN   30 mg at 10/19/19 1727     lidocaine (LMX4) cream   Topical Q1H PRN         lidocaine 1 % 0.1-1 mL  0.1-1 mL Other Q1H PRN         melatonin tablet 1 mg  1 mg Oral At Bedtime PRN         morphine (PF) injection 1 mg  1 mg Intravenous Q2H PRN   1 mg at 10/19/19 1148     naloxone (NARCAN) injection 0.1-0.4 mg  0.1-0.4 mg Intravenous Q2 Min PRN         ondansetron (ZOFRAN-ODT) ODT tab 4 mg  4 mg Oral Q6H PRN        Or     ondansetron (ZOFRAN) injection 4 mg  4 mg Intravenous Q6H PRN   4 mg at 10/18/19 1712     Patient is already receiving mechanical prophylaxis   Does not apply Continuous PRN         phenol (CHLORASEPTIC) 1.4 % spray 1 mL  1 spray Mouth/Throat Q1H PRN         potassium chloride (KLOR-CON) Packet 20-40 mEq  20-40 mEq Oral or Feeding Tube Q2H PRN         potassium chloride 10 mEq in 100 mL intermittent infusion with 10 mg lidocaine  10 mEq Intravenous Q1H PRN   10 mEq at 10/19/19 0055     potassium chloride 10 mEq in 100 mL sterile water intermittent infusion (premix)  10 mEq Intravenous Q1H PRN         potassium chloride 20 mEq in 50 mL intermittent infusion  20 mEq Intravenous Q2H PRN         potassium chloride ER (K-DUR/KLOR-CON M) CR tablet 20-40 mEq  20-40 mEq Oral Q2H PRN         potassium phosphate 15 mmol in D5W 250 mL intermittent infusion  15 mmol Intravenous Daily PRN         potassium phosphate 20 mmol in D5W 250 mL intermittent infusion  20  mmol Intravenous Q6H PRN         potassium phosphate 20 mmol in D5W 500 mL intermittent infusion  20 mmol Intravenous Q6H PRN         potassium phosphate 25 mmol in D5W 500 mL intermittent infusion  25 mmol Intravenous Q8H PRN   25 mmol at 10/19/19 2235     prochlorperazine (COMPAZINE) injection 10 mg  10 mg Intravenous Q6H PRN   10 mg at 10/19/19 2057    Or     prochlorperazine (COMPAZINE) tablet 10 mg  10 mg Oral Q6H PRN        Or     prochlorperazine (COMPAZINE) Suppository 25 mg  25 mg Rectal Q12H PRN         sertraline (ZOLOFT) tablet 100 mg  100 mg Oral Daily   100 mg at 10/20/19 0812     sodium chloride (PF) 0.9% PF flush 3 mL  3 mL Intracatheter q1 min prn         sodium chloride (PF) 0.9% PF flush 3 mL  3 mL Intracatheter Q8H         No current [facility]-ordered outpatient medications on file.

## 2019-10-20 NOTE — PLAN OF CARE
Pt A/O. VSS on RA, soft BPs. Denies pain. C/o slight nausea, IV compazine x1 effective. Phos redraw--0.4. Replaced and recheck at 0700. Multiple BMs. Passing gas. Clear liquid diet. PIV w/ D5 1/2NS + KCL 20 at 100ml/hr. Up independently, ambulating frequently.

## 2019-10-20 NOTE — PROGRESS NOTES
Patient discharged at 5:50 PM to home. IV was discontinued. Pain at time of discharge was 0/10. Belongings returned to patient.  Discharge instructions and medications reviewed with patient.  Patient verbalized understanding and all questions were answered.  At time of discharge, patient condition was stable and left the unit independently.

## 2019-10-20 NOTE — DISCHARGE SUMMARY
[facility]  Hospitalist Discharge Summary       Date of Admission:  10/17/2019  Date of Discharge:  10/20/2019  Discharging Provider: Xuan Archibald MD      Discharge Diagnoses   SBO   UC, s/p colectomy with ileostomy takedown    Follow-ups Needed After Discharge   Follow-up Appointments     Follow-up and recommended labs and tests       Follow up with primary care provider, Cinthia Charles, within 7 days   for hospital follow- up.  The following labs/tests are recommended: BMP,   CBC.             Unresulted Labs Ordered in the Past 30 Days of this Admission     Date and Time Order Name Status Description    10/18/2019 1334 Blood culture Preliminary     10/18/2019 1334 Blood culture Preliminary       Hospitalist group will be notified if cultures turn positive. No growth to date.     Discharge Disposition   Discharged to home  Condition at discharge: Good    Hospital Course   Amber Buckner is a 34 year old yo woman with history of ulcerative colitis s/p colectomy with ileostomy takedown in 2001, has had 3 c-sections presenting with acute abdominal pain, nausea, no vomiting and decreased passage of gas. Last BM was day prior to admission. She denies any vomiting. She also describes overall malaise, light-headedness with diffuse aches in her muscles and joints, without synovitis on exam. No fevers, SOB, cough, chest pain, urinary symptoms, rash, sick contacts or recent travel.     At presentation, afebrile, , 96/59, 18, 98% on RA    CT:  IMPRESSION:  1.  Suspected small bowel obstruction with dilated loops seen in the  lower abdomen. Transition to nondilated bowel appears to be seen near  the small bowel anastomosis within the right lower quadrant.  2.  Multiple dilated periuterine and paraovarian vessels with dilated  left ovarian vein. Findings can be seen in pelvic congestion syndrome.  3.  Nonspecific hepatomegaly.    Labs significant for mildly elevated WBC at admission 12.2 > now  nl at 9.4, MCV of 102, bicarbonate initially 17 > 12, with pH of 7.12/CO2 33 on VBG. Cr stable.     Mild Small Bowel Obstruction   * Colorectal Surgery consulted.  On 10/19/19, given gastrograffin challenge and has had multiple BM, no longer nauseated, or distended. Advanced diet to low fiber and discharged on 10/20/19.     Acute NAG Metabolic Acidosis, RESOLVED Bicarbonate 12, VBG pH 7.12, CO2 27. Secondary to GI bicarbonate loss in the context of SBO, ileotomy. Anion gap and lactic acid remained normal. Prior bicarbonates around 28. K normal. Nephrology consulted. Placed on D5W + 100 meq/L HCO3 with K 20 meq at 150 ml/h with normalization of bicarbonate. Stopped on 10/19 and remained normal off bicarbonate.     Overall Malaise with Myalgias and Arthralgias without synovitis   * Given her overall malaise, low BP and acidosis, ? underlying infection? Other than abdominal symptoms, no localizing source. Started Zosyn on 10/18. UA showed 30 WBC, UCx <10,000 nl micrograms ana. BCx NGTD. Procalcitonin low > stopped IV zosyn on 10/19/19. No further signs of infection during her stay.     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 Patient denies any new pelvic pain or discharge. She has chronic discomfort cramping with sex, which is unchanged. She will follow up with her Ob/gyn regarding this.      Elevated MCV, chronic  B12 nl. MCV nl at discharge.   Mild leukopenia and anemia - Suspect secondary to dilution. Follow up macrocytosis as outpatient.     Recent Labs   Lab 10/19/19  0650 10/18/19  0656 10/17/19  0939   WBC 3.8* 9.4 12.2*   HGB 10.6* 13.1 15.1    257 286     Depression - continues on PTA sertraline.            Consultations This Hospital Stay   COLORECTAL SURGERY IP CONSULT  NEPHROLOGY IP CONSULT  GYNECOLOGY IP CONSULT    Code Status   Full Code    Time Spent on this Encounter   I, Xuan Archibald MD,  personally saw the patient today and spent less than or equal to 30 minutes discharging this patient.       Xuan Archibald MD  [facility]  ______________________________________________________________________    Physical Exam   Vital Signs: Temp: 96.5  F (35.8  C) Temp src: Oral BP: 99/72 Pulse: 69 Heart Rate: 65 Resp: 16 SpO2: 98 % O2 Device: None (Room air)    Weight: 125 lbs 14.4 oz  Constitutional: NAD, walking around room.   Neuropsyche:  alert and oriented, answers questions appropriately.   Respiratory:  Breathing comfortably, good air exchange, no wheezes, no crackles.   Cardiovascular:  Regular rate and rhythm, no edema.  GI:  soft, NT/ND, BS normal  Skin/Integumen:  No acute rash, bruising or sign of bleeding.          Primary Care Physician   Cinthia Charles    Discharge Orders      Reason for your hospital stay    You were admitted to the hospital with a small bowel obstruction.     Follow-up and recommended labs and tests     Follow up with primary care provider, Cinthia Charles, within 7 days for hospital follow- up.  The following labs/tests are recommended: BMP, CBC.     Activity    Your activity upon discharge: activity as tolerated     Diet    Low Fiber Diet for 2 weeks.       Significant Results and Procedures   Most Recent 3 CBC's:  Recent Labs   Lab Test 10/20/19  0741 10/19/19  0650 10/18/19  0656   WBC 3.6* 3.8* 9.4   HGB 11.5* 10.6* 13.1   MCV 99 101* 105*    200 257     Most Recent 3 BMP's:  Recent Labs   Lab Test 10/20/19  0741 10/19/19  0650 10/18/19  1729    144 141   POTASSIUM 3.5 3.4 3.2*   CHLORIDE 110* 116* 114*   CO2 23 21 16*   BUN 1* 2* 4*   CR 0.40* 0.53 0.50*   ANIONGAP 7 7 11   JUDSON 8.4* 7.7* 7.7*   * 108* 115*     Most Recent 2 LFT's:  Recent Labs   Lab Test 10/19/19  0650 10/18/19  1729   AST 6 7   ALT 10 10   ALKPHOS 41 49   BILITOTAL 0.4 0.4     Most Recent 6 Bacteria Isolates From Any Culture (See EPIC Reports for Culture  Details):  Lab Test 10/18/19  1410 10/18/19  1355 10/18/19  1351   CULT <10,000 colonies/mL  urogenital ana  Susceptibility testing not routinely done   No growth after 2 days No growth after 2 days   Most Recent Urinalysis:  Recent Labs   Lab Test 10/18/19  1410   COLOR Light Yellow   APPEARANCE Clear   URINEGLC Negative   URINEBILI Negative   URINEKETONE >150*   SG 1.012   UBLD Trace*   URINEPH 5.0   PROTEIN 30*   NITRITE Negative   LEUKEST Moderate*   RBCU 1   WBCU 30*     Most Recent Anemia Panel:  Recent Labs   Lab Test 10/20/19  0741 10/19/19  0650   WBC 3.6* 3.8*   HGB 11.5* 10.6*   HCT 33.4* 32.0*   MCV 99 101*    200   B12  --  620   ,   Results for orders placed or performed during the hospital encounter of 10/17/19   CT Abdomen Pelvis w Contrast    Narrative    CT ABDOMEN AND PELVIS WITH CONTRAST   10/17/2019 11:18 AM     HISTORY: History of colectomy, bowel obstructions, worsening pain,  nausea.    TECHNIQUE:  CT abdomen and pelvis with 64 mL Isovue-370 IV. Radiation  dose for this scan was reduced using automated exposure control,  adjustment of the mA and/or kV according to patient size, or iterative  reconstruction technique.    COMPARISON: None available    FINDINGS:  Visualized lung bases are unremarkable.    No focal hepatic lesion is seen. No intrahepatic or extra hepatic  biliary ductal dilatation is present. Liver is enlarged measuring 19.8  cm in length. Gallbladder is unremarkable.    The spleen, adrenal glands and pancreas are unremarkable. Kidneys  enhance symmetrically. No hydronephrosis is seen. Tiny, 1-2 mm  calculus seen in the lower poles of both kidneys.    Stomach is moderately distended with ingested contents and air.  Postsurgical changes seen involving the rectosigmoid and sigmoid  colon. Small bowel anastomosis seen within the right lower quadrant.  Mildly distended bowel loop is noted within the lower abdomen  measuring up to 3.6 cm in diameter (series 4, image 12).  Transition to  nondilated bowel appears to be at the small bowel anastomosis (series  2, image 50).    Abdominal aorta and IVC are of normal course and caliber. No  retroperitoneal, mesenteric or pelvic lymphadenopathy seen.    Uterus is enlarged with intrauterine device seen in the endometrial  canal. Enlarged, prominent periuterine and paraovarian vessels are  present. Left ovarian vein is dilated measuring up to 1.0 cm. Urinary  bladder is moderately distended.    No suspicious osseous lesions seen.      Impression    IMPRESSION:  1.  Suspected small bowel obstruction with dilated loops seen in the  lower abdomen. Transition to nondilated bowel appears to be seen near  the small bowel anastomosis within the right lower quadrant.  2.  Multiple dilated periuterine and paraovarian vessels with dilated  left ovarian vein. Findings can be seen in pelvic congestion syndrome.  3.  Nonspecific hepatomegaly.    GINNA ALLISON MD       Discharge Medications   Current Discharge Medication List      CONTINUE these medications which have NOT CHANGED    Details   levonorgestrel (MIRENA) 20 MCG/24HR IUD 1 each by Intrauterine route once      sertraline (ZOLOFT) 100 MG tablet Take 1 tablet (100 mg) by mouth daily  Qty: 90 tablet, Refills: 1    Associated Diagnoses: Current severe episode of major depressive disorder without psychotic features without prior episode (H)           Allergies   No Known Allergies

## 2019-10-21 ENCOUNTER — TELEPHONE (OUTPATIENT)
Dept: OBGYN | Facility: CLINIC | Age: 34
End: 2019-10-21

## 2019-10-21 NOTE — TELEPHONE ENCOUNTER
Gave message and phone number for Dr. Archibald to Dr. Luna to call.   Dr. Luna did try to reach Dr. Archibald on 10/18 but was unable to reach them.

## 2019-10-21 NOTE — TELEPHONE ENCOUNTER
----- Message from Stephanie Palmer sent at 10/18/2019  2:06 PM CDT -----  Regarding: Dr. Archibald from [facility] requesting call back Dr. Luna regarding CT Results  Daysi from [facility] called on behalf of Dr. Archibald and requested message be sent to  Dr. Luna to call Dr. Archibald back regarding CT results. Please have Dr. Luna call [phone]. I did call try the nurse line, but Daysi could not hold long enough for a nurse and requested message be sent.     Thank you,  Stephanie    Please DO NOT send message and or reply back to sender. Call center Representatives DO NOT respond to Messages.

## 2019-10-24 LAB
BACTERIA SPEC CULT: NO GROWTH
BACTERIA SPEC CULT: NO GROWTH
Lab: NORMAL
Lab: NORMAL
SPECIMEN SOURCE: NORMAL
SPECIMEN SOURCE: NORMAL

## 2019-11-20 ENCOUNTER — TRANSFERRED RECORDS (OUTPATIENT)
Dept: HEALTH INFORMATION MANAGEMENT | Facility: CLINIC | Age: 34
End: 2019-11-20

## 2019-11-25 ENCOUNTER — OFFICE VISIT (OUTPATIENT)
Dept: INTERNAL MEDICINE | Facility: CLINIC | Age: 34
End: 2019-11-25
Attending: INTERNAL MEDICINE
Payer: COMMERCIAL

## 2019-11-25 VITALS
SYSTOLIC BLOOD PRESSURE: 95 MMHG | BODY MASS INDEX: 23.72 KG/M2 | DIASTOLIC BLOOD PRESSURE: 64 MMHG | WEIGHT: 129.7 LBS | HEART RATE: 83 BPM

## 2019-11-25 DIAGNOSIS — F32.2 CURRENT SEVERE EPISODE OF MAJOR DEPRESSIVE DISORDER WITHOUT PSYCHOTIC FEATURES WITHOUT PRIOR EPISODE (H): ICD-10-CM

## 2019-11-25 PROCEDURE — G0463 HOSPITAL OUTPT CLINIC VISIT: HCPCS | Mod: ZF

## 2019-11-25 RX ORDER — SERTRALINE HYDROCHLORIDE 100 MG/1
100 TABLET, FILM COATED ORAL DAILY
Qty: 90 TABLET | Refills: 1 | Status: SHIPPED | OUTPATIENT
Start: 2019-11-25 | End: 2020-10-13

## 2019-11-25 ASSESSMENT — ENCOUNTER SYMPTOMS
ALTERED TEMPERATURE REGULATION: 0
COUGH: 0
FEVER: 0
CHILLS: 0
DEPRESSION: 0
POLYPHAGIA: 0
NERVOUS/ANXIOUS: 0
SINUS CONGESTION: 0
SWOLLEN GLANDS: 0
BACK PAIN: 0
SORE THROAT: 1
PANIC: 0
WEIGHT GAIN: 1
POLYDIPSIA: 0
BRUISES/BLEEDS EASILY: 0
DECREASED LIBIDO: 1
INSOMNIA: 0
DECREASED CONCENTRATION: 0
FATIGUE: 0
DYSPNEA ON EXERTION: 0

## 2019-11-25 ASSESSMENT — PATIENT HEALTH QUESTIONNAIRE - PHQ9
5. POOR APPETITE OR OVEREATING: NOT AT ALL
SUM OF ALL RESPONSES TO PHQ QUESTIONS 1-9: 4

## 2019-11-25 ASSESSMENT — ANXIETY QUESTIONNAIRES
2. NOT BEING ABLE TO STOP OR CONTROL WORRYING: NOT AT ALL
5. BEING SO RESTLESS THAT IT IS HARD TO SIT STILL: NOT AT ALL
1. FEELING NERVOUS, ANXIOUS, OR ON EDGE: NOT AT ALL
3. WORRYING TOO MUCH ABOUT DIFFERENT THINGS: NOT AT ALL
6. BECOMING EASILY ANNOYED OR IRRITABLE: NOT AT ALL
GAD7 TOTAL SCORE: 0
7. FEELING AFRAID AS IF SOMETHING AWFUL MIGHT HAPPEN: NOT AT ALL

## 2019-11-25 NOTE — PROGRESS NOTES
HPI  Patient is here for follow-up on depression.  She reports that she continues to have some mild symptoms, however overall she is doing a lot better.  She is currently in therapy.  She states that she is seeing somewhat undesirable side effects of sertraline, including decreased libido as well as weight gain.  She states that her goal would be to discontinue antidepressant therapy in approximately 6 months.    Review of Systems     Constitutional:  Positive for weight gain. Negative for fever, chills and fatigue.   HENT:  Positive for sore throat. Negative for sinus congestion.    Respiratory:   Negative for cough and dyspnea on exertion.    Cardiovascular:  Negative for chest pain, dyspnea on exertion and edema.   Genitourinary:  Positive for decreased libido.   Musculoskeletal:  Negative for back pain.   Endo/Heme:  Negative for anemia, swollen glands and bruises/bleeds easily.   Psychiatric/Behavioral:  Negative for depression, decreased concentration, mood swings and panic attacks.    Endocrine:  Negative for altered temperature regulation, polyphagia, polydipsia, unwanted hair growth and change in facial hair.    Current Outpatient Medications   Medication     levonorgestrel (MIRENA) 20 MCG/24HR IUD     sertraline (ZOLOFT) 100 MG tablet     No current facility-administered medications for this visit.      Vitals:    11/25/19 0813 11/25/19 0814 11/25/19 0815   BP: 95/61 94/62 95/64   Pulse: 83 83 83   Weight: 58.8 kg (129 lb 11.2 oz)         Physical Exam  Vitals signs and nursing note reviewed.   Constitutional:       Appearance: Normal appearance.   HENT:      Head: Normocephalic and atraumatic.   Eyes:      Pupils: Pupils are equal, round, and reactive to light.   Neck:      Musculoskeletal: Normal range of motion.   Cardiovascular:      Rate and Rhythm: Normal rate.   Pulmonary:      Effort: Pulmonary effort is normal.   Musculoskeletal:         General: No edema.   Neurological:      General: No focal  deficit present.      Mental Status: She is alert and oriented to person, place, and time.   Psychiatric:         Mood and Affect: Mood normal.         Behavior: Behavior normal.         Thought Content: Thought content normal.         Judgment: Judgment normal.         PHQ-9 SCORE 10/21/2016 5/1/2019 11/25/2019   PHQ-9 Total Score - - -   PHQ-9 Total Score 10 22 4     Assessment and Plan:  Amber was seen today for follow up.    Diagnoses and all orders for this visit:    Current severe episode of major depressive disorder without psychotic features without prior episode (H).  Patient reports significant improvement in symptoms.  Discussed medication related side effects.  Patient does not want to switch to different medication this time.  Will continue with current plan of care.  Recommend follow-up in 3 months.  Patient was encouraged to continue with therapy.  Advised on the need for adequate control of symptoms for 6 months prior to taper off antidepressant medication.  -     sertraline (ZOLOFT) 100 MG tablet; Take 1 tablet (100 mg) by mouth daily    Total time spent 15 minutes.  More than 50% of the time spent with Ms. Buckner on counseling / coordinating her care    Cinthia Charles MD

## 2019-11-25 NOTE — LETTER
11/25/2019       RE: Amber Buckner  [address] [id number]     Dear Colleague,    Thank you for referring your patient, Amber Buckner, to the [facility]  at [facility]. Please see a copy of my visit note below.    HPI  Patient is here for follow-up on depression.  She reports that she continues to have some mild symptoms, however overall she is doing a lot better.  She is currently in therapy.  She states that she is seeing somewhat undesirable side effects of sertraline, including decreased libido as well as weight gain.  She states that her goal would be to discontinue antidepressant therapy in approximately 6 months.    Review of Systems   Constitutional:  Positive for weight gain. Negative for fever, chills and fatigue.   HENT:  Positive for sore throat. Negative for sinus congestion.    Respiratory:   Negative for cough and dyspnea on exertion.    Cardiovascular:  Negative for chest pain, dyspnea on exertion and edema.   Genitourinary:  Positive for decreased libido.   Musculoskeletal:  Negative for back pain.   Endo/Heme:  Negative for anemia, swollen glands and bruises/bleeds easily.   Psychiatric/Behavioral:  Negative for depression, decreased concentration, mood swings and panic attacks.    Endocrine:  Negative for altered temperature regulation, polyphagia, polydipsia, unwanted hair growth and change in facial hair.    Current Outpatient Medications   Medication     levonorgestrel (MIRENA) 20 MCG/24HR IUD     sertraline (ZOLOFT) 100 MG tablet     No current facility-administered medications for this visit.      Vitals:    11/25/19 0813 11/25/19 0814 11/25/19 0815   BP: 95/61 94/62 95/64   Pulse: 83 83 83   Weight: 58.8 kg (129 lb 11.2 oz)       Physical Exam  Vitals signs and nursing note reviewed.   Constitutional:       Appearance: Normal appearance.   HENT:      Head: Normocephalic and atraumatic.   Eyes:       Pupils: Pupils are equal, round, and reactive to light.   Neck:      Musculoskeletal: Normal range of motion.   Cardiovascular:      Rate and Rhythm: Normal rate.   Pulmonary:      Effort: Pulmonary effort is normal.   Musculoskeletal:         General: No edema.   Neurological:      General: No focal deficit present.      Mental Status: She is alert and oriented to person, place, and time.   Psychiatric:         Mood and Affect: Mood normal.         Behavior: Behavior normal.         Thought Content: Thought content normal.         Judgment: Judgment normal.     PHQ-9 SCORE 10/21/2016 5/1/2019 11/25/2019   PHQ-9 Total Score - - -   PHQ-9 Total Score 10 22 4     Assessment and Plan:  Amber was seen today for follow up.    Diagnoses and all orders for this visit:    Current severe episode of major depressive disorder without psychotic features without prior episode (H).  Patient reports significant improvement in symptoms.  Discussed medication related side effects.  Patient does not want to switch to different medication this time.  Will continue with current plan of care.  Recommend follow-up in 3 months.  Patient was encouraged to continue with therapy.  Advised on the need for adequate control of symptoms for 6 months prior to taper off antidepressant medication.  -     sertraline (ZOLOFT) 100 MG tablet; Take 1 tablet (100 mg) by mouth daily    Total time spent 15 minutes.  More than 50% of the time spent with Ms. Buckner on counseling / coordinating her care    Cinthia Charles MD

## 2019-11-25 NOTE — NURSING NOTE
Chief Complaint   Patient presents with     Follow Up     Depression symptoms   Elicia Domingo LPN

## 2019-11-26 ASSESSMENT — ANXIETY QUESTIONNAIRES: GAD7 TOTAL SCORE: 0

## 2019-12-12 ENCOUNTER — OFFICE VISIT (OUTPATIENT)
Dept: OBGYN | Facility: CLINIC | Age: 34
End: 2019-12-12
Payer: COMMERCIAL

## 2019-12-12 VITALS
HEIGHT: 62 IN | DIASTOLIC BLOOD PRESSURE: 63 MMHG | OXYGEN SATURATION: 98 % | WEIGHT: 132 LBS | SYSTOLIC BLOOD PRESSURE: 98 MMHG | HEART RATE: 76 BPM | BODY MASS INDEX: 24.29 KG/M2

## 2019-12-12 DIAGNOSIS — Z30.432 ENCOUNTER FOR IUD REMOVAL: ICD-10-CM

## 2019-12-12 DIAGNOSIS — R10.2 PELVIC PAIN IN FEMALE: Primary | ICD-10-CM

## 2019-12-12 PROCEDURE — 58301 REMOVE INTRAUTERINE DEVICE: CPT | Performed by: OBSTETRICS & GYNECOLOGY

## 2019-12-12 PROCEDURE — 99203 OFFICE O/P NEW LOW 30 MIN: CPT | Mod: 25 | Performed by: OBSTETRICS & GYNECOLOGY

## 2019-12-12 ASSESSMENT — MIFFLIN-ST. JEOR: SCORE: 1247

## 2019-12-12 NOTE — NURSING NOTE
"Chief Complaint   Patient presents with     Contraception     IUD REMOVAL.      Follow Up       Initial BP 98/63   Pulse 76   Ht 1.575 m (5' 2\")   Wt 59.9 kg (132 lb)   LMP  (LMP Unknown)   SpO2 98%   Breastfeeding No   BMI 24.14 kg/m   Estimated body mass index is 24.14 kg/m  as calculated from the following:    Height as of this encounter: 1.575 m (5' 2\").    Weight as of this encounter: 59.9 kg (132 lb).  BP completed using cuff size: regular    Questioned patient about current smoking habits.  Pt. has never smoked.          The following HM Due: NONE      The following patient reported/Care Every where data was sent to:  P ABSTRACT QUALITY INITIATIVES [90707]  n/a      patient has appointment for today              "

## 2019-12-17 NOTE — PROGRESS NOTES
S:   Amber presents to [facility] care and   - IUD removal. Has never really been great. Has cramping and spotting. Unsure if she has more or less pelvic pain with it. Has pretty significant depressive episode currently and wants to make sure that isn't impacting anything. Removal was previously attempted on 18 by Jona Vazquez at [facility]. IUD hook and pap brush were not successful.  - Recently admitted for small bowel obstruction. Imaging showing dilated uterine and ovarian veins. Wondering if she has pelvic congestion syndrome. Pain has been longstanding. Mostly pain with sex. On insertion and deeper, but mostly with insertion. Very limited sexual activity currently do to this and to depression. Has tried many things for dyspareunia, PT particularly. Feels like a burning pain after sex, lingers for 30 min to one hour. Has to soak in bathtub. Feels like it's caused by friction.     Past Medical History:   Diagnosis Date     Adolescent depression in college     Anxiety      Fit/adjust GI carole-device NEC      Perineal abscess      PONV (postoperative nausea and vomiting)      S/P total colectomy      Ulcerative colitis      Past Surgical History:   Procedure Laterality Date      SECTION  2012    Procedure: SECTION; Surgeon:JAVI BELL; Location:[facility] L[facility]      SECTION N/A 2014    Procedure:  SECTION;  Surgeon: Shawanda Luna MD;  Location: [facility] L+D      SECTION N/A 2017    Procedure:  SECTION;  Repeat  Section ;  Surgeon: Laurie Montoya MD;  Location: [facility] L[facility]     COLECTOMY SUBTOTAL       COLONOSCOPY N/A 4/15/2015    Procedure: COLONOSCOPY;  Surgeon: Al Santo MD;  Location: [facility] OR     EXAM UNDER ANESTHESIA ANUS N/A 4/15/2015    Procedure: EXAM UNDER ANESTHESIA ANUS;  Surgeon: Al Santo MD;  Location: [facility]     FISTULOTOMY RECTUM N/A 4/15/2015    Procedure: FISTULOTOMY RECTUM;  Surgeon: Al Santo MD;   "Location: [facility] OR     TAKEDOWN ILEOSTOMY       tonsilectomy       TRANSPERINEAL REPAIR FISTULA RECTAL URETHRAL  2015     Current Outpatient Medications   Medication     sertraline (ZOLOFT) 100 MG tablet     levonorgestrel (MIRENA) 20 MCG/24HR IUD     No current facility-administered medications for this visit.       No Known Allergies    O:  Vitals:    12/12/19 1259   BP: 98/63   Pulse: 76   SpO2: 98%   Weight: 59.9 kg (132 lb)   Height: 1.575 m (5' 2\")     Gen: well appearing  Psych: affect flat  Abd: soft, NT, surgical scars.  : uterine non tender, adnexa non tender. IUD strings not visible.   MS: levator ani non tender bilaterally. Obturator internus minimally tender bilaterally. Pelvic exam does not elicit the burning pain she has after sex.    Procedure IUD REMOVAL:    Bimanual exam was performed.  The IUD strings were not palpable.  Speculum introduced with patient in the dorsal lithotomy position.  The IUD string was not visualized. The cervical canal was narrow so alligator forceps were introduced. After multiple attempts the IUD was removed easily.  Pt tolerated well.        CT ABDOMEN AND PELVIS WITH CONTRAST   10/17/2019 11:18 AM      HISTORY: History of colectomy, bowel obstructions, worsening pain,  nausea.     TECHNIQUE:  CT abdomen and pelvis with 64 mL Isovue-370 IV. Radiation  dose for this scan was reduced using automated exposure control,  adjustment of the mA and/or kV according to patient size, or iterative  reconstruction technique.     COMPARISON: None available     FINDINGS:  Visualized lung bases are unremarkable.     No focal hepatic lesion is seen. No intrahepatic or extra hepatic  biliary ductal dilatation is present. Liver is enlarged measuring 19.8  cm in length. Gallbladder is unremarkable.     The spleen, adrenal glands and pancreas are unremarkable. Kidneys  enhance symmetrically. No hydronephrosis is seen. Tiny, 1-2 mm  calculus seen in the lower poles of both kidneys.     Stomach " is moderately distended with ingested contents and air.  Postsurgical changes seen involving the rectosigmoid and sigmoid  colon. Small bowel anastomosis seen within the right lower quadrant.  Mildly distended bowel loop is noted within the lower abdomen  measuring up to 3.6 cm in diameter (series 4, image 12). Transition to  nondilated bowel appears to be at the small bowel anastomosis (series  2, image 50).     Abdominal aorta and IVC are of normal course and caliber. No  retroperitoneal, mesenteric or pelvic lymphadenopathy seen.     Uterus is enlarged with intrauterine device seen in the endometrial  canal. Enlarged, prominent periuterine and paraovarian vessels are  present. Left ovarian vein is dilated measuring up to 1.0 cm. Urinary  bladder is moderately distended.     No suspicious osseous lesions seen.                                                                      IMPRESSION:  1.  Suspected small bowel obstruction with dilated loops seen in the  lower abdomen. Transition to nondilated bowel appears to be seen near  the small bowel anastomosis within the right lower quadrant.  2.  Multiple dilated periuterine and paraovarian vessels with dilated  left ovarian vein. Findings can be seen in pelvic congestion syndrome.  3.  Nonspecific hepatomegaly.     GINNA ALLISON MD       A/P:  34 year old  with   1. IUD removal  - Removed with forceps  - Discussed IUD side effects. Discussed minimal absorption of progestin, so unlikely to be causing severe depression symptoms. Will see how she feels with removal.  - Declines other contraceptive option due to sexually inactive at this time.     2. Pelvic pain  - Discussed pelvic congestion syndrome and typical symptoms.   - Discussed imaging findings often present without signs and symptoms.   - Patient does describe pelvic pressure and heaviness. Uterus and ovaries non-tender, which doesn't support diagnosis. Discussed treatment is usually hysterectomy,  which is presumptive and does not always result in symptom resolution. Given her surgical history would only pursue this with more definitive symptoms. There are reports of IR procedures to block pelvic vasculature, I have not seen this in practice.   - Will see how she does with IUD removal.  - discussed exam findings today.   - Discussed trial of lidocaine with intercourse to see if that improves symptoms (with concurrent condom use). Will consider this when her depression improves and she feels more interested in sex.     30 min was spent with this patient that does not include time spent on the procedure and over 50 % of the 30 min was spent counseling on above issues

## 2020-02-03 ENCOUNTER — OFFICE VISIT (OUTPATIENT)
Dept: URGENT CARE | Facility: URGENT CARE | Age: 35
End: 2020-02-03
Payer: COMMERCIAL

## 2020-02-03 VITALS
TEMPERATURE: 98.6 F | DIASTOLIC BLOOD PRESSURE: 69 MMHG | WEIGHT: 134.4 LBS | RESPIRATION RATE: 16 BRPM | OXYGEN SATURATION: 96 % | BODY MASS INDEX: 24.58 KG/M2 | SYSTOLIC BLOOD PRESSURE: 108 MMHG | HEART RATE: 77 BPM

## 2020-02-03 DIAGNOSIS — B34.9 VIRAL SYNDROME: Primary | ICD-10-CM

## 2020-02-03 PROCEDURE — 99213 OFFICE O/P EST LOW 20 MIN: CPT | Performed by: FAMILY MEDICINE

## 2020-02-03 ASSESSMENT — ENCOUNTER SYMPTOMS
DYSURIA: 0
HEADACHES: 0
COUGH: 0
DIARRHEA: 0
WOUND: 0
VOMITING: 0
SHORTNESS OF BREATH: 0
NAUSEA: 0
RHINORRHEA: 0
MYALGIAS: 1
CHILLS: 0
SORE THROAT: 0
FEVER: 0

## 2020-02-03 ASSESSMENT — PAIN SCALES - GENERAL: PAINLEVEL: SEVERE PAIN (6)

## 2020-02-03 NOTE — PROGRESS NOTES
SUBJECTIVE:   Amber Buckner is a 34 year old female presenting with a chief complaint of   Chief Complaint   Patient presents with     Headache     x 10 days-[facility] and [facility] A      Back Pain     started 5-6 days ago-right now is the middle back but it can go all the way up and down        She is an established patient of [facility].    Neck and back stiffness over the past 5 days.   Cough -- thick yellow  -- over the past 3 days.   Tested positive for flu this past Sat did not  Tamiflu.     Hx of depression -- zoloft -- recently -- sx such as chest heaviness.   Hx of depression x 20 years -- anxiety hx.     Flu shot this year.   Nurse educator       Review of Systems   Constitutional: Negative for chills and fever.   HENT: Negative for congestion, ear pain, rhinorrhea and sore throat.    Respiratory: Negative for cough and shortness of breath.    Gastrointestinal: Negative for diarrhea, nausea and vomiting.   Genitourinary: Negative for dysuria, menstrual problem, vaginal bleeding, vaginal discharge and vaginal pain.   Musculoskeletal: Positive for myalgias (neck and back ).   Skin: Negative for rash and wound.   Allergic/Immunologic: Negative for environmental allergies and food allergies.   Neurological: Negative for headaches.       Past Medical History:   Diagnosis Date     Adolescent depression in college     Anxiety      Fit/adjust GI carole-device NEC 2001     Perineal abscess      PONV (postoperative nausea and vomiting)      S/P total colectomy 2001     Ulcerative colitis      Family History   Problem Relation Age of Onset     Diabetes Maternal Grandmother      Diabetes Paternal Grandfather      Genetic Disorder Father         chron's     Gastrointestinal Disease Sister         ulcerative colitis     Current Outpatient Medications   Medication Sig Dispense Refill     Acetaminophen (TYLENOL PO)        sertraline (ZOLOFT) 100 MG tablet Take 1 tablet (100 mg) by  mouth daily 90 tablet 1     levonorgestrel (MIRENA) 20 MCG/24HR IUD 1 each by Intrauterine route once       Social History     Tobacco Use     Smoking status: Never Smoker     Smokeless tobacco: Never Used   Substance Use Topics     Alcohol use: No     Alcohol/week: 0.0 standard drinks     Comment: not since pregnancy       OBJECTIVE  /69 (BP Location: Left arm, Patient Position: Sitting, Cuff Size: Adult Regular)   Pulse 77   Temp 98.6  F (37  C) (Oral)   Resp 16   Wt 61 kg (134 lb 6.4 oz)   SpO2 96%   BMI 24.58 kg/m      Physical Exam  HENT:      Head: Normocephalic and atraumatic.      Right Ear: External ear normal.      Left Ear: External ear normal.      Nose: Nose normal.      Mouth/Throat:      Pharynx: No oropharyngeal exudate.   Eyes:      General: No scleral icterus.        Right eye: No discharge.         Left eye: No discharge.      Conjunctiva/sclera: Conjunctivae normal.      Pupils: Pupils are equal, round, and reactive to light.   Neck:      Musculoskeletal: Neck supple.      Thyroid: No thyromegaly.      Trachea: No tracheal deviation.   Cardiovascular:      Rate and Rhythm: Normal rate and regular rhythm.      Heart sounds: Normal heart sounds. No murmur. No friction rub. No gallop.    Pulmonary:      Effort: Pulmonary effort is normal. No respiratory distress.      Breath sounds: Normal breath sounds. No stridor. No wheezing or rales.   Chest:      Chest wall: No tenderness.   Abdominal:      General: Bowel sounds are normal. There is no distension.      Palpations: Abdomen is soft. There is no mass.      Tenderness: There is no abdominal tenderness. There is no guarding or rebound.   Musculoskeletal:         General: No tenderness or deformity.   Lymphadenopathy:      Cervical: No cervical adenopathy.   Skin:     General: Skin is warm and dry.      Findings: No erythema or rash.   Neurological:      Mental Status: She is alert and oriented to person, place, and time.      Cranial  Nerves: No cranial nerve deficit.   Psychiatric:         Mood and Affect: Mood normal.         Judgment: Judgment normal.           ASSESSMENT:    ICD-10-CM    1. Viral syndrome B34.9         PLAN:  Rest, fluids and time.   Discussed viral myositis as a cause for muscle pain  Trial of NSAID described.   The patient indicates understanding of these issues and agrees with the plan.   Artie Ramos MD

## 2020-02-07 ENCOUNTER — TRANSFERRED RECORDS (OUTPATIENT)
Dept: HEALTH INFORMATION MANAGEMENT | Facility: CLINIC | Age: 35
End: 2020-02-07

## 2020-08-08 ENCOUNTER — E-VISIT (OUTPATIENT)
Dept: OBGYN | Facility: CLINIC | Age: 35
End: 2020-08-08
Payer: COMMERCIAL

## 2020-08-08 DIAGNOSIS — F33.1 MAJOR DEPRESSIVE DISORDER, RECURRENT EPISODE, MODERATE (H): ICD-10-CM

## 2020-08-08 DIAGNOSIS — F41.1 GENERALIZED ANXIETY DISORDER: ICD-10-CM

## 2020-08-08 PROCEDURE — 99422 OL DIG E/M SVC 11-20 MIN: CPT | Performed by: OBSTETRICS & GYNECOLOGY

## 2020-08-08 ASSESSMENT — PATIENT HEALTH QUESTIONNAIRE - PHQ9
SUM OF ALL RESPONSES TO PHQ QUESTIONS 1-9: 11
SUM OF ALL RESPONSES TO PHQ QUESTIONS 1-9: 11
10. IF YOU CHECKED OFF ANY PROBLEMS, HOW DIFFICULT HAVE THESE PROBLEMS MADE IT FOR YOU TO DO YOUR WORK, TAKE CARE OF THINGS AT HOME, OR GET ALONG WITH OTHER PEOPLE: VERY DIFFICULT

## 2020-08-08 ASSESSMENT — ANXIETY QUESTIONNAIRES
GAD7 TOTAL SCORE: 20
6. BECOMING EASILY ANNOYED OR IRRITABLE: NEARLY EVERY DAY
GAD7 TOTAL SCORE: 20
2. NOT BEING ABLE TO STOP OR CONTROL WORRYING: NEARLY EVERY DAY
5. BEING SO RESTLESS THAT IT IS HARD TO SIT STILL: MORE THAN HALF THE DAYS
1. FEELING NERVOUS, ANXIOUS, OR ON EDGE: NEARLY EVERY DAY
GAD7 TOTAL SCORE: 20
4. TROUBLE RELAXING: NEARLY EVERY DAY
3. WORRYING TOO MUCH ABOUT DIFFERENT THINGS: NEARLY EVERY DAY
7. FEELING AFRAID AS IF SOMETHING AWFUL MIGHT HAPPEN: NEARLY EVERY DAY
7. FEELING AFRAID AS IF SOMETHING AWFUL MIGHT HAPPEN: NEARLY EVERY DAY

## 2020-08-09 RX ORDER — CITALOPRAM HYDROBROMIDE 10 MG/1
10 TABLET ORAL DAILY
Qty: 30 TABLET | Refills: 1 | Status: SHIPPED | OUTPATIENT
Start: 2020-08-09 | End: 2021-08-15

## 2020-08-09 ASSESSMENT — PATIENT HEALTH QUESTIONNAIRE - PHQ9: SUM OF ALL RESPONSES TO PHQ QUESTIONS 1-9: 11

## 2020-08-09 ASSESSMENT — ANXIETY QUESTIONNAIRES: GAD7 TOTAL SCORE: 20

## 2020-08-09 NOTE — TELEPHONE ENCOUNTER
Provider E-Visit time total (minutes): 12    PHQ 5/1/2019 11/25/2019 8/8/2020   PHQ-9 Total Score 22 4 11   Q9: Thoughts of better off dead/self-harm past 2 weeks Not at all Not at all Not at all     LAZARO-7 SCORE 5/13/2019 11/25/2019 8/8/2020   Total Score 14 (moderate anxiety) - 20 (severe anxiety)   Total Score 14 0 20

## 2020-08-09 NOTE — PATIENT INSTRUCTIONS
"  Thank you for choosing us for your care. I have placed an order for a prescription so that you can start treatment. View your full visit summary for details by clicking on the link below. Your pharmacist will able to address any questions you may have about the medication.      If you're not feeling better within 4-6 weeks please schedule an appointment.  You can schedule an appointment right here in [facility], or call [phone]  If the visit is for the same symptoms as your e-visit, we'll refund the cost of your e-visit if seen within seven days.      Warning Signs of Suicide and What You Can Do    If you think a person could be suicidal, ask, \"Have you thought about suicide?\" If they say \"yes,\" they may already have a plan for how and when they will attempt it. Find out as much as you can. The more detailed the plan, and the easier it is to carry out, the more danger the person is in right now.  Know the warning signs  The warning signs for suicide include:    Threats or talk of suicide    Sense of hopelessness    Buying a gun or other weapon    Statements such as \"Soon, I won't be a problem\" or \"Nothing matters\"    Giving away items they own, making out a will, or planning their     Suddenly being happy or calm after being depressed  Factors that put a person at a higher risk of attempting suicide include:    A history of suicide in the person's family    Previous suicide attempts    Alcohol and drug use, along with impulsive behaviors    Having a diagnose mood disorder such as depression or bipolar disorder    History of trauma or abuse including bullying    Significant losses such as a divorce, death of a loved one, financial problems, or legal problems    Having access to a lethal weapon (for example firearms in the home)    Chronic physical illnesses, including chronic pain    Exposure to suicidal behavior of others  Get help  Don't try to handle this alone. You can be the most help by getting the " "person to a trained professional. Suicidal thinking may be a sign of depression, a serious but treatable illness.  In an emergency--call 911  Don't leave the person alone. Anyone who is at imminent risk of suicide needs psychiatric services right away. The person must be continuously monitored, and never left out of sight. Call 911 or a 24-hour suicide crisis hotline. It can be found in the white pages of your phone book under \"Suicide.\" You can also take the person to the nearest hospital emergency room (ER).  Don't keep it a secret and don't wait  Call a mental health clinic or a licensed mental health professional in your area right away: a psychiatrist, clinical psychologist, psychiatric or licensed clinical , marriage and family counselor, or clergy. Tell them you need help for a person who is thinking about suicide.  Resources    National Suicide Prevention Ezejzabq385-651-2256 (318-851-PWAY)www.suicidepreventionlifeline.org    National Suicide Bjsgwor734-631-4940 (800-SUICIDE)    National [place] of Mental Dmkcxc385-072-1755ajk.[facility].nih.gov    National [place] on Mental Ntbvljr008-293-0257uvp.lucie.org    Mental Health Vwdwras728-309-0136zli.nmha.org   Date Last Reviewed: 1/1/2017 2000-2019 [employer]. [address] [place] [address]. All rights reserved. This information is not intended as a substitute for professional medical care. Always follow your healthcare professional's instructions.          Recognizing Suicide Warning Signs in Yourself    People who are thinking about suicide may not know they are depressed. Certain thoughts, feelings, and actions can be signals that let you know you may need help. The best thing you can do is watch for signs that you may be at risk. Then, ask for help. You can talk to your regular healthcare provider or seek help from a mental health provider.  Depression  Depression is a treatable illness. To know if depression is causing " "you to feel like ending your life, ask yourself:    Do I feel worthless, guilty, helpless, or hopeless?    Have I been feeling sad, down, or blue on most days?    Have I lost interest in my work or people I used to enjoy?    Do I have trouble sleeping or do I sleep too much?    Do I eat more or less than usual?    Do I feel tired, weak, and low on energy?    Do I feel restless and unable to sit still?    Do I have trouble thinking or making choices?    Do I cry more than usual?    Do I feel life isn't worth living?  Warning signs for suicide    Thinking often about taking your life    Planning how you may attempt it    Talking or writing about committing suicide    Feeling that death is the only solution to your problems    Feeling a pressing need to make out your will or arrange your     Giving away things you own    Participating in risky behaviors, such as sex with someone you don't know or drinking and driving    Buying a lethal weapon, such as a gun, or hoarding medicines that could be used in an over dose  If you notice any of these warning signs, call for help right away or go to your closest hospital emergency department. You can also call a mental health clinic or a 24-hour suicide crisis hotline for help and support. Search for local suicide prevention resources on your computer or look for the number in the white pages of your phone book under \"Suicide.\" In an emergency, if you are in immediate risk of harming yourself, call 911. For more information about depression:    National [place] of Mental Ekvzmm707-261-6898lrb.[facility].nih.gov    National Suicide Prevention Xmggdfdx022-990-9172 (893-871-MYFY)www.suicidepreventionlifeline.org    National [place] on Mental Xszwdkh672-701-2412kaj.lucie.org    Mental Health Daxkxjq196-589-4340pna.[facility].org    National Suicide Lotjawi652-623-6240 (800-SUICIDE)   Date Last Reviewed: 2017-2019 [employer]. [address] [place] [address], [place], " [place] [address]. All rights reserved. This information is not intended as a substitute for professional medical care. Always follow your healthcare professional's instructions.

## 2020-10-13 ENCOUNTER — E-VISIT (OUTPATIENT)
Dept: OBGYN | Facility: CLINIC | Age: 35
End: 2020-10-13
Payer: COMMERCIAL

## 2020-10-13 DIAGNOSIS — F41.1 GENERALIZED ANXIETY DISORDER: Primary | ICD-10-CM

## 2020-10-13 DIAGNOSIS — F33.1 MAJOR DEPRESSIVE DISORDER, RECURRENT EPISODE, MODERATE (H): ICD-10-CM

## 2020-10-13 PROCEDURE — 99421 OL DIG E/M SVC 5-10 MIN: CPT | Performed by: OBSTETRICS & GYNECOLOGY

## 2020-10-13 RX ORDER — CITALOPRAM HYDROBROMIDE 10 MG/1
10 TABLET ORAL DAILY
Qty: 90 TABLET | Refills: 3 | Status: SHIPPED | OUTPATIENT
Start: 2020-10-13 | End: 2021-08-15

## 2020-10-13 ASSESSMENT — ANXIETY QUESTIONNAIRES
6. BECOMING EASILY ANNOYED OR IRRITABLE: NOT AT ALL
4. TROUBLE RELAXING: SEVERAL DAYS
GAD7 TOTAL SCORE: 2
GAD7 TOTAL SCORE: 2
7. FEELING AFRAID AS IF SOMETHING AWFUL MIGHT HAPPEN: NOT AT ALL
5. BEING SO RESTLESS THAT IT IS HARD TO SIT STILL: NOT AT ALL
7. FEELING AFRAID AS IF SOMETHING AWFUL MIGHT HAPPEN: NOT AT ALL
2. NOT BEING ABLE TO STOP OR CONTROL WORRYING: NOT AT ALL
3. WORRYING TOO MUCH ABOUT DIFFERENT THINGS: NOT AT ALL
1. FEELING NERVOUS, ANXIOUS, OR ON EDGE: SEVERAL DAYS

## 2020-10-13 NOTE — LETTER
My Depression Action Plan  Name: Amber Bukcner   Date of Birth 1985  Date: 10/13/2020    My doctor: Cinthia Charles   My clinic: [address] [id number] [phone]          GREEN    ZONE   Good Control    What it looks like:     Things are going generally well. You have normal ups and downs. You may even feel depressed from time to time, but bad moods usually last less than a day.   What you need to do:  1. Continue to care for yourself (see self care plan)  2. Check your depression survival kit and update it as needed  3. Follow your physician s recommendations including any medication.  4. Do not stop taking medication unless you consult with your physician first.           YELLOW         ZONE Getting Worse    What it looks like:     Depression is starting to interfere with your life.     It may be hard to get out of bed; you may be starting to isolate yourself from others.    Symptoms of depression are starting to last most all day and this has happened for several days.     You may have suicidal thoughts but they are not constant.   What you need to do:     1. Call your care team. Your response to treatment will improve if you keep your care team informed of your progress. Yellow periods are signs an adjustment may need to be made.     2. Continue your self-care.  Just get dressed and ready for the day.  Don't give yourself time to talk yourself out of it.    3. Talk to someone in your support network.    4. Open up your Depression Self-Care Plan/Wellness Kit.           RED    ZONE Medical Alert - Get Help    What it looks like:     Depression is seriously interfering with your life.     You may experience these or other symptoms: You can t get out of bed most days, can t work or engage in other necessary activities, you have trouble taking care of basic hygiene, or basic responsibilities, thoughts of suicide or death  that will not go away, self-injurious behavior.     What you need to do:  1. Call your care team and request a same-day appointment. If they are not available (weekends or after hours) call your local crisis line, emergency room or 911.            Depression Self-Care Plan / Wellness Kit    Self-Care for Depression  Here s the deal. Your body and mind are really not as separate as most people think.  What you do and think affects how you feel and how you feel influences what you do and think. This means if you do things that people who feel good do, it will help you feel better.  Sometimes this is all it takes.  There is also a place for medication and therapy depending on how severe your depression is, so be sure to consult with your medical provider and/ or Behavioral Health Consultant if your symptoms are worsening or not improving.     In order to better manage my stress, I will:    Exercise  Get some form of exercise, every day. This will help reduce pain and release endorphins, the  feel good  chemicals in your brain. This is almost as good as taking antidepressants!  This is not the same as joining a gym and then never going! (they count on that by the way ) It can be as simple as just going for a walk or doing some gardening, anything that will get you moving.      Hygiene   Maintain good hygiene (get out of bed in the morning, make your bed, brush your teeth, take a shower, and get dressed like you were going to work, even if you are unemployed).  If your clothes don't fit try to get ones that do.    Diet  Strive to eat foods that are good for me, drink plenty of water, and avoid excessive sugar, caffeine, alcohol, and other mood-altering substances.  Some foods that are helpful in depression are: complex carbohydrates, B vitamins, flaxseed, fish or fish oil, fresh fruits and vegetables.    Psychotherapy  Agree to participate in Individual Therapy (if recommended).    Medication  If prescribed medications,  I agree to take them.  Missing doses can result in serious side effects.  I understand that drinking alcohol, or other illicit drug use, may cause potential side effects.  I will not stop my medication abruptly without first discussing it with my provider.    Staying Connected With Others  Stay in touch with my friends, family members, and my primary care provider/team.    Use your imagination  Be creative.  We all have a creative side; it doesn t matter if it s oil painting, sand castles, or mud pies! This will also kick up the endorphins.    Witness Beauty  (AKA stop and smell the roses) Take a look outside, even in mid-winter. Notice colors, textures. Watch the squirrels and birds.     Service to others  Be of service to others.  There is always someone else in need.  By helping others we can  get out of ourselves  and remember the really important things.  This also provides opportunities for practicing all the other parts of the program.    Humor  Laugh and be silly!  Adjust your TV habits for less news and crime-drama and more comedy.    Control your stress  Try breathing deep, massage therapy, biofeedback, and meditation. Find time to relax each day.     Crisis Text Line  http://www.crisistextline.org    The Crisis Text Line serves anyone, in any type of crisis, providing access to free, 24/7 support and information via the medium people already use and trust:    Here's how it works:  1.  Text 143-240 from anywhere in the [place], anytime, about any type of crisis.  2.  A live, trained Crisis Counselor receives the text and responds quickly.  3.  The volunteer Crisis Counselor will help you move from a 'hot moment to a cool moment'.    My support system    Clinic Contact: [facility] Phone number:    Contact 1: [phone] Phone number:    Contact 2:  Phone number:    Zoroastrianism/:  Phone number:    Therapist:  Phone number:    Local crisis center:   [facility] Phone number:    Other community support:  Phone number:

## 2020-10-13 NOTE — TELEPHONE ENCOUNTER
Provider E-Visit time total (minutes): 6    LAZARO-7 SCORE 8/8/2020 10/13/2020 10/13/2020   Total Score 20 (severe anxiety) 2 (minimal anxiety) 2 (minimal anxiety)   Total Score 20 2 2       PHQ 11/25/2019 8/8/2020 10/13/2020   PHQ-9 Total Score 4 11 1   Q9: Thoughts of better off dead/self-harm past 2 weeks Not at all Not at all Not at all     One year refill given.

## 2020-10-14 ASSESSMENT — ANXIETY QUESTIONNAIRES: GAD7 TOTAL SCORE: 2

## 2020-11-06 ENCOUNTER — E-VISIT (OUTPATIENT)
Dept: URGENT CARE | Facility: CLINIC | Age: 35
End: 2020-11-06
Payer: COMMERCIAL

## 2020-11-06 DIAGNOSIS — Z20.828 EXPOSURE TO VIRAL DISEASE: Primary | ICD-10-CM

## 2020-11-06 PROCEDURE — 99421 OL DIG E/M SVC 5-10 MIN: CPT | Performed by: FAMILY MEDICINE

## 2020-11-06 NOTE — PATIENT INSTRUCTIONS
Dear Amber Buckner,    Only if one of tests positive, would we want you to have a test and stay off from work.     Here is the work guidance.                If you were exposed to someone who has tested positive for COVID-19, you can return to work 14 days after your last contact with the positive individual, provided you do not have symptoms at all during that time. In some cases, your manager may ask you to come back sooner than 14 days.            If you were exposed to someone who is symptomatic, you can continue to work throughout their testing process and while you await their test results. If you become symptomatic, do not come in to work. If your contact tests positive, you will need to be cleared by [facility] to return to work.    Based on the details you've shared, you are concerned about contact with someone who may have been exposed to coronavirus (COVID-19). Based on [facility] guidelines you do not need to be tested at this time.  Testing for people who do not have symptoms is only required in certain instances, including direct contact with a person who has tested positive for COVID-19, or for those who are traveling to locations where testing is required beforehand.  Please submit a new visit or call your clinic if you think we missed needed information, your exposure information has changed, or you develop symptoms.  What are the symptoms of COVID-19?  The most common symptoms are cough, fever and trouble breathing. Less common symptoms include headache, body aches, fatigue (feeling very tired), chills, sore throat, stuffy or runny nose, diarrhea (loose poop), loss of taste or smell, belly pain, and nausea or vomiting (feeling sick to your stomach or throwing up).  Where can I get more information?   [facility] - About COVID-19: www.[facility].org/covid19/  CDC - What to Do If You're Sick: www.cdc.gov/coronavirus/2019-ncov/about/steps-when-sick.html  CDC - Ending Home Isolation:  www.cdc.gov/coronavirus/2019-ncov/hcp/disposition-in-home-patients.html  CDC - Caring for Someone: www.cdc.gov/coronavirus/2019-ncov/if-you-are-sick/care-for-someone.html  [facility] - Interim Guidance for Hospital Discharge to Home: www.[facility].[place]/diseases/coronavirus/hcp/hospdischarge.pdf  [facility] clinical trials (COVID-19 research studies): clinicalaffairs.[facility]-clinical-trials  Below are the COVID-19 hotlines at the [place] Department of Health ([facility]). Interpreters are available.  For health questions: Call 459-163-8064 or 1-874.927.5889 (7 a.m. to 7 p.m.)  For questions about schools and childcare: Call 725-925-7910 or 1-200.189.2602 (7 a.m. to 7 p.m.)

## 2021-01-10 ENCOUNTER — HEALTH MAINTENANCE LETTER (OUTPATIENT)
Age: 36
End: 2021-01-10

## 2021-04-29 ENCOUNTER — E-VISIT (OUTPATIENT)
Dept: OBGYN | Facility: CLINIC | Age: 36
End: 2021-04-29
Payer: COMMERCIAL

## 2021-04-29 DIAGNOSIS — F33.1 MAJOR DEPRESSIVE DISORDER, RECURRENT EPISODE, MODERATE (H): ICD-10-CM

## 2021-04-29 DIAGNOSIS — F41.1 GENERALIZED ANXIETY DISORDER: ICD-10-CM

## 2021-04-29 PROCEDURE — 99421 OL DIG E/M SVC 5-10 MIN: CPT | Performed by: OBSTETRICS & GYNECOLOGY

## 2021-04-29 RX ORDER — CITALOPRAM HYDROBROMIDE 20 MG/1
20 TABLET ORAL DAILY
Qty: 30 TABLET | Refills: 1 | Status: SHIPPED | OUTPATIENT
Start: 2021-04-29 | End: 2021-08-15

## 2021-04-29 ASSESSMENT — ANXIETY QUESTIONNAIRES
7. FEELING AFRAID AS IF SOMETHING AWFUL MIGHT HAPPEN: MORE THAN HALF THE DAYS
5. BEING SO RESTLESS THAT IT IS HARD TO SIT STILL: NEARLY EVERY DAY
4. TROUBLE RELAXING: NEARLY EVERY DAY
6. BECOMING EASILY ANNOYED OR IRRITABLE: SEVERAL DAYS
2. NOT BEING ABLE TO STOP OR CONTROL WORRYING: NEARLY EVERY DAY
1. FEELING NERVOUS, ANXIOUS, OR ON EDGE: NEARLY EVERY DAY
3. WORRYING TOO MUCH ABOUT DIFFERENT THINGS: NEARLY EVERY DAY
GAD7 TOTAL SCORE: 18
7. FEELING AFRAID AS IF SOMETHING AWFUL MIGHT HAPPEN: MORE THAN HALF THE DAYS
GAD7 TOTAL SCORE: 18
GAD7 TOTAL SCORE: 18

## 2021-04-29 ASSESSMENT — PATIENT HEALTH QUESTIONNAIRE - PHQ9
SUM OF ALL RESPONSES TO PHQ QUESTIONS 1-9: 22
SUM OF ALL RESPONSES TO PHQ QUESTIONS 1-9: 22
10. IF YOU CHECKED OFF ANY PROBLEMS, HOW DIFFICULT HAVE THESE PROBLEMS MADE IT FOR YOU TO DO YOUR WORK, TAKE CARE OF THINGS AT HOME, OR GET ALONG WITH OTHER PEOPLE: SOMEWHAT DIFFICULT

## 2021-04-30 ASSESSMENT — ANXIETY QUESTIONNAIRES: GAD7 TOTAL SCORE: 18

## 2021-04-30 ASSESSMENT — PATIENT HEALTH QUESTIONNAIRE - PHQ9: SUM OF ALL RESPONSES TO PHQ QUESTIONS 1-9: 22

## 2021-04-30 NOTE — PATIENT INSTRUCTIONS
Thank you for choosing us for your care. I have placed an order for a prescription so that you can start treatment. View your full visit summary for details by clicking on the link below. Your pharmacist will able to address any questions you may have about the medication.      If you're not feeling better within 4-6 weeks please schedule an appointment.  You can schedule an appointment right here in [facility], or call [phone]  If the visit is for the same symptoms as your eVisit, we'll refund the cost of your eVisit if seen within seven days.      Using Antidepressants  Depression is a mood disorder that affects the way you think and feel. The most common symptom is a feeling of deep sadness. This feeling doesn't go away or get better on its own. But most types of depression can be helped with therapy and antidepressant medicines. (Note: This covers antidepressant use in adults only.)     What do antidepressants do?  Antidepressants restore the balance of certain chemicals in your brain to help ease your depression. You will likely feel better in 4 to 6 weeks. But you may continue taking antidepressants for a year or more to keep your symptoms from coming back. Some people with depression need to take antidepressants for life. There are several types of antidepressants. The main types are described below.   Selective serotonin reuptake inhibitors (SSRIs)  SSRIs are effective medicines for the treatment of depression. They tend to have fewer side effects than other antidepressants. Possible side effects include anxiety, trouble sleeping, nausea, diarrhea, sexual dysfunction, and headaches. In rare cases, they may make you more depressed. SSRIs shouldn t be mixed with certain other medicines. Talk with your healthcare provider about all the medicines, herbs, and supplements you are taking.   Tricyclic antidepressants  Tricyclics help severe or long-term depression. They have been used for many years with good  results. Possible side effects include blurred vision, dry mouth, and constipation.   Monoamine oxidase inhibitors (MAOIs)  If you don t respond to tricyclics or SSRIs, your healthcare provider may prescribe MAOIs. These medicines can be very effective. But people taking MAOIs must stay away from certain foods and medicines. Your healthcare provider can tell you more.   Lithium  If you have bipolar disorder, you may take a medicine called lithium. This medicine helps even out your mood. Possible side effects are weight gain, trembling, loose stool, and nausea. Lithium is also used:     For people who have unipolar depression and have not responded to other antidepressants    For people who have a sudden (acute) episode of unipolar depression    As a maintenance medicine to prevent unipolar depression from happening again  Things to stay away from if you are taking MAOIs   If you are taking MAOIs, don t have any of the following:     Beans    Aged cheese    Chocolate    Red wine    Most cold medicines    Certain medicines (ask your healthcare provider)  To reduce the risk of lithium poisoning  You can reduce the risk of lithium poisoning by following this advice:    Take only the prescribed amount of lithium. If your depressive symptoms get worse, contact your healthcare provider. Never increase or decrease your medicine on your own.    Drink plenty of fluids other than coffee, tea, and soda.    Limit salt in your diet.    Before using other prescribed medicines or over-the-counter medicines, check with your pharmacist. This is to be sure the medicines won t interact with the lithium.    Never share your medicines or use another person's medicines, even if it is the same medicine and dose.    Keep follow-up appointments.    Have your lithium blood level checked as advised. You will need blood work more often when symptoms are not under control.  If you have side effects  The side effects of antidepressants are  usually mild. But if you have troubling side effects, call your healthcare provider. Changing the dosage or type of medicine may help. Never stop taking medicines on your own.   Dinesh last reviewed this educational content on 9/1/2019 2000-2021 [employer]. All rights reserved. This information is not intended as a substitute for professional medical care. Always follow your healthcare professional's instructions.

## 2021-05-07 NOTE — LETTER
"2017       RE: Amber Buckner  [address] [place] [address]     Dear Colleague,    Thank you for referring your patient, Amber Buckner, to the [facility] at [facility]. Please see a copy of my visit note below.    Post-partum Visit Note  2017    SUBJECTIVE:   Amber Buckner is a 33 yo  who presents today for her post-partum exam.  Amber overall is doing very well.  She has not had any further issues since her immediate post-operative course which was complicated by obstruction.  She denies any further pain.  She stopped bleeding 4 weeks after surgery, no menses yet.  She is tolerating regular diet.  She denies issues with urination.  Her bowel continue to be abnormal but always are with her Crohn's disease, she denies continued issues wsith obstruction and has followed up already with her GI regarding that issue.  She is breastfeeding and that is going well.  She would like to get an IUD today, not sure which one, possibly  will have vasectomy in future, but would like something else for right now.     PHQ-9 score:  2      Delivery Date: 17.    Delivering provider:  Laurie Montoya MD.    Type of delivery:  Repeat .     Delivery complications: None  Infant gender:  girl, weight 8 pounds 3 oz.  Feeding Method:  .  Complications reported with feeding:  none, infant thriving .    Bleeding:  None.  Duration:  4 wks.  Menses resumed:  No  Bowel/Urinary problems:  Yes bowel obstruction. No concerns today    Contraception Planned:  IUD, unsure which one  She has not had intercourse since delivery..    ================================================================  ROS: 10 point ROS neg other than the symptoms noted above in the HPI.     EXAM:  BP 93/61  Pulse 78  Ht 1.575 m (5' 2\")  Wt 56.8 kg (125 lb 4.8 oz)  LMP 2016 (Exact Date)  BMI 22.92 kg/m2    General: healthy, alert and no " 1. Stress test  2. Echo  3. "distress  Psych: NEGATIVE  Last PHQ-9 score on record= 2  Breasts:  Lactating, Nipples intact with no lesions, Non-tender and No S/S of yeast or mastitis  Abdomen: Benign, Soft, flat, non-tender, No masses, organomegaly and Diastasis less than 1-2 FB  Incision:  well healed and dry and intact   Vulva:  Normal genitalia and Bartholin's, Urethra, Fremont's normal  Vagina:  Slight atrophy and minimal rugae noted, with good muscle tone, without discharge  Cervix:  Nulliparous, no lesions and pink, moist, closed, without lesion or CMT.  Pap collected today.  Uterus:  fully involuted and non-tender    Adnexa:  Within normal limits and No masses, nodularity, tenderness    IUD Insertion Note:      Time Out - \"Pause for the Cause\"  Just before the procedure begins, through verbal and active participation of team members, verify:    Initials   Patient Name    SLH   Patient Date of Birth SLH   Procedure to be performed  SL     Consent:  Verbal consent obtained from patient. , Risks, benefits of treatment, and no treatment were discussed.  Patient's questions were elicited and answered. , Written consent signed and scanned into medical record. and Patient received and verbalized understanding of discharge instructions    After informed consent was obtained from the patient, a speculum was placed in the vagina to visualized the cervix.  The cervix was then swabbed with a betadine prep x 3.  Tenaculum was placed at the 12 o'clock position on the cervix and the uterus sounded to 7.5 cm.  The Mirena  IUD was then placed in the usual fashion under sterile technique.  Strings were clipped about 2 cm from the cervical os.  Tenaculum was removed and cervix was hemostatic.  There were no complications.  The patient tolerated the procedure well.    ASSESSMENT:   Encounter Diagnoses   Name Primary?     Routine postpartum follow-up Yes     Screening for malignant neoplasm of cervix      Encounter for insertion of intrauterine contraceptive " device      Vaginal atrophy      PLAN:  Orders Placed This Encounter   Procedures     IUD (Insertion Intrauterine Device) [30666]     Obtaining, preparing and conveyance of cervical or vaginal smear to laboratory.     Pap imaged thin layer screen with HPV - recommended age 30 - 65 years (select HPV order below)     HPV High Risk Types DNA Cervical      Orders Placed This Encounter   Medications     levonorgestrel (MIRENA, 52 MG,) 20 MCG/24HR IUD     Si each (20 mcg) by Intrauterine route once for 1 dose     Dispense:  1 each     Refill:  0     estradiol (VAGIFEM) 10 MCG TABS vaginal tablet     Sig: Place 1 tablet (10 mcg) vaginally daily Use daily for 2 weeks, then twice weekly thereafter     Dispense:  36 tablet     Refill:  1      1) Normal breast exam today  2) Screening for malignant neoplasm of cervix: Due for pap today, pap with cotesting completed today, no history of abnormal pap smears  3) Vaginal atrophy: Patient with atrophy on exam, has long-standing issues with dyspareunia and discussed option of trial of local estrogen supplementation.  She denies to try this.  Discussed cream, tablets and ring, she desires to try vaginal tablets.  Rx given for Vagifem today.  4) Desires LARC: Discussed different types of IUD today with patient and risks and benefits of each form, as well as risks and benefits of IUD insertion.  After discussion she chose to have  Mirena IUD placed today.  This was completed without difficulty.  Discussed checking for strings. Plan for RTC in 4 weeks for string check.    Elyssa Boyce MD

## 2021-08-14 ENCOUNTER — E-VISIT (OUTPATIENT)
Dept: OBGYN | Facility: CLINIC | Age: 36
End: 2021-08-14
Payer: COMMERCIAL

## 2021-08-14 DIAGNOSIS — F33.1 MAJOR DEPRESSIVE DISORDER, RECURRENT EPISODE, MODERATE (H): ICD-10-CM

## 2021-08-14 DIAGNOSIS — F41.1 GENERALIZED ANXIETY DISORDER: ICD-10-CM

## 2021-08-14 DIAGNOSIS — F32.1 MAJOR DEPRESSIVE DISORDER, SINGLE EPISODE, MODERATE (H): ICD-10-CM

## 2021-08-14 PROCEDURE — 99421 OL DIG E/M SVC 5-10 MIN: CPT | Performed by: OBSTETRICS & GYNECOLOGY

## 2021-08-14 ASSESSMENT — PATIENT HEALTH QUESTIONNAIRE - PHQ9
SUM OF ALL RESPONSES TO PHQ QUESTIONS 1-9: 2
10. IF YOU CHECKED OFF ANY PROBLEMS, HOW DIFFICULT HAVE THESE PROBLEMS MADE IT FOR YOU TO DO YOUR WORK, TAKE CARE OF THINGS AT HOME, OR GET ALONG WITH OTHER PEOPLE: NOT DIFFICULT AT ALL
SUM OF ALL RESPONSES TO PHQ QUESTIONS 1-9: 2

## 2021-08-14 ASSESSMENT — ANXIETY QUESTIONNAIRES
2. NOT BEING ABLE TO STOP OR CONTROL WORRYING: NOT AT ALL
7. FEELING AFRAID AS IF SOMETHING AWFUL MIGHT HAPPEN: NOT AT ALL
GAD7 TOTAL SCORE: 4
3. WORRYING TOO MUCH ABOUT DIFFERENT THINGS: SEVERAL DAYS
GAD7 TOTAL SCORE: 4
GAD7 TOTAL SCORE: 4
5. BEING SO RESTLESS THAT IT IS HARD TO SIT STILL: SEVERAL DAYS
7. FEELING AFRAID AS IF SOMETHING AWFUL MIGHT HAPPEN: NOT AT ALL
6. BECOMING EASILY ANNOYED OR IRRITABLE: SEVERAL DAYS
4. TROUBLE RELAXING: SEVERAL DAYS
1. FEELING NERVOUS, ANXIOUS, OR ON EDGE: NOT AT ALL
8. IF YOU CHECKED OFF ANY PROBLEMS, HOW DIFFICULT HAVE THESE MADE IT FOR YOU TO DO YOUR WORK, TAKE CARE OF THINGS AT HOME, OR GET ALONG WITH OTHER PEOPLE?: NOT DIFFICULT AT ALL

## 2021-08-15 RX ORDER — CITALOPRAM HYDROBROMIDE 20 MG/1
20 TABLET ORAL DAILY
Qty: 90 TABLET | Refills: 3 | Status: SHIPPED | OUTPATIENT
Start: 2021-08-15 | End: 2021-08-31

## 2021-08-15 ASSESSMENT — ANXIETY QUESTIONNAIRES: GAD7 TOTAL SCORE: 4

## 2021-08-15 ASSESSMENT — PATIENT HEALTH QUESTIONNAIRE - PHQ9: SUM OF ALL RESPONSES TO PHQ QUESTIONS 1-9: 2

## 2021-08-25 ASSESSMENT — ENCOUNTER SYMPTOMS
FREQUENCY: 0
COUGH: 0
JOINT SWELLING: 0
SHORTNESS OF BREATH: 0
FEVER: 0
SORE THROAT: 0
PALPITATIONS: 0
DYSURIA: 0
HEADACHES: 0
DIZZINESS: 0
HEMATURIA: 0
NAUSEA: 0
ARTHRALGIAS: 0
EYE PAIN: 0
BREAST MASS: 0
MYALGIAS: 0
HEARTBURN: 0
HEMATOCHEZIA: 0
DIARRHEA: 0
PARESTHESIAS: 0
CONSTIPATION: 0
CHILLS: 0
NERVOUS/ANXIOUS: 0
WEAKNESS: 0
ABDOMINAL PAIN: 0

## 2021-08-31 ENCOUNTER — OFFICE VISIT (OUTPATIENT)
Dept: FAMILY MEDICINE | Facility: CLINIC | Age: 36
End: 2021-08-31
Payer: COMMERCIAL

## 2021-08-31 VITALS
TEMPERATURE: 98.1 F | DIASTOLIC BLOOD PRESSURE: 62 MMHG | SYSTOLIC BLOOD PRESSURE: 102 MMHG | WEIGHT: 131 LBS | HEIGHT: 63 IN | BODY MASS INDEX: 23.21 KG/M2 | HEART RATE: 80 BPM

## 2021-08-31 DIAGNOSIS — F32.1 MODERATE MAJOR DEPRESSION (H): ICD-10-CM

## 2021-08-31 DIAGNOSIS — Z90.49 S/P TOTAL COLECTOMY: ICD-10-CM

## 2021-08-31 DIAGNOSIS — K51.919 ULCERATIVE COLITIS WITH COMPLICATION, UNSPECIFIED LOCATION (H): ICD-10-CM

## 2021-08-31 DIAGNOSIS — Z11.59 NEED FOR HEPATITIS C SCREENING TEST: ICD-10-CM

## 2021-08-31 DIAGNOSIS — F41.1 GENERALIZED ANXIETY DISORDER: ICD-10-CM

## 2021-08-31 DIAGNOSIS — Z00.00 ROUTINE GENERAL MEDICAL EXAMINATION AT A HEALTH CARE FACILITY: Primary | ICD-10-CM

## 2021-08-31 LAB
CHOLEST SERPL-MCNC: 218 MG/DL
FASTING STATUS PATIENT QL REPORTED: YES
GLUCOSE BLD-MCNC: 89 MG/DL (ref 79–116)
HCV AB SERPL QL IA: NONREACTIVE
HDLC SERPL-MCNC: 77 MG/DL
LDLC SERPL CALC-MCNC: 125 MG/DL
NONHDLC SERPL-MCNC: 141 MG/DL
TRIGL SERPL-MCNC: 80 MG/DL

## 2021-08-31 PROCEDURE — 82947 ASSAY GLUCOSE BLOOD QUANT: CPT | Performed by: NURSE PRACTITIONER

## 2021-08-31 PROCEDURE — 36415 COLL VENOUS BLD VENIPUNCTURE: CPT | Performed by: NURSE PRACTITIONER

## 2021-08-31 PROCEDURE — 99214 OFFICE O/P EST MOD 30 MIN: CPT | Mod: 25 | Performed by: NURSE PRACTITIONER

## 2021-08-31 PROCEDURE — 86803 HEPATITIS C AB TEST: CPT | Performed by: NURSE PRACTITIONER

## 2021-08-31 PROCEDURE — 99395 PREV VISIT EST AGE 18-39: CPT | Performed by: NURSE PRACTITIONER

## 2021-08-31 PROCEDURE — 96127 BRIEF EMOTIONAL/BEHAV ASSMT: CPT | Mod: 59 | Performed by: NURSE PRACTITIONER

## 2021-08-31 PROCEDURE — 80061 LIPID PANEL: CPT | Performed by: NURSE PRACTITIONER

## 2021-08-31 RX ORDER — CITALOPRAM HYDROBROMIDE 20 MG/1
20 TABLET ORAL DAILY
Qty: 90 TABLET | Refills: 11 | Status: SHIPPED | OUTPATIENT
Start: 2021-08-31 | End: 2022-11-01

## 2021-08-31 ASSESSMENT — ENCOUNTER SYMPTOMS
MYALGIAS: 0
NAUSEA: 0
FEVER: 0
WEAKNESS: 0
COUGH: 0
JOINT SWELLING: 0
HEARTBURN: 0
FREQUENCY: 0
DIZZINESS: 0
PARESTHESIAS: 0
ABDOMINAL PAIN: 0
HEMATOCHEZIA: 0
BREAST MASS: 0
ARTHRALGIAS: 0
HEMATURIA: 0
DIARRHEA: 0
DYSURIA: 0
PALPITATIONS: 0
NERVOUS/ANXIOUS: 0
SHORTNESS OF BREATH: 0
EYE PAIN: 0
CONSTIPATION: 0
CHILLS: 0
HEADACHES: 0
SORE THROAT: 0

## 2021-08-31 ASSESSMENT — ANXIETY QUESTIONNAIRES
7. FEELING AFRAID AS IF SOMETHING AWFUL MIGHT HAPPEN: NOT AT ALL
6. BECOMING EASILY ANNOYED OR IRRITABLE: NOT AT ALL
5. BEING SO RESTLESS THAT IT IS HARD TO SIT STILL: SEVERAL DAYS
3. WORRYING TOO MUCH ABOUT DIFFERENT THINGS: SEVERAL DAYS
2. NOT BEING ABLE TO STOP OR CONTROL WORRYING: NOT AT ALL
1. FEELING NERVOUS, ANXIOUS, OR ON EDGE: SEVERAL DAYS
GAD7 TOTAL SCORE: 3

## 2021-08-31 ASSESSMENT — PATIENT HEALTH QUESTIONNAIRE - PHQ9
5. POOR APPETITE OR OVEREATING: NOT AT ALL
SUM OF ALL RESPONSES TO PHQ QUESTIONS 1-9: 1

## 2021-08-31 ASSESSMENT — MIFFLIN-ST. JEOR: SCORE: 1244.37

## 2021-08-31 NOTE — PROGRESS NOTES
SUBJECTIVE:   CC: Amber Buckner is an 36 year old woman who presents for preventive health visit.       Patient has been advised of split billing requirements and indicates understanding: Yes  Healthy Habits:     Getting at least 3 servings of Calcium per day:  Yes    Bi-annual eye exam:  NO    Dental care twice a year:  Yes    Sleep apnea or symptoms of sleep apnea:  None    Diet:  Regular (no restrictions)    Frequency of exercise:  None    Taking medications regularly:  Yes    Medication side effects:  None    PHQ-2 Total Score: 0    Additional concerns today:  No    Walking playing with kids, has 3 children.   Yoga       Depression and Anxiety Follow-Up    How are you doing with your depression since your last visit? Improved     How are you doing with your anxiety since your last visit?  Improved     Are you having other symptoms that might be associated with depression or anxiety? No    Have you had a significant life event? OTHER: new job     Do you have any concerns with your use of alcohol or other drugs? Yes:  alcohol couple times a week   Recently increased lexapro to 20 mg daily.   On therapy break     [facility] in [facility] - [facility].   Fistula in 2013  -   [facility]- 2019   Vaccinated against Covid  Social History     Tobacco Use     Smoking status: Never Smoker     Smokeless tobacco: Never Used   Substance Use Topics     Alcohol use: Yes     Alcohol/week: 0.0 standard drinks     Comment: not since pregnancy     Drug use: No     PHQ 10/13/2020 4/29/2021 8/14/2021   PHQ-9 Total Score 1 22 2   Q9: Thoughts of better off dead/self-harm past 2 weeks Not at all Not at all Not at all     LAZARO-7 SCORE 10/13/2020 4/29/2021 8/14/2021   Total Score 2 (minimal anxiety) 18 (severe anxiety) 4 (minimal anxiety)   Total Score 2 18 4     Last PHQ-9 8/31/2021   1.  Little interest or pleasure in doing things 0   2.  Feeling down, depressed, or hopeless 0   3.  Trouble falling or staying  asleep, or sleeping too much 1   4.  Feeling tired or having little energy 0   5.  Poor appetite or overeating 0   6.  Feeling bad about yourself 0   7.  Trouble concentrating 0   8.  Moving slowly or restless 0   Q9: Thoughts of better off dead/self-harm past 2 weeks 0   PHQ-9 Total Score 1   Difficulty at work, home, or with people -     LAZARO-7  8/31/2021   1. Feeling nervous, anxious, or on edge 1   2. Not being able to stop or control worrying 0   3. Worrying too much about different things 1   4. Trouble relaxing 0   5. Being so restless that it is hard to sit still 1   6. Becoming easily annoyed or irritable 0   7. Feeling afraid, as if something awful might happen 0   LAZARO-7 Total Score 3       Suicide Assessment Five-step Evaluation and Treatment (SAFE-T)      Today's PHQ-2 Score:   PHQ-2 ( 1999 Pfizer) 8/25/2021   Q1: Little interest or pleasure in doing things 0   Q2: Feeling down, depressed or hopeless 0   PHQ-2 Score 0   Q1: Little interest or pleasure in doing things Not at all   Q2: Feeling down, depressed or hopeless Not at all   PHQ-2 Score 0       Abuse: Current or Past (Physical, Sexual or Emotional) - No  Do you feel safe in your environment? Yes    Have you ever done Advance Care Planning? (For example, a Health Directive, POLST, or a discussion with a medical provider or your loved ones about your wishes):     Social History     Tobacco Use     Smoking status: Never Smoker     Smokeless tobacco: Never Used   Substance Use Topics     Alcohol use: Yes     Alcohol/week: 0.0 standard drinks     Comment: not since pregnancy     If you drink alcohol do you typically have >3 drinks per day or >7 drinks per week? No    Alcohol Use 8/25/2021   Prescreen: >3 drinks/day or >7 drinks/week? No   No flowsheet data found.    Reviewed orders with patient.  Reviewed health maintenance and updated orders accordingly - Yes  Lab work is in process    Breast Cancer Screening:  Any new diagnosis of family breast,  ovarian, or bowel cancer? No    FHS-7: No flowsheet data found.  click delete button to remove this line now  Patient under 40 years of age: Routine Mammogram Screening not recommended.   Pertinent mammograms are reviewed under the imaging tab.    History of abnormal Pap smear: NO - age 30-65 PAP every 5 years with negative HPV co-testing recommended  PAP / HPV Latest Ref Rng & Units 6/22/2017 6/12/2014 7/6/2011   PAP (Historical) - NIL NIL NIL   HPV16 NEG Negative - -   HPV18 NEG Negative - -   HRHPV NEG Negative - -     Reviewed and updated as needed this visit by clinical staff  Tobacco     Med Hx  Surg Hx  Fam Hx  Soc Hx        Reviewed and updated as needed this visit by Provider                    Review of Systems   Constitutional: Negative for chills and fever.   HENT: Negative for congestion, ear pain, hearing loss and sore throat.    Eyes: Negative for pain and visual disturbance.   Respiratory: Negative for cough and shortness of breath.    Cardiovascular: Negative for chest pain, palpitations and peripheral edema.   Gastrointestinal: Negative for abdominal pain, constipation, diarrhea, heartburn, hematochezia and nausea.   Breasts:  Negative for tenderness, breast mass and discharge.   Genitourinary: Negative for dysuria, frequency, genital sores, hematuria, pelvic pain, urgency, vaginal bleeding and vaginal discharge.   Musculoskeletal: Negative for arthralgias, joint swelling and myalgias.   Skin: Negative for rash.   Neurological: Negative for dizziness, weakness, headaches and paresthesias.   Psychiatric/Behavioral: Negative for mood changes. The patient is not nervous/anxious.           OBJECTIVE:   There were no vitals taken for this visit.  Physical Exam  GENERAL: healthy, alert and no distress  EYES: Eyes grossly normal to inspection, PERRL and conjunctivae and sclerae normal  HENT: ear canals and TM's normal, nose and mouth without ulcers or lesions  NECK: no adenopathy, no asymmetry,  masses, or scars and thyroid normal to palpation  RESP: lungs clear to auscultation - no rales, rhonchi or wheezes  BREAST: normal without masses, tenderness or nipple discharge and no palpable axillary masses or adenopathy  CV: regular rate and rhythm, normal S1 S2, no S3 or S4, no murmur, click or rub, no peripheral edema and peripheral pulses strong  ABDOMEN: soft, nontender, no hepatosplenomegaly, no masses and bowel sounds normal  MS: no gross musculoskeletal defects noted, no edema  SKIN: no suspicious lesions or rashes  NEURO: Normal strength and tone, mentation intact and speech normal  PSYCH: mentation appears normal, affect normal/bright    Diagnostic Test Results:  Labs reviewed in Epic  No results found for this or any previous visit (from the past 24 hour(s)).    ASSESSMENT/PLAN:     Problem List Items Addressed This Visit     S/P total colectomy    Depression with anxiety    Relevant Medications    citalopram (CELEXA) 20 MG tablet    Ulcerative colitis (H)    Moderate major depression (H)    Relevant Medications    citalopram (CELEXA) 20 MG tablet      Other Visit Diagnoses     Routine general medical examination at a health care facility    -  Primary    Relevant Orders    Lipid panel reflex to direct LDL Fasting    Glucose, whole blood    Need for hepatitis C screening test        Relevant Orders    Hepatitis C Screen Reflex to HCV RNA Quant and Genotype    Moderate Depression [296.32]   (Acute)      Relevant Medications    citalopram (CELEXA) 20 MG tablet    Generalized anxiety disorder        Relevant Medications    citalopram (CELEXA) 20 MG tablet        Annual physical  Anxiety improved with increased dose of Celexa refill today  Ulcerative colitis status post total colectomy followed by [facility]     Patient has been advised of split billing requirements and indicates understanding: Yes  COUNSELING:  Reviewed preventive health counseling, as reflected in patient  "instructions       Regular exercise       Healthy diet/nutrition       Vision screening    Estimated body mass index is 24.58 kg/m  as calculated from the following:    Height as of 12/12/19: 1.575 m (5' 2\").    Weight as of 2/3/20: 61 kg (134 lb 6.4 oz).        She reports that she has never smoked. She has never used smokeless tobacco.      Counseling Resources:  ATP IV Guidelines  Pooled Cohorts Equation Calculator  Breast Cancer Risk Calculator  BRCA-Related Cancer Risk Assessment: FHS-7 Tool  FRAX Risk Assessment  ICSI Preventive Guidelines  Dietary Guidelines for Americans, 2010  USDA's MyPlate  ASA Prophylaxis  Lung CA Screening    SEPIDEH [facility]  "

## 2021-09-01 ASSESSMENT — ANXIETY QUESTIONNAIRES: GAD7 TOTAL SCORE: 3

## 2021-10-19 PROBLEM — F32.9 MAJOR DEPRESSION: Status: ACTIVE | Noted: 2021-08-31

## 2021-10-23 ENCOUNTER — HEALTH MAINTENANCE LETTER (OUTPATIENT)
Age: 36
End: 2021-10-23

## 2022-08-04 DIAGNOSIS — K56.609 SMALL BOWEL OBSTRUCTION (H): Primary | ICD-10-CM

## 2022-08-05 ENCOUNTER — LAB (OUTPATIENT)
Dept: LAB | Facility: CLINIC | Age: 37
End: 2022-08-05
Payer: COMMERCIAL

## 2022-08-05 DIAGNOSIS — K56.609 SMALL BOWEL OBSTRUCTION (H): ICD-10-CM

## 2022-08-05 LAB
ALBUMIN SERPL-MCNC: 4.3 G/DL (ref 3.4–5)
ALP SERPL-CCNC: 52 U/L (ref 40–150)
ALT SERPL W P-5'-P-CCNC: 16 U/L (ref 0–50)
ANION GAP SERPL CALCULATED.3IONS-SCNC: 6 MMOL/L (ref 3–14)
AST SERPL W P-5'-P-CCNC: 9 U/L (ref 0–45)
BASOPHILS # BLD AUTO: 0 10E3/UL (ref 0–0.2)
BASOPHILS NFR BLD AUTO: 1 %
BILIRUB SERPL-MCNC: 0.5 MG/DL (ref 0.2–1.3)
BUN SERPL-MCNC: 9 MG/DL (ref 7–30)
CALCIUM SERPL-MCNC: 9.4 MG/DL (ref 8.5–10.1)
CHLORIDE BLD-SCNC: 107 MMOL/L (ref 94–109)
CO2 SERPL-SCNC: 27 MMOL/L (ref 20–32)
CREAT SERPL-MCNC: 0.53 MG/DL (ref 0.52–1.04)
EOSINOPHIL # BLD AUTO: 0.1 10E3/UL (ref 0–0.7)
EOSINOPHIL NFR BLD AUTO: 1 %
ERYTHROCYTE [DISTWIDTH] IN BLOOD BY AUTOMATED COUNT: 12.7 % (ref 10–15)
GFR SERPL CREATININE-BSD FRML MDRD: >90 ML/MIN/1.73M2
GLUCOSE BLD-MCNC: 95 MG/DL (ref 70–99)
HCT VFR BLD AUTO: 39.3 % (ref 35–47)
HGB BLD-MCNC: 13.1 G/DL (ref 11.7–15.7)
INR PPP: 1.09 (ref 0.85–1.15)
LYMPHOCYTES # BLD AUTO: 1.6 10E3/UL (ref 0.8–5.3)
LYMPHOCYTES NFR BLD AUTO: 32 %
MAGNESIUM SERPL-MCNC: 2 MG/DL (ref 1.6–2.3)
MCH RBC QN AUTO: 34.3 PG (ref 26.5–33)
MCHC RBC AUTO-ENTMCNC: 33.3 G/DL (ref 31.5–36.5)
MCV RBC AUTO: 103 FL (ref 78–100)
MONOCYTES # BLD AUTO: 0.4 10E3/UL (ref 0–1.3)
MONOCYTES NFR BLD AUTO: 8 %
NEUTROPHILS # BLD AUTO: 3 10E3/UL (ref 1.6–8.3)
NEUTROPHILS NFR BLD AUTO: 58 %
PHOSPHATE SERPL-MCNC: 3.3 MG/DL (ref 2.5–4.5)
PLATELET # BLD AUTO: 275 10E3/UL (ref 150–450)
POTASSIUM BLD-SCNC: 4.2 MMOL/L (ref 3.4–5.3)
PROT SERPL-MCNC: 7.9 G/DL (ref 6.8–8.8)
RBC # BLD AUTO: 3.82 10E6/UL (ref 3.8–5.2)
SODIUM SERPL-SCNC: 140 MMOL/L (ref 133–144)
WBC # BLD AUTO: 5.1 10E3/UL (ref 4–11)

## 2022-08-05 PROCEDURE — 85610 PROTHROMBIN TIME: CPT

## 2022-08-05 PROCEDURE — 84134 ASSAY OF PREALBUMIN: CPT

## 2022-08-05 PROCEDURE — 83735 ASSAY OF MAGNESIUM: CPT

## 2022-08-05 PROCEDURE — 84100 ASSAY OF PHOSPHORUS: CPT

## 2022-08-05 PROCEDURE — 85025 COMPLETE CBC W/AUTO DIFF WBC: CPT

## 2022-08-05 PROCEDURE — 36415 COLL VENOUS BLD VENIPUNCTURE: CPT

## 2022-08-05 PROCEDURE — 80053 COMPREHEN METABOLIC PANEL: CPT

## 2022-08-08 LAB — PREALB SERPL IA-MCNC: 26 MG/DL (ref 15–45)

## 2022-08-18 ENCOUNTER — ANCILLARY PROCEDURE (OUTPATIENT)
Dept: GENERAL RADIOLOGY | Facility: CLINIC | Age: 37
End: 2022-08-18
Attending: COLON & RECTAL SURGERY
Payer: COMMERCIAL

## 2022-08-18 DIAGNOSIS — K56.609 SMALL BOWEL OBSTRUCTION (H): ICD-10-CM

## 2022-08-18 PROCEDURE — 74240 X-RAY XM UPR GI TRC 1CNTRST: CPT | Mod: GC | Performed by: RADIOLOGY

## 2022-08-18 PROCEDURE — 74248 X-RAY SM INT F-THRU STD: CPT | Mod: GC | Performed by: RADIOLOGY

## 2022-09-07 ENCOUNTER — LAB REQUISITION (OUTPATIENT)
Dept: LAB | Facility: CLINIC | Age: 37
End: 2022-09-07

## 2022-09-07 PROCEDURE — 86481 TB AG RESPONSE T-CELL SUSP: CPT | Performed by: INTERNAL MEDICINE

## 2022-09-09 LAB
GAMMA INTERFERON BACKGROUND BLD IA-ACNC: 0.08 IU/ML
M TB IFN-G BLD-IMP: NEGATIVE
M TB IFN-G CD4+ BCKGRND COR BLD-ACNC: 9.92 IU/ML
MITOGEN IGNF BCKGRD COR BLD-ACNC: 0 IU/ML
MITOGEN IGNF BCKGRD COR BLD-ACNC: 0.02 IU/ML
QUANTIFERON MITOGEN: 10 IU/ML
QUANTIFERON NIL TUBE: 0.08 IU/ML
QUANTIFERON TB1 TUBE: 0.08 IU/ML
QUANTIFERON TB2 TUBE: 0.1

## 2022-10-10 ENCOUNTER — HEALTH MAINTENANCE LETTER (OUTPATIENT)
Age: 37
End: 2022-10-10

## 2022-10-24 ENCOUNTER — HOSPITAL ENCOUNTER (INPATIENT)
Facility: CLINIC | Age: 37
LOS: 4 days | Discharge: HOME | DRG: 390 | End: 2022-10-28
Attending: EMERGENCY MEDICINE | Admitting: HOSPITALIST
Payer: COMMERCIAL

## 2022-10-24 ENCOUNTER — APPOINTMENT (OUTPATIENT)
Dept: MRI IMAGING | Facility: CLINIC | Age: 37
DRG: 390 | End: 2022-10-24
Attending: HOSPITALIST
Payer: COMMERCIAL

## 2022-10-24 ENCOUNTER — APPOINTMENT (OUTPATIENT)
Dept: CT IMAGING | Facility: CLINIC | Age: 37
DRG: 390 | End: 2022-10-24
Attending: EMERGENCY MEDICINE
Payer: COMMERCIAL

## 2022-10-24 DIAGNOSIS — K56.609 SMALL BOWEL OBSTRUCTION (H): ICD-10-CM

## 2022-10-24 DIAGNOSIS — Z90.49 HX OF COLECTOMY: ICD-10-CM

## 2022-10-24 DIAGNOSIS — Z87.19 HX OF ULCERATIVE COLITIS: ICD-10-CM

## 2022-10-24 LAB
ALBUMIN SERPL-MCNC: 4.3 G/DL (ref 3.4–5)
ALP SERPL-CCNC: 53 U/L (ref 40–150)
ALT SERPL W P-5'-P-CCNC: 18 U/L (ref 0–50)
ANION GAP SERPL CALCULATED.3IONS-SCNC: 4 MMOL/L (ref 3–14)
AST SERPL W P-5'-P-CCNC: 10 U/L (ref 0–45)
BASOPHILS # BLD AUTO: 0 10E3/UL (ref 0–0.2)
BASOPHILS NFR BLD AUTO: 0 %
BILIRUB SERPL-MCNC: 0.6 MG/DL (ref 0.2–1.3)
BUN SERPL-MCNC: 11 MG/DL (ref 7–30)
CALCIUM SERPL-MCNC: 9.1 MG/DL (ref 8.5–10.1)
CHLORIDE BLD-SCNC: 108 MMOL/L (ref 94–109)
CO2 SERPL-SCNC: 24 MMOL/L (ref 20–32)
CREAT SERPL-MCNC: 0.6 MG/DL (ref 0.52–1.04)
EOSINOPHIL # BLD AUTO: 0.1 10E3/UL (ref 0–0.7)
EOSINOPHIL NFR BLD AUTO: 1 %
ERYTHROCYTE [DISTWIDTH] IN BLOOD BY AUTOMATED COUNT: 12.5 % (ref 10–15)
GFR SERPL CREATININE-BSD FRML MDRD: >90 ML/MIN/1.73M2
GLUCOSE BLD-MCNC: 95 MG/DL (ref 70–99)
HCG SERPL QL: NEGATIVE
HCO3 BLDV-SCNC: 22 MMOL/L (ref 21–28)
HCT VFR BLD AUTO: 39.3 % (ref 35–47)
HGB BLD-MCNC: 13 G/DL (ref 11.7–15.7)
IMM GRANULOCYTES # BLD: 0 10E3/UL
IMM GRANULOCYTES NFR BLD: 0 %
LACTATE BLD-SCNC: 0.6 MMOL/L
LIPASE SERPL-CCNC: 91 U/L (ref 73–393)
LYMPHOCYTES # BLD AUTO: 1.4 10E3/UL (ref 0.8–5.3)
LYMPHOCYTES NFR BLD AUTO: 21 %
MCH RBC QN AUTO: 33.7 PG (ref 26.5–33)
MCHC RBC AUTO-ENTMCNC: 33.1 G/DL (ref 31.5–36.5)
MCV RBC AUTO: 102 FL (ref 78–100)
MONOCYTES # BLD AUTO: 0.4 10E3/UL (ref 0–1.3)
MONOCYTES NFR BLD AUTO: 6 %
NEUTROPHILS # BLD AUTO: 4.8 10E3/UL (ref 1.6–8.3)
NEUTROPHILS NFR BLD AUTO: 72 %
NRBC # BLD AUTO: 0 10E3/UL
NRBC BLD AUTO-RTO: 0 /100
PCO2 BLDV: 39 MM HG (ref 40–50)
PH BLDV: 7.36 [PH] (ref 7.32–7.43)
PLATELET # BLD AUTO: 288 10E3/UL (ref 150–450)
PO2 BLDV: 36 MM HG (ref 25–47)
POTASSIUM BLD-SCNC: 3.7 MMOL/L (ref 3.4–5.3)
PROT SERPL-MCNC: 8.4 G/DL (ref 6.8–8.8)
RBC # BLD AUTO: 3.86 10E6/UL (ref 3.8–5.2)
SAO2 % BLDV: 67 % (ref 94–100)
SARS-COV-2 RNA RESP QL NAA+PROBE: NEGATIVE
SODIUM SERPL-SCNC: 136 MMOL/L (ref 133–144)
WBC # BLD AUTO: 6.8 10E3/UL (ref 4–11)

## 2022-10-24 PROCEDURE — 83690 ASSAY OF LIPASE: CPT | Performed by: EMERGENCY MEDICINE

## 2022-10-24 PROCEDURE — 84703 CHORIONIC GONADOTROPIN ASSAY: CPT | Performed by: EMERGENCY MEDICINE

## 2022-10-24 PROCEDURE — 96361 HYDRATE IV INFUSION ADD-ON: CPT

## 2022-10-24 PROCEDURE — 80053 COMPREHEN METABOLIC PANEL: CPT | Performed by: EMERGENCY MEDICINE

## 2022-10-24 PROCEDURE — 250N000011 HC RX IP 250 OP 636: Performed by: HOSPITALIST

## 2022-10-24 PROCEDURE — 96374 THER/PROPH/DIAG INJ IV PUSH: CPT | Mod: 59

## 2022-10-24 PROCEDURE — 99285 EMERGENCY DEPT VISIT HI MDM: CPT | Mod: 25

## 2022-10-24 PROCEDURE — 258N000003 HC RX IP 258 OP 636: Performed by: EMERGENCY MEDICINE

## 2022-10-24 PROCEDURE — U0005 INFEC AGEN DETEC AMPLI PROBE: HCPCS | Performed by: EMERGENCY MEDICINE

## 2022-10-24 PROCEDURE — 74177 CT ABD & PELVIS W/CONTRAST: CPT

## 2022-10-24 PROCEDURE — 83605 ASSAY OF LACTIC ACID: CPT

## 2022-10-24 PROCEDURE — 36415 COLL VENOUS BLD VENIPUNCTURE: CPT | Performed by: EMERGENCY MEDICINE

## 2022-10-24 PROCEDURE — 85025 COMPLETE CBC W/AUTO DIFF WBC: CPT | Performed by: EMERGENCY MEDICINE

## 2022-10-24 PROCEDURE — 250N000011 HC RX IP 250 OP 636: Performed by: EMERGENCY MEDICINE

## 2022-10-24 PROCEDURE — 258N000003 HC RX IP 258 OP 636: Performed by: HOSPITALIST

## 2022-10-24 PROCEDURE — 250N000009 HC RX 250: Performed by: EMERGENCY MEDICINE

## 2022-10-24 PROCEDURE — 255N000002 HC RX 255 OP 636: Performed by: HOSPITALIST

## 2022-10-24 PROCEDURE — 74183 MRI ABD W/O CNTR FLWD CNTR: CPT

## 2022-10-24 PROCEDURE — 120N000001 HC R&B MED SURG/OB

## 2022-10-24 PROCEDURE — 99222 1ST HOSP IP/OBS MODERATE 55: CPT | Mod: AI | Performed by: HOSPITALIST

## 2022-10-24 PROCEDURE — 96375 TX/PRO/DX INJ NEW DRUG ADDON: CPT

## 2022-10-24 PROCEDURE — 96376 TX/PRO/DX INJ SAME DRUG ADON: CPT

## 2022-10-24 PROCEDURE — A9585 GADOBUTROL INJECTION: HCPCS | Performed by: HOSPITALIST

## 2022-10-24 RX ORDER — ONDANSETRON 4 MG/1
4 TABLET, ORALLY DISINTEGRATING ORAL EVERY 6 HOURS PRN
Status: DISCONTINUED | OUTPATIENT
Start: 2022-10-24 | End: 2022-10-28 | Stop reason: HOSPADM

## 2022-10-24 RX ORDER — HYDROMORPHONE HYDROCHLORIDE 1 MG/ML
0.5 INJECTION, SOLUTION INTRAMUSCULAR; INTRAVENOUS; SUBCUTANEOUS
Status: DISCONTINUED | OUTPATIENT
Start: 2022-10-24 | End: 2022-10-25

## 2022-10-24 RX ORDER — DEXTROSE MONOHYDRATE, SODIUM CHLORIDE, AND POTASSIUM CHLORIDE 50; 1.49; 4.5 G/1000ML; G/1000ML; G/1000ML
INJECTION, SOLUTION INTRAVENOUS CONTINUOUS
Status: DISCONTINUED | OUTPATIENT
Start: 2022-10-24 | End: 2022-10-28 | Stop reason: HOSPADM

## 2022-10-24 RX ORDER — LIDOCAINE 40 MG/G
CREAM TOPICAL
Status: DISCONTINUED | OUTPATIENT
Start: 2022-10-24 | End: 2022-10-28 | Stop reason: HOSPADM

## 2022-10-24 RX ORDER — PROCHLORPERAZINE 25 MG
25 SUPPOSITORY, RECTAL RECTAL EVERY 12 HOURS PRN
Status: DISCONTINUED | OUTPATIENT
Start: 2022-10-24 | End: 2022-10-28 | Stop reason: HOSPADM

## 2022-10-24 RX ORDER — ADAPALENE 45 G/G
GEL TOPICAL AT BEDTIME
COMMUNITY

## 2022-10-24 RX ORDER — PROCHLORPERAZINE MALEATE 10 MG
10 TABLET ORAL EVERY 6 HOURS PRN
Status: DISCONTINUED | OUTPATIENT
Start: 2022-10-24 | End: 2022-10-28 | Stop reason: HOSPADM

## 2022-10-24 RX ORDER — NALOXONE HYDROCHLORIDE 0.4 MG/ML
0.4 INJECTION, SOLUTION INTRAMUSCULAR; INTRAVENOUS; SUBCUTANEOUS
Status: DISCONTINUED | OUTPATIENT
Start: 2022-10-24 | End: 2022-10-28 | Stop reason: HOSPADM

## 2022-10-24 RX ORDER — CITALOPRAM HYDROBROMIDE 20 MG/1
20 TABLET ORAL DAILY
Status: DISCONTINUED | OUTPATIENT
Start: 2022-10-25 | End: 2022-10-28 | Stop reason: HOSPADM

## 2022-10-24 RX ORDER — ONDANSETRON 2 MG/ML
4 INJECTION INTRAMUSCULAR; INTRAVENOUS ONCE
Status: COMPLETED | OUTPATIENT
Start: 2022-10-24 | End: 2022-10-24

## 2022-10-24 RX ORDER — ACETAMINOPHEN 650 MG/1
650 SUPPOSITORY RECTAL EVERY 6 HOURS PRN
Status: DISCONTINUED | OUTPATIENT
Start: 2022-10-24 | End: 2022-10-28 | Stop reason: HOSPADM

## 2022-10-24 RX ORDER — IOPAMIDOL 755 MG/ML
65 INJECTION, SOLUTION INTRAVASCULAR ONCE
Status: COMPLETED | OUTPATIENT
Start: 2022-10-24 | End: 2022-10-24

## 2022-10-24 RX ORDER — ONDANSETRON 2 MG/ML
4 INJECTION INTRAMUSCULAR; INTRAVENOUS EVERY 6 HOURS PRN
Status: DISCONTINUED | OUTPATIENT
Start: 2022-10-24 | End: 2022-10-28 | Stop reason: HOSPADM

## 2022-10-24 RX ORDER — GADOBUTROL 604.72 MG/ML
6 INJECTION INTRAVENOUS ONCE
Status: COMPLETED | OUTPATIENT
Start: 2022-10-24 | End: 2022-10-24

## 2022-10-24 RX ORDER — ACETAMINOPHEN 325 MG/1
650 TABLET ORAL EVERY 6 HOURS PRN
Status: DISCONTINUED | OUTPATIENT
Start: 2022-10-24 | End: 2022-10-28 | Stop reason: HOSPADM

## 2022-10-24 RX ORDER — NALOXONE HYDROCHLORIDE 0.4 MG/ML
0.2 INJECTION, SOLUTION INTRAMUSCULAR; INTRAVENOUS; SUBCUTANEOUS
Status: DISCONTINUED | OUTPATIENT
Start: 2022-10-24 | End: 2022-10-28 | Stop reason: HOSPADM

## 2022-10-24 RX ORDER — HYDROMORPHONE HYDROCHLORIDE 1 MG/ML
0.5 INJECTION, SOLUTION INTRAMUSCULAR; INTRAVENOUS; SUBCUTANEOUS
Status: DISCONTINUED | OUTPATIENT
Start: 2022-10-24 | End: 2022-10-24

## 2022-10-24 RX ADMIN — PROCHLORPERAZINE EDISYLATE 10 MG: 5 INJECTION INTRAMUSCULAR; INTRAVENOUS at 21:30

## 2022-10-24 RX ADMIN — SODIUM CHLORIDE 1000 ML: 9 INJECTION, SOLUTION INTRAVENOUS at 12:14

## 2022-10-24 RX ADMIN — POTASSIUM CHLORIDE, DEXTROSE MONOHYDRATE AND SODIUM CHLORIDE: 150; 5; 450 INJECTION, SOLUTION INTRAVENOUS at 17:37

## 2022-10-24 RX ADMIN — ONDANSETRON 4 MG: 2 INJECTION INTRAMUSCULAR; INTRAVENOUS at 18:18

## 2022-10-24 RX ADMIN — HYDROMORPHONE HYDROCHLORIDE 0.5 MG: 1 INJECTION, SOLUTION INTRAMUSCULAR; INTRAVENOUS; SUBCUTANEOUS at 12:14

## 2022-10-24 RX ADMIN — HYDROMORPHONE HYDROCHLORIDE 0.5 MG: 1 INJECTION, SOLUTION INTRAMUSCULAR; INTRAVENOUS; SUBCUTANEOUS at 21:29

## 2022-10-24 RX ADMIN — GADOBUTROL 6 ML: 604.72 INJECTION INTRAVENOUS at 22:04

## 2022-10-24 RX ADMIN — ONDANSETRON 4 MG: 2 INJECTION INTRAMUSCULAR; INTRAVENOUS at 12:15

## 2022-10-24 RX ADMIN — SODIUM CHLORIDE 60 ML: 900 INJECTION INTRAVENOUS at 13:19

## 2022-10-24 RX ADMIN — IOPAMIDOL 65 ML: 755 INJECTION, SOLUTION INTRAVENOUS at 13:19

## 2022-10-24 RX ADMIN — HYDROMORPHONE HYDROCHLORIDE 0.5 MG: 1 INJECTION, SOLUTION INTRAMUSCULAR; INTRAVENOUS; SUBCUTANEOUS at 16:50

## 2022-10-24 ASSESSMENT — ENCOUNTER SYMPTOMS
FEVER: 0
DYSURIA: 0
ABDOMINAL PAIN: 1
NAUSEA: 1
VOMITING: 0
FREQUENCY: 0
DIARRHEA: 0

## 2022-10-24 ASSESSMENT — ACTIVITIES OF DAILY LIVING (ADL)
ADLS_ACUITY_SCORE: 18
ADLS_ACUITY_SCORE: 18
ADLS_ACUITY_SCORE: 35
ADLS_ACUITY_SCORE: 18

## 2022-10-24 NOTE — ED NOTES
"[facility]  ED Nurse Handoff Report    ED Chief complaint: Abdominal Pain      ED Diagnosis:   Final diagnoses:   Small bowel obstruction (H)   Hx of ulcerative colitis   Hx of colectomy       Code Status: Full Code    Allergies: No Known Allergies    Patient Story: Pt reports waking up early this morning with c/o abd pain and nausea. Pt reports history of small bowel obstructions. Pt does have current colostomy.  Focused Assessment:  +Abd pain/Nausea    Treatments and/or interventions provided: See MAR  Patient's response to treatments and/or interventions: Improved nausea and Pain    To be done/followed up on inpatient unit:  Control Nausea-NPO    Does this patient have any cognitive concerns?: A&O x4    Activity level - Baseline/Home:  Independent  Activity Level - Current:   Independent    Patient's Preferred language: English   Needed?: No    Isolation: None  Infection: Not Applicable  Patient tested for COVID 19 prior to admission: YES  Bariatric?: No    Vital Signs:   Vitals:    10/24/22 1155   BP: 111/81   Pulse: 69   Resp: 16   SpO2: 99%   Weight: 59 kg (130 lb)   Height: 1.575 m (5' 2\")       Cardiac Rhythm:     Was the PSS-3 completed:   Yes  What interventions are required if any?               Family Comments:  at beside  OBS brochure/video discussed/provided to patient/family: Yes              Name of person given brochure if not patient: NA              Relationship to patient: NA    For the majority of the shift this patient's behavior was Green.   Behavioral interventions performed were non eneeded.    ED NURSE PHONE NUMBER: [id number]         "

## 2022-10-24 NOTE — PHARMACY-ADMISSION MEDICATION HISTORY
Pharmacy Medication History  Admission medication history interview status for the 10/24/2022  admission is complete. See EPIC admission navigator for prior to admission medications     Location of Interview: Patient room  Medication history sources: Patient and Surescripts    Significant changes made to the medication list:   Added: adapalene gel    Medication reconciliation completed by provider prior to medication history? No    Time spent in this activity: 5 minutes     Prior to Admission medications    Medication Sig Last Dose Taking? Auth Provider Long Term End Date   adapalene (DIFFERIN) 0.1 % external gel Apply topically At Bedtime Past Month Yes Unknown, Entered By History     citalopram (CELEXA) 20 MG tablet Take 1 tablet (20 mg) by mouth daily 10/24/2022 at am Yes Lin Mchugh, APRN CNP Yes        The information provided in this note is only as accurate as the sources available at the time of update(s)     Arelis Segovia, RafatD, BCCCP

## 2022-10-24 NOTE — ED PROVIDER NOTES
"  History   Chief Complaint:  Abdominal Pain       HPI   Amber Buckner is a 37 year old female with history of bowel obstruction who presents with abdominal discomfort since last night and nausea since this morning. She feels that this is similar to a past small bowel obstruction. She denies fever, vomiting, diarrhea, dysuria, frequency, and concern for pregnancy. She last drank coffee at 0700.    Review of Systems   Constitutional: Negative for fever.   Gastrointestinal: Positive for abdominal pain and nausea. Negative for diarrhea and vomiting.   Genitourinary: Negative for dysuria and frequency.   All other systems reviewed and are negative.    Allergies:  No known drug allergies    Medications:  Vistaril   Mirena  Inderal   Zoloft     Past Medical History:     Depression  Anxiety  Perineal abscess  PONV  Ulcerative colitis   Polycystic ovaries  angioma   Bowel obstruction      Past Surgical History:    c section x3  Colectomy subtotal  Fistulotomy rectum  Takedown ileostomy   Tonsillectomy   Transperineal repair fistula rectal urethral      Family History:    Father: Crohn's Disease, Ulcerative colitis  Sister: ulcerative colitis     Social History:  The patient presents to the ED alone  PCP: Cinthia Charles    Physical Exam     Patient Vitals for the past 24 hrs:   BP Pulse Resp SpO2 Height Weight   10/24/22 1155 111/81 69 16 99 % 1.575 m (5' 2\") 59 kg (130 lb)     Physical Exam  General: Alert, appears well-developed and well-nourished. Cooperative.     In mild distress  HEENT:  Head:  Atraumatic  Ears:  External ears are normal  Mouth/Throat:  Oropharynx is without erythema or exudate and mucous membranes are moist.   Eyes:   Conjunctivae normal and EOM are normal. No scleral icterus.  CV:  Normal rate, regular rhythm, normal heart sounds and radial pulses are 2+ and symmetric.  No murmur.  Resp:  Breath sounds are clear bilaterally    Non-labored, no retractions or accessory muscle use  GI:  Abdomen " is soft, mild distention, diffuse quadrant discomfort without rebound or guarding.  No CVA tenderness bilaterally  MS:  Normal range of motion. No edema.    Normal strength in all 4 extremities.     Back atraumatic.    No midline cervical, thoracic, or lumbar tenderness  Skin:  Warm and dry.  No rash or lesions noted.  Neuro: Alert. Normal strength.  GCS: 15  Psych:  Normal mood and affect.    Emergency Department Course   Imaging:  CT Abdomen Pelvis w Contrast   Preliminary Result   IMPRESSION:    1.  Prominently dilated central small bowel loops leading to a small   bowel anastomosis site at the upper pelvis level worrisome for small   bowel obstruction.   2.  New finding of multiple hypodensities in the liver compared to   10/17/2019 that are indeterminate. Recommend correlation with liver   MRI. Neoplasm remains in the differential.         Report per radiology    Laboratory:  Labs Ordered and Resulted from Time of ED Arrival to Time of ED Departure   CBC WITH PLATELETS AND DIFFERENTIAL - Abnormal       Result Value    WBC Count 6.8      RBC Count 3.86      Hemoglobin 13.0      Hematocrit 39.3       (*)     MCH 33.7 (*)     MCHC 33.1      RDW 12.5      Platelet Count 288      % Neutrophils 72      % Lymphocytes 21      % Monocytes 6      % Eosinophils 1      % Basophils 0      % Immature Granulocytes 0      NRBCs per 100 WBC 0      Absolute Neutrophils 4.8      Absolute Lymphocytes 1.4      Absolute Monocytes 0.4      Absolute Eosinophils 0.1      Absolute Basophils 0.0      Absolute Immature Granulocytes 0.0      Absolute NRBCs 0.0     ISTAT GASES LACTATE VENOUS POCT - Abnormal    Lactic Acid POCT 0.6      Bicarbonate Venous POCT 22      O2 Sat, Venous POCT 67 (*)     pCO2V Venous POCT 39 (*)     pH Venous POCT 7.36      pO2 Venous POCT 36     COMPREHENSIVE METABOLIC PANEL - Normal    Sodium 136      Potassium 3.7      Chloride 108      Carbon Dioxide (CO2) 24      Anion Gap 4      Urea Nitrogen 11       Creatinine 0.60      Calcium 9.1      Glucose 95      Alkaline Phosphatase 53      AST 10      ALT 18      Protein Total 8.4      Albumin 4.3      Bilirubin Total 0.6      GFR Estimate >90     LIPASE - Normal    Lipase 91     HCG QUALITATIVE PREGNANCY - Normal    hCG Serum Qualitative Negative     COVID-19 VIRUS (CORONAVIRUS) BY PCR     Emergency Department Course:  Reviewed:  I reviewed nursing notes, vitals, past medical history and Care Everywhere    Assessments:  1202 I obtained history and examined the patient as noted above.   1345 I rechecked the patient and explained findings.     Consults:  1407 I spoke with Dr. Duenas of the Hospitalist service from [facility] regarding patient's presentation, findings, and plan of care.     Interventions:  1214 Sodium chloride bolus, 1000 ml, IV   1214 Dilaudid, 0.5 mg, IV   1215 Zofran, 4 mg, IV     Disposition:  The patient was admitted to the hospital under the care of Dr. Duenas.     Impression & Plan   Medical Decision Making:  Amber Buckner is a 37 year old female who presents for evaluation of abdominal pain.  There has been nausea and pain feels similarly to prior [facility] admissions.  Of note she is followed closely by colorectal surgery given her history of ulcerative colitis and colectomy.  Clinically this is consistent with bowel obstruction, CT confirms this.  Patient has been resuscitated with IVF and pain medications.  Will admit to medicine for further cares and bowel rest.  Consultation placed for colorectal surgery, but have not heard back at time of admission.      Diagnosis:    ICD-10-CM    1. Small bowel obstruction (H)  K56.609       2. Hx of ulcerative colitis  Z87.19       3. Hx of colectomy  Z90.49         Scribe Disclosure:  CANELO, Jim Forrester, am serving as a scribe at 11:59 AM on 10/24/2022 to document services personally performed by Manjinder Miranda MD based on my observations and the provider's statements to me.         Manjinder Miranda,  MD  10/24/22 [id number]

## 2022-10-24 NOTE — PROGRESS NOTES
RECEIVING UNIT ED HANDOFF REVIEW    ED Nurse Handoff Report was reviewed by: Tara Wright RN on October 24, 2022 at 5:36 PM

## 2022-10-24 NOTE — CONSULTS
"[facility]  Colon and Rectal Surgery Consult Note  Name: Amber Buckner    MRN: [id number]  YOB: 1985    Age: 37 year old  Date of admission: 10/24/2022  Primary care provider: Cinthia Charles     Requesting Physician:  Dr. Duenas  Reason for consult:  Small bowel obstruction           History of Present Illness:   Amber Buckner is a 37 year old female with h/o UC s/p TPC with IPAA in 2001, seen at the request of Dr. Duenas, who presents with abdominal pain that started yesterday evening and became worse overnight. The patient has a history of small bowel obstructions and was last hospitalized for an SBO in October 2019. Two months ago she felt like a bowel obstruction was coming on and she had a small bowel follow through XR that was normal without dilated of the small bowel or concern for stricture. She notes yesterday evening she developed abdominal pain that was persistent throughout the night and prompted her presentation to the ER today.     In the ER, her vital signs were stable. Labs were unremarkable. CT abdomen pelvis showed \"Prominently dilated central small bowel loops leading to a small bowel anastomosis site at the upper pelvis level worrisome for small bowel obstruction. New finding of multiple hypodensities in the liver compared to 10/17/2019 that are indeterminate. Recommend correlation with liver MRI. Neoplasm remains in the differential.\" She was admitted to the hospitalist service based on these findings and CRS was consulted for further evaluation and management.     Currently, the patient is resting in bed, boarding in the ED. She reports her pain is improved after IV pain meds were administered. She denies any nausea currently, but did have some upon initial presentation. No emesis. Her last bowel movement was 5 hours ago and it was liquid. She typically has 10-20 bowel movements per day and they are soft, but not all liquid. No flatus. She does feel " bloated.       Colonoscopy History:  3/10/2016 - superficial ulcerations of cuff, stable    Past abdominal surgery: TPC with IPAA ,  x 3            Past Medical History:     Past Medical History:   Diagnosis Date     Adolescent depression in college     Anxiety      Depressive disorder 2002    Treated witj celexa     Fit/adjust GI carole-device NEC      Perineal abscess      PONV (postoperative nausea and vomiting)      S/P total colectomy      Ulcerative colitis              Past Surgical History:     Past Surgical History:   Procedure Laterality Date      SECTION  2012    Procedure: SECTION; Surgeon:JAVI BELL; [facility]      SECTION N/A 2014    Procedure:  SECTION;  Surgeon: Shawanda Luna MD;  Location: [facility] L+[facility]      SECTION N/A 2017    Procedure:  SECTION;  Repeat  Section ;  Surgeon: Laurie Montoya MD;  [facility]: [facility] L[facility]     COLECTOMY SUBTOTAL       COLONOSCOPY N/A 4/15/2015    Procedure: COLONOSCOPY;  Surgeon: Al Santo MD;  [facility]     EXAM UNDER ANESTHESIA ANUS N/A 4/15/2015    Procedure: EXAM UNDER ANESTHESIA ANUS;  Surgeon: Al Santo MD;  [facility]     FISTULOTOMY RECTUM N/A 4/15/2015    Procedure: FISTULOTOMY RECTUM;  Surgeon: Al Santo MD;  [facility]     TAKEDOWN ILEOSTOMY       tonsilectomy       TRANSPERINEAL REPAIR FISTULA RECTAL URETHRAL                 Social History:     Social History     Tobacco Use     Smoking status: Never     Smokeless tobacco: Never   Substance Use Topics     Alcohol use: Yes             Family History:     Family History   Problem Relation Age of Onset     Diabetes Maternal Grandmother      Diabetes Paternal Grandfather      Genetic Disorder Father         chron's     Gastrointestinal Disease Sister         ulcerative colitis             Allergies:   No Known Allergies          Medications:             Review  "of Systems:   A comprehensive greater than 10 system review of systems was carried out.  Pertinent positives and negatives are noted above.  Otherwise negative for contributory info.            Physical Exam:     Blood pressure 111/81, pulse 69, resp. rate 16, height 1.575 m (5' 2\"), weight 59 kg (130 lb), SpO2 99 %, not currently breastfeeding.  No intake or output data in the 24 hours ending 10/24/22 1523  Exam:  General - Awake alert and oriented, appears stated age  Pulm - Non-labored breathing with normal respiratory effort  CVS - reg rate and rhythm, no peripheral edema  Abd - Soft, distended, non tender.  No guarding, rigidity or peritoneal signs.   Rectal exam was not performed.   Neuro - CN II-XII grossly intact  Musculoskeletal - extremities with no clubbing, cyanosis or edema; able to ambulate  Psych - responsive, alert, cooperative; oriented x3; appropriate mood and affect  External/skin - inspection reveals no rashes, lesions or ulcers, normal coloring         Data Reviewed:     Recent Results (from the past 24 hour(s))   CT Abdomen Pelvis w Contrast    Narrative    CT ABDOMEN PELVIS WITH CONTRAST 10/24/2022 1:27 PM    CLINICAL HISTORY: Small bowel obstruction. Pain.    TECHNIQUE: CT scan of the abdomen and pelvis was performed following  injection of IV contrast. Multiplanar reformats were obtained. Dose  reduction techniques were used.  CONTRAST: 65mL Isovue-370    COMPARISON: CT abdomen and pelvis 10/17/2019.    FINDINGS:   LOWER CHEST: Normal.    HEPATOBILIARY: There are new hypodense regions within the left and  right liver. At the left liver segment IV is a 2.4 cm ill-defined  hypoenhancing nodular lesion series 3 image 28. At the inferior right  liver segment VI there is another example measuring 1.2 cm series 3  image 81. Another example is noted just inferiorly on image 93.  Gallbladder unremarkable.    PANCREAS: Normal.    SPLEEN: Normal.    ADRENAL GLANDS: Normal.    KIDNEYS/BLADDER: No " hydronephrosis. Nonobstructing stone lower right  kidney 0.4 cm, and at the lower left kidney measures 0.4 cm. These are  present on the prior exam. Small cyst at the right kidney as noted  without specific imaging follow-up recommended. The bladder is  unremarkable.    BOWEL: Multiple dilated central small bowel loops lead to fecalized  small bowel and postoperative change at the anterior mid pelvis series  3 image 136. Other small bowel loops appear decompressed. Colectomy  changes. No free air. No abscess identified.    PELVIC ORGANS: Normal.    ADDITIONAL FINDINGS: None.    MUSCULOSKELETAL: Normal.      Impression    IMPRESSION:   1.  Prominently dilated central small bowel loops leading to a small  bowel anastomosis site at the upper pelvis level worrisome for small  bowel obstruction.  2.  New finding of multiple hypodensities in the liver compared to  10/17/2019 that are indeterminate. Recommend correlation with liver  MRI. Neoplasm remains in the differential.        Recent Labs   Lab 10/24/22  1213   WBC 6.8   HGB 13.0   HCT 39.3   *        Recent Labs   Lab 10/24/22  1213      POTASSIUM 3.7   CHLORIDE 108   CO2 24   ANIONGAP 4   GLC 95   BUN 11   CR 0.60   GFRESTIMATED >90   JUDSON 9.1   PROTTOTAL 8.4   ALBUMIN 4.3   BILITOTAL 0.6   ALKPHOS 53   AST 10   ALT 18         Assessment and Plan:   Amber Buckner is a 37 year old female with h/o UC s/p TPC with IPAA who presented with abdominal pain found to have a small bowel obstruction with dilated loops of bowel leading up to anastomosis on CT imaging. Also concern for new liver lesions, recommend f/u with MRI. For SBO, recommend conservative management with bowel rest and IVF. If she develops nausea and emesis, would recommend NGT placement. CRS will follow closely.     Plan:  1. Admit to hospitalist  2. Surgery: No indication for urgent surgery at this time.  3. Diet: NPO  4. IV Fluids: continue  5. Antibiotics:  No  indication  6. Medications:  PTA medications per hospitalist  7. I&O s:  strict I&O s   8. Labs:   - Reviewed: Yes  - Ordered: none   9. Imaging:   - Dr. Gutierrez and myself have personally viewed: CT abd/pelvis  - Defer to hospitalist re: MRI liver  10. Activity:  OOB, ambulate as able  11. DVT prophylaxis: SCD s  12. This plan has been discussed with Dr. Gutierrez    Patient specific identified risk factors considered as part of today s evaluation include: prior abdominal surgery    Additional history obtained from chart review.  Time spent on consultation: 35 minutes, greater than 50% spent on counseling and/or coordination of care.    Maria Dolores Hernandez PA-C  Colorectal Physician Assistant    [facility]  [address] [facility] [address]  [place] [address]  T: [phone]  F: [phone]

## 2022-10-24 NOTE — H&P
[facility]    History and Physical - Hospitalist Service       Date of Admission:  10/24/2022    Assessment & Plan      Amber Buckner is a 37 year old female with a history of ulcerative colitis status post total colectomy, depression, and anxiety who presented to the ER with abdominal pain and nausea.  Evaluation in the ER revealed a small bowel obstruction.  The hospitalist service was contacted to admit her for further evaluation and management.    Small bowel obstruction  History of ulcerative colitis status post total colectomy  Symptoms began overnight prior to admission.  Has had progressively worsening abdominal pain and nausea, but no emesis.  Had a small liquid bowel movement on the day of admission.  CT abdomen/pelvis showed findings consistent with small bowel obstruction.  Has been hospitalized in the past for bowel obstructions but has never required surgical intervention for bowel obstruction.    Admit to inpatient.    Consult colorectal surgery, appreciate their assistance.    NPO.    IV fluids.    As needed pain and nausea medications.    No NG tube at this time, discussed potential need for NG tube if she develops worsening symptoms.    Depression  Anxiety    Continue PTA Celexa if tolerated.    Multiple hypodensities of the liver  CT abdomen/pelvis in the ER on 10/24/2022 showed multiple hypodensities in the liver which are new compared to prior CT scan in 2019.    Liver MRI ordered per radiology recommendations.    COVID-19 PCR testing    Routine admission COVID-19 PCR testing was obtained on 10/24/2022 and is pending at this time, follow-up on results.       Diet:   NPO  DVT Prophylaxis: Pneumatic Compression Devices  Rodriguez Catheter: Not present  Central Lines: None  Cardiac Monitoring: None  Code Status:   FULL CODE    Clinically Significant Risk Factors Present on Admission                              Disposition Plan      Expected Discharge Date: 10/26/2022                 The patient's care was discussed with the Bedside Nurse, Patient and ER Team.    Tomi Duenas MD  Hospitalist Service  [facility]  Securely message with the [employer] Web Console (learn more here)  Text page via [employer] Paging/Directory         ______________________________________________________________________    Chief Complaint   Abdominal pain and nausea    History is obtained from the patient    History of Present Illness   Amber Buckner is a 37 year old female with a history of ulcerative colitis status post total colectomy, depression, and anxiety who presented to the ER with abdominal pain and nausea.  She has a history of ulcerative colitis and had a total colectomy about 20 years ago.  She did well for about 10 years following the surgery but since then has had intermittent bowel obstructions.  States they have happened about every 3 or 4 years.  She has not required surgery for bowel obstruction in the past.  Last night she developed abdominal pain and nausea.  She states she will intermittently get abdominal pain and nausea at times but sometimes will just go away on its own if she takes a warm bath and relaxes.  She continued to have symptoms today despite these measures.  She went into work today and her symptoms got progressively worse.  The nausea worsened but she did not throw up.  She had a small liquid bowel movement earlier today.  She normally has 8-10 small bowel movements per day.  Due to her worsening symptoms, she came into the ER today for evaluation.    In the ER she was evaluated by Dr. Miranda.  Vitals were okay as documented in epic.  Labs are fairly unremarkable.  CT abdomen/pelvis showed prominently dilated central small bowel loops leading to small bowel anastomosis site at the upper pelvis level worrisome for small bowel obstruction.  She was also noted to have multiple hypodensities in the liver which were new compared to a prior scan in 2019.   She was given pain and nausea medications and IV fluids.  The hospitalist service was contacted to admit her for further evaluation and management.    She denies any fevers, chills, sweats.  No chest pain or shortness of breath.  No urinary symptoms.  No neurologic symptoms.  She denies any recent changes to her medications.  No recent sick contacts.    Review of Systems    The 10 point Review of Systems is negative other than noted in the HPI or here.    Past Medical History    I have reviewed this patient's medical history and updated it with pertinent information if needed.   Past Medical History:   Diagnosis Date     Adolescent depression in [facility]     Anxiety      Depressive disorder 2002    Treated [facility] celexa     Fit/adjust GI carole-device NEC      Perineal abscess      PONV (postoperative nausea and vomiting)      S/P total colectomy      Ulcerative colitis      Past Surgical History   I have reviewed this patient's surgical history and updated it with pertinent information if needed.  Past Surgical History:   Procedure Laterality Date      SECTION  2012    Procedure: SECTION; Surgeon:JAVI BELL; [facility]      SECTION N/A 2014    Procedure:  SECTION;  Surgeon: Shawanda Luna MD;  [facility]      SECTION N/A 2017    Procedure:  SECTION;  Repeat  Section ;  Surgeon: Laurie Montoya MD;  [facility]     COLECTOMY SUBTOTAL       COLONOSCOPY N/A 4/15/2015    Procedure: COLONOSCOPY;  Surgeon: Al Santo MD;  [facility]     EXAM UNDER ANESTHESIA ANUS N/A 4/15/2015    Procedure: EXAM UNDER ANESTHESIA ANUS;  Surgeon: Al Santo MD;  [facility]     FISTULOTOMY RECTUM N/A 4/15/2015    Procedure: FISTULOTOMY RECTUM;  Surgeon: Al Santo MD;  [facility]     TAKEDOWN ILEOSTOMY       tonsilectomy       TRANSPERINEAL REPAIR FISTULA RECTAL URETHRAL       Social  History   I have reviewed this patient's social history and updated it with pertinent information if needed.  Social History     Tobacco Use     Smoking status: Never     Smokeless tobacco: Never   Substance Use Topics     Alcohol use: Yes     Drug use: No     Family History   I have reviewed this patient's family history and updated it with pertinent information if needed.  Family History   Problem Relation Age of Onset     Diabetes Maternal Grandmother      Diabetes Paternal Grandfather      Genetic Disorder Father         chron's     Gastrointestinal Disease Sister         ulcerative colitis     Prior to Admission Medications   Prior to Admission Medications   Prescriptions Last Dose Informant Patient Reported? Taking?   adapalene (DIFFERIN) 0.1 % external gel Past Month  Yes Yes   Sig: Apply topically At Bedtime   citalopram (CELEXA) 20 MG tablet 10/24/2022 at am  No Yes   Sig: Take 1 tablet (20 mg) by mouth daily      Facility-Administered Medications: None     Allergies   No Known Allergies    Physical Exam   Vital Signs:     BP: 111/81 Pulse: 69   Resp: 16 SpO2: 99 % O2 Device: None (Room air)    Weight: 130 lbs 0 oz    Constitutional: awake, alert, cooperative, no apparent distress, laying in the ER bed  Eyes: sclera clear, conjunctiva normal  ENT: oral pharynx with moist mucous membranes  Respiratory: no increased work of breathing, clear to auscultation bilaterally, no crackles or wheezing  Cardiovascular: regular rate and rhythm, normal S1 and S2, no murmur noted  GI: bowel sounds decreased, soft, non-distended, mild generalized tenderness  Skin: warm, dry  Musculoskeletal: no lower extremity pitting edema present  Neurologic: awake, alert, answers questions appropriately, moves all extremities    Data   Data reviewed today: I reviewed all medications, new labs and imaging results over the last 24 hours. I personally reviewed no images or EKG's today.    Recent Labs   Lab 10/24/22  1213   WBC 6.8   HGB  13.0   *         POTASSIUM 3.7   CHLORIDE 108   CO2 24   BUN 11   CR 0.60   ANIONGAP 4   JUDSON 9.1   GLC 95   ALBUMIN 4.3   PROTTOTAL 8.4   BILITOTAL 0.6   ALKPHOS 53   ALT 18   AST 10   LIPASE 91     Recent Results (from the past 24 hour(s))   CT Abdomen Pelvis w Contrast    Narrative    CT ABDOMEN PELVIS WITH CONTRAST 10/24/2022 1:27 PM    CLINICAL HISTORY: Small bowel obstruction. Pain.    TECHNIQUE: CT scan of the abdomen and pelvis was performed following  injection of IV contrast. Multiplanar reformats were obtained. Dose  reduction techniques were used.  CONTRAST: 65mL Isovue-370    COMPARISON: CT abdomen and pelvis 10/17/2019.    FINDINGS:   LOWER CHEST: Normal.    HEPATOBILIARY: There are new hypodense regions within the left and  right liver. At the left liver segment IV is a 2.4 cm ill-defined  hypoenhancing nodular lesion series 3 image 28. At the inferior right  liver segment VI there is another example measuring 1.2 cm series 3  image 81. Another example is noted just inferiorly on image 93.  Gallbladder unremarkable.    PANCREAS: Normal.    SPLEEN: Normal.    ADRENAL GLANDS: Normal.    KIDNEYS/BLADDER: No hydronephrosis. Nonobstructing stone lower right  kidney 0.4 cm, and at the lower left kidney measures 0.4 cm. These are  present on the prior exam. Small cyst at the right kidney as noted  without specific imaging follow-up recommended. The bladder is  unremarkable.    BOWEL: Multiple dilated central small bowel loops lead to fecalized  small bowel and postoperative change at the anterior mid pelvis series  3 image 136. Other small bowel loops appear decompressed. Colectomy  changes. No free air. No abscess identified.    PELVIC ORGANS: Normal.    ADDITIONAL FINDINGS: None.    MUSCULOSKELETAL: Normal.      Impression    IMPRESSION:   1.  Prominently dilated central small bowel loops leading to a small  bowel anastomosis site at the upper pelvis level worrisome for small  bowel  obstruction.  2.  New finding of multiple hypodensities in the liver compared to  10/17/2019 that are indeterminate. Recommend correlation with liver  MRI. Neoplasm remains in the differential.

## 2022-10-25 LAB
ANION GAP SERPL CALCULATED.3IONS-SCNC: 4 MMOL/L (ref 3–14)
BUN SERPL-MCNC: 9 MG/DL (ref 7–30)
CALCIUM SERPL-MCNC: 8.2 MG/DL (ref 8.5–10.1)
CHLORIDE BLD-SCNC: 111 MMOL/L (ref 94–109)
CO2 SERPL-SCNC: 22 MMOL/L (ref 20–32)
CREAT SERPL-MCNC: 0.52 MG/DL (ref 0.52–1.04)
ERYTHROCYTE [DISTWIDTH] IN BLOOD BY AUTOMATED COUNT: 12.6 % (ref 10–15)
GFR SERPL CREATININE-BSD FRML MDRD: >90 ML/MIN/1.73M2
GLUCOSE BLD-MCNC: 116 MG/DL (ref 70–99)
HCT VFR BLD AUTO: 36.5 % (ref 35–47)
HGB BLD-MCNC: 12.2 G/DL (ref 11.7–15.7)
MCH RBC QN AUTO: 34.3 PG (ref 26.5–33)
MCHC RBC AUTO-ENTMCNC: 33.4 G/DL (ref 31.5–36.5)
MCV RBC AUTO: 103 FL (ref 78–100)
PLATELET # BLD AUTO: 249 10E3/UL (ref 150–450)
POTASSIUM BLD-SCNC: 3.9 MMOL/L (ref 3.4–5.3)
RBC # BLD AUTO: 3.56 10E6/UL (ref 3.8–5.2)
SODIUM SERPL-SCNC: 137 MMOL/L (ref 133–144)
WBC # BLD AUTO: 7 10E3/UL (ref 4–11)

## 2022-10-25 PROCEDURE — 80048 BASIC METABOLIC PNL TOTAL CA: CPT | Performed by: HOSPITALIST

## 2022-10-25 PROCEDURE — 250N000011 HC RX IP 250 OP 636: Performed by: HOSPITALIST

## 2022-10-25 PROCEDURE — 120N000001 HC R&B MED SURG/OB

## 2022-10-25 PROCEDURE — 250N000013 HC RX MED GY IP 250 OP 250 PS 637: Performed by: HOSPITALIST

## 2022-10-25 PROCEDURE — 258N000003 HC RX IP 258 OP 636: Performed by: HOSPITALIST

## 2022-10-25 PROCEDURE — 85027 COMPLETE CBC AUTOMATED: CPT | Performed by: HOSPITALIST

## 2022-10-25 PROCEDURE — 36415 COLL VENOUS BLD VENIPUNCTURE: CPT | Performed by: HOSPITALIST

## 2022-10-25 PROCEDURE — 99233 SBSQ HOSP IP/OBS HIGH 50: CPT | Performed by: HOSPITALIST

## 2022-10-25 RX ORDER — HYDROMORPHONE HCL IN WATER/PF 6 MG/30 ML
.2-.5 PATIENT CONTROLLED ANALGESIA SYRINGE INTRAVENOUS
Status: DISCONTINUED | OUTPATIENT
Start: 2022-10-25 | End: 2022-10-28 | Stop reason: HOSPADM

## 2022-10-25 RX ADMIN — HYDROMORPHONE HYDROCHLORIDE 0.5 MG: 1 INJECTION, SOLUTION INTRAMUSCULAR; INTRAVENOUS; SUBCUTANEOUS at 14:25

## 2022-10-25 RX ADMIN — CITALOPRAM HYDROBROMIDE 20 MG: 20 TABLET ORAL at 07:43

## 2022-10-25 RX ADMIN — ACETAMINOPHEN 650 MG: 325 TABLET, FILM COATED ORAL at 10:19

## 2022-10-25 RX ADMIN — ONDANSETRON 4 MG: 2 INJECTION INTRAMUSCULAR; INTRAVENOUS at 07:34

## 2022-10-25 RX ADMIN — POTASSIUM CHLORIDE, DEXTROSE MONOHYDRATE AND SODIUM CHLORIDE: 150; 5; 450 INJECTION, SOLUTION INTRAVENOUS at 06:59

## 2022-10-25 RX ADMIN — HYDROMORPHONE HYDROCHLORIDE 0.5 MG: 1 INJECTION, SOLUTION INTRAMUSCULAR; INTRAVENOUS; SUBCUTANEOUS at 05:55

## 2022-10-25 RX ADMIN — POTASSIUM CHLORIDE, DEXTROSE MONOHYDRATE AND SODIUM CHLORIDE: 150; 5; 450 INJECTION, SOLUTION INTRAVENOUS at 20:50

## 2022-10-25 RX ADMIN — HYDROMORPHONE HYDROCHLORIDE 0.4 MG: 0.2 INJECTION, SOLUTION INTRAMUSCULAR; INTRAVENOUS; SUBCUTANEOUS at 21:13

## 2022-10-25 RX ADMIN — HYDROMORPHONE HYDROCHLORIDE 0.4 MG: 0.2 INJECTION, SOLUTION INTRAMUSCULAR; INTRAVENOUS; SUBCUTANEOUS at 16:44

## 2022-10-25 ASSESSMENT — ACTIVITIES OF DAILY LIVING (ADL)
ADLS_ACUITY_SCORE: 18

## 2022-10-25 NOTE — PLAN OF CARE
5450-0220: VSS on RA. 8/10 upper abdominal pain controlled with IV dilaudid x2 and tylenol. Tried clears, minimal intake due to increase in pain. Pt had small emesis while ambulating this AM- pt thinks it was pain medication related. Few loose stools. BS active. PIV infusing. Up ambulating independently. Plan pending improvement.

## 2022-10-25 NOTE — PLAN OF CARE
Pt is A&Ox4. VSS on RA. C/o 8/10 abdominal pain, managed with PRN IV dilaudid x2. C/o nausea, managed with PRN compazine. Continent of B&B. X1 loose BM. Abdomen distended, BS hypoactive. Up independent. NPO. R PIV infusing IVF. MRI completed. Colorectal surgery following.

## 2022-10-25 NOTE — PLAN OF CARE
Up to unit at 1800. VSS on RA. Upper abdominal pain 2/10- declined pain med. Zofran given for slight nausea. Hypoactive BS. Last stool this AM, abdomen distended. PIV infusing. NPO. Liver MRI this clemente. Up independently. Plan for bowel rest and IVF.

## 2022-10-25 NOTE — PROGRESS NOTES
COLON & RECTAL SURGERY  PROGRESS NOTE    October 25, 2022    SUBJECTIVE:  Feeling better, less bloated. Did have small emesis after dilaudid this am. Passing liquid brown stool.     OBJECTIVE:  Temp:  [97.4  F (36.3  C)-98.2  F (36.8  C)] 98  F (36.7  C)  Pulse:  [64-88] 88  Resp:  [14-18] 16  BP: ()/(52-81) 91/54  SpO2:  [95 %-99 %] 98 %    Intake/Output Summary (Last 24 hours) at 10/25/2022 0754  Last data filed at 10/25/2022 0602  Gross per 24 hour   Intake 622 ml   Output --   Net 622 ml       GENERAL:  Awake, alert, no acute distress  HEAD: Normocephalic atraumatic  SCLERA: Anicteric  EXTREMITIES: Warm and well perfused  ABDOMEN:  Soft, non-tender, non-distended. No guarding, rigidity, or peritoneal signs.     LABS:  Lab Results   Component Value Date    WBC 6.8 10/24/2022    WBC 3.6 10/20/2019     Lab Results   Component Value Date    HGB 13.0 10/24/2022    HGB 11.5 10/20/2019     Lab Results   Component Value Date    HCT 39.3 10/24/2022    HCT 33.4 10/20/2019     Lab Results   Component Value Date     10/24/2022     10/20/2019     Last Basic Metabolic Panel:  Lab Results   Component Value Date     10/24/2022     10/20/2019      Lab Results   Component Value Date    POTASSIUM 3.7 10/24/2022    POTASSIUM 3.5 10/20/2019     Lab Results   Component Value Date    CHLORIDE 108 10/24/2022    CHLORIDE 110 10/20/2019     Lab Results   Component Value Date    JUDSON 9.1 10/24/2022    JUDSON 8.4 10/20/2019     Lab Results   Component Value Date    CO2 24 10/24/2022    CO2 23 10/20/2019     Lab Results   Component Value Date    BUN 11 10/24/2022    BUN 1 10/20/2019     Lab Results   Component Value Date    CR 0.60 10/24/2022    CR 0.40 10/20/2019     Lab Results   Component Value Date    GLC 95 10/24/2022     10/20/2019       ASSESSMENT/PLAN: 37-year-old female with a history of ulcerative colitis status post TPC with IPAA, prior history of small bowel obstructions who presents with small  bowel obstruction. Starting to improve with conservative management.     1. Clear liquid diet  2. PRN IV/PO pain meds  3. Continue IVF  4. OOB, ambulate    Will d/w Dr. Gutierrez.     For questions/paging, please contact the CRS office at [phone].    Maria Dolores Hernandez PA-C  Colorectal Physician Assistant    [facility]  [address]  [place] [address]  T: [phone]  F: [phone]      CRS Staff.  Seen and examined independently.  Agree with above.      Reviewed MRI results with her  Reviewed last 2 ct scans  Mildly distended on exam.  [place] pretty good this AM, but now feeling a little worse after eating.    Asked her to back off on diet.  Cont IV fluids.  Will follow.  No plan for urgent surgery currently.    I performed a history and physical examination of the patient and discussed their management with the physician assistant. I reviewed the physician assistants note and agree with the documented findings and plan of care.     Artie Link MD FACS FASCRS  Colorectal Surgeon       [facility]  [address], [address]  [place] [address]  T: [phone]  F: [phone]  Pager: [phone]  [email or web address]

## 2022-10-26 ENCOUNTER — APPOINTMENT (OUTPATIENT)
Dept: GENERAL RADIOLOGY | Facility: CLINIC | Age: 37
DRG: 390 | End: 2022-10-26
Attending: COLON & RECTAL SURGERY
Payer: COMMERCIAL

## 2022-10-26 PROCEDURE — 74019 RADEX ABDOMEN 2 VIEWS: CPT

## 2022-10-26 PROCEDURE — 250N000011 HC RX IP 250 OP 636: Performed by: HOSPITALIST

## 2022-10-26 PROCEDURE — 250N000013 HC RX MED GY IP 250 OP 250 PS 637: Performed by: HOSPITALIST

## 2022-10-26 PROCEDURE — 258N000003 HC RX IP 258 OP 636: Performed by: HOSPITALIST

## 2022-10-26 PROCEDURE — 120N000001 HC R&B MED SURG/OB

## 2022-10-26 PROCEDURE — 99233 SBSQ HOSP IP/OBS HIGH 50: CPT | Performed by: HOSPITALIST

## 2022-10-26 RX ADMIN — ACETAMINOPHEN 650 MG: 325 TABLET, FILM COATED ORAL at 12:18

## 2022-10-26 RX ADMIN — ACETAMINOPHEN 650 MG: 325 TABLET, FILM COATED ORAL at 18:38

## 2022-10-26 RX ADMIN — POTASSIUM CHLORIDE, DEXTROSE MONOHYDRATE AND SODIUM CHLORIDE: 150; 5; 450 INJECTION, SOLUTION INTRAVENOUS at 08:59

## 2022-10-26 RX ADMIN — POTASSIUM CHLORIDE, DEXTROSE MONOHYDRATE AND SODIUM CHLORIDE: 150; 5; 450 INJECTION, SOLUTION INTRAVENOUS at 23:05

## 2022-10-26 RX ADMIN — HYDROMORPHONE HYDROCHLORIDE 0.4 MG: 0.2 INJECTION, SOLUTION INTRAMUSCULAR; INTRAVENOUS; SUBCUTANEOUS at 02:08

## 2022-10-26 RX ADMIN — DIATRIZOATE MEGLUMINE AND DIATRIZOATE SODIUM 30 ML: 660; 100 SOLUTION ORAL; RECTAL at 12:52

## 2022-10-26 RX ADMIN — HYDROMORPHONE HYDROCHLORIDE 0.4 MG: 0.2 INJECTION, SOLUTION INTRAMUSCULAR; INTRAVENOUS; SUBCUTANEOUS at 10:39

## 2022-10-26 RX ADMIN — CITALOPRAM HYDROBROMIDE 20 MG: 20 TABLET ORAL at 08:59

## 2022-10-26 RX ADMIN — HYDROMORPHONE HYDROCHLORIDE 0.4 MG: 0.2 INJECTION, SOLUTION INTRAMUSCULAR; INTRAVENOUS; SUBCUTANEOUS at 06:46

## 2022-10-26 RX ADMIN — HYDROMORPHONE HYDROCHLORIDE 0.4 MG: 0.2 INJECTION, SOLUTION INTRAMUSCULAR; INTRAVENOUS; SUBCUTANEOUS at 19:10

## 2022-10-26 ASSESSMENT — ACTIVITIES OF DAILY LIVING (ADL)
ADLS_ACUITY_SCORE: 18

## 2022-10-26 NOTE — PROGRESS NOTES
[facility]    Medicine Progress Note - Hospitalist Service    Date of Admission:  10/24/2022    Assessment & Plan     Amber Buckner is a 37 year old female with a history of ulcerative colitis status post total colectomy, depression, and anxiety who presented to the ER with abdominal pain and nausea.  Evaluation in the ER revealed a small bowel obstruction.  The hospitalist service was contacted to admit her for further evaluation and management.     Small bowel obstruction  History of ulcerative colitis status post total colectomy  *Symptoms began overnight prior to admission.  Has had progressively worsening abdominal pain and nausea, but no emesis.  Had a small liquid bowel movement on the day of admission.    *CT abdomen/pelvis showed findings consistent with small bowel obstruction.  Has been hospitalized in the past for bowel obstructions but has never required surgical intervention for bowel obstruction.  Plan  -- colorectal surgery consult: input appreciated. Gastrographin study planned for today as patient was passing much gas  -- advanced to clear liquid diet.   -- continue supportive care with prn pain meds, antinausea meds and IVFs.  -- will discontinue IVFs if tolerates clears and SBO improving.      Depression  Anxiety    Continue PTA Celexa      Multiple hypodensities of the liver  CT abdomen/pelvis in the ER on 10/24/2022 showed multiple hypodensities in the liver which are new compared to prior CT scan in 2019.  -- Liver MRI ordered per radiology recommendations - did not show any of the hypodensities seen on CT scan  -- Patient to have follow up CT in 3 - 6 months     COVID-19 PCR testing   -- negative     Diet: Clear Liquid Diet    DVT Prophylaxis: Pneumatic Compression Devices  Rodriguez Catheter: Not present  Central Lines: None  Cardiac Monitoring: None  Code Status: Full Code      Disposition Plan     Expected Discharge Date: 10/26/2022                The patient's  care was discussed with the Patient.    Angelica Clark MD  Hospitalist Service  [facility]  Securely message with the [employer] Web Console (learn more here)  Text page via [employer] Paging/Directory         Clinically Significant Risk Factors          # Hypocalcemia: Lowest Ca = 8.2 mg/dL (Ref range: 8.5 - 10.1 mg/dL) in last 2 days, will monitor and replace as appropriate                       ______________________________________________________________________    Interval History    Continues to have pain in abdomen.   Notes she wasn't passing gas in the morning but started passing some gas this afternoon.       Data reviewed today: I reviewed all medications, new labs and imaging results over the last 24 hours. I personally reviewed no images or EKG's today.    Physical Exam   Vital Signs: Temp: 97.9  F (36.6  C) Temp src: Oral BP: 93/60 Pulse: 74   Resp: 16 SpO2: 96 % O2 Device: None (Room air)    Weight: 130 lbs 0 oz  General Appearance: Well appearing for stated age.  Respiratory: CTAB, no rales or ronchi  Cardiovascular: S1, S2 normal, no murmurs  GI: tenderness on palpation of upper quadrant, BS present      Data   Recent Labs   Lab 10/25/22  0750 10/24/22  1213   WBC 7.0 6.8   HGB 12.2 13.0   * 102*    288    136   POTASSIUM 3.9 3.7   CHLORIDE 111* 108   CO2 22 24   BUN 9 11   CR 0.52 0.60   ANIONGAP 4 4   JUDSON 8.2* 9.1   * 95   ALBUMIN  --  4.3   PROTTOTAL  --  8.4   BILITOTAL  --  0.6   ALKPHOS  --  53   ALT  --  18   AST  --  10   LIPASE  --  91     No results found for this or any previous visit (from the past 24 hour(s)).

## 2022-10-26 NOTE — PLAN OF CARE
Goal Outcome Evaluation:      Plan of Care Reviewed With: patient    Overall Patient Progress: improvingOverall Patient Progress: improving       Shift Note:     1900 - 0730    Pt is A+Ox4, vss on RA. Clear liquid diet, LS clear, continent of b and b, bowel sounds active, has not been able to pass flatus. Denies n/v, iv dilaudid  X3 for pain in abd. Abd still distended. Continuous IVF at 75ml/hr, independent the room. Discharge pending bowel function.

## 2022-10-26 NOTE — PROGRESS NOTES
COLON & RECTAL SURGERY  PROGRESS NOTE    October 26, 2022    SUBJECTIVE:  More bloated and crampy abdominal pain today. Liquid stools have slowed down. No nausea/vomiting. No flatus. Ambulating frequently in hallways.     OBJECTIVE:  Temp:  [97.9  F (36.6  C)-98.7  F (37.1  C)] 97.9  F (36.6  C)  Pulse:  [74-81] 74  Resp:  [16] 16  BP: ()/(57-61) 93/60  SpO2:  [96 %-97 %] 96 %    Intake/Output Summary (Last 24 hours) at 10/26/2022 1015  Last data filed at 10/26/2022 0900  Gross per 24 hour   Intake 1972 ml   Output --   Net 1972 ml       GENERAL:  Awake, alert, no acute distress  HEAD: Normocephalic atraumatic  SCLERA: Anicteric  EXTREMITIES: Warm and well perfused  ABDOMEN:  Soft, non-tender, mildly distended. No guarding, rigidity, or peritoneal signs.     LABS:  Lab Results   Component Value Date    WBC 7.0 10/25/2022    WBC 3.6 10/20/2019     Lab Results   Component Value Date    HGB 12.2 10/25/2022    HGB 11.5 10/20/2019     Lab Results   Component Value Date    HCT 36.5 10/25/2022    HCT 33.4 10/20/2019     Lab Results   Component Value Date     10/25/2022     10/20/2019     Last Basic Metabolic Panel:  Lab Results   Component Value Date     10/25/2022     10/20/2019      Lab Results   Component Value Date    POTASSIUM 3.9 10/25/2022    POTASSIUM 3.5 10/20/2019     Lab Results   Component Value Date    CHLORIDE 111 10/25/2022    CHLORIDE 110 10/20/2019     Lab Results   Component Value Date    JUDSON 8.2 10/25/2022    JUDSON 8.4 10/20/2019     Lab Results   Component Value Date    CO2 22 10/25/2022    CO2 23 10/20/2019     Lab Results   Component Value Date    BUN 9 10/25/2022    BUN 1 10/20/2019     Lab Results   Component Value Date    CR 0.52 10/25/2022    CR 0.40 10/20/2019     Lab Results   Component Value Date     10/25/2022     10/20/2019       ASSESSMENT/PLAN: 37-year-old female with a history of ulcerative colitis status post TPC with IPAA, prior history of  small bowel obstructions who presents with small bowel obstruction. Await return of bowel function    1. Sips of clears  2. PRN IV/PO pain meds  3. Continue IVF  4. OOB, ambulate    Will d/w Dr. Link.     For questions/paging, please contact the CRS office at [phone].    Maria Dolores Hernandez PA-C  Colorectal Physician Assistant    [facility]  [address]  [place] [address]  T: [phone]  F: [phone]      CRS Staff.  Seen and examined independently.  Agree with above.    Not passing gas.  Abdomen is soft, tender but no peritoneal signs.    Plan for gastroview challenge.  Will obtain abd xray    I performed a history and physical examination of the patient and discussed their management with the physician assistant. I reviewed the physician assistants note and agree with the documented findings and plan of care.     Artie Link MD FACS FASCRS  Colorectal Surgeon       [facility]  [address], [address]  [place] [address]  T: [phone]  F: [phone]  Pager: [phone]  [email or web address]

## 2022-10-27 ENCOUNTER — APPOINTMENT (OUTPATIENT)
Dept: GENERAL RADIOLOGY | Facility: CLINIC | Age: 37
DRG: 390 | End: 2022-10-27
Attending: PHYSICIAN ASSISTANT
Payer: COMMERCIAL

## 2022-10-27 PROCEDURE — 120N000001 HC R&B MED SURG/OB

## 2022-10-27 PROCEDURE — 250N000011 HC RX IP 250 OP 636: Performed by: HOSPITALIST

## 2022-10-27 PROCEDURE — 250N000013 HC RX MED GY IP 250 OP 250 PS 637: Performed by: HOSPITALIST

## 2022-10-27 PROCEDURE — 99232 SBSQ HOSP IP/OBS MODERATE 35: CPT | Performed by: HOSPITALIST

## 2022-10-27 PROCEDURE — 74270 X-RAY XM COLON 1CNTRST STD: CPT

## 2022-10-27 RX ADMIN — DIATRIZOATE MEGLUMINE AND DIATRIZOATE SODIUM 120 ML: 660; 100 SOLUTION ORAL; RECTAL at 13:37

## 2022-10-27 RX ADMIN — ONDANSETRON 4 MG: 4 TABLET, ORALLY DISINTEGRATING ORAL at 20:54

## 2022-10-27 RX ADMIN — ACETAMINOPHEN 650 MG: 325 TABLET, FILM COATED ORAL at 07:57

## 2022-10-27 RX ADMIN — ONDANSETRON 4 MG: 4 TABLET, ORALLY DISINTEGRATING ORAL at 13:49

## 2022-10-27 RX ADMIN — Medication 1 MG: at 22:03

## 2022-10-27 RX ADMIN — CITALOPRAM HYDROBROMIDE 20 MG: 20 TABLET ORAL at 07:57

## 2022-10-27 RX ADMIN — ACETAMINOPHEN 650 MG: 325 TABLET, FILM COATED ORAL at 13:51

## 2022-10-27 RX ADMIN — ACETAMINOPHEN 650 MG: 325 TABLET, FILM COATED ORAL at 01:42

## 2022-10-27 RX ADMIN — ACETAMINOPHEN 650 MG: 325 TABLET, FILM COATED ORAL at 19:58

## 2022-10-27 ASSESSMENT — ACTIVITIES OF DAILY LIVING (ADL)
ADLS_ACUITY_SCORE: 18

## 2022-10-27 NOTE — PLAN OF CARE
Pt is A&Ox4. VSS, soft BP. On RA. C/o 6/10 abdominal pain, managed with PRN tylenol. Denies nausea. Continent of B&B. Loose BMs, baseline. Abdomen distended, improving per pt. Up independent. Ambulating frequently. On clear liquid diet. Passing flatus.  R PIV infusing IVF. Colorectal surgery following. Discharge pending bowel improvement.

## 2022-10-27 NOTE — PROGRESS NOTES
[facility]    Medicine Progress Note - Hospitalist Service    Date of Admission:  10/24/2022    Assessment & Plan     Amber Buckner is a 37 year old female with a history of ulcerative colitis status post total colectomy, depression, and anxiety who presented to the ER with abdominal pain and nausea.  Evaluation in the ER revealed a small bowel obstruction.  The hospitalist service was contacted to admit her for further evaluation and management.     Small bowel obstruction  History of ulcerative colitis status post total colectomy  *Symptoms began overnight prior to admission.  Has had progressively worsening abdominal pain and nausea, but no emesis.  Had a small liquid bowel movement on the day of admission.    *CT abdomen/pelvis showed findings consistent with small bowel obstruction.  Has been hospitalized in the past for bowel obstructions but has never required surgical intervention for bowel obstruction.  Gastrografin/abxr on 10/26 shoed SBO similar to prior CT, high attenuation of contrast in the ileal pouch.  Plan  -- Cont clears (advance as per surgery).  -- continue supportive care with prn pain meds.  -- will discontinue IVFs if tolerates clears and SBO improving.   - Further plan per CRS (appreciate assistance).     Depression  Anxiety    Continue PTA Celexa      Multiple hypodensities of the liver  CT abdomen/pelvis in the ER on 10/24/2022 showed multiple hypodensities in the liver which are new compared to prior CT scan in 2019.  -- Liver MRI ordered per radiology recommendations - did not show any of the hypodensities seen on CT scan  -- Patient to have follow up CT in 3 - 6 months     COVID-19 PCR testing   -- negative     Diet: Clear Liquid Diet    DVT Prophylaxis: Pneumatic Compression Devices  Rodriguez Catheter: Not present  Central Lines: None  Cardiac Monitoring: None  Code Status: Full Code      Disposition Plan : Pending return of BF and diet tolerance.     The  patient's care was discussed with the Patient.    Nicholas Barclay MD  Hospitalist Service  [facility]               ______________________________________________________________________    Interval History    Continues to have abd discomfort. No n/v. Having small loose BMs. Passed gas yesterday. .       Data reviewed today: I reviewed all medications, new labs and imaging results over the last 24 hours. I personally reviewed no images or EKG's today.    Physical Exam   Vital Signs: Temp: 97.9  F (36.6  C) Temp src: Oral BP: 99/57 Pulse: 77   Resp: 16 SpO2: 98 % O2 Device: None (Room air)    Weight: 130 lbs 0 oz  General Appearance: Well appearing for stated age.  Respiratory: CTAB, no rales or ronchi  Cardiovascular: S1, S2 normal, no murmurs  GI: tenderness on palpation of LLQ , BS present      Data   Recent Labs   Lab 10/25/22  0750 10/24/22  1213   WBC 7.0 6.8   HGB 12.2 13.0   * 102*    288    136   POTASSIUM 3.9 3.7   CHLORIDE 111* 108   CO2 22 24   BUN 9 11   CR 0.52 0.60   ANIONGAP 4 4   JUDSON 8.2* 9.1   * 95   ALBUMIN  --  4.3   PROTTOTAL  --  8.4   BILITOTAL  --  0.6   ALKPHOS  --  53   ALT  --  18   AST  --  10   LIPASE  --  91     Recent Results (from the past 24 hour(s))   XR Abdomen 2 Views    Narrative    EXAM: XR ABDOMEN 2 VIEWS  LOCATION: [facility]  DATE/TIME: 10/26/2022 5:58 PM    INDICATION: small bowel obstruction  COMPARISON: CT 10/24/2022      Impression    IMPRESSION: Dilated small bowel in the left lower quadrant measuring up to 4.2 cm, similar to CT 10/24/2022. High attenuation oral contrast material distally in the ileal pouch. No free air or pneumatosis.

## 2022-10-27 NOTE — PLAN OF CARE
[id number]: VSS on RA. Dilaudid 0.4 mg and tylenol for pain, pt feels the pain moved to her lower abdomen today. Denied nausea. Passed gas this afternoon after taking gastrografin. Xray with little change. Ambulating halls frequently. PIV infusing. Tolerating some clear liquids. Plan pending improvement.

## 2022-10-27 NOTE — PROGRESS NOTES
COLON & RECTAL SURGERY  PROGRESS NOTE    October 27, 2022    SUBJECTIVE:  Initially felt better after gastroview challenge, passing flatus and bowel movements but now feels things have stopped again. Soreness across abdomen. Tried some tea this morning but not much of an appetite.     OBJECTIVE:  Temp:  [97.9  F (36.6  C)-98.2  F (36.8  C)] 97.9  F (36.6  C)  Pulse:  [68-77] 72  Resp:  [16] 16  BP: (91-99)/(57-59) 94/59  SpO2:  [97 %-100 %] 100 %    Intake/Output Summary (Last 24 hours) at 10/27/2022 0847  Last data filed at 10/27/2022 0620  Gross per 24 hour   Intake 1633 ml   Output --   Net 1633 ml       GENERAL:  Awake, alert, no acute distress  HEAD: Normocephalic atraumatic  SCLERA: Anicteric  EXTREMITIES: Warm and well perfused  ABDOMEN:  Soft, mild tenderness, non-distended. No guarding, rigidity, or peritoneal signs.     LABS:  Lab Results   Component Value Date    WBC 7.0 10/25/2022    WBC 3.6 10/20/2019     Lab Results   Component Value Date    HGB 12.2 10/25/2022    HGB 11.5 10/20/2019     Lab Results   Component Value Date    HCT 36.5 10/25/2022    HCT 33.4 10/20/2019     Lab Results   Component Value Date     10/25/2022     10/20/2019     Last Basic Metabolic Panel:  Lab Results   Component Value Date     10/25/2022     10/20/2019      Lab Results   Component Value Date    POTASSIUM 3.9 10/25/2022    POTASSIUM 3.5 10/20/2019     Lab Results   Component Value Date    CHLORIDE 111 10/25/2022    CHLORIDE 110 10/20/2019     Lab Results   Component Value Date    JUDSON 8.2 10/25/2022    JUDSON 8.4 10/20/2019     Lab Results   Component Value Date    CO2 22 10/25/2022    CO2 23 10/20/2019     Lab Results   Component Value Date    BUN 9 10/25/2022    BUN 1 10/20/2019     Lab Results   Component Value Date    CR 0.52 10/25/2022    CR 0.40 10/20/2019     Lab Results   Component Value Date     10/25/2022     10/20/2019       ASSESSMENT/PLAN: 37-year-old female with a history of  ulcerative colitis status post TPC with IPAA, prior history of small bowel obstructions who presents with small bowel obstruction. Gastroview challenge with initial god improvement, but now feels symptoms have returned. XR shows dilated distal small bowel, there is contrast in the pouch. Await return of bowel function.     1. Clear liquids as tolerated  2. PRN pain meds  3. Continue IVF  4. OOB, ambulate  5. Gastrografin enema and XR today.     Discussed with Dr. Link.     For questions/paging, please contact the CRS office at [phone].    Maria Dolores Hernandez PA-C  Colorectal Physician Assistant    [facility]  [address]  [place] [address]  T: [phone]  F: [phone]      CRS Staff.  Seen and examined independently.  Agree with above.    Feeling better.  GGE looks good.  Full liquid diet tonight.      I performed a history and physical examination of the patient and discussed their management with the physician assistant. I reviewed the physician assistants note and agree with the documented findings and plan of care.     Artie Link MD FACS FASCRS  Colorectal Surgeon       [facility]  [address], [address]  [place] [address]  T: [phone]  F: [phone]  Pager: [phone]  [email or web address]

## 2022-10-27 NOTE — PLAN OF CARE
Pt up ind walking multiple times on her own.  IVFs infusing.  On clear liquid diet during the day.  Still having some abd discomfort, using tylenol PRN.  Gastroview enema in X-ray today, awaiting results.  Pt did request ODT zofran this afternoon.  Showered this evening.  Advanced to full liquid diet this evening.  Pt hopeful to discharge tomorrow.

## 2022-10-28 VITALS
DIASTOLIC BLOOD PRESSURE: 58 MMHG | WEIGHT: 130 LBS | SYSTOLIC BLOOD PRESSURE: 99 MMHG | RESPIRATION RATE: 16 BRPM | OXYGEN SATURATION: 99 % | HEIGHT: 62 IN | TEMPERATURE: 98.2 F | BODY MASS INDEX: 23.92 KG/M2 | HEART RATE: 73 BPM

## 2022-10-28 LAB
ANION GAP SERPL CALCULATED.3IONS-SCNC: 7 MMOL/L (ref 3–14)
BUN SERPL-MCNC: 3 MG/DL (ref 7–30)
CALCIUM SERPL-MCNC: 9.3 MG/DL (ref 8.5–10.1)
CHLORIDE BLD-SCNC: 109 MMOL/L (ref 94–109)
CO2 SERPL-SCNC: 24 MMOL/L (ref 20–32)
CREAT SERPL-MCNC: 0.53 MG/DL (ref 0.52–1.04)
ERYTHROCYTE [DISTWIDTH] IN BLOOD BY AUTOMATED COUNT: 12.1 % (ref 10–15)
GFR SERPL CREATININE-BSD FRML MDRD: >90 ML/MIN/1.73M2
GLUCOSE BLD-MCNC: 149 MG/DL (ref 70–99)
HCT VFR BLD AUTO: 35.9 % (ref 35–47)
HGB BLD-MCNC: 12.2 G/DL (ref 11.7–15.7)
MCH RBC QN AUTO: 33.9 PG (ref 26.5–33)
MCHC RBC AUTO-ENTMCNC: 34 G/DL (ref 31.5–36.5)
MCV RBC AUTO: 100 FL (ref 78–100)
PLATELET # BLD AUTO: 277 10E3/UL (ref 150–450)
POTASSIUM BLD-SCNC: 3.8 MMOL/L (ref 3.4–5.3)
RBC # BLD AUTO: 3.6 10E6/UL (ref 3.8–5.2)
SODIUM SERPL-SCNC: 140 MMOL/L (ref 133–144)
WBC # BLD AUTO: 4.5 10E3/UL (ref 4–11)

## 2022-10-28 PROCEDURE — 36415 COLL VENOUS BLD VENIPUNCTURE: CPT | Performed by: HOSPITALIST

## 2022-10-28 PROCEDURE — 250N000013 HC RX MED GY IP 250 OP 250 PS 637: Performed by: HOSPITALIST

## 2022-10-28 PROCEDURE — 82310 ASSAY OF CALCIUM: CPT | Performed by: HOSPITALIST

## 2022-10-28 PROCEDURE — 85027 COMPLETE CBC AUTOMATED: CPT | Performed by: HOSPITALIST

## 2022-10-28 PROCEDURE — 258N000003 HC RX IP 258 OP 636: Performed by: HOSPITALIST

## 2022-10-28 PROCEDURE — 99239 HOSP IP/OBS DSCHRG MGMT >30: CPT | Performed by: INTERNAL MEDICINE

## 2022-10-28 RX ADMIN — CITALOPRAM HYDROBROMIDE 20 MG: 20 TABLET ORAL at 09:00

## 2022-10-28 RX ADMIN — POTASSIUM CHLORIDE, DEXTROSE MONOHYDRATE AND SODIUM CHLORIDE: 150; 5; 450 INJECTION, SOLUTION INTRAVENOUS at 02:52

## 2022-10-28 RX ADMIN — ACETAMINOPHEN 650 MG: 325 TABLET, FILM COATED ORAL at 03:01

## 2022-10-28 ASSESSMENT — ACTIVITIES OF DAILY LIVING (ADL)
ADLS_ACUITY_SCORE: 18

## 2022-10-28 NOTE — DISCHARGE SUMMARY
[facility]    Discharge Summary  Hospitalist    Date of Admission:  10/24/2022  Date of Discharge:  10/28/2022  Discharging Provider: Meliton Leslie MD, MD    Discharge Diagnoses      Small bowel obstruction  History of ulcerative colitis status post total colectomy  Depression  Anxiety  Multiple hypodensities of the liver    Hospital Course:    Amber Buckner is a 37 year old female with a history of ulcerative colitis status post total colectomy, depression, and anxiety who presented to the ER with abdominal pain and nausea.  Evaluation in the ER revealed a small bowel obstruction.  The hospitalist service was contacted to admit her for further evaluation and management.     Small bowel obstruction  History of ulcerative colitis status post total colectomy  *Symptoms began overnight prior to admission.  Has had progressively worsening abdominal pain and nausea, but no emesis.  Had a small liquid bowel movement on the day of admission.    *CT abdomen/pelvis showed findings consistent with small bowel obstruction.  Has been hospitalized in the past for bowel obstructions but has never required surgical intervention for bowel obstruction.  Gastrografin/abxr on 10/26 shoed SBO similar to prior CT, high attenuation of contrast in the ileal pouch.  -Was managed conservatively with bowel rest and pain control and IV fluids  -Diet was advanced which she tolerated, upgraded to low fiber diet this morning.  Pain and nausea resolved. Also having bowel movements now and ready for discharge.  -Outpatient follow-up with colorectal surgery as per their schedule.     Depression  Anxiety  -Continue PTA Celexa      Multiple hypodensities of the liver  CT abdomen/pelvis in the ER on 10/24/2022 showed multiple hypodensities in the liver which are new compared to prior CT scan in 2019.  -Liver MRI ordered per radiology recommendations - did not show any of the hypodensities seen on CT scan  -Recommend  repeating CT abdomen in 3 - 6 months as per radiology recommendation.  Discussed with both patient and her  at the bedside on the day of discharge.  This can be arranged through her PCP.    Meliton Leslie MD, MD    Significant Results and Procedures   See below    Pending Results     Unresulted Labs Ordered in the Past 30 Days of this Admission     No orders found from 9/24/2022 to 10/25/2022.          Code Status   Full Code       Primary Care Physician   Cinthia Charles    Physical Exam   Temp: 98.2  F (36.8  C) Temp src: Oral BP: 99/58 Pulse: 73   Resp: 16 SpO2: 99 % O2 Device: None (Room air)      Constitutional: AAOX3, NAD  Respiratory: CTA B/L, Normal WOB  Cardiovascular: RRR, No murmur  GI: Soft, mild nonspecific tenderness, no guarding or rebound.  Bowel sound normoactive.  Neuro: CN- grossly intact, Motor strength 5/5 on all 4 extremities.     Discharge Disposition   Discharged to home  Condition at discharge: Stable    Consultations This Hospital Stay   COLORECTAL SURGERY IP CONSULT    Time Spent on this Encounter   I, Meliton Leslie MD, personally saw the patient today and spent greater than 30 minutes discharging this patient.    Discharge Orders      Reason for your hospital stay    SBO     Follow-up and recommended labs and tests    Follow up with primary care provider, Cinthia Charles, within 7 days for hospital follow- up.  Consider possible CT abdomen in 3-6 months with PCP     Activity    Your activity upon discharge: activity as tolerated     Diet    Follow this diet upon discharge: Orders Placed This Encounter      Low Fiber Diet     Discharge Medications   Current Discharge Medication List      CONTINUE these medications which have NOT CHANGED    Details   adapalene (DIFFERIN) 0.1 % external gel Apply topically At Bedtime      citalopram (CELEXA) 20 MG tablet Take 1 tablet (20 mg) by mouth daily  Qty: 90 tablet, Refills: 11    Associated Diagnoses: Generalized anxiety  disorder           Allergies   No Known Allergies  Data   Most Recent 3 CBC's:  Recent Labs   Lab Test 10/28/22  0740 10/25/22  0750 10/24/22  1213   WBC 4.5 7.0 6.8   HGB 12.2 12.2 13.0    103* 102*    249 288      Most Recent 3 BMP's:  Recent Labs   Lab Test 10/28/22  0740 10/25/22  0750 10/24/22  1213    137 136   POTASSIUM 3.8 3.9 3.7   CHLORIDE 109 111* 108   CO2 24 22 24   BUN 3* 9 11   CR 0.53 0.52 0.60   ANIONGAP 7 4 4   JUDSON 9.3 8.2* 9.1   * 116* 95     Most Recent 2 LFT's:  Recent Labs   Lab Test 10/24/22  1213 08/05/22  0920   AST 10 9   ALT 18 16   ALKPHOS 53 52   BILITOTAL 0.6 0.5     Most Recent INR's and Anticoagulation Dosing History:  Anticoagulation Dose History     Recent Dosing and Labs Latest Ref Rng & Units 5/14/2017 8/5/2022    INR 0.85 - 1.15 1.06 1.09        Most Recent 3 Troponin's:No lab results found.  Most Recent Cholesterol Panel:  Recent Labs   Lab Test 08/31/21  0731   CHOL 218*   *   HDL 77   TRIG 80     Most Recent 6 Bacteria Isolates From Any Culture (See EPIC Reports for Culture Details):  Recent Labs   Lab Test 10/18/19  1410 10/18/19  1355 10/18/19  1351 04/12/17  0900 10/03/16  1448 12/13/14  1756   CULT <10,000 colonies/mL  urogenital ana  Susceptibility testing not routinely done   No growth No growth Group B Streptococcus DNA detected, presumed positive for GBS, by real time PCR.   However, Group B Streptococcus NOT isolated upon subculture. Therefore,   susceptibility results not available. PCR assay is more sensitive than culture.  Canceled, Test credited   <10,000 colonies/mL mixed urogenital ana  Susceptibility testing not routinely done   Heavy growth Escherichia coli  Plus normal skin ana.  *     Most Recent TSH, T4 and A1c Labs:No lab results found.    Results for orders placed or performed during the hospital encounter of 10/24/22   CT Abdomen Pelvis w Contrast    Narrative    CT ABDOMEN PELVIS WITH CONTRAST 10/24/2022 1:27  PM    CLINICAL HISTORY: Small bowel obstruction. Pain.    TECHNIQUE: CT scan of the abdomen and pelvis was performed following  injection of IV contrast. Multiplanar reformats were obtained. Dose  reduction techniques were used.  CONTRAST: 65mL Isovue-370    COMPARISON: CT abdomen and pelvis 10/17/2019.    FINDINGS:   LOWER CHEST: Normal.    HEPATOBILIARY: There are new hypodense regions within the left and  right liver. At the left liver segment IV is a 2.4 cm ill-defined  hypoenhancing nodular lesion series 3 image 28. At the inferior right  liver segment VI there is another example measuring 1.2 cm series 3  image 81. Another example is noted just inferiorly on image 93.  Gallbladder unremarkable.    PANCREAS: Normal.    SPLEEN: Normal.    ADRENAL GLANDS: Normal.    KIDNEYS/BLADDER: No hydronephrosis. Nonobstructing stone lower right  kidney 0.4 cm, and at the lower left kidney measures 0.4 cm. These are  present on the prior exam. Small cyst at the right kidney as noted  without specific imaging follow-up recommended. The bladder is  unremarkable.    BOWEL: Multiple dilated central small bowel loops lead to fecalized  small bowel and postoperative change at the anterior mid pelvis series  3 image 136. Other small bowel loops appear decompressed. Colectomy  changes. No free air. No abscess identified.    PELVIC ORGANS: Normal.    ADDITIONAL FINDINGS: None.    MUSCULOSKELETAL: Normal.      Impression    IMPRESSION:   1.  Prominently dilated central small bowel loops leading to a small  bowel anastomosis site at the upper pelvis level worrisome for small  bowel obstruction.  2.  New finding of multiple hypodensities in the liver compared to  10/17/2019 that are indeterminate. Recommend correlation with liver  MRI. Neoplasm remains in the differential.     SHARAN CHOWDHURY MD         SYSTEM ID:  [id number]   MR Liver wo & w Contrast    Narrative    EXAM: MR LIVER W/O and W CONTRAST  LOCATION: [facility]  HOSPITAL  DATE/TIME: 10/24/2022 10:34 PM    INDICATION: Evaluate multiple hypodensities involving the liver on recent CT 10/24/2022.  COMPARISON: CT 10/24/2022, 8/29/2018  TECHNIQUE: Routine MRI liver protocol including T1 in/out phase, diffusion, multiplane T2, and dynamic T1 with IV contrast.    CONTRAST: 6 mL Gadavist    FINDINGS:     HEPATOBILIARY: No evidence for abnormality in the area of hypodensities within the liver from the CT of 10/24/2022. No evidence for iron deposition or fatty infiltration. Homogeneous enhancement to the liver on portal venous phase imaging. No arterial   enhancing hepatic lesions. No evidence for lesions demonstrating abnormal T2 signal. Gallbladder and biliary system unremarkable. Portal vein patent. No areas of restricted diffusion.    ADDITIONAL FINDINGS: Partially visualized marked dilatation of the small bowel compatible with bowel obstruction is detailed on the CT. Adrenal glands negative. Symmetric renal enhancement. Right renal cysts, no further follow-up. Normal-sized spleen.      Impression    IMPRESSION:  1.  No evidence for abnormality or hypodensity of the liver from the CT of 10/24/2022. No evidence for focal fatty infiltration or enhancing lesion in these areas. No evidence for T2 signal abnormalities corresponding to these regions. In a patient   without a history of abnormal liver function studies or AFP, these likely represent benign entities or perfusion anomalies. Consider possible follow-up CT in 3-6 months.    2.  Right renal cyst, no further follow-up.   XR Abdomen 2 Views    Narrative    EXAM: XR ABDOMEN 2 VIEWS  LOCATION: [facility]  DATE/TIME: 10/26/2022 5:58 PM    INDICATION: small bowel obstruction  COMPARISON: CT 10/24/2022      Impression    IMPRESSION: Dilated small bowel in the left lower quadrant measuring up to 4.2 cm, similar to CT 10/24/2022. High attenuation oral contrast material distally in the ileal pouch. No free air  or pneumatosis.

## 2022-10-28 NOTE — PLAN OF CARE
Pt is A&Ox4. VSS, on RA. Mild nausea and abdominal discomfort after pudding, gave x1 PO zofran. Continent of B&B. Loose BMs, baseline. Up independent. Ambulating frequently. On a full iquid diet. Passing flatus.  R PIV infusing IVF. Colorectal surgery following. Discharge pending bowel improvement.

## 2022-10-28 NOTE — PROGRESS NOTES
COLON & RECTAL SURGERY  PROGRESS NOTE    October 28, 2022    SUBJECTIVE:  Tolerated fulls yesterday evening and this morning. +flatus, +BM. Some back pain from bed, no abdominal pain. No n/v.     OBJECTIVE:  Temp:  [97.5  F (36.4  C)-98.2  F (36.8  C)] 98.2  F (36.8  C)  Pulse:  [63-73] 73  Resp:  [16] 16  BP: (93-99)/(55-58) 99/58  SpO2:  [98 %-99 %] 99 %    Intake/Output Summary (Last 24 hours) at 10/28/2022 0930  Last data filed at 10/28/2022 0801  Gross per 24 hour   Intake 1298 ml   Output --   Net 1298 ml       GENERAL:  Awake, alert, no acute distress  HEAD: Normocephalic atraumatic  SCLERA: Anicteric  EXTREMITIES: Warm and well perfused  ABDOMEN:  Soft, nontender, non-distended. No guarding, rigidity, or peritoneal signs.     LABS:  Lab Results   Component Value Date    WBC 4.5 10/28/2022    WBC 3.6 10/20/2019     Lab Results   Component Value Date    HGB 12.2 10/28/2022    HGB 11.5 10/20/2019     Lab Results   Component Value Date    HCT 35.9 10/28/2022    HCT 33.4 10/20/2019     Lab Results   Component Value Date     10/28/2022     10/20/2019     Last Basic Metabolic Panel:  Lab Results   Component Value Date     10/28/2022     10/20/2019      Lab Results   Component Value Date    POTASSIUM 3.8 10/28/2022    POTASSIUM 3.5 10/20/2019     Lab Results   Component Value Date    CHLORIDE 109 10/28/2022    CHLORIDE 110 10/20/2019     Lab Results   Component Value Date    JUDSON 9.3 10/28/2022    JUDSON 8.4 10/20/2019     Lab Results   Component Value Date    CO2 24 10/28/2022    CO2 23 10/20/2019     Lab Results   Component Value Date    BUN 3 10/28/2022    BUN 1 10/20/2019     Lab Results   Component Value Date    CR 0.53 10/28/2022    CR 0.40 10/20/2019     Lab Results   Component Value Date     10/28/2022     10/20/2019       ASSESSMENT/PLAN: 37-year-old female with a history of ulcerative colitis status post TPC with IPAA, prior history of small bowel obstructions who  presents with small bowel obstruction. GGE yesterday looked good. No with return of bowel function.     1. Low fiber diet  2. Discontinue IVF  3. If tolerates diet can plan for discharge this afternoon  4. Will arrange f/u with Dr. Link in clinic.     Discussed with Dr. Link.     For questions/paging, please contact the CRS office at [phone].    Maria Dolores Hernandez PA-C  Colorectal Physician Assistant    [facility]  [address] [address]  T: [phone]  F: [phone]

## 2022-10-28 NOTE — PROGRESS NOTES
Discharge Note    Patient discharged to home via private vehicle  accompanied by significant other .  IV: Discontinued  Prescriptions N/A.   Belongings reviewed and sent with patient.   Home medications returned to patient: NA  Equipment sent with: patient, N/A.   N/A verbalizes understanding of discharge instructions. AVS given to patient.

## 2022-10-28 NOTE — PLAN OF CARE
Goal Outcome Evaluation:    Shift: 10/27/22-10/28/22 [id number]    Pt is A&Ox4. VSS, on RA. Mild nausea and abdominal discomfort. Continent of B&B. Loose BMs, baseline. Up independent. Ambulating frequently. On a full iquid diet. Passing minimal flatus.  R PIV infusing IVF 75 ml/hr. Colorectal surgery following. Discharge pending bowel improvement.

## 2022-10-30 ENCOUNTER — E-VISIT (OUTPATIENT)
Dept: OBGYN | Facility: CLINIC | Age: 37
End: 2022-10-30
Payer: COMMERCIAL

## 2022-10-30 DIAGNOSIS — F41.1 GENERALIZED ANXIETY DISORDER: ICD-10-CM

## 2022-10-30 PROCEDURE — 99421 OL DIG E/M SVC 5-10 MIN: CPT | Performed by: OBSTETRICS & GYNECOLOGY

## 2022-10-30 ASSESSMENT — ANXIETY QUESTIONNAIRES
7. FEELING AFRAID AS IF SOMETHING AWFUL MIGHT HAPPEN: SEVERAL DAYS
GAD7 TOTAL SCORE: 9
4. TROUBLE RELAXING: MORE THAN HALF THE DAYS
7. FEELING AFRAID AS IF SOMETHING AWFUL MIGHT HAPPEN: SEVERAL DAYS
5. BEING SO RESTLESS THAT IT IS HARD TO SIT STILL: SEVERAL DAYS
GAD7 TOTAL SCORE: 9
3. WORRYING TOO MUCH ABOUT DIFFERENT THINGS: SEVERAL DAYS
6. BECOMING EASILY ANNOYED OR IRRITABLE: MORE THAN HALF THE DAYS
1. FEELING NERVOUS, ANXIOUS, OR ON EDGE: SEVERAL DAYS
8. IF YOU CHECKED OFF ANY PROBLEMS, HOW DIFFICULT HAVE THESE MADE IT FOR YOU TO DO YOUR WORK, TAKE CARE OF THINGS AT HOME, OR GET ALONG WITH OTHER PEOPLE?: VERY DIFFICULT
2. NOT BEING ABLE TO STOP OR CONTROL WORRYING: SEVERAL DAYS
GAD7 TOTAL SCORE: 9

## 2022-10-30 ASSESSMENT — PATIENT HEALTH QUESTIONNAIRE - PHQ9
SUM OF ALL RESPONSES TO PHQ QUESTIONS 1-9: 13
SUM OF ALL RESPONSES TO PHQ QUESTIONS 1-9: 13
10. IF YOU CHECKED OFF ANY PROBLEMS, HOW DIFFICULT HAVE THESE PROBLEMS MADE IT FOR YOU TO DO YOUR WORK, TAKE CARE OF THINGS AT HOME, OR GET ALONG WITH OTHER PEOPLE: VERY DIFFICULT

## 2022-10-31 ENCOUNTER — MYC MEDICAL ADVICE (OUTPATIENT)
Dept: OBGYN | Facility: CLINIC | Age: 37
End: 2022-10-31

## 2022-10-31 ASSESSMENT — ENCOUNTER SYMPTOMS
ABDOMINAL PAIN: 1
CONSTIPATION: 0
HEARTBURN: 1
DIARRHEA: 0
DEPRESSION: 1
NERVOUS/ANXIOUS: 1
RECTAL PAIN: 0
NAUSEA: 1
VOMITING: 0
JAUNDICE: 0
BLOATING: 1
INSOMNIA: 1
BOWEL INCONTINENCE: 0
PANIC: 0
DECREASED CONCENTRATION: 1
BLOOD IN STOOL: 0

## 2022-10-31 ASSESSMENT — ANXIETY QUESTIONNAIRES: GAD7 TOTAL SCORE: 9

## 2022-10-31 ASSESSMENT — PATIENT HEALTH QUESTIONNAIRE - PHQ9: SUM OF ALL RESPONSES TO PHQ QUESTIONS 1-9: 13

## 2022-10-31 NOTE — TELEPHONE ENCOUNTER
REFERRAL INFORMATION:    Referring Provider:      Referring Clinic:      Reason for Visit/Diagnosis: UC with pouch, Admitted last week for SBO      FUTURE VISIT INFORMATION:    Appointment Date: 11/1/2022    Appointment Time: 4:20 PM      NOTES STATUS DETAILS   OFFICE NOTE from Referring Provider N/A    OFFICE NOTE from Other Specialist Internal/ Received  8/31/2021 Office visit with SEPIDEH Swenson CNP ([facility])     11/20/19 Office visit with Dr. Artie Link ([facility])     9/10/18, 5/17/17 Office visit with Dr. Sara Zuniga ([facility])     8/23/16 Office visit with Dr. Al Malagon ([facility])     4/10/15 Office visit with Dr. Cinthia Charles ([facility])     3/24/15 Office visit with SEPIDEH Arriaga CNP ([facility])        HOSPITAL DISCHARGE SUMMARY/  ED VISITS Internal/ Care Everywhere 10/24/2022, 10/17/19 ([facility])     8/24/18 ([facility])    7/26/16 ([facility])       OPERATIVE REPORT Received Pouchoscopy: 2/7/2020, 2/18/16 ([facility])    MEDICATION LIST Internal         ENDOSCOPY  N/A    COLONOSCOPY N/A    ERCP N/A    EUS N/A    STOOL TESTING N/A    PERTINENT LABS Care Everywhere    PATHOLOGY REPORTS (RELATED) N/A    IMAGING (CT, MRI, EGD, MRCP, Small Bowel Follow Through/SBT, MR/CT Enterography) Internal XR Colon Water: 10/27/2022  XR Abdomen: 10/26/2022  CT Abdomen Pelvis: 10/24/2022, 10/17/19  XR Upper GI: 8/18/2022

## 2022-11-01 ENCOUNTER — VIRTUAL VISIT (OUTPATIENT)
Dept: GASTROENTEROLOGY | Facility: CLINIC | Age: 37
End: 2022-11-01
Payer: COMMERCIAL

## 2022-11-01 ENCOUNTER — PRE VISIT (OUTPATIENT)
Dept: GASTROENTEROLOGY | Facility: CLINIC | Age: 37
End: 2022-11-01

## 2022-11-01 DIAGNOSIS — F41.9 ANXIETY: ICD-10-CM

## 2022-11-01 DIAGNOSIS — K91.850 POUCHITIS (H): ICD-10-CM

## 2022-11-01 DIAGNOSIS — K56.609 SBO (SMALL BOWEL OBSTRUCTION) (H): Primary | ICD-10-CM

## 2022-11-01 PROCEDURE — 99205 OFFICE O/P NEW HI 60 MIN: CPT | Mod: 95 | Performed by: INTERNAL MEDICINE

## 2022-11-01 RX ORDER — CITALOPRAM HYDROBROMIDE 20 MG/1
20 TABLET ORAL DAILY
Qty: 90 TABLET | Refills: 4 | Status: SHIPPED | OUTPATIENT
Start: 2022-11-01 | End: 2023-11-10

## 2022-11-01 NOTE — LETTER
2022         RE: Amber Buckner  [address]  [place] [address]        Dear Colleague,    Thank you for referring your patient, Amber Buckner, to the [facility] [place]. Please see a copy of my visit note below.    [facility] IBD NEW       PATIENT: Amber Buckner    MRN: [id number]    Date of Birth 1985    Tel: [phone] (home)     PCP: Luciana Edwards     HPI: Ms. Buckner is a 37 year old female here to establish care for recurrent SBOs in the setting of a pouch.     UC history  Amber was diagnosed with UC in . She was on flagyl, prednisone. No biologics. Underwent RPC/IPAA in . Did very well for 10 years without any issues.     Delivered Yoan in  via C section.  Around then, developed abdominal pain and more frequent BMs. Developed a perianal abscess during 2nd pregnancy with Riley in . At that time, also had a 2 night hospital stay for possible SBO (XR and US normal).     In , she delivered her daughter, Shantal. During the postpartum period, she developed another SBO that required an NGT.     Had a total of 3 C sections. Since her last pregnancy, she had 3 SBOs (in 2018, 2019 and just recently in ). All managed conservatively.     Currently:   Endorses fatigue. Wakes up 5 times in the middle of the night to defecate. 8-10 BMs per day.     Total number of IBD surgeries (except perianal): 1; RPC/IPAA in     Current IBD Medications:  none    Past IBD Medications:   Flagyl, prednisone  No biologics or immunomodulators    Past Medical History:   Diagnosis Date     Adolescent depression in college     Anxiety      Depressive disorder 2002    Treated david celexa     Fit/adjust GI carole-device NEC      Perineal abscess      PONV (postoperative nausea and vomiting)      S/P total colectomy 2001     Ulcerative colitis         Past Surgical History:   Procedure Laterality Date      SECTION   2012    Procedure: SECTION; Surgeon:JAVI BELL; Location:[facility] L[facility]      SECTION N/A 2014    Procedure:  SECTION;  Surgeon: Shawanda Luna MD;  Location: [facility] L+[facility]      SECTION N/A 2017    Procedure:  SECTION;  Repeat  Section ;  Surgeon: Laurie Montoya MD;  Location: [facility] L[facility]     COLECTOMY SUBTOTAL       COLONOSCOPY N/A 4/15/2015    Procedure: COLONOSCOPY;  Surgeon: Al Santo MD;  Location: [facility] OR     EXAM UNDER ANESTHESIA ANUS N/A 4/15/2015    Procedure: EXAM UNDER ANESTHESIA ANUS;  Surgeon: Al Santo MD;  Location: [facility] OR     FISTULOTOMY RECTUM N/A 4/15/2015    Procedure: FISTULOTOMY RECTUM;  Surgeon: Al Santo MD;  Location: [facility] OR     TAKEDOWN ILEOSTOMY       tonsilectomy       TRANSPERINEAL REPAIR FISTULA RECTAL URETHRAL         Social History     Tobacco Use     Smoking status: Never     Smokeless tobacco: Never   Substance Use Topics     Alcohol use: Yes       Family History   Problem Relation Age of Onset     Diabetes Maternal Grandmother      Diabetes Paternal Grandfather      Genetic Disorder Father         chron's     Gastrointestinal Disease Sister         ulcerative colitis       No Known Allergies     Outpatient Encounter Medications as of 2022   Medication Sig Dispense Refill     adapalene (DIFFERIN) 0.1 % external gel Apply topically At Bedtime       [DISCONTINUED] citalopram (CELEXA) 20 MG tablet Take 1 tablet (20 mg) by mouth daily 90 tablet 11     Facility-Administered Encounter Medications as of 2022   Medication Dose Route Frequency Provider Last Rate Last Admin     barium sulfate (EZ-DISK) tablet 700 mg  700 mg Oral Once José Manuel Wolf MD         sod bicarbonate-citric acid-simethicone (EZ GAS) 2.21-1.53-0.04 g packet 4 g  4 g Oral Once José Manuel Wolf MD          NSAID  NO    Review of Systems  Complete 10 System ROS performed. All are negative except as documented  below, in the HPI, or in patient questionnaire from today's visit.    1) Constitutional: No fevers, chills, night sweats or malaise, weight loss or gain  2) Skin: No rash  3) Pulmonary: No wheeze, SOB, cough, sputum or hemoptysis  4) Cardiovascular: No Chest pain or palpitations  5) Genitourinary: No blood in urine or dysuria  6) Endocrine: No increased sweating, hunger, thirst or thyroid problems  7) Hematologic: No bruising and easy bleeding  8) Musculoskeletal: no new pain in joints or limitation in ROM  9) Neurologic: No dizziness, paresthesias or weakness or falls  10) Psychiatric:  not depressed/anxious, no sleep problems    PHYSICAL EXAM  Vitals: There were no vitals taken for this visit.    No Pain (0)     General appearance  Healthy appearing adult, in no acute distress     Eyes  Sclera anicteric  Pupils round and reactive to light     Ears, nose, mouth and throat  No obvious external lesions of ears and nose  Hearing intact     Neck  Symmetric  No obvious external lesions     Respiratory  Normal respiration, no use of accessory muscles      MSK  Gait normal     Skin  No rashes or jaundice      Psychiatric  Oriented to person, place and time  Appropriate mood and affect.     DATA:  Reviewed in detail past documentation, medications and prior workup available in electronic health records or through outside records.    PERTINENT STUDIES:  Most recent CBC:  WBC   Date Value Ref Range Status   10/20/2019 3.6 (L) 4.0 - 11.0 10e9/L Final     WBC Count   Date Value Ref Range Status   10/28/2022 4.5 4.0 - 11.0 10e3/uL Final   ]  Hemoglobin   Date Value Ref Range Status   10/28/2022 12.2 11.7 - 15.7 g/dL Final   10/20/2019 11.5 (L) 11.7 - 15.7 g/dL Final   ]   Platelet Count   Date Value Ref Range Status   10/28/2022 277 150 - 450 10e3/uL Final   10/20/2019 200 150 - 450 10e9/L Final       Most recent coag:  INR   Date Value Ref Range Status   08/05/2022 1.09 0.85 - 1.15 Final   05/14/2017 1.06 0.86 - 1.14 Final        Most recent hepatic panel:  AST   Date Value Ref Range Status   10/24/2022 10 0 - 45 U/L Final   10/19/2019 6 0 - 45 U/L Final     ALT   Date Value Ref Range Status   10/24/2022 18 0 - 50 U/L Final   10/19/2019 10 0 - 50 U/L Final     No results found for: BILICONJ   Bilirubin Total   Date Value Ref Range Status   10/24/2022 0.6 0.2 - 1.3 mg/dL Final   10/19/2019 0.4 0.2 - 1.3 mg/dL Final     Albumin   Date Value Ref Range Status   10/24/2022 4.3 3.4 - 5.0 g/dL Final   10/19/2019 3.4 3.4 - 5.0 g/dL Final     Alkaline Phosphatase   Date Value Ref Range Status   10/24/2022 53 40 - 150 U/L Final   10/19/2019 41 40 - 150 U/L Final       Most recent creatinine:  Creatinine   Date Value Ref Range Status   10/28/2022 0.53 0.52 - 1.04 mg/dL Final   10/20/2019 0.40 (L) 0.52 - 1.04 mg/dL Final       Endoscopy:    Pouchoscopy 2/2020             Imaging:  CT 10/2022:     BOWEL: Multiple dilated central small bowel loops lead to fecalized  small bowel and postoperative change at the anterior mid pelvis series  3 image 136. Other small bowel loops appear decompressed. Colectomy  changes. No free air. No abscess identified.    CT a/p 10/2019:  1.  Suspected small bowel obstruction with dilated loops seen in the  lower abdomen. Transition to nondilated bowel appears to be seen near  the small bowel anastomosis within the right lower quadrant.    AXR 5/2017:  Single supine view of the abdomen. Interval placement of gastric tube  with tip and sidehole projecting over the fundus. Slight interval  decrease in caliber of air-filled loops of small bowel in the left  side of the abdomen with associated unchanged dilated loops of bowel  in the right side of the abdomen, measuring up to 6.5 cm in greatest  diameter. No free air, pneumatosis, portal venous gas. The visualized  left lung base is clear.    IMPRESSION:    Ms. Buckner is a 37 year old here with IBD s/p RPC/IPAA in 2002 c/b recurrent SBOs, frequent BMs and ulcerations in the  pouch per exam in 2020. Differential for the obstructions includes mechanical obstructions at the ileostomy takedown site, adhesions and Crohn's disease. Differential for diarrhea and fatigue are pouchitis, Crohn's disease, SIBO and infection.     We will systematically address these possibilities and proceed with the following:    DIAGNOSIS:    # Recurrent SBOs s/p RPC/IPAA in 2002  # Diarrhea and fatigue, concerning for inflammation     PLAN:  ---Blood work, including vitamin B12, folate, vitamin D, QFN, and HBV serology  ---Infectious stool studies   --Obtain and MRE to characterize anatomy and check for evidence of luminal narrowing or active disease.   ------------After this is done, will check with Dr. Wolf to see if the SBOs are occurring in the same location and if there is anything suspicious evident on the MRE at that location.   --Pouchoscopy under MAC for disease activity assessment (check for pouchitis, rule out Crohn's disease) and dysplasia surveillance with biopsies    Misc:  -- Avoid tobacco use  -- Avoid NSAIDs     Return in about 3 months (around 2/1/2023).      Marlys Jacobs MD   of Medicine  Division of Gastroenterology, Hepatology and Nutrition  [facility]    November 1, 2022    60 minutes spent on the date of the encounter performing chart review, history and exam, documentation and further activities as noted above.

## 2022-11-01 NOTE — PATIENT INSTRUCTIONS
PLAN  ---Blood work, including vitamin B12, folate, vitamin D, QFN, and HBV serology  ---Infectious stool studies   --Obtain and MRE to characterize anatomy and check for evidence of luminal narrowing or active disease --[phone]  --Pouchoscopy under MAC for disease activity assessment (check for pouchitis, rule out Crohn's disease) and dysplasia surveillance with biopsies

## 2022-11-01 NOTE — PROGRESS NOTES
Amber is a 37 year old who is being evaluated via a billable video visit.      How would you like to obtain your AVS? MyChart  If the video visit is dropped, the invitation should be resent by: Text to cell phone: [phone]  Will anyone else be joining your video visit? No        Video-Visit Details    Video Start Time: 4:21 PM    Type of service:  Video Visit    Video End Time: 4:55 OM    Originating Location (pt. Location): Home        Distant Location (provider location):  On-site    Platform used for Video Visit: [facility] IBD NEW       PATIENT: Amber Buckner    MRN: [id number]    Date of Birth 1985    Tel: [phone] (home)     PCP: Luciana Edwards     HPI: Ms. Buckner is a 37 year old female here to establish care for recurrent SBOs in the setting of a pouch.     UC history  Amber was diagnosed with UC in 2001. She was on flagyl, prednisone. No biologics. Underwent RPC/IPAA in 2002. Did very well for 10 years without any issues.     Delivered Yoan in 2012 via C section.  Around then, developed abdominal pain and more frequent BMs. Developed a perianal abscess during 2nd pregnancy with Riley in 2014. At that time, also had a 2 night hospital stay for possible SBO (XR and US normal).     In 2017, she delivered her daughter, Shantal. During the postpartum period, she developed another SBO that required an NGT.     Had a total of 3 C sections. Since her last pregnancy, she had 3 SBOs (in 2018, 2019 and just recently in 2022). All managed conservatively.     Currently:   Endorses fatigue. Wakes up 5 times in the middle of the night to defecate. 8-10 BMs per day.     Total number of IBD surgeries (except perianal): 1; RPC/IPAA in 2002    Current IBD Medications:  none    Past IBD Medications:   Flagyl, prednisone  No biologics or immunomodulators    Past Medical History:   Diagnosis Date     Adolescent depression in college     Anxiety      Depressive disorder 05/02/2002     [facility]     Fit/adjust GI carole-device NEC      Perineal abscess      PONV (postoperative nausea and vomiting)      S/P total colectomy      Ulcerative colitis         Past Surgical History:   Procedure Laterality Date      SECTION  2012    Procedure: SECTION; Surgeon:JAVI BELL; [facility]      SECTION N/A 2014    Procedure:  SECTION;  Surgeon: Shawanda Luna MD;  [facility]      SECTION N/A 2017    Procedure:  SECTION;  Repeat  Section ;  Surgeon: Laurie Montoya MD;  [facility]     COLECTOMY SUBTOTAL       COLONOSCOPY N/A 4/15/2015    Procedure: COLONOSCOPY;  Surgeon: Al Santo MD;  [facility]     EXAM UNDER ANESTHESIA ANUS N/A 4/15/2015    Procedure: EXAM UNDER ANESTHESIA ANUS;  Surgeon: Al Santo MD;  [facility]     FISTULOTOMY RECTUM N/A 4/15/2015    Procedure: FISTULOTOMY RECTUM;  Surgeon: Al Santo MD;  [facility]     TAKEDOWN ILEOSTOMY       tonsilectomy       TRANSPERINEAL REPAIR FISTULA RECTAL URETHRAL         Social History     Tobacco Use     Smoking status: Never     Smokeless tobacco: Never   Substance Use Topics     Alcohol use: Yes       Family History   Problem Relation Age of Onset     Diabetes Maternal Grandmother      Diabetes Paternal Grandfather      Genetic Disorder Father         chron's     Gastrointestinal Disease Sister         ulcerative colitis       No Known Allergies     Outpatient Encounter Medications as of 2022   Medication Sig Dispense Refill     adapalene (DIFFERIN) 0.1 % external gel Apply topically At Bedtime       [DISCONTINUED] citalopram (CELEXA) 20 MG tablet Take 1 tablet (20 mg) by mouth daily 90 tablet 11     Facility-Administered Encounter Medications as of 2022   Medication Dose Route Frequency Provider Last Rate Last Admin     barium sulfate (EZ-DISK) tablet 700 mg  700 mg Oral Once Luciano  José Manuel Allred MD         sod bicarbonate-citric acid-simethicone (EZ GAS) 2.21-1.53-0.04 g packet 4 g  4 g Oral Once Spilseth, José Manuel Allred MD          NSAID  NO    Review of Systems  Complete 10 System ROS performed. All are negative except as documented below, in the HPI, or in patient questionnaire from today's visit.    1) Constitutional: No fevers, chills, night sweats or malaise, weight loss or gain  2) Skin: No rash  3) Pulmonary: No wheeze, SOB, cough, sputum or hemoptysis  4) Cardiovascular: No Chest pain or palpitations  5) Genitourinary: No blood in urine or dysuria  6) Endocrine: No increased sweating, hunger, thirst or thyroid problems  7) Hematologic: No bruising and easy bleeding  8) Musculoskeletal: no new pain in joints or limitation in ROM  9) Neurologic: No dizziness, paresthesias or weakness or falls  10) Psychiatric:  not depressed/anxious, no sleep problems    PHYSICAL EXAM  Vitals: There were no vitals taken for this visit.    No Pain (0)     General appearance  Healthy appearing adult, in no acute distress     Eyes  Sclera anicteric  Pupils round and reactive to light     Ears, nose, mouth and throat  No obvious external lesions of ears and nose  Hearing intact     Neck  Symmetric  No obvious external lesions     Respiratory  Normal respiration, no use of accessory muscles      MSK  Gait normal     Skin  No rashes or jaundice      Psychiatric  Oriented to person, place and time  Appropriate mood and affect.     DATA:  Reviewed in detail past documentation, medications and prior workup available in electronic health records or through outside records.    PERTINENT STUDIES:  Most recent CBC:  WBC   Date Value Ref Range Status   10/20/2019 3.6 (L) 4.0 - 11.0 10e9/L Final     WBC Count   Date Value Ref Range Status   10/28/2022 4.5 4.0 - 11.0 10e3/uL Final   ]  Hemoglobin   Date Value Ref Range Status   10/28/2022 12.2 11.7 - 15.7 g/dL Final   10/20/2019 11.5 (L) 11.7 - 15.7 g/dL Final   ]    Platelet Count   Date Value Ref Range Status   10/28/2022 277 150 - 450 10e3/uL Final   10/20/2019 200 150 - 450 10e9/L Final       Most recent coag:  INR   Date Value Ref Range Status   08/05/2022 1.09 0.85 - 1.15 Final   05/14/2017 1.06 0.86 - 1.14 Final       Most recent hepatic panel:  AST   Date Value Ref Range Status   10/24/2022 10 0 - 45 U/L Final   10/19/2019 6 0 - 45 U/L Final     ALT   Date Value Ref Range Status   10/24/2022 18 0 - 50 U/L Final   10/19/2019 10 0 - 50 U/L Final     No results found for: BILICONJ   Bilirubin Total   Date Value Ref Range Status   10/24/2022 0.6 0.2 - 1.3 mg/dL Final   10/19/2019 0.4 0.2 - 1.3 mg/dL Final     Albumin   Date Value Ref Range Status   10/24/2022 4.3 3.4 - 5.0 g/dL Final   10/19/2019 3.4 3.4 - 5.0 g/dL Final     Alkaline Phosphatase   Date Value Ref Range Status   10/24/2022 53 40 - 150 U/L Final   10/19/2019 41 40 - 150 U/L Final       Most recent creatinine:  Creatinine   Date Value Ref Range Status   10/28/2022 0.53 0.52 - 1.04 mg/dL Final   10/20/2019 0.40 (L) 0.52 - 1.04 mg/dL Final       Endoscopy:    Pouchoscopy 2/2020             Imaging:  CT 10/2022:     BOWEL: Multiple dilated central small bowel loops lead to fecalized  small bowel and postoperative change at the anterior mid pelvis series  3 image 136. Other small bowel loops appear decompressed. Colectomy  changes. No free air. No abscess identified.    CT a/p 10/2019:  1.  Suspected small bowel obstruction with dilated loops seen in the  lower abdomen. Transition to nondilated bowel appears to be seen near  the small bowel anastomosis within the right lower quadrant.    AXR 5/2017:  Single supine view of the abdomen. Interval placement of gastric tube  with tip and sidehole projecting over the fundus. Slight interval  decrease in caliber of air-filled loops of small bowel in the left  side of the abdomen with associated unchanged dilated loops of bowel  in the right side of the abdomen, measuring  up to 6.5 cm in greatest  diameter. No free air, pneumatosis, portal venous gas. The visualized  left lung base is clear.    IMPRESSION:    Ms. Buckner is a 37 year old here with IBD s/p RPC/IPAA in 2002 c/b recurrent SBOs, frequent BMs and ulcerations in the pouch per exam in 2020. Differential for the obstructions includes mechanical obstructions at the ileostomy takedown site, adhesions and Crohn's disease. Differential for diarrhea and fatigue are pouchitis, Crohn's disease, SIBO and infection.     We will systematically address these possibilities and proceed with the following:    DIAGNOSIS:    # Recurrent SBOs s/p RPC/IPAA in 2002  # Diarrhea and fatigue, concerning for inflammation     PLAN:  ---Blood work, including vitamin B12, folate, vitamin D, QFN, and HBV serology  ---Infectious stool studies   --Obtain and MRE to characterize anatomy and check for evidence of luminal narrowing or active disease.   ------------After this is done, will check with Dr. Wolf to see if the SBOs are occurring in the same location and if there is anything suspicious evident on the MRE at that location.   --Pouchoscopy under MAC for disease activity assessment (check for pouchitis, rule out Crohn's disease) and dysplasia surveillance with biopsies    Misc:  -- Avoid tobacco use  -- Avoid NSAIDs     Return in about 3 months (around 2/1/2023).    Marlys Jacobs MD   of Medicine  Division of Gastroenterology, Hepatology and Nutrition  [facility]    November 1, 2022    60 minutes spent on the date of the encounter performing chart review, history and exam, documentation and further activities as noted above.

## 2022-11-02 ENCOUNTER — MYC MEDICAL ADVICE (OUTPATIENT)
Dept: GASTROENTEROLOGY | Facility: CLINIC | Age: 37
End: 2022-11-02

## 2022-11-02 ENCOUNTER — IMMUNIZATION (OUTPATIENT)
Dept: FAMILY MEDICINE | Facility: CLINIC | Age: 37
End: 2022-11-02
Payer: COMMERCIAL

## 2022-11-02 DIAGNOSIS — K51.919 ULCERATIVE COLITIS WITH COMPLICATION, UNSPECIFIED LOCATION (H): Primary | ICD-10-CM

## 2022-11-02 DIAGNOSIS — Z23 NEED FOR PROPHYLACTIC VACCINATION AND INOCULATION AGAINST INFLUENZA: Primary | ICD-10-CM

## 2022-11-02 PROCEDURE — 90471 IMMUNIZATION ADMIN: CPT

## 2022-11-02 PROCEDURE — 90686 IIV4 VACC NO PRSV 0.5 ML IM: CPT

## 2022-11-02 PROCEDURE — 99207 PR NO CHARGE NURSE ONLY: CPT

## 2022-11-02 RX ORDER — LORAZEPAM 1 MG/1
1 TABLET ORAL EVERY 6 HOURS PRN
Qty: 2 TABLET | Refills: 0 | Status: SHIPPED | OUTPATIENT
Start: 2022-11-02 | End: 2023-02-06

## 2022-11-02 NOTE — TELEPHONE ENCOUNTER
Provider E-Visit time total (minutes): 5    PHQ 8/14/2021 8/31/2021 10/30/2022   PHQ-9 Total Score 2 1 13   Q9: Thoughts of better off dead/self-harm past 2 weeks Not at all Not at all Not at all     LAZARO-7 SCORE 8/14/2021 8/31/2021 10/30/2022   Total Score 4 (minimal anxiety) - 9 (mild anxiety)   Total Score 4 3 9

## 2022-11-02 NOTE — TELEPHONE ENCOUNTER
Called patient in regards to MyChart message. Spoke with Dr. Jacobs at clinic yesterday and entered all orders per her direction: MRE, pouchoscopy, stool studies, and blood work. Dr. Jacobs will enter orders for Ativan to pre-med for claustrophobia prior to MRE.

## 2022-11-02 NOTE — CONFIDENTIAL NOTE
FMLA paperwork completed including the workability form and faxed to the [facility] for the patient.  FMLA forms scanned into the chart.     Patient notified via my chart.

## 2022-11-03 ENCOUNTER — APPOINTMENT (OUTPATIENT)
Dept: LAB | Facility: CLINIC | Age: 37
End: 2022-11-03
Payer: COMMERCIAL

## 2022-11-03 ENCOUNTER — LAB (OUTPATIENT)
Dept: LAB | Facility: CLINIC | Age: 37
End: 2022-11-03
Payer: COMMERCIAL

## 2022-11-03 DIAGNOSIS — K51.919 ULCERATIVE COLITIS WITH COMPLICATION, UNSPECIFIED LOCATION (H): ICD-10-CM

## 2022-11-03 LAB
ALBUMIN SERPL-MCNC: 4.2 G/DL (ref 3.4–5)
ALP SERPL-CCNC: 47 U/L (ref 40–150)
ALT SERPL W P-5'-P-CCNC: 22 U/L (ref 0–50)
ANION GAP SERPL CALCULATED.3IONS-SCNC: 6 MMOL/L (ref 3–14)
AST SERPL W P-5'-P-CCNC: 11 U/L (ref 0–45)
BASOPHILS # BLD AUTO: 0 10E3/UL (ref 0–0.2)
BASOPHILS NFR BLD AUTO: 0 %
BILIRUB SERPL-MCNC: 0.3 MG/DL (ref 0.2–1.3)
BUN SERPL-MCNC: 11 MG/DL (ref 7–30)
C DIFF TOX B STL QL: NEGATIVE
CALCIUM SERPL-MCNC: 9.1 MG/DL (ref 8.5–10.1)
CHLORIDE BLD-SCNC: 107 MMOL/L (ref 94–109)
CO2 SERPL-SCNC: 28 MMOL/L (ref 20–32)
CREAT SERPL-MCNC: 0.58 MG/DL (ref 0.52–1.04)
CRP SERPL-MCNC: 5.1 MG/L (ref 0–8)
DEPRECATED CALCIDIOL+CALCIFEROL SERPL-MC: 25 UG/L (ref 20–75)
EOSINOPHIL # BLD AUTO: 0.1 10E3/UL (ref 0–0.7)
EOSINOPHIL NFR BLD AUTO: 3 %
ERYTHROCYTE [DISTWIDTH] IN BLOOD BY AUTOMATED COUNT: 12.5 % (ref 10–15)
ERYTHROCYTE [SEDIMENTATION RATE] IN BLOOD BY WESTERGREN METHOD: 9 MM/HR (ref 0–20)
FOLATE SERPL-MCNC: 10.1 NG/ML (ref 4.6–34.8)
GFR SERPL CREATININE-BSD FRML MDRD: >90 ML/MIN/1.73M2
GLUCOSE BLD-MCNC: 88 MG/DL (ref 70–99)
HBV CORE AB SERPL QL IA: NONREACTIVE
HBV SURFACE AB SERPL IA-ACNC: 0 M[IU]/ML
HBV SURFACE AB SERPL IA-ACNC: NONREACTIVE M[IU]/ML
HBV SURFACE AG SERPL QL IA: NONREACTIVE
HCT VFR BLD AUTO: 37.4 % (ref 35–47)
HGB BLD-MCNC: 12.8 G/DL (ref 11.7–15.7)
LYMPHOCYTES # BLD AUTO: 1.8 10E3/UL (ref 0.8–5.3)
LYMPHOCYTES NFR BLD AUTO: 40 %
MCH RBC QN AUTO: 34.7 PG (ref 26.5–33)
MCHC RBC AUTO-ENTMCNC: 34.2 G/DL (ref 31.5–36.5)
MCV RBC AUTO: 101 FL (ref 78–100)
MONOCYTES # BLD AUTO: 0.5 10E3/UL (ref 0–1.3)
MONOCYTES NFR BLD AUTO: 10 %
NEUTROPHILS # BLD AUTO: 2.2 10E3/UL (ref 1.6–8.3)
NEUTROPHILS NFR BLD AUTO: 47 %
PLATELET # BLD AUTO: 310 10E3/UL (ref 150–450)
POTASSIUM BLD-SCNC: 4.2 MMOL/L (ref 3.4–5.3)
PROT SERPL-MCNC: 7.9 G/DL (ref 6.8–8.8)
RBC # BLD AUTO: 3.69 10E6/UL (ref 3.8–5.2)
SODIUM SERPL-SCNC: 141 MMOL/L (ref 133–144)
VIT B12 SERPL-MCNC: 680 PG/ML (ref 232–1245)
WBC # BLD AUTO: 4.6 10E3/UL (ref 4–11)

## 2022-11-03 PROCEDURE — 82306 VITAMIN D 25 HYDROXY: CPT

## 2022-11-03 PROCEDURE — 85025 COMPLETE CBC W/AUTO DIFF WBC: CPT

## 2022-11-03 PROCEDURE — 36415 COLL VENOUS BLD VENIPUNCTURE: CPT

## 2022-11-03 PROCEDURE — 87329 GIARDIA AG IA: CPT | Mod: 59

## 2022-11-03 PROCEDURE — 86140 C-REACTIVE PROTEIN: CPT

## 2022-11-03 PROCEDURE — 80053 COMPREHEN METABOLIC PANEL: CPT

## 2022-11-03 PROCEDURE — 82746 ASSAY OF FOLIC ACID SERUM: CPT

## 2022-11-03 PROCEDURE — 87506 IADNA-DNA/RNA PROBE TQ 6-11: CPT

## 2022-11-03 PROCEDURE — 86706 HEP B SURFACE ANTIBODY: CPT

## 2022-11-03 PROCEDURE — 87493 C DIFF AMPLIFIED PROBE: CPT | Mod: 59

## 2022-11-03 PROCEDURE — 85652 RBC SED RATE AUTOMATED: CPT

## 2022-11-03 PROCEDURE — 87340 HEPATITIS B SURFACE AG IA: CPT

## 2022-11-03 PROCEDURE — 87328 CRYPTOSPORIDIUM AG IA: CPT

## 2022-11-03 PROCEDURE — 86704 HEP B CORE ANTIBODY TOTAL: CPT

## 2022-11-03 PROCEDURE — 82607 VITAMIN B-12: CPT

## 2022-11-03 PROCEDURE — 83993 ASSAY FOR CALPROTECTIN FECAL: CPT

## 2022-11-03 PROCEDURE — 86481 TB AG RESPONSE T-CELL SUSP: CPT

## 2022-11-04 ENCOUNTER — TELEPHONE (OUTPATIENT)
Dept: GASTROENTEROLOGY | Facility: CLINIC | Age: 37
End: 2022-11-04

## 2022-11-04 LAB
C COLI+JEJUNI+LARI FUSA STL QL NAA+PROBE: NOT DETECTED
C PARVUM AG STL QL IA: NEGATIVE
CALPROTECTIN STL-MCNT: 43.7 MG/KG (ref 0–49.9)
EC STX1 GENE STL QL NAA+PROBE: NOT DETECTED
EC STX2 GENE STL QL NAA+PROBE: NOT DETECTED
G LAMBLIA AG STL QL IA: NEGATIVE
NOROV GI+II ORF1-ORF2 JNC STL QL NAA+PR: NOT DETECTED
RVA NSP5 STL QL NAA+PROBE: NOT DETECTED
SALMONELLA SP RPOD STL QL NAA+PROBE: NOT DETECTED
SHIGELLA SP+EIEC IPAH STL QL NAA+PROBE: NOT DETECTED
V CHOL+PARA RFBL+TRKH+TNAA STL QL NAA+PR: NOT DETECTED
Y ENTERO RECN STL QL NAA+PROBE: NOT DETECTED

## 2022-11-04 NOTE — TELEPHONE ENCOUNTER
Screening Questions    BlueKIND OF PREP RedLOCATION [review exclusion criteria] GreenSEDATION TYPE    Y HOME 11/4 Have you had a positive covid test in the last 90 days?   If yes, what date?      Y Your able to give consent for your medical care?    Y Are you active on mychart?     PO What insurance do you carry?      MOODY KELLIE Ordering/Referring Provider:     23.8 BMI [BMI OVER 40-EXTENDED PREP]  BMI OVER 40 NEED PAC EVALUATION FOR [facility]    Respiratory Screening  [If yes to any of the following HOSPITAL setting only]     N      Do you use daily home oxygen?   N      Do you have mod to severe Obstructive Sleep Apnea?  N      Do you have Pulmonary Hypertension? NEED PAC APPT AT [facility]    N      Do you have UNCONTROLLED asthma?      N Have you had a heart or lung transplant?       N Are you currently on dialysis? [If yes, G-PREP & HOSPITAL setting only]   N  Do you have chronic kidney disease? [If yes, G-PREP]  N  Do you have a diagnosis of diabetes?[If yes, G-PREP]    N Have you had a stroke or Transient ischemic attack (TIA - aka  mini stroke ) within 6 months? (If yes, please review exclusion criteria)  N  In the past 6 months, have you had any heart related issues including cardiomyopathy or heart attack?   N  If yes, did it require cardiac stenting or other implantable device?       N Do you have any implantable devices in your body (pacemaker, defib, LVAD)? (If yes, please review exclusion criteria)    N Do you take nitroglycerin?   N If yes, how often?  (If yes, HOSPITAL setting ONLY)  N Are you currently taking any blood thinners?           [IF YES, INFORM PATIENT TO FOLLOW UP W/ ORDERING PROVIDER FOR BRIDGING INSTRUCTIONS]     N  Do you take Phentermine?   Yes-> Hold for 7 days before procedure.  Please consult your prescribing provider if you have questions about holding this medication.     N    [FEMALES] are you currently  pregnant?   N    If yes, how many weeks? [Greater than 12 weeks, OR NEEDED]    N Any prescription pain medications taken daily like narcotics? [EXTENDED PREP AND MAC]    N Any chemical dependencies such as alcohol, street drugs, or methadone? [If yes, MAC]    N Any post-traumatic stress syndrome, severe anxiety or history of psychosis? [If yes, MAC]    Y Can you transfer from bed to chair? (If NO, please HOSPITAL setting  only)     N  On a regular basis do you go 3-5 days without a bowel movement?[ If yes, EXTENDED PREP.]     Preferred LOCAL Pharmacy for Pre Prescription:           [id number] - [place], [place] - [address]                        Scheduling Details    Amber Caller:   (Please ask for phone number if not scheduled by patient)    Type of Procedure Scheduled: Pouchoscopy     Which Colonoscopy Prep was Sent:   LUANNE CF PATIENTS & GROEN'S PATIENTS NEEDS EXTENDED PREP    2/6 Date of Procedure:   SHMIDT Surgeon:   RONALDO Location:     MAC Sedation Type:     Conscious Sedation- Needs  for 6 hours after the procedure  MAC/General-Needs  for 24 hours after procedure    N Pre-op Required at [facility] [place], [place] and OR for MAC sedation:        (Advise patient they will need a pre-op WITH IN 30 DAYS prior to procedure -)      Y Informed patient they will need an adult          Cannot take any type of public or medical transportation alone    Y Confirmed Nurse will call to complete assessment     COVID + 11/4 Pre-Procedure Covid test to be completed [[facility] PCR Testing Required]    DOUBLE CHECK BMI AND JUAN JOSE       Additional comments:

## 2022-11-05 LAB
GAMMA INTERFERON BACKGROUND BLD IA-ACNC: 0.12 IU/ML
M TB IFN-G BLD-IMP: NEGATIVE
M TB IFN-G CD4+ BCKGRND COR BLD-ACNC: 9.88 IU/ML
MITOGEN IGNF BCKGRD COR BLD-ACNC: 0.05 IU/ML
MITOGEN IGNF BCKGRD COR BLD-ACNC: 0.1 IU/ML
QUANTIFERON MITOGEN: 10 IU/ML
QUANTIFERON NIL TUBE: 0.12 IU/ML
QUANTIFERON TB1 TUBE: 0.22 IU/ML
QUANTIFERON TB2 TUBE: 0.17

## 2022-11-29 ENCOUNTER — HOSPITAL ENCOUNTER (OUTPATIENT)
Dept: MRI IMAGING | Facility: CLINIC | Age: 37
Discharge: HOME | End: 2022-11-29
Attending: INTERNAL MEDICINE | Admitting: INTERNAL MEDICINE
Payer: COMMERCIAL

## 2022-11-29 DIAGNOSIS — K51.919 ULCERATIVE COLITIS WITH COMPLICATION, UNSPECIFIED LOCATION (H): ICD-10-CM

## 2022-11-29 PROCEDURE — 255N000002 HC RX 255 OP 636: Performed by: INTERNAL MEDICINE

## 2022-11-29 PROCEDURE — 250N000011 HC RX IP 250 OP 636: Performed by: INTERNAL MEDICINE

## 2022-11-29 PROCEDURE — 74183 MRI ABD W/O CNTR FLWD CNTR: CPT | Mod: 26 | Performed by: RADIOLOGY

## 2022-11-29 PROCEDURE — 72197 MRI PELVIS W/O & W/DYE: CPT | Mod: 26 | Performed by: RADIOLOGY

## 2022-11-29 PROCEDURE — 72197 MRI PELVIS W/O & W/DYE: CPT

## 2022-11-29 PROCEDURE — A9585 GADOBUTROL INJECTION: HCPCS | Performed by: INTERNAL MEDICINE

## 2022-11-29 RX ORDER — GADOBUTROL 604.72 MG/ML
6 INJECTION INTRAVENOUS ONCE
Status: COMPLETED | OUTPATIENT
Start: 2022-11-29 | End: 2022-11-29

## 2022-11-29 RX ADMIN — GLUCAGON HYDROCHLORIDE 1 MG: 1 INJECTION, POWDER, FOR SOLUTION INTRAMUSCULAR; INTRAVENOUS; SUBCUTANEOUS at 09:40

## 2022-11-29 RX ADMIN — GADOBUTROL 6 ML: 604.72 INJECTION INTRAVENOUS at 09:46

## 2022-12-13 ENCOUNTER — TELEPHONE (OUTPATIENT)
Dept: GASTROENTEROLOGY | Facility: CLINIC | Age: 37
End: 2022-12-13

## 2022-12-13 DIAGNOSIS — R19.7 DIARRHEA, UNSPECIFIED TYPE: Primary | ICD-10-CM

## 2022-12-13 NOTE — TELEPHONE ENCOUNTER
I called Amber to follow up on how she is doing and to review test results, which are all normal.     She has 3-4 BMs overnight and continues to have 8-10 BMs during the day. Has occasional cramping.   She tried imodium in the past, which gave her cramps.     We will proceed with the following plan:    1. Start rifaximin for possible SIBO  2. If rifaximin does not help, will restart fiber and consider bentyl. May also consider low doses of imodium.     Amber expressed understanding, agreement and appreciation. All questions were answered.

## 2022-12-19 ENCOUNTER — TELEPHONE (OUTPATIENT)
Dept: GASTROENTEROLOGY | Facility: CLINIC | Age: 37
End: 2022-12-19

## 2022-12-19 NOTE — TELEPHONE ENCOUNTER
Insurance requesting if patient has IBSD as noted below. When initiating PA Request, Diagnosis provided Diarrhea, unspecified type (R19.7) with Rationale: treatment of suspected SIBO and insurance would like to confirm if patient has IBSD.

## 2022-12-19 NOTE — TELEPHONE ENCOUNTER
Central Prior Authorization Team   Phone: [phone]    PA Initiation    Medication: rifaximin (XIFAXAN) 550 MG TABS tablet   Insurance Company: [employer] - Phone [phone] Fax [phone]  Pharmacy Filling the Rx: [id number] - [place], [place] - [address]  Filling Pharmacy Phone: [phone]  Filling Pharmacy Fax: [phone]  Start Date: 12/19/2022

## 2022-12-19 NOTE — TELEPHONE ENCOUNTER
Prior Authorization Retail Medication Request    Medication/Dose: Rifaximin (XIFAXAN) 550mg 3x daily for 14 days  ICD code (if different than what is on RX):    Previously Tried and Failed:    Rationale: treatment of suspected SIBO    Insurance Name:    Insurance ID:        Pharmacy Information (if different than what is on RX)  Name:    Phone:

## 2022-12-20 NOTE — TELEPHONE ENCOUNTER
PRIOR AUTHORIZATION DENIED    Medication: rifaximin (XIFAXAN) 550 MG TABS tablet-PA DENIED     Denial Date: 12/19/2022    Denial Rational:         Appeal Information:

## 2022-12-21 ENCOUNTER — PATIENT OUTREACH (OUTPATIENT)
Dept: GASTROENTEROLOGY | Facility: CLINIC | Age: 37
End: 2022-12-21

## 2022-12-21 DIAGNOSIS — R19.7 DIARRHEA, UNSPECIFIED TYPE: ICD-10-CM

## 2022-12-21 DIAGNOSIS — K51.919 ULCERATIVE COLITIS WITH COMPLICATION, UNSPECIFIED LOCATION (H): Primary | ICD-10-CM

## 2022-12-21 NOTE — PROGRESS NOTES
Insurance denied PA for Rifaximin TID. Okay with Dr. Jacobs to prescribe BID for 21 days instead. Order placed. Called patient to update her on the plan; no answer; left message with request for her to return the call.

## 2023-01-25 ENCOUNTER — TELEPHONE (OUTPATIENT)
Dept: GASTROENTEROLOGY | Facility: CLINIC | Age: 38
End: 2023-01-25

## 2023-01-25 NOTE — TELEPHONE ENCOUNTER
Attempted to contact patient regarding upcoming Pouchoscopy  procedure on 2/6/23 for pre assessment questions. No answer.     Left message to return call to [phone] #[phone]    Discuss Covid policy and designated  policy.    Pre op exam scheduled: N/A    Arrival time: 1030. Procedure time: 1130    [facility] [address] [place] [address], [address] Floor, [place] [address]    Sedation type: MAC    Anticoagulants: No    Electronic implanted devices? No    Diabetic? No    Indication for procedure: UC, persistent symptoms of disease; assess for Crohn's of the pouch    Bowel prep recommendation: Enemas     Prep instructions sent via [employer].       Maral Dyson RN  Endoscopy Procedure Pre Assessment RN

## 2023-01-26 NOTE — TELEPHONE ENCOUNTER
Pre assessment questions completed for upcoming Pouchoscopy  procedure scheduled on 02.06.2023    COVID policy reviewed.     Pre-op scheduled  N/A    Reviewed procedural arrival time 1030, procedure time 1130 and facility location [facility] [place] [facility]; [address] [place] [address], [address] Floor, [place] [address]    Designated  policy reviewed. Instructed to have someone stay 24 hours post procedure.     Anticoagulation/blood thinners? No    Electronic implanted devices? No    Diabetic? No    Procedure indication: UC,persistent symptoms of the disease, assess for Crohn's of the pouch    Bowel prep recommendation: Enemas    Reviewed procedure prep instructions.     Prep instructions sent via [employer].     Patient verbalized understanding and had no questions or concerns at this time.    Lin Chatman RN  Endoscopy Procedure Pre Assessment RN

## 2023-02-06 ENCOUNTER — ANESTHESIA (OUTPATIENT)
Dept: SURGERY | Facility: AMBULATORY SURGERY CENTER | Age: 38
End: 2023-02-06
Payer: COMMERCIAL

## 2023-02-06 ENCOUNTER — ANESTHESIA EVENT (OUTPATIENT)
Dept: SURGERY | Facility: AMBULATORY SURGERY CENTER | Age: 38
End: 2023-02-06
Payer: COMMERCIAL

## 2023-02-06 ENCOUNTER — HOSPITAL ENCOUNTER (OUTPATIENT)
Facility: AMBULATORY SURGERY CENTER | Age: 38
Discharge: HOME | End: 2023-02-06
Attending: INTERNAL MEDICINE
Payer: COMMERCIAL

## 2023-02-06 VITALS
BODY MASS INDEX: 24.84 KG/M2 | RESPIRATION RATE: 16 BRPM | DIASTOLIC BLOOD PRESSURE: 60 MMHG | WEIGHT: 135 LBS | TEMPERATURE: 97 F | HEART RATE: 60 BPM | SYSTOLIC BLOOD PRESSURE: 91 MMHG | OXYGEN SATURATION: 100 % | HEIGHT: 62 IN

## 2023-02-06 VITALS — HEART RATE: 68 BPM

## 2023-02-06 LAB
HCG UR QL: NEGATIVE
INTERNAL QC OK POCT: NORMAL
POCT KIT EXPIRATION DATE: NORMAL
POCT KIT LOT NUMBER: NORMAL
PROVATION GI EXAM: NORMAL

## 2023-02-06 PROCEDURE — 88305 TISSUE EXAM BY PATHOLOGIST: CPT | Mod: 26 | Performed by: STUDENT IN AN ORGANIZED HEALTH CARE EDUCATION/TRAINING PROGRAM

## 2023-02-06 PROCEDURE — 81025 URINE PREGNANCY TEST: CPT | Performed by: PATHOLOGY

## 2023-02-06 PROCEDURE — 88305 TISSUE EXAM BY PATHOLOGIST: CPT | Mod: TC | Performed by: INTERNAL MEDICINE

## 2023-02-06 PROCEDURE — 88342 IMHCHEM/IMCYTCHM 1ST ANTB: CPT | Mod: 26 | Performed by: STUDENT IN AN ORGANIZED HEALTH CARE EDUCATION/TRAINING PROGRAM

## 2023-02-06 PROCEDURE — 44386 ENDOSCOPY BOWEL POUCH/BIOP: CPT

## 2023-02-06 RX ORDER — LIDOCAINE 40 MG/G
CREAM TOPICAL
Status: DISCONTINUED | OUTPATIENT
Start: 2023-02-06 | End: 2023-02-07 | Stop reason: HOSPADM

## 2023-02-06 RX ORDER — SODIUM CHLORIDE, SODIUM LACTATE, POTASSIUM CHLORIDE, CALCIUM CHLORIDE 600; 310; 30; 20 MG/100ML; MG/100ML; MG/100ML; MG/100ML
INJECTION, SOLUTION INTRAVENOUS CONTINUOUS
Status: DISCONTINUED | OUTPATIENT
Start: 2023-02-06 | End: 2023-02-07 | Stop reason: HOSPADM

## 2023-02-06 RX ORDER — PROPOFOL 10 MG/ML
INJECTION, EMULSION INTRAVENOUS CONTINUOUS PRN
Status: DISCONTINUED | OUTPATIENT
Start: 2023-02-06 | End: 2023-02-06

## 2023-02-06 RX ORDER — PROPOFOL 10 MG/ML
INJECTION, EMULSION INTRAVENOUS PRN
Status: DISCONTINUED | OUTPATIENT
Start: 2023-02-06 | End: 2023-02-06

## 2023-02-06 RX ORDER — LIDOCAINE HYDROCHLORIDE 20 MG/ML
INJECTION, SOLUTION INFILTRATION; PERINEURAL PRN
Status: DISCONTINUED | OUTPATIENT
Start: 2023-02-06 | End: 2023-02-06

## 2023-02-06 RX ADMIN — SODIUM CHLORIDE, SODIUM LACTATE, POTASSIUM CHLORIDE, CALCIUM CHLORIDE: 600; 310; 30; 20 INJECTION, SOLUTION INTRAVENOUS at 10:55

## 2023-02-06 RX ADMIN — PROPOFOL 50 MG: 10 INJECTION, EMULSION INTRAVENOUS at 12:13

## 2023-02-06 RX ADMIN — LIDOCAINE HYDROCHLORIDE 50 MG: 20 INJECTION, SOLUTION INFILTRATION; PERINEURAL at 12:03

## 2023-02-06 RX ADMIN — PROPOFOL 150 MCG/KG/MIN: 10 INJECTION, EMULSION INTRAVENOUS at 12:03

## 2023-02-06 RX ADMIN — PROPOFOL 70 MG: 10 INJECTION, EMULSION INTRAVENOUS at 12:03

## 2023-02-06 NOTE — H&P
Amber [facility]  [id number]  female  37 year old      Reason for procedure/surgery: small bowel obstructions    Patient Active Problem List   Diagnosis     S/P total colectomy     Polycystic ovaries     Depression with anxiety     Acne     Angioma     High-risk pregnancy [facility] MD patient     Ulcerative colitis (H)     S/P  section     Small bowel obstruction (H)     Moderate major depression (H)     Hx of colectomy     Hx of ulcerative colitis       Past Surgical History:    Past Surgical History:   Procedure Laterality Date      SECTION  2012    Procedure: SECTION; Surgeon:JAVI BELL; [facility]      SECTION N/A 2014    Procedure:  SECTION;  Surgeon: Shawanda Luna MD;  [facility]      SECTION N/A 2017    Procedure:  SECTION;  Repeat  Section ;  Surgeon: Laurie Montoya MD;  [facility]     COLECTOMY SUBTOTAL       COLONOSCOPY N/A 4/15/2015    Procedure: COLONOSCOPY;  Surgeon: Al Santo MD;  [facility]     EXAM UNDER ANESTHESIA ANUS N/A 4/15/2015    Procedure: EXAM UNDER ANESTHESIA ANUS;  Surgeon: Al Santo MD;  [facility]     FISTULOTOMY RECTUM N/A 4/15/2015    Procedure: FISTULOTOMY RECTUM;  Surgeon: Al Santo MD;  [facility]     TAKEDOWN ILEOSTOMY       tonsilectomy       TRANSPERINEAL REPAIR FISTULA RECTAL URETHRAL         Past Medical History:   Past Medical History:   Diagnosis Date     Adolescent depression in college     Anxiety      Depressive disorder 2002    Treated witj celexa     Fit/adjust GI carole-device [facility]      Perineal abscess      PONV (postoperative nausea and vomiting)      S/P total colectomy 2001     Ulcerative colitis        Social History:   Social History     Tobacco Use     Smoking status: Never     Smokeless tobacco: Never   Substance Use Topics     Alcohol use: Yes       Family History:   Family History   Problem Relation  "Age of Onset     Diabetes Maternal Grandmother      Diabetes Paternal Grandfather      Genetic Disorder Father         chron's     Gastrointestinal Disease Sister         ulcerative colitis       Allergies: No Known Allergies    Active Medications:   Current Outpatient Medications   Medication Sig Dispense Refill     adapalene (DIFFERIN) 0.1 % external gel Apply topically At Bedtime       citalopram (CELEXA) 20 MG tablet Take 1 tablet (20 mg) by mouth daily 90 tablet 4       Systemic Review:   CONSTITUTIONAL: NEGATIVE for fever, chills, change in weight  ENT/MOUTH: NEGATIVE for ear, mouth and throat problems  RESP: NEGATIVE for significant cough or SOB  CV: NEGATIVE for chest pain, palpitations or peripheral edema    Physical Examination:   Vital Signs: /62 (BP Location: Right arm)   Pulse 64   Temp 97.6  F (36.4  C) (Temporal)   Resp 18   Ht 1.575 m (5' 2\")   Wt 61.2 kg (135 lb)   LMP 02/01/2023 (Exact Date)   SpO2 100%   BMI 24.69 kg/m    GENERAL: healthy, alert and no distress  NECK: no adenopathy, no asymmetry, masses, or scars  RESP: lungs clear to auscultation - no rales, rhonchi or wheezes  CV: regular rate and rhythm, normal S1 S2, no S3 or S4, no murmur, click or rub, no peripheral edema and peripheral pulses strong  ABDOMEN: soft, nontender, no hepatosplenomegaly, no masses and bowel sounds normal  MS: no gross musculoskeletal defects noted, no edema    Plan: Appropriate to proceed as scheduled.      Marlys Jacobs MD  2/6/2023    PCP:  Luciana Edwards    "

## 2023-02-06 NOTE — ANESTHESIA CARE TRANSFER NOTE
Patient: Amber Buckner    Procedure: Procedure(s):  ENDOSCOPY, POUCH, DIAGNOSTIC WITH BIOPSIES       Diagnosis: Ulcerative colitis with complication, unspecified location (H) [K54.693]  Diagnosis Additional Information: No value filed.    Anesthesia Type:   MAC     Note:    Oropharynx: oropharynx clear of all foreign objects  Level of Consciousness: drowsy  Oxygen Supplementation: room air    Independent Airway: airway patency satisfactory and stable  Dentition: dentition unchanged  Vital Signs Stable: post-procedure vital signs reviewed and stable  Report to RN Given: handoff report given  Patient transferred to: Phase II  Comments: Vital signs per nursing documentation.     Handoff Report: Identifed the Patient, Identified the Reponsible Provider, Reviewed the pertinent medical history, Discussed the surgical course, Reviewed Intra-OP anesthesia mangement and issues during anesthesia, Set expectations for post-procedure period and Allowed opportunity for questions and acknowledgement of understanding      Vitals:  Vitals Value Taken Time   BP     Temp     Pulse 82    Resp 13    SpO2 98%        Electronically Signed By: SEPIDEH Cartagena CRNA  February 6, 2023  12:41 PM

## 2023-02-06 NOTE — ANESTHESIA PREPROCEDURE EVALUATION
Anesthesia Pre-Procedure Evaluation    Patient: Amber Buckner   MRN: [id number] : 1985        Procedure : Procedure(s):  ENDOSCOPY, POUCH, DIAGNOSTIC          Past Medical History:   Diagnosis Date     Adolescent depression in college     Anxiety      Depressive disorder 2002    Treated witj celexa     Fit/adjust GI carole-device NEC      Perineal abscess      PONV (postoperative nausea and vomiting)      S/P total colectomy      Ulcerative colitis       Past Surgical History:   Procedure Laterality Date      SECTION  2012    Procedure: SECTION; Surgeon:JAVI BELL; [facility]      SECTION N/A 2014    Procedure:  SECTION;  Surgeon: Shawanda Luna MD;  [facility]      SECTION N/A 2017    Procedure:  SECTION;  Repeat  Section ;  Surgeon: Laurie Montoya MD;  [facility]     COLECTOMY SUBTOTAL       COLONOSCOPY N/A 4/15/2015    Procedure: COLONOSCOPY;  Surgeon: Al Santo MD;  [facility]     EXAM UNDER ANESTHESIA ANUS N/A 4/15/2015    Procedure: EXAM UNDER ANESTHESIA ANUS;  Surgeon: Al Santo MD;  [facility]     FISTULOTOMY RECTUM N/A 4/15/2015    Procedure: FISTULOTOMY RECTUM;  Surgeon: Al Santo MD;  [facility]     TAKEDOWN ILEOSTOMY       tonsilectomy       TRANSPERINEAL REPAIR FISTULA RECTAL URETHRAL        No Known Allergies   Social History     Tobacco Use     Smoking status: Never     Smokeless tobacco: Never   Substance Use Topics     Alcohol use: Yes      Wt Readings from Last 1 Encounters:   23 61.2 kg (135 lb)        Anesthesia Evaluation            ROS/MED HX  ENT/Pulmonary:  - neg pulmonary ROS     Neurologic:  - neg neurologic ROS     Cardiovascular:  - neg cardiovascular ROS     METS/Exercise Tolerance:     Hematologic:  - neg hematologic  ROS     Musculoskeletal:  - neg musculoskeletal ROS     GI/Hepatic:     (+) Inflammatory  bowel disease, bowel prep,     Renal/Genitourinary:  - neg Renal ROS     Endo:  - neg endo ROS     Psychiatric/Substance Use:     (+) psychiatric history anxiety and depression     Infectious Disease:  - neg infectious disease ROS     Malignancy:  - neg malignancy ROS     Other:               OUTSIDE LABS:  CBC:   Lab Results   Component Value Date    WBC 4.6 11/03/2022    WBC 4.5 10/28/2022    HGB 12.8 11/03/2022    HGB 12.2 10/28/2022    HCT 37.4 11/03/2022    HCT 35.9 10/28/2022     11/03/2022     10/28/2022     BMP:   Lab Results   Component Value Date     11/03/2022     10/28/2022    POTASSIUM 4.2 11/03/2022    POTASSIUM 3.8 10/28/2022    CHLORIDE 107 11/03/2022    CHLORIDE 109 10/28/2022    CO2 28 11/03/2022    CO2 24 10/28/2022    BUN 11 11/03/2022    BUN 3 (L) 10/28/2022    CR 0.58 11/03/2022    CR 0.53 10/28/2022    GLC 88 11/03/2022     (H) 10/28/2022     COAGS:   Lab Results   Component Value Date    INR 1.09 08/05/2022     POC:   Lab Results   Component Value Date    HCG Negative 02/06/2023    HCGS Negative 10/24/2022     HEPATIC:   Lab Results   Component Value Date    ALBUMIN 4.2 11/03/2022    PROTTOTAL 7.9 11/03/2022    ALT 22 11/03/2022    AST 11 11/03/2022    ALKPHOS 47 11/03/2022    BILITOTAL 0.3 11/03/2022     OTHER:   Lab Results   Component Value Date    PH 7.36 10/24/2022    LACT 0.6 10/24/2022    JUDSON 9.1 11/03/2022    PHOS 3.3 08/05/2022    MAG 2.0 08/05/2022    LIPASE 91 10/24/2022    AMYLASE 49 11/03/2014    TSH 2.07 04/21/2011    CRP 5.1 11/03/2022    SED 9 11/03/2022       Anesthesia Plan    ASA Status:  2      Anesthesia Type: MAC.     - Reason for MAC: straight local not clinically adequate, immobility needed              Consents    Anesthesia Plan(s) and associated risks, benefits, and realistic alternatives discussed. Questions answered and patient/representative(s) expressed understanding.     - Discussed: Risks, Benefits and Alternatives for BOTH  SEDATION and the PROCEDURE were discussed     - Discussed with:  Patient      - Extended Intubation/Ventilatory Support Discussed: No.      - Patient is DNR/DNI Status: No    Use of blood products discussed: No .     Postoperative Care    Pain management: IV analgesics, Oral pain medications.        Comments:                Al Holder MD, MD

## 2023-02-06 NOTE — ANESTHESIA POSTPROCEDURE EVALUATION
Patient: Amber Buckner    Procedure: Procedure(s):  ENDOSCOPY, POUCH, DIAGNOSTIC WITH BIOPSIES       Anesthesia Type:  MAC    Note:  Disposition: Outpatient   Postop Pain Control: Uneventful            Sign Out: Well controlled pain   PONV: No   Neuro/Psych: Uneventful            Sign Out: Acceptable/Baseline neuro status   Airway/Respiratory: Uneventful            Sign Out: Acceptable/Baseline resp. status   CV/Hemodynamics: Uneventful            Sign Out: Acceptable CV status; No obvious hypovolemia; No obvious fluid overload   Other NRE: NONE   DID A NON-ROUTINE EVENT OCCUR? No           Last vitals:  Vitals Value Taken Time   BP 91/60 02/06/23 1305   Temp 36.1  C (97  F) 02/06/23 1305   Pulse 60 02/06/23 1305   Resp 16 02/06/23 1305   SpO2 100 % 02/06/23 1305       Electronically Signed By: Al Holder MD, MD  February 6, 2023  2:29 PM

## 2023-02-08 ENCOUNTER — TELEPHONE (OUTPATIENT)
Dept: GASTROENTEROLOGY | Facility: CLINIC | Age: 38
End: 2023-02-08
Payer: COMMERCIAL

## 2023-02-08 NOTE — TELEPHONE ENCOUNTER
Pt called back and confirmed that 2/21/2023 at 1pm for a video visit with Dr. Jacobs will work for her.

## 2023-02-08 NOTE — TELEPHONE ENCOUNTER
Called and left voice message for Pt. Called Pt to help get them scheduled with an appointment on Dr. Jacobs's schedule for 2/21/2023 at 1pm. Writer left call back number.

## 2023-02-09 ENCOUNTER — MYC MEDICAL ADVICE (OUTPATIENT)
Dept: GASTROENTEROLOGY | Facility: CLINIC | Age: 38
End: 2023-02-09
Payer: COMMERCIAL

## 2023-02-09 LAB
PATH REPORT.ADDENDUM SPEC: NORMAL
PATH REPORT.COMMENTS IMP SPEC: NORMAL
PATH REPORT.FINAL DX SPEC: NORMAL
PATH REPORT.GROSS SPEC: NORMAL
PATH REPORT.MICROSCOPIC SPEC OTHER STN: NORMAL
PATH REPORT.RELEVANT HX SPEC: NORMAL
PHOTO IMAGE: NORMAL

## 2023-02-13 NOTE — TELEPHONE ENCOUNTER
Called to remind patient of their upcoming appointment with our GI clinic, on Tuesday 2/21/2023 at 1:00pm with Dr. Marlys Jacobs. This appointment is scheduled as a video visit. You will receive a call approximately 30 minutes prior to check you in, you must be in [place] for this visit., if your appointment is virtual (video or telephone) you need to be in [place] for the visit. To reschedule or cancel patient to call [phone].    Confirmed appointment.    Nadia Terrell

## 2023-02-21 ENCOUNTER — TELEPHONE (OUTPATIENT)
Dept: GASTROENTEROLOGY | Facility: CLINIC | Age: 38
End: 2023-02-21

## 2023-02-21 ENCOUNTER — PATIENT OUTREACH (OUTPATIENT)
Dept: GASTROENTEROLOGY | Facility: CLINIC | Age: 38
End: 2023-02-21

## 2023-02-21 ENCOUNTER — VIRTUAL VISIT (OUTPATIENT)
Dept: GASTROENTEROLOGY | Facility: CLINIC | Age: 38
End: 2023-02-21
Payer: COMMERCIAL

## 2023-02-21 VITALS — WEIGHT: 135 LBS | BODY MASS INDEX: 24.69 KG/M2

## 2023-02-21 DIAGNOSIS — K50.90 CROHN'S DISEASE (H): ICD-10-CM

## 2023-02-21 DIAGNOSIS — K50.00 CROHN'S DISEASE OF SMALL INTESTINE WITHOUT COMPLICATION (H): Primary | ICD-10-CM

## 2023-02-21 PROCEDURE — 99215 OFFICE O/P EST HI 40 MIN: CPT | Mod: VID | Performed by: INTERNAL MEDICINE

## 2023-02-21 RX ORDER — ALBUTEROL SULFATE 0.83 MG/ML
2.5 SOLUTION RESPIRATORY (INHALATION)
Status: CANCELLED | OUTPATIENT
Start: 2023-02-21 | Stop reason: HOSPADM

## 2023-02-21 RX ORDER — ALBUTEROL SULFATE 90 UG/1
1-2 AEROSOL, METERED RESPIRATORY (INHALATION)
Status: CANCELLED
Start: 2023-02-21 | Stop reason: HOSPADM

## 2023-02-21 RX ORDER — MEPERIDINE HYDROCHLORIDE 25 MG/ML
25 INJECTION INTRAMUSCULAR; INTRAVENOUS; SUBCUTANEOUS EVERY 30 MIN PRN
Status: CANCELLED | OUTPATIENT
Start: 2023-02-21 | Stop reason: HOSPADM

## 2023-02-21 RX ORDER — DIPHENHYDRAMINE HYDROCHLORIDE 50 MG/ML
50 INJECTION INTRAMUSCULAR; INTRAVENOUS
Status: CANCELLED
Start: 2023-02-21 | Stop reason: HOSPADM

## 2023-02-21 RX ORDER — BUDESONIDE 28 MG/1
AEROSOL, FOAM RECTAL
Qty: 133.6 G | Refills: 0 | Status: SHIPPED | OUTPATIENT
Start: 2023-02-21 | End: 2023-03-21

## 2023-02-21 RX ORDER — EPINEPHRINE 1 MG/ML
0.3 INJECTION, SOLUTION, CONCENTRATE INTRAVENOUS EVERY 5 MIN PRN
Status: CANCELLED | OUTPATIENT
Start: 2023-02-21 | Stop reason: HOSPADM

## 2023-02-21 RX ORDER — METHYLPREDNISOLONE SODIUM SUCCINATE 125 MG/2ML
125 INJECTION, POWDER, LYOPHILIZED, FOR SOLUTION INTRAMUSCULAR; INTRAVENOUS
Status: CANCELLED
Start: 2023-02-21 | Stop reason: HOSPADM

## 2023-02-21 ASSESSMENT — ENCOUNTER SYMPTOMS
CHILLS: 0
FEVER: 0
NAUSEA: 0
RECTAL PAIN: 1
POLYDIPSIA: 0
DECREASED CONCENTRATION: 0
ABDOMINAL PAIN: 0
WEIGHT GAIN: 0
VOMITING: 0
HEARTBURN: 0
DIARRHEA: 1
INCREASED ENERGY: 0
HALLUCINATIONS: 0
CONSTIPATION: 0
WEIGHT LOSS: 0
BOWEL INCONTINENCE: 0
INSOMNIA: 1
ALTERED TEMPERATURE REGULATION: 0
NERVOUS/ANXIOUS: 1
PANIC: 0
BLOATING: 1
DEPRESSION: 0
BLOOD IN STOOL: 0
NIGHT SWEATS: 0
DECREASED APPETITE: 0
POLYPHAGIA: 0
FATIGUE: 1
JAUNDICE: 0

## 2023-02-21 ASSESSMENT — PAIN SCALES - GENERAL: PAINLEVEL: NO PAIN (0)

## 2023-02-21 NOTE — LETTER
2/21/2023         RE: Amber Buckner  [address]  [place] [address]        Dear Colleague,    Thank you for referring your patient, Amber Buckner, to the [facility] [place]. Please see a copy of my visit note below.    [facility] IBD follow up       PATIENT: Amber Buckner    MRN: [id number]    Date of Birth 1985    Tel: [phone] (home)     PCP: Luciana Edwards     HPI: Ms. Buckner is a 37 year old female here to establish care for recurrent SBOs in the setting of a pouch.     UC history  Amber was diagnosed with UC in 2001. She was on flagyl, prednisone. No biologics. Underwent RPC/IPAA in 2002. Did very well for 10 years without any issues.     Delivered Yoan in 2012 via C section.  Around then, developed abdominal pain and more frequent BMs. Developed a perianal abscess during 2nd pregnancy with Riley in 2014. At that time, also had a 2 night hospital stay for possible SBO (XR and US normal).     In 2017, she delivered her daughter, Shantal. During the postpartum period, she developed another SBO that required an NGT.     Had a total of 3 C sections. Since her last pregnancy, she had 3 SBOs (in 2018, 2019 and just recently in 2022). All managed conservatively.     Currently:   Endorses fatigue. Wakes up 5 times in the middle of the night to defecate. 8-10 BMs per day.     Total number of IBD surgeries (except perianal): 1; RPC/IPAA in 2002    Current IBD Medications:  none    Past IBD Medications:   Flagyl, prednisone  No biologics or immunomodulators      Interval history, 2/2023 (virtual visit)  GI symptoms have not changed significantly. Endorses fatigue. Wakes up 5 times in the middle of the night to defecate. 8-10 BMs per day.     Pouchoscopy was c/w Crohn's.     Past Medical History:   Diagnosis Date     Adolescent depression in college     Anxiety      Depressive disorder 05/02/2002    Treated david celexa     Fit/adjust  GI carloe-device NEC      Perineal abscess      PONV (postoperative nausea and vomiting)      S/P total colectomy      Ulcerative colitis         Past Surgical History:   Procedure Laterality Date      SECTION  2012    Procedure: SECTION; Surgeon:JAVI BELL; [facility]      SECTION N/A 2014    Procedure:  SECTION;  Surgeon: Shawanda Luna MD;  [facility]      SECTION N/A 2017    Procedure:  SECTION;  Repeat  Section ;  Surgeon: Laurie Montoya MD;  [facility]     COLECTOMY SUBTOTAL       COLONOSCOPY N/A 4/15/2015    Procedure: COLONOSCOPY;  Surgeon: Al Santo MD;  [facility]     EXAM UNDER ANESTHESIA ANUS N/A 4/15/2015    Procedure: EXAM UNDER ANESTHESIA ANUS;  Surgeon: Al Santo MD;  [facility]     FISTULOTOMY RECTUM N/A 4/15/2015    Procedure: FISTULOTOMY RECTUM;  Surgeon: Al Santo MD;  [facility]     POUCHOSCOPY N/A 2023    Procedure: ENDOSCOPY, POUCH, DIAGNOSTIC WITH BIOPSIES;  Surgeon: Marlys Jacobs MD;  Location: [facility] OR     TAKEDOWN ILEOSTOMY       tonsilectomy       TRANSPERINEAL REPAIR FISTULA RECTAL URETHRAL         Social History     Tobacco Use     Smoking status: Never     Passive exposure: Never     Smokeless tobacco: Never   Substance Use Topics     Alcohol use: Yes       Family History   Problem Relation Age of Onset     Diabetes Maternal Grandmother      Diabetes Paternal Grandfather      Genetic Disorder Father         chron's     Gastrointestinal Disease Sister         ulcerative colitis       No Known Allergies     Outpatient Encounter Medications as of 2023   Medication Sig Dispense Refill     adapalene (DIFFERIN) 0.1 % external gel Apply topically At Bedtime       citalopram (CELEXA) 20 MG tablet Take 1 tablet (20 mg) by mouth daily 90 tablet 4     Facility-Administered Encounter Medications as of 2023   Medication Dose  Route Frequency Provider Last Rate Last Admin     barium sulfate (EZ-DISK) tablet 700 mg  700 mg Oral Once José Manuel Wolf MD         sod bicarbonate-citric acid-simethicone (EZ GAS) 2.21-1.53-0.04 g packet 4 g  4 g Oral Once José Manuel Wolf MD          NSAID  NO    Review of Systems  Complete 10 System ROS performed. All are negative except as documented below, in the HPI, or in patient questionnaire from today's visit.    1) Constitutional: No fevers, chills, night sweats or malaise, weight loss or gain  2) Skin: No rash  3) Pulmonary: No wheeze, SOB, cough, sputum or hemoptysis  4) Cardiovascular: No Chest pain or palpitations  5) Genitourinary: No blood in urine or dysuria  6) Endocrine: No increased sweating, hunger, thirst or thyroid problems  7) Hematologic: No bruising and easy bleeding  8) Musculoskeletal: no new pain in joints or limitation in ROM  9) Neurologic: No dizziness, paresthesias or weakness or falls  10) Psychiatric:  not depressed/anxious, no sleep problems    PHYSICAL EXAM  Vitals: Wt 61.2 kg (135 lb)   LMP 02/01/2023 (Exact Date)   BMI 24.69 kg/m      No Pain (0)     General appearance  Healthy appearing adult, in no acute distress     Eyes  Sclera anicteric  Pupils round and reactive to light     Ears, nose, mouth and throat  No obvious external lesions of ears and nose  Hearing intact     Neck  Symmetric  No obvious external lesions     Respiratory  Normal respiration, no use of accessory muscles      MSK  Gait normal     Skin  No rashes or jaundice      Psychiatric  Oriented to person, place and time  Appropriate mood and affect.     DATA:  Reviewed in detail past documentation, medications and prior workup available in electronic health records or through outside records.    PERTINENT STUDIES:  Most recent CBC:  WBC   Date Value Ref Range Status   10/20/2019 3.6 (L) 4.0 - 11.0 10e9/L Final     WBC Count   Date Value Ref Range Status   11/03/2022 4.6 4.0 - 11.0 10e3/uL  Final   ]  Hemoglobin   Date Value Ref Range Status   11/03/2022 12.8 11.7 - 15.7 g/dL Final   10/20/2019 11.5 (L) 11.7 - 15.7 g/dL Final   ]   Platelet Count   Date Value Ref Range Status   11/03/2022 310 150 - 450 10e3/uL Final   10/20/2019 200 150 - 450 10e9/L Final       Most recent coag:  INR   Date Value Ref Range Status   08/05/2022 1.09 0.85 - 1.15 Final   05/14/2017 1.06 0.86 - 1.14 Final       Most recent hepatic panel:  AST   Date Value Ref Range Status   11/03/2022 11 0 - 45 U/L Final   10/19/2019 6 0 - 45 U/L Final     ALT   Date Value Ref Range Status   11/03/2022 22 0 - 50 U/L Final   10/19/2019 10 0 - 50 U/L Final     No results found for: BILICONJ   Bilirubin Total   Date Value Ref Range Status   11/03/2022 0.3 0.2 - 1.3 mg/dL Final   10/19/2019 0.4 0.2 - 1.3 mg/dL Final     Albumin   Date Value Ref Range Status   11/03/2022 4.2 3.4 - 5.0 g/dL Final   10/19/2019 3.4 3.4 - 5.0 g/dL Final     Alkaline Phosphatase   Date Value Ref Range Status   11/03/2022 47 40 - 150 U/L Final   10/19/2019 41 40 - 150 U/L Final       Most recent creatinine:  Creatinine   Date Value Ref Range Status   11/03/2022 0.58 0.52 - 1.04 mg/dL Final   10/20/2019 0.40 (L) 0.52 - 1.04 mg/dL Final       Endoscopy:    Pouchoscopy 2/2023:                                                                      Impression:            - Multiple ulcers in the ileoanal pouch and                          jazzy-terminal ileum and in the pre-pouch ileum.                          Biopsied. Suspicious for Crohn's disease.                          - Biopsies were taken with a cold forceps for                          histology from the rectal cuff for dysplasia                          surveillance.     PATH  A. JAZZY-TERMINAL ILEUM, ULCER AT 45 CM, BIOPSY:  -Enteric mucosa with erosions and  acute inflammation  -Negative for dysplasia    B. JAZZY-TERMINAL ILEUM, NORMAL TISSUE, BIOPSIES:  -Enteric mucosa with no significant inflammation  -Negative for  dysplasia    C. ENTRANCE TO JAZZY-TERMINAL ILEUM, ULCER, BIOPSIES:  -Enteric mucosa with ulceration and granulation tissue  -Negative for dysplasia  -CMV immunostain to follow    D. ENTRANCE TO BLIND LIMB, BIOPSIES:  -Enteric mucosa with ulceration and granulation tissue  -Negative for dysplasia  -CMV immunostain to follow    E. POUCH, BIOPSIES:  -Severe pouchitis with ulceration and granulation tissue  -Negative for dysplasia  -CMV immunostain to follow    F. RECTAL POUCH, BIOPSIES:  -Enteric mucosa with with no significant inflammation  -Squamous mucosa with mild active inflammation  -Negative for dysplasia    Pouchoscopy 2/2020             Imaging:  CT 10/2022:     BOWEL: Multiple dilated central small bowel loops lead to fecalized  small bowel and postoperative change at the anterior mid pelvis series  3 image 136. Other small bowel loops appear decompressed. Colectomy  changes. No free air. No abscess identified.    CT a/p 10/2019:  1.  Suspected small bowel obstruction with dilated loops seen in the  lower abdomen. Transition to nondilated bowel appears to be seen near  the small bowel anastomosis within the right lower quadrant.    AXR 5/2017:  Single supine view of the abdomen. Interval placement of gastric tube  with tip and sidehole projecting over the fundus. Slight interval  decrease in caliber of air-filled loops of small bowel in the left  side of the abdomen with associated unchanged dilated loops of bowel  in the right side of the abdomen, measuring up to 6.5 cm in greatest  diameter. No free air, pneumatosis, portal venous gas. The visualized  left lung base is clear.    IMPRESSION:    Ms. Buckner is a 37 year old here with IBD s/p RPC/IPAA in 2002 c/b recurrent SBOs, frequent BMs and a recent pouchoscopy c/w Crohn's disease.   Today we discussed the need to start a biologic. We discussed the risks and benefits of various biologics (IFX, ADA, CMZ, VDZ, UST, Skyrizi) and small molecules.     We decided  to move forward with Skyrizi given effectiveness and an excellent safety profile.     DIAGNOSIS:    # Crohn's disease, biologic naive  # s/p RPC/IPAA in 2002    PLAN:  --PA for Skyrizi  --Referral to [facility] pharmacy to discuss Skyrizi   --Budesonide foam   --Mental health referral     Misc:  -- Avoid tobacco use  -- Avoid NSAIDs     RTC 3 months    Marlys Jacobs MD   of Medicine  Division of Gastroenterology, Hepatology and Nutrition  [facility]    40 minutes spent on the date of the encounter performing chart review, history and exam, documentation and further activities as noted above.                          Answers for HPI/ROS submitted by the patient on 2/21/2023  General Symptoms: Yes  Skin Symptoms: No  HENT Symptoms: No  EYE SYMPTOMS: No  HEART SYMPTOMS: No  LUNG SYMPTOMS: No  INTESTINAL SYMPTOMS: Yes  URINARY SYMPTOMS: No  GYNECOLOGIC SYMPTOMS: No  BREAST SYMPTOMS: No  SKELETAL SYMPTOMS: No  BLOOD SYMPTOMS: No  NERVOUS SYSTEM SYMPTOMS: No  MENTAL HEALTH SYMPTOMS: Yes  Fever: No  Loss of appetite: No  Weight loss: No  Weight gain: No  Fatigue: Yes  Night sweats: No  Chills: No  Increased stress: Yes  Excessive hunger: No  Excessive thirst: No  Feeling hot or cold when others believe the temperature is normal: No  Loss of height: No  Post-operative complications: No  Surgical site pain: No  Hallucinations: No  Change in or Loss of Energy: No  Hyperactivity: No  Confusion: No  Heart burn or indigestion: No  Nausea: No  Vomiting: No  Abdominal pain: No  Bloating: Yes  Constipation: No  Diarrhea: Yes  Blood in stool: No  Black stools: No  Rectal or Anal pain: Yes  Fecal incontinence: No  Yellowing of skin or eyes: No  Vomit with blood: No  Change in stools: No  Nervous or Anxious: Yes  Depression: No  Trouble sleeping: Yes  Trouble thinking or concentrating: No  Mood changes: No  Panic attacks: No        Sincerely,    Marlys Jacobs MD

## 2023-02-21 NOTE — PROGRESS NOTES
Video-Visit Details    Type of service:  Video Visit    Video Start Time (time video started): 1:00PM    Video End Time (time video stopped): 1:35PM    Originating Location (pt. Location): Home        Distant Location (provider location):  Off-site    Mode of Communication:  Video Conference via Sounder    Amber is a 37 year old who is being evaluated via a billable video visit.      How would you like to obtain your AVS? MyChart  If the video visit is dropped, the invitation should be resent by: Text to cell phone: [phone]  Will anyone else be joining your video visit? No            [facility] IBD follow up       PATIENT: Amber Buckner    MRN: [id number]    Date of Birth 1985    Tel: [phone] (home)     PCP: Luciana Edwards     HPI: Ms. Buckner is a 37 year old female here to establish care for recurrent SBOs in the setting of a pouch.     UC history  Amber was diagnosed with UC in 2001. She was on flagyl, prednisone. No biologics. Underwent RPC/IPAA in 2002. Did very well for 10 years without any issues.     Delivered Yoan in 2012 via C section.  Around then, developed abdominal pain and more frequent BMs. Developed a perianal abscess during 2nd pregnancy with Riley in 2014. At that time, also had a 2 night hospital stay for possible SBO (XR and US normal).     In 2017, she delivered her daughter, Shantal. During the postpartum period, she developed another SBO that required an NGT.     Had a total of 3 C sections. Since her last pregnancy, she had 3 SBOs (in 2018, 2019 and just recently in 2022). All managed conservatively.     Currently:   Endorses fatigue. Wakes up 5 times in the middle of the night to defecate. 8-10 BMs per day.     Total number of IBD surgeries (except perianal): 1; RPC/IPAA in 2002    Current IBD Medications:  none    Past IBD Medications:   Flagyl, prednisone  No biologics or immunomodulators      Interval history, 2/2023 (virtual visit)  GI symptoms  have not changed significantly. Endorses fatigue. Wakes up 5 times in the middle of the night to defecate. 8-10 BMs per day.     Pouchoscopy was c/w Crohn's.     Past Medical History:   Diagnosis Date     Adolescent depression in college     Anxiety      Depressive disorder 2002    Treated witj celexa     Fit/adjust GI carole-device NEC      Perineal abscess      PONV (postoperative nausea and vomiting)      S/P total colectomy      Ulcerative colitis         Past Surgical History:   Procedure Laterality Date      SECTION  2012    Procedure: SECTION; Surgeon:JAVI BELL; Location:[facility] L[facility]      SECTION N/A 2014    Procedure:  SECTION;  Surgeon: Shawanda Luna MD;  Location: [facility] L[facility]      SECTION N/A 2017    Procedure:  SECTION;  Repeat  Section ;  Surgeon: Laurie Montoya MD;  Location: [facility] L[facility]     COLECTOMY SUBTOTAL       COLONOSCOPY N/A 4/15/2015    Procedure: COLONOSCOPY;  Surgeon: Al Santo MD;  Location: [facility] OR     EXAM UNDER ANESTHESIA ANUS N/A 4/15/2015    Procedure: EXAM UNDER ANESTHESIA ANUS;  Surgeon: Al Santo MD;  Location: [facility] OR     FISTULOTOMY RECTUM N/A 4/15/2015    Procedure: FISTULOTOMY RECTUM;  Surgeon: Al Santo MD;  Location: [facility] OR     POUCHOSCOPY N/A 2023    Procedure: ENDOSCOPY, POUCH, DIAGNOSTIC WITH BIOPSIES;  Surgeon: Marlys Jacobs MD;  Location: [facility] OR     TAKEDOWN ILEOSTOMY       tonsilectomy       TRANSPERINEAL REPAIR FISTULA RECTAL URETHRAL         Social History     Tobacco Use     Smoking status: Never     Passive exposure: Never     Smokeless tobacco: Never   Substance Use Topics     Alcohol use: Yes       Family History   Problem Relation Age of Onset     Diabetes Maternal Grandmother      Diabetes Paternal Grandfather      Genetic Disorder Father         chron's     Gastrointestinal Disease Sister         ulcerative colitis       No Known Allergies      Outpatient Encounter Medications as of 2/21/2023   Medication Sig Dispense Refill     adapalene (DIFFERIN) 0.1 % external gel Apply topically At Bedtime       citalopram (CELEXA) 20 MG tablet Take 1 tablet (20 mg) by mouth daily 90 tablet 4     Facility-Administered Encounter Medications as of 2/21/2023   Medication Dose Route Frequency Provider Last Rate Last Admin     barium sulfate (EZ-DISK) tablet 700 mg  700 mg Oral Once José Manuel Wolf MD         sod bicarbonate-citric acid-simethicone (EZ GAS) 2.21-1.53-0.04 g packet 4 g  4 g Oral Once José Manuel Wolf MD          NSAID  NO    Review of Systems  Complete 10 System ROS performed. All are negative except as documented below, in the HPI, or in patient questionnaire from today's visit.    1) Constitutional: No fevers, chills, night sweats or malaise, weight loss or gain  2) Skin: No rash  3) Pulmonary: No wheeze, SOB, cough, sputum or hemoptysis  4) Cardiovascular: No Chest pain or palpitations  5) Genitourinary: No blood in urine or dysuria  6) Endocrine: No increased sweating, hunger, thirst or thyroid problems  7) Hematologic: No bruising and easy bleeding  8) Musculoskeletal: no new pain in joints or limitation in ROM  9) Neurologic: No dizziness, paresthesias or weakness or falls  10) Psychiatric:  not depressed/anxious, no sleep problems    PHYSICAL EXAM  Vitals: Wt 61.2 kg (135 lb)   LMP 02/01/2023 (Exact Date)   BMI 24.69 kg/m      No Pain (0)     General appearance  Healthy appearing adult, in no acute distress     Eyes  Sclera anicteric  Pupils round and reactive to light     Ears, nose, mouth and throat  No obvious external lesions of ears and nose  Hearing intact     Neck  Symmetric  No obvious external lesions     Respiratory  Normal respiration, no use of accessory muscles      MSK  Gait normal     Skin  No rashes or jaundice      Psychiatric  Oriented to person, place and time  Appropriate mood and affect.     DATA:  Reviewed  in detail past documentation, medications and prior workup available in electronic health records or through outside records.    PERTINENT STUDIES:  Most recent CBC:  WBC   Date Value Ref Range Status   10/20/2019 3.6 (L) 4.0 - 11.0 10e9/L Final     WBC Count   Date Value Ref Range Status   11/03/2022 4.6 4.0 - 11.0 10e3/uL Final   ]  Hemoglobin   Date Value Ref Range Status   11/03/2022 12.8 11.7 - 15.7 g/dL Final   10/20/2019 11.5 (L) 11.7 - 15.7 g/dL Final   ]   Platelet Count   Date Value Ref Range Status   11/03/2022 310 150 - 450 10e3/uL Final   10/20/2019 200 150 - 450 10e9/L Final       Most recent coag:  INR   Date Value Ref Range Status   08/05/2022 1.09 0.85 - 1.15 Final   05/14/2017 1.06 0.86 - 1.14 Final       Most recent hepatic panel:  AST   Date Value Ref Range Status   11/03/2022 11 0 - 45 U/L Final   10/19/2019 6 0 - 45 U/L Final     ALT   Date Value Ref Range Status   11/03/2022 22 0 - 50 U/L Final   10/19/2019 10 0 - 50 U/L Final     No results found for: BILICONJ   Bilirubin Total   Date Value Ref Range Status   11/03/2022 0.3 0.2 - 1.3 mg/dL Final   10/19/2019 0.4 0.2 - 1.3 mg/dL Final     Albumin   Date Value Ref Range Status   11/03/2022 4.2 3.4 - 5.0 g/dL Final   10/19/2019 3.4 3.4 - 5.0 g/dL Final     Alkaline Phosphatase   Date Value Ref Range Status   11/03/2022 47 40 - 150 U/L Final   10/19/2019 41 40 - 150 U/L Final       Most recent creatinine:  Creatinine   Date Value Ref Range Status   11/03/2022 0.58 0.52 - 1.04 mg/dL Final   10/20/2019 0.40 (L) 0.52 - 1.04 mg/dL Final       Endoscopy:    Pouchoscopy 2/2023:                                                                      Impression:            - Multiple ulcers in the ileoanal pouch and                          sendy-terminal ileum and in the pre-pouch ileum.                          Biopsied. Suspicious for Crohn's disease.                          - Biopsies were taken with a cold forceps for                          histology  from the rectal cuff for dysplasia                          surveillance.     PATH  A. JAZZY-TERMINAL ILEUM, ULCER AT 45 CM, BIOPSY:  -Enteric mucosa with erosions and  acute inflammation  -Negative for dysplasia    B. JAZZY-TERMINAL ILEUM, NORMAL TISSUE, BIOPSIES:  -Enteric mucosa with no significant inflammation  -Negative for dysplasia    C. ENTRANCE TO JAZZY-TERMINAL ILEUM, ULCER, BIOPSIES:  -Enteric mucosa with ulceration and granulation tissue  -Negative for dysplasia  -CMV immunostain to follow    D. ENTRANCE TO BLIND LIMB, BIOPSIES:  -Enteric mucosa with ulceration and granulation tissue  -Negative for dysplasia  -CMV immunostain to follow    E. POUCH, BIOPSIES:  -Severe pouchitis with ulceration and granulation tissue  -Negative for dysplasia  -CMV immunostain to follow    F. RECTAL POUCH, BIOPSIES:  -Enteric mucosa with with no significant inflammation  -Squamous mucosa with mild active inflammation  -Negative for dysplasia    Pouchoscopy 2/2020             Imaging:  CT 10/2022:     BOWEL: Multiple dilated central small bowel loops lead to fecalized  small bowel and postoperative change at the anterior mid pelvis series  3 image 136. Other small bowel loops appear decompressed. Colectomy  changes. No free air. No abscess identified.    CT a/p 10/2019:  1.  Suspected small bowel obstruction with dilated loops seen in the  lower abdomen. Transition to nondilated bowel appears to be seen near  the small bowel anastomosis within the right lower quadrant.    AXR 5/2017:  Single supine view of the abdomen. Interval placement of gastric tube  with tip and sidehole projecting over the fundus. Slight interval  decrease in caliber of air-filled loops of small bowel in the left  side of the abdomen with associated unchanged dilated loops of bowel  in the right side of the abdomen, measuring up to 6.5 cm in greatest  diameter. No free air, pneumatosis, portal venous gas. The visualized  left lung base is  clear.    IMPRESSION:    Ms. Buckner is a 37 year old here with IBD s/p RPC/IPAA in 2002 c/b recurrent SBOs, frequent BMs and a recent pouchoscopy c/w Crohn's disease.   Today we discussed the need to start a biologic. We discussed the risks and benefits of various biologics (IFX, ADA, CMZ, VDZ, UST, Skyrizi) and small molecules.     We decided to move forward with Skyrizi given effectiveness and an excellent safety profile.     DIAGNOSIS:    # Crohn's disease, biologic naive  # s/p RPC/IPAA in 2002    PLAN:  --PA for Skyrizi  --Referral to [facility] pharmacy to discuss Skyrizi   --Budesonide foam   --Mental health referral     Misc:  -- Avoid tobacco use  -- Avoid NSAIDs     RTC 3 months    Marlys Jacobs MD   of Medicine  Division of Gastroenterology, Hepatology and Nutrition  [facility]    40 minutes spent on the date of the encounter performing chart review, history and exam, documentation and further activities as noted above.                          Answers for HPI/ROS submitted by the patient on 2/21/2023  General Symptoms: Yes  Skin Symptoms: No  HENT Symptoms: No  EYE SYMPTOMS: No  HEART SYMPTOMS: No  LUNG SYMPTOMS: No  INTESTINAL SYMPTOMS: Yes  URINARY SYMPTOMS: No  GYNECOLOGIC SYMPTOMS: No  BREAST SYMPTOMS: No  SKELETAL SYMPTOMS: No  BLOOD SYMPTOMS: No  NERVOUS SYSTEM SYMPTOMS: No  MENTAL HEALTH SYMPTOMS: Yes  Fever: No  Loss of appetite: No  Weight loss: No  Weight gain: No  Fatigue: Yes  Night sweats: No  Chills: No  Increased stress: Yes  Excessive hunger: No  Excessive thirst: No  Feeling hot or cold when others believe the temperature is normal: No  Loss of height: No  Post-operative complications: No  Surgical site pain: No  Hallucinations: No  Change in or Loss of Energy: No  Hyperactivity: No  Confusion: No  Heart burn or indigestion: No  Nausea: No  Vomiting: No  Abdominal pain: No  Bloating: Yes  Constipation: No  Diarrhea: Yes  Blood in stool: No  Black stools:  No  Rectal or Anal pain: Yes  Fecal incontinence: No  Yellowing of skin or eyes: No  Vomit with blood: No  Change in stools: No  Nervous or Anxious: Yes  Depression: No  Trouble sleeping: Yes  Trouble thinking or concentrating: No  Mood changes: No  Panic attacks: No

## 2023-02-21 NOTE — PROGRESS NOTES
-- Therapy plan placed for Skyrizi IV loading doses  -- PA to be initiated for both infusions and injections  -- Order placed for budesonide foam  ____________________________________________  Per clinic visit with Dr. Jacobs 2/21/23:

## 2023-02-21 NOTE — TELEPHONE ENCOUNTER
PA Initiation    Medication: Skyrizi initiated  Insurance Company: [employer] - Phone [phone] Fax [phone]  Pharmacy Filling the Rx:    Filling Pharmacy Phone:    Filling Pharmacy Fax:    Start Date: 2/21/2023    [id number]

## 2023-02-21 NOTE — PATIENT INSTRUCTIONS
PLAN  --PA for Skyrizi  --Referral to [facility] pharmacy to discuss Skyrizi   --Budesonide foam   --Mental health referral     If you have any questions, please don't hesitate to contact us via [employer] or call Preeti Barton, the Gastroenterology nurse at [phone].

## 2023-02-21 NOTE — NURSING NOTE
Is the patient currently in the state of [place]? NO    Visit mode:VIDEO    If the visit is dropped, the patient can be reconnected by: VIDEO VISIT: Text to cell phone: [phone]    Will anyone else be joining the visit? NO      How would you like to obtain your AVS? MyChart    Are changes needed to the allergy or medication list? NO    Reason for visit:

## 2023-02-22 NOTE — TELEPHONE ENCOUNTER
PA Initiation    Medication: Lala approved  Insurance Company: [employer] - Phone [phone] Fax [phone]  Pharmacy Filling the Rx:    Filling Pharmacy Phone:    Filling Pharmacy Fax:    Start Date: 2/21/2023

## 2023-03-02 ENCOUNTER — VIRTUAL VISIT (OUTPATIENT)
Dept: PHARMACY | Facility: CLINIC | Age: 38
End: 2023-03-02
Attending: INTERNAL MEDICINE
Payer: COMMERCIAL

## 2023-03-02 DIAGNOSIS — K50.00 CROHN'S DISEASE OF SMALL INTESTINE WITHOUT COMPLICATION (H): Primary | ICD-10-CM

## 2023-03-02 PROCEDURE — 99605 MTMS BY PHARM NP 15 MIN: CPT | Mod: VID | Performed by: PHARMACIST

## 2023-03-02 PROCEDURE — 99607 MTMS BY PHARM ADDL 15 MIN: CPT | Mod: VID | Performed by: PHARMACIST

## 2023-03-02 RX ORDER — CHOLECALCIFEROL (VITAMIN D3) 50 MCG
1 TABLET ORAL DAILY
COMMUNITY

## 2023-03-02 NOTE — PATIENT INSTRUCTIONS
"Recommendations from today's MTM visit:                                                      1. Our team will follow-up when we hear back from your insurance on the authorization for the Skyrizi infusion loading doses.    2. Amber to consider the following vaccines in light of planned immunosuppression start:   -- Prevnar-20 (for pneumonia)   -- COVID-19 bivalent booster   -- hepatitis B series or combination A/B series (non-immune to hepatitis B per serologies 11/2022)   -- Shingrix series (for Shingles)    Follow-up:    -- in 4-6 weeks for check-in on medication start   -- 1 year for IBD health maintenance review    It was great speaking with you today.  I value your experience and would be very thankful for your time in providing feedback in our clinic survey. In the next few days, you may receive an email or text message from [employer] with a link to a survey related to your  clinical pharmacist.\"     To schedule another MTM appointment, please call the clinic directly or you may call the MTM scheduling line at [phone] or toll-free at [phone].     My Clinical Pharmacist's contact information:                                                      Please feel free to contact me with any questions or concerns you have.      Dolly DouglassD, BCACP  MTM Pharmacist   [facility] Gastroenterology   Phone: [phone]    "

## 2023-03-02 NOTE — PROGRESS NOTES
Medication Therapy Management (MTM) Encounter    ASSESSMENT:                            Medication Adherence/Access: See below for considerations    Crohn's: Amber would benefit from starting Skyrizi therapy as indicated by Dr. Jacobs, which was discussed in detail today. Reviewed vaccines, which would be recommended for her in light of immunosuppression start. Her serologies from 11/2022 indicate she is non-immune to hepatitis B, so we discussed either vaccination with the hepatitis B series, or combination A/B series. She would also benefit from the COVID-19 bivalent booster. Given plan for immunosuppression, we also discussed vaccination with the Shingrix series as well as the adult pneumonia vaccine. We reviewed that these do not need to be completed prior to starting Skyrizi. Her medication is partially authorized at this time, so we will plan to follow-up on this.    PLAN:                            1. Our team will follow-up when we hear back from your insurance on the authorization for the Skyrizi infusion loading doses.    2. Amber to consider the following vaccines:   -- Prevnar-20 (for pneumonia)   -- COVID-19 bivalent booster   -- hepatitis B series or combination A/B series   -- Shingrix series (for Shingles)    Follow-up:    -- in 4-6 weeks for check-in on medication start   -- 1 year for IBD health maintenance review    EDUCATION:     We reviewed risankizumab (Skyrizi) today including an overview of the coverage and pharmacy process, mechanism of action, general dosing/administration, side effects (both common/serious), precautions, monitoring for safety and efficacy, as well as time to efficacy. We discussed immunosuppressive precautions including caution with LIVE vaccines unless specifically indicated, and recommendation for indicated non-live/inactivated vaccines. Discussed recommendation for baseline skin check, and periodic skin cancer screening going forward. Reviewed potential need to hold  "medication in the setting of signs/symptoms of infection. Encouraged formal injection training for first on-body injector dose. Contact information provided in the event she has questions/concerns about the medication.    SUBJECTIVE/OBJECTIVE:                          Amber Buckner is a 37 year old female contacted via secure video for an initial visit. She was referred to me from Dr. Jacobs.     Reason for visit: Skyrizi education    Allergies/ADRs: None  Tobacco: She reports that she has never smoked. She has never been exposed to tobacco smoke. She has never used smokeless tobacco.  Alcohol: 1-3 beverages / week    Medication Adherence/Access: no issues reported    Crohn's:   Budesonide 2 mg/ACT (not picked up yet)  Skyrizi (starting - pending insurance auth)  Vitamin D 50 mcg daily     Amber states she is aware of the approval for Skyrizi, as she received a notice about this. She understands that she needs to come in for injection training when the nurse tells her to do so. At the time of our conversation today, the Skyrizi maintenance dose is approved but we are waiting on a determination for the infusion loading doses. Notes her sister is a provider in the GI clinic. She is s/p subtotal colectomy with an ileal pouch-anal anastomosis.    Amber was recently seen by Dr. Jacobs on 2/21/2023 with the following plan:  \"# Crohn's disease, biologic naive  # s/p RPC/IPAA in 2002     PLAN:  --PA for Skyrizi  --Referral to [facility] pharmacy to discuss Skyrizi   --Budesonide foam   --Mental health referral\"    IBD Health Care Maintenance:    Vaccinations:  All patients on biologics should avoid live vaccines.    -- Influenza (every year) 11/2/2022  -- TdaP (every 10 years) last 2/23/2017  -- Pneumococcal Pneumonia    Prevnar-13: not on file    Pneumovax-23: not on file   Prevnar-20: not on file   -- COVID-19 1/5/2021, 1/26/2021, and 11/10/2021  -- Yearly assessment for latent Tb (verbal screening and exam, PPD or QuantiFERON-Tb " testing)      -negative 11/2022    One time confirmation of immunity or serologies:  -- Hepatitis A (serologies or immunizations) not on file   -- Hepatitis B (serologies or immunizations) serologies 11/2022 do not indicate immunity  -- Varicella/Zoster    Varicella did have chicken pox   Zoster not on file  -- MMR 12/28/2014    Due to the planned immunosuppression in this patient, I would not advise administration of live vaccines such as varicella/VZV, intranasal influenza, MMR, or yellow fever vaccine (if traveling).    Pre-Biologic Screening:  -- Hep B Surface Antibody serologies do not indicate immunity  -- Hep B Surface Antigen non-reactive 11/2022  -- Hep B Core Antibody non-reactive 11/2022  -- Hep C Antibody non-reactive 8/2021    Misc:  -- Avoid tobacco use  -- Avoid NSAIDs as there is potentially a 25% chance of causing an IBD flare    ----------------    I spent 28 minutes with this patient today. All changes were made via collaborative practice agreement with Marlys Jacobs. A copy of the visit note was provided to the patient's provider(s).    A summary of these recommendations was sent via [employer].    Dolly DouglassD, BCACP  MTM Pharmacist   [facility] Gastroenterology   Phone: [phone]    Telemedicine Visit Details  Type of service:  Video Conference via [employer]  Start Time: 2:30 PM  End Time: 2:58 PM     Medication Therapy Recommendations  Crohn's disease (H)    Rationale: Preventive therapy - Needs additional medication therapy - Indication   Recommendation: Start Medication - Prevnar 20 0.5 ML Christine   Status: Accepted - no CPA Needed   Note: Given planned immunosuppression start, would recommend COVID-19 bivalent booster, Prevnar-20, Shingrix series and the hepatitis B or combination hepatitis A/B series.

## 2023-03-13 NOTE — PROGRESS NOTES
Called patient to update her on PA status for Skyrizi. No answer; left voicemail with information. Skyrizi infusions are still pending. Insurance requested a lower-cost facility. Plan for all 3 infusions to occur at the [facility] suites in [place]. [facility] has initiated a PA. Encouraged the patient to return my call if she had any questions or concerns.

## 2023-03-28 ENCOUNTER — CARE COORDINATION (OUTPATIENT)
Dept: PHARMACY | Facility: CLINIC | Age: 38
End: 2023-03-28
Payer: COMMERCIAL

## 2023-03-28 NOTE — PROGRESS NOTES
Referred to [facility]    Amber Murdock Sita, 1985  Medication (name, frequency and route):  Skyrizi every 4 weeks x3 doses   Start of Care Date: 4/7/23 (Confirmed with patient)  Infusion location: \[facility]\"" Amb[facility]fusion Site  Skilled Nursing will be provided by: [facility]  @ [phone]    GEOVANNY Bell

## 2023-04-07 ENCOUNTER — LAB REQUISITION (OUTPATIENT)
Dept: LAB | Facility: CLINIC | Age: 38
End: 2023-04-07
Payer: COMMERCIAL

## 2023-04-07 ENCOUNTER — DOCUMENTATION ONLY (OUTPATIENT)
Dept: PHARMACY | Facility: CLINIC | Age: 38
End: 2023-04-07

## 2023-04-07 DIAGNOSIS — K50.90 CROHN'S DISEASE, UNSPECIFIED, WITHOUT COMPLICATIONS (H): ICD-10-CM

## 2023-04-07 LAB
ALBUMIN SERPL BCG-MCNC: 4.3 G/DL (ref 3.5–5.2)
ALP SERPL-CCNC: 42 U/L (ref 35–104)
ALT SERPL W P-5'-P-CCNC: 11 U/L (ref 10–35)
AST SERPL W P-5'-P-CCNC: 14 U/L (ref 10–35)
BILIRUB DIRECT SERPL-MCNC: <0.2 MG/DL (ref 0–0.3)
BILIRUB SERPL-MCNC: 0.4 MG/DL
CHOLEST SERPL-MCNC: 190 MG/DL
HDLC SERPL-MCNC: 64 MG/DL
HOLD SPECIMEN: NORMAL
HOLD SPECIMEN: NORMAL
LDLC SERPL CALC-MCNC: 114 MG/DL
NONHDLC SERPL-MCNC: 126 MG/DL
PROT SERPL-MCNC: 7.4 G/DL (ref 6.4–8.3)
TRIGL SERPL-MCNC: 58 MG/DL

## 2023-04-07 PROCEDURE — 80061 LIPID PANEL: CPT | Performed by: INTERNAL MEDICINE

## 2023-04-07 PROCEDURE — 80076 HEPATIC FUNCTION PANEL: CPT | Performed by: INTERNAL MEDICINE

## 2023-04-07 NOTE — PROGRESS NOTES
Skilled Nurse visit in the [facility] [facility] to administer Skyrizi 600mg IV.  Patient denies recent elevated temperature, fever, chills, productive cough, coughing for 3 weeks or longer or hemoptysis, abnormal vital signs, night sweats, chest pain, decrease in appetite, unexplained weight loss or fatigue.  No other new onset medical symptoms.  Current weight 135lbs. IV access obtained left forearm with attempt x1. Labs drawn: Hepatic panel and lipid panel. Infusion completed without complication or reaction.     Emeli Coleman RN  [phone]   [email or web address]

## 2023-05-05 ENCOUNTER — DOCUMENTATION ONLY (OUTPATIENT)
Dept: PHARMACY | Facility: CLINIC | Age: 38
End: 2023-05-05

## 2023-05-05 ENCOUNTER — LAB REQUISITION (OUTPATIENT)
Dept: LAB | Facility: CLINIC | Age: 38
End: 2023-05-05
Payer: COMMERCIAL

## 2023-05-05 DIAGNOSIS — K50.90 CROHN'S DISEASE, UNSPECIFIED, WITHOUT COMPLICATIONS (H): ICD-10-CM

## 2023-05-05 LAB
ALBUMIN SERPL BCG-MCNC: 4.5 G/DL (ref 3.5–5.2)
ALP SERPL-CCNC: 45 U/L (ref 35–104)
ALT SERPL W P-5'-P-CCNC: 13 U/L (ref 10–35)
AST SERPL W P-5'-P-CCNC: 13 U/L (ref 10–35)
BILIRUB DIRECT SERPL-MCNC: <0.2 MG/DL (ref 0–0.3)
BILIRUB SERPL-MCNC: 0.5 MG/DL
CHOLEST SERPL-MCNC: 195 MG/DL
HDLC SERPL-MCNC: 67 MG/DL
HOLD SPECIMEN: NORMAL
LDLC SERPL CALC-MCNC: 115 MG/DL
NONHDLC SERPL-MCNC: 128 MG/DL
PROT SERPL-MCNC: 7.3 G/DL (ref 6.4–8.3)
TRIGL SERPL-MCNC: 65 MG/DL

## 2023-05-05 PROCEDURE — 80076 HEPATIC FUNCTION PANEL: CPT | Performed by: INTERNAL MEDICINE

## 2023-05-05 PROCEDURE — 80061 LIPID PANEL: CPT | Performed by: INTERNAL MEDICINE

## 2023-05-05 NOTE — PROGRESS NOTES
Skilled Nurse visit in the [place] [facility] to administer Skyrizi 600 mg IV.  Patient denies recent elevated temperature, fever, chills, productive cough, coughing for 3 weeks or longer or hemoptysis, abnormal vital signs, night sweats, chest pain, decrease in appetite, unexplained weight loss or fatigue.  No other new onset medical symptoms.  Current weight 130 lbs. IV access obtained in left AC with attempt x1. Labs drawn: Hepatic panel and lipid panel. Infusion completed without complication or reaction.     Julia Headley, BSN, RN  [phone]  [email or web address]

## 2023-06-02 ENCOUNTER — LAB REQUISITION (OUTPATIENT)
Dept: LAB | Facility: CLINIC | Age: 38
End: 2023-06-02
Payer: COMMERCIAL

## 2023-06-02 ENCOUNTER — DOCUMENTATION ONLY (OUTPATIENT)
Dept: PHARMACY | Facility: CLINIC | Age: 38
End: 2023-06-02

## 2023-06-02 DIAGNOSIS — K50.90 CROHN'S DISEASE, UNSPECIFIED, WITHOUT COMPLICATIONS (H): ICD-10-CM

## 2023-06-02 LAB
ALBUMIN SERPL BCG-MCNC: 4.7 G/DL (ref 3.5–5.2)
ALP SERPL-CCNC: 45 U/L (ref 35–104)
ALT SERPL W P-5'-P-CCNC: 13 U/L (ref 10–35)
AST SERPL W P-5'-P-CCNC: 13 U/L (ref 10–35)
BILIRUB DIRECT SERPL-MCNC: <0.2 MG/DL (ref 0–0.3)
BILIRUB SERPL-MCNC: 0.5 MG/DL
CHOLEST SERPL-MCNC: 209 MG/DL
HDLC SERPL-MCNC: 72 MG/DL
LDLC SERPL CALC-MCNC: 124 MG/DL
NONHDLC SERPL-MCNC: 137 MG/DL
PROT SERPL-MCNC: 7.7 G/DL (ref 6.4–8.3)
TRIGL SERPL-MCNC: 67 MG/DL

## 2023-06-02 PROCEDURE — 80061 LIPID PANEL: CPT | Performed by: INTERNAL MEDICINE

## 2023-06-02 PROCEDURE — 80076 HEPATIC FUNCTION PANEL: CPT | Performed by: INTERNAL MEDICINE

## 2023-06-04 ENCOUNTER — MYC MEDICAL ADVICE (OUTPATIENT)
Dept: GASTROENTEROLOGY | Facility: CLINIC | Age: 38
End: 2023-06-04
Payer: COMMERCIAL

## 2023-06-04 DIAGNOSIS — K50.00 CROHN'S DISEASE OF SMALL INTESTINE WITHOUT COMPLICATION (H): Primary | ICD-10-CM

## 2023-06-05 RX ORDER — RISANKIZUMAB-RZAA 360 MG/2.4
360 WEARABLE INJECTOR SUBCUTANEOUS ONCE
Qty: 2.4 ML | Refills: 4 | Status: SHIPPED | OUTPATIENT
Start: 2023-06-30 | End: 2023-06-30

## 2023-06-12 ENCOUNTER — MYC MEDICAL ADVICE (OUTPATIENT)
Dept: GASTROENTEROLOGY | Facility: CLINIC | Age: 38
End: 2023-06-12
Payer: COMMERCIAL

## 2023-06-19 NOTE — TELEPHONE ENCOUNTER
Called to remind patient of their upcoming appointment with our GI clinic, on Tuesday 6/27/2023 at 3:40PM with Dr. Marlys Jacobs. This appointment is scheduled as a video visit. You will receive a call approximately 30 minutes prior to check you in, you must be in [place] for this visit., if your appointment is virtual (video or telephone) you need to be in [place] for the visit. To reschedule or cancel patient to call [phone].    Complete questionnaire(s) prior to appointment.    Luana Mai MA

## 2023-06-27 ENCOUNTER — VIRTUAL VISIT (OUTPATIENT)
Dept: GASTROENTEROLOGY | Facility: CLINIC | Age: 38
End: 2023-06-27
Payer: COMMERCIAL

## 2023-06-27 DIAGNOSIS — K50.00 CROHN'S DISEASE OF SMALL INTESTINE WITHOUT COMPLICATION (H): Primary | ICD-10-CM

## 2023-06-27 DIAGNOSIS — F33.9 RECURRENT MAJOR DEPRESSIVE DISORDER, REMISSION STATUS UNSPECIFIED (H): ICD-10-CM

## 2023-06-27 PROCEDURE — 99214 OFFICE O/P EST MOD 30 MIN: CPT | Mod: VID | Performed by: INTERNAL MEDICINE

## 2023-06-27 ASSESSMENT — ENCOUNTER SYMPTOMS
POLYPHAGIA: 0
NIGHT SWEATS: 0
INCREASED ENERGY: 1
HALLUCINATIONS: 0
PANIC: 0
FATIGUE: 1
WEIGHT LOSS: 0
CHILLS: 0
DEPRESSION: 0
INSOMNIA: 1
DECREASED CONCENTRATION: 0
ALTERED TEMPERATURE REGULATION: 0
DECREASED APPETITE: 0
POLYDIPSIA: 0
NERVOUS/ANXIOUS: 1
FEVER: 0
WEIGHT GAIN: 0

## 2023-06-27 NOTE — PROGRESS NOTES
Virtual Visit Details    Type of service:  Video Visit     Originating Location (pt. Location): Home    Distant Location (provider location):  On-site  Platform used for Video Visit: [facility] IBD follow up       PATIENT: Amber Buckner    MRN: [id number]    Date of Birth 1985    Tel: [phone] (home)     PCP: Luciana Edwards     HPI: Ms. Buckner is a 37 year old female here to establish care for recurrent SBOs in the setting of a pouch.     UC history  Amber was diagnosed with UC in 2001. She was on flagyl, prednisone. No biologics. Underwent RPC/IPAA in 2002. Did very well for 10 years without any issues.     Delivered Yoan in 2012 via C section.  Around then, developed abdominal pain and more frequent BMs. Developed a perianal abscess during 2nd pregnancy with Riley in 2014. At that time, also had a 2 night hospital stay for possible SBO (XR and US normal).     In 2017, she delivered her daughter, Shantal. During the postpartum period, she developed another SBO that required an NGT.     Had a total of 3 C sections. Since her last pregnancy, she had 3 SBOs (in 2018, 2019 and just recently in 2022). All managed conservatively.     Currently:   Endorses fatigue. Wakes up 5 times in the middle of the night to defecate. 8-10 BMs per day.     Total number of IBD surgeries (except perianal): 1; RPC/IPAA in 2002    Current IBD Medications:  none    Past IBD Medications:   Flagyl, prednisone  No biologics or immunomodulators    Interval history, 2/2023 (virtual visit)  GI symptoms have not changed significantly. Endorses fatigue. Wakes up 5 times in the middle of the night to defecate. 8-10 BMs per day.     Pouchoscopy was c/w Crohn's.     Interval history, 6/2023 (virtual visit)  About 8 BMs per day. Now having 1-3 BMs per day. Urgency has decreased. No blood.   Endorses significant fatigue.     Started Skyrizi on 4/7. Last induction infusion was on 6/2.  Injection education on  Friday.       Past Medical History:   Diagnosis Date     Adolescent depression in college     Anxiety      Depressive disorder 2002    Treated witj celexa     Fit/adjust GI carole-device NEC      Perineal abscess      PONV (postoperative nausea and vomiting)      S/P total colectomy      Ulcerative colitis         Past Surgical History:   Procedure Laterality Date      SECTION  2012    Procedure: SECTION; Surgeon:JAVI BELL; [facility]      SECTION N/A 2014    Procedure:  SECTION;  Surgeon: Shawanda Luna MD;  [facility]: [facility]      SECTION N/A 2017    Procedure:  SECTION;  Repeat  Section ;  Surgeon: Laurie Montoya MD;  [facility]     COLECTOMY SUBTOTAL       COLONOSCOPY N/A 4/15/2015    Procedure: COLONOSCOPY;  Surgeon: Al Santo MD;  [facility]     EXAM UNDER ANESTHESIA ANUS N/A 4/15/2015    Procedure: EXAM UNDER ANESTHESIA ANUS;  Surgeon: Al Santo MD;  [facility]     FISTULOTOMY RECTUM N/A 4/15/2015    Procedure: FISTULOTOMY RECTUM;  Surgeon: Al Santo MD;  [facility]     POUCHOSCOPY N/A 2023    Procedure: ENDOSCOPY, POUCH, DIAGNOSTIC WITH BIOPSIES;  Surgeon: Marlys Jacobs MD;  Location: [facility] OR     TAKEDOWN ILEOSTOMY       tonsilectomy       TRANSPERINEAL REPAIR FISTULA RECTAL URETHRAL         Social History     Tobacco Use     Smoking status: Never     Passive exposure: Never     Smokeless tobacco: Never   Substance Use Topics     Alcohol use: Yes       Family History   Problem Relation Age of Onset     Diabetes Maternal Grandmother      Diabetes Paternal Grandfather      Genetic Disorder Father         chron's     Gastrointestinal Disease Sister         ulcerative colitis       No Known Allergies     Outpatient Encounter Medications as of 2023   Medication Sig Dispense Refill     adapalene (DIFFERIN) 0.1 % external gel Apply topically At  Bedtime       citalopram (CELEXA) 20 MG tablet Take 1 tablet (20 mg) by mouth daily 90 tablet 4     [START ON 6/30/2023] Risankizumab-rzaa (SKYRIZI) 360 MG/2.4ML SOCT Inject 2.4 mLs (360 mg) Subcutaneous once for 1 dose Every 8 weeks. First dose due June 30th, 2023. 2.4 mL 4     vitamin D3 (CHOLECALCIFEROL) 50 mcg (2000 units) tablet Take 1 tablet by mouth daily       Facility-Administered Encounter Medications as of 6/27/2023   Medication Dose Route Frequency Provider Last Rate Last Admin     barium sulfate (EZ-DISK) tablet 700 mg  700 mg Oral Once José Manuel Wolf MD         sod bicarbonate-citric acid-simethicone (EZ GAS) 2.21-1.53-0.04 g packet 4 g  4 g Oral Once José Manuel Wolf MD          NSAID  NO    Review of Systems  Complete 10 System ROS performed. All are negative except as documented below, in the HPI, or in patient questionnaire from today's visit.    1) Constitutional: No fevers, chills, night sweats or malaise, weight loss or gain  2) Skin: No rash  3) Pulmonary: No wheeze, SOB, cough, sputum or hemoptysis  4) Cardiovascular: No Chest pain or palpitations  5) Genitourinary: No blood in urine or dysuria  6) Endocrine: No increased sweating, hunger, thirst or thyroid problems  7) Hematologic: No bruising and easy bleeding  8) Musculoskeletal: no new pain in joints or limitation in ROM  9) Neurologic: No dizziness, paresthesias or weakness or falls  10) Psychiatric:  not depressed/anxious, no sleep problems    PHYSICAL EXAM  Vitals: There were no vitals taken for this visit.    No Pain (0)     General appearance  Healthy appearing adult, in no acute distress     Eyes  Sclera anicteric  Pupils round and reactive to light     Ears, nose, mouth and throat  No obvious external lesions of ears and nose  Hearing intact     Neck  Symmetric  No obvious external lesions     Respiratory  Normal respiration, no use of accessory muscles      MSK  Gait normal     Skin  No rashes or jaundice       Psychiatric  Oriented to person, place and time  Appropriate mood and affect.     DATA:  Reviewed in detail past documentation, medications and prior workup available in electronic health records or through outside records.    PERTINENT STUDIES:  Most recent CBC:  WBC   Date Value Ref Range Status   10/20/2019 3.6 (L) 4.0 - 11.0 10e9/L Final     WBC Count   Date Value Ref Range Status   11/03/2022 4.6 4.0 - 11.0 10e3/uL Final   ]  Hemoglobin   Date Value Ref Range Status   11/03/2022 12.8 11.7 - 15.7 g/dL Final   10/20/2019 11.5 (L) 11.7 - 15.7 g/dL Final   ]   Platelet Count   Date Value Ref Range Status   11/03/2022 310 150 - 450 10e3/uL Final   10/20/2019 200 150 - 450 10e9/L Final       Most recent coag:  INR   Date Value Ref Range Status   08/05/2022 1.09 0.85 - 1.15 Final   05/14/2017 1.06 0.86 - 1.14 Final       Most recent hepatic panel:  AST   Date Value Ref Range Status   06/02/2023 13 10 - 35 U/L Final   10/19/2019 6 0 - 45 U/L Final     ALT   Date Value Ref Range Status   06/02/2023 13 10 - 35 U/L Final   10/19/2019 10 0 - 50 U/L Final     No results found for: BILICONJ   Bilirubin Total   Date Value Ref Range Status   06/02/2023 0.5 <=1.2 mg/dL Final   10/19/2019 0.4 0.2 - 1.3 mg/dL Final     Albumin   Date Value Ref Range Status   06/02/2023 4.7 3.5 - 5.2 g/dL Final   11/03/2022 4.2 3.4 - 5.0 g/dL Final   10/19/2019 3.4 3.4 - 5.0 g/dL Final     Alkaline Phosphatase   Date Value Ref Range Status   06/02/2023 45 35 - 104 U/L Final   10/19/2019 41 40 - 150 U/L Final       Most recent creatinine:  Creatinine   Date Value Ref Range Status   11/03/2022 0.58 0.52 - 1.04 mg/dL Final   10/20/2019 0.40 (L) 0.52 - 1.04 mg/dL Final       Endoscopy:    Pouchoscopy 2/2023:                                                                      Impression:            - Multiple ulcers in the ileoanal pouch and                          sendy-terminal ileum and in the pre-pouch ileum.                          Biopsied.  Suspicious for Crohn's disease.                          - Biopsies were taken with a cold forceps for                          histology from the rectal cuff for dysplasia                          surveillance.     PATH  A. JAZZY-TERMINAL ILEUM, ULCER AT 45 CM, BIOPSY:  -Enteric mucosa with erosions and  acute inflammation  -Negative for dysplasia    B. JAZZY-TERMINAL ILEUM, NORMAL TISSUE, BIOPSIES:  -Enteric mucosa with no significant inflammation  -Negative for dysplasia    C. ENTRANCE TO JAZZY-TERMINAL ILEUM, ULCER, BIOPSIES:  -Enteric mucosa with ulceration and granulation tissue  -Negative for dysplasia  -CMV immunostain to follow    D. ENTRANCE TO BLIND LIMB, BIOPSIES:  -Enteric mucosa with ulceration and granulation tissue  -Negative for dysplasia  -CMV immunostain to follow    E. POUCH, BIOPSIES:  -Severe pouchitis with ulceration and granulation tissue  -Negative for dysplasia  -CMV immunostain to follow    F. RECTAL POUCH, BIOPSIES:  -Enteric mucosa with with no significant inflammation  -Squamous mucosa with mild active inflammation  -Negative for dysplasia    Pouchoscopy 2/2020             Imaging:  CT 10/2022:     BOWEL: Multiple dilated central small bowel loops lead to fecalized  small bowel and postoperative change at the anterior mid pelvis series  3 image 136. Other small bowel loops appear decompressed. Colectomy  changes. No free air. No abscess identified.    CT a/p 10/2019:  1.  Suspected small bowel obstruction with dilated loops seen in the  lower abdomen. Transition to nondilated bowel appears to be seen near  the small bowel anastomosis within the right lower quadrant.    AXR 5/2017:  Single supine view of the abdomen. Interval placement of gastric tube  with tip and sidehole projecting over the fundus. Slight interval  decrease in caliber of air-filled loops of small bowel in the left  side of the abdomen with associated unchanged dilated loops of bowel  in the right side of the abdomen,  measuring up to 6.5 cm in greatest  diameter. No free air, pneumatosis, portal venous gas. The visualized  left lung base is clear.    IMPRESSION:    Ms. Buckner is a 37 year old here with IBD s/p RPC/IPAA in 2002 c/b recurrent SBOs, frequent BMs and a recent pouchoscopy c/w Crohn's disease.   [facility] with symptom improvement. However, she does endorse significant fatigue.     DIAGNOSES:  # Crohn's disease, biologic naive  # s/p RPC/IPAA in 2002    PLAN:  --Labs including vitamin B12  --Continue [facility] maintenance recs:              -- Prevnar-20 (for pneumonia)              -- COVID-19 bivalent booster              -- hepatitis B series or combination A/B series              -- Shingrix series (for Shingles)    --Mental health discussed. Amber will try to get back in with her therapist.     Misc:  -- Avoid tobacco use  -- Avoid NSAIDs     RTC 6 months    Marlys Jacobs MD   of Medicine  Division of Gastroenterology, Hepatology and Nutrition  [facility]    30 minutes spent on the date of the encounter performing chart review, history and exam, documentation and further activities as noted above.

## 2023-06-27 NOTE — LETTER
6/27/2023         RE: Amber Buckner  [address] [address]        Dear Colleague,    Thank you for referring your patient, Amber Buckner, to the [facility] [place]. Please see a copy of my visit note below.    [facility] IBD follow up       PATIENT: Amber Buckner    MRN: [id number]    Date of Birth 1985    Tel: [phone] (home)     PCP: Luciana Edwards     HPI: Ms. Buckner is a 37 year old female here to establish care for recurrent SBOs in the setting of a pouch.     UC history  Amber was diagnosed with UC in 2001. She was on flagyl, prednisone. No biologics. Underwent RPC/IPAA in 2002. Did very well for 10 years without any issues.     Delivered Yoan in 2012 via C section.  Around then, developed abdominal pain and more frequent BMs. Developed a perianal abscess during 2nd pregnancy with Riley in 2014. At that time, also had a 2 night hospital stay for possible SBO (XR and US normal).     In 2017, she delivered her daughter, Shantal. During the postpartum period, she developed another SBO that required an NGT.     Had a total of 3 C sections. Since her last pregnancy, she had 3 SBOs (in 2018, 2019 and just recently in 2022). All managed conservatively.     Currently:   Endorses fatigue. Wakes up 5 times in the middle of the night to defecate. 8-10 BMs per day.     Total number of IBD surgeries (except perianal): 1; RPC/IPAA in 2002    Current IBD Medications:  none    Past IBD Medications:   Flagyl, prednisone  No biologics or immunomodulators    Interval history, 2/2023 (virtual visit)  GI symptoms have not changed significantly. Endorses fatigue. Wakes up 5 times in the middle of the night to defecate. 8-10 BMs per day.     Pouchoscopy was c/w Crohn's.     Interval history, 6/2023 (virtual visit)  About 8 BMs per day. Now having 1-3 BMs per day. Urgency has decreased. No blood.   Endorses significant fatigue.      Started Skyrizi on . Last induction infusion was on .  Injection education on Friday.       Past Medical History:   Diagnosis Date    Adolescent depression in college    Anxiety     Depressive disorder 2002    Treated witj celexa    Fit/adjust GI carole-device NEC     Perineal abscess     PONV (postoperative nausea and vomiting)     S/P total colectomy     Ulcerative colitis         Past Surgical History:   Procedure Laterality Date     SECTION  2012    Procedure: SECTION; Surgeon:JAVI BELL; [facility]     SECTION N/A 2014    Procedure:  SECTION;  Surgeon: Shawanda Luna MD;  [facility]     SECTION N/A 2017    Procedure:  SECTION;  Repeat  Section ;  Surgeon: Laurie Montoya MD;  [facility]    COLECTOMY SUBTOTAL      COLONOSCOPY N/A 4/15/2015    Procedure: COLONOSCOPY;  Surgeon: Al Santo MD;  [facility]    EXAM UNDER ANESTHESIA ANUS N/A 4/15/2015    Procedure: EXAM UNDER ANESTHESIA ANUS;  Surgeon: Al Santo MD;  [facility]    FISTULOTOMY RECTUM N/A 4/15/2015    Procedure: FISTULOTOMY RECTUM;  Surgeon: Al Santo MD;  [facility]    POUCHOSCOPY N/A 2023    Procedure: ENDOSCOPY, POUCH, DIAGNOSTIC WITH BIOPSIES;  Surgeon: Marlys Jacobs MD;  Location: [facility] OR    TAKEDOWN ILEOSTOMY      tonsilectomy      TRANSPERINEAL REPAIR FISTULA RECTAL URETHRAL         Social History     Tobacco Use    Smoking status: Never     Passive exposure: Never    Smokeless tobacco: Never   Substance Use Topics    Alcohol use: Yes       Family History   Problem Relation Age of Onset    Diabetes Maternal Grandmother     Diabetes Paternal Grandfather     Genetic Disorder Father         chron's    Gastrointestinal Disease Sister         ulcerative colitis       No Known Allergies     Outpatient Encounter Medications as of 2023   Medication Sig Dispense Refill     adapalene (DIFFERIN) 0.1 % external gel Apply topically At Bedtime      citalopram (CELEXA) 20 MG tablet Take 1 tablet (20 mg) by mouth daily 90 tablet 4    [START ON 6/30/2023] Risankizumab-rzaa (SKYRIZI) 360 MG/2.4ML SOCT Inject 2.4 mLs (360 mg) Subcutaneous once for 1 dose Every 8 weeks. First dose due June 30th, 2023. 2.4 mL 4    vitamin D3 (CHOLECALCIFEROL) 50 mcg (2000 units) tablet Take 1 tablet by mouth daily       Facility-Administered Encounter Medications as of 6/27/2023   Medication Dose Route Frequency Provider Last Rate Last Admin    barium sulfate (EZ-DISK) tablet 700 mg  700 mg Oral Once José Manuel Wolf MD        sod bicarbonate-citric acid-simethicone (EZ GAS) 2.21-1.53-0.04 g packet 4 g  4 g Oral Once José Manuel Wolf MD          NSAID  NO    Review of Systems  Complete 10 System ROS performed. All are negative except as documented below, in the HPI, or in patient questionnaire from today's visit.    1) Constitutional: No fevers, chills, night sweats or malaise, weight loss or gain  2) Skin: No rash  3) Pulmonary: No wheeze, SOB, cough, sputum or hemoptysis  4) Cardiovascular: No Chest pain or palpitations  5) Genitourinary: No blood in urine or dysuria  6) Endocrine: No increased sweating, hunger, thirst or thyroid problems  7) Hematologic: No bruising and easy bleeding  8) Musculoskeletal: no new pain in joints or limitation in ROM  9) Neurologic: No dizziness, paresthesias or weakness or falls  10) Psychiatric:  not depressed/anxious, no sleep problems    PHYSICAL EXAM  Vitals: There were no vitals taken for this visit.    No Pain (0)     General appearance  Healthy appearing adult, in no acute distress     Eyes  Sclera anicteric  Pupils round and reactive to light     Ears, nose, mouth and throat  No obvious external lesions of ears and nose  Hearing intact     Neck  Symmetric  No obvious external lesions     Respiratory  Normal respiration, no use of accessory muscles       MSK  Gait normal     Skin  No rashes or jaundice      Psychiatric  Oriented to person, place and time  Appropriate mood and affect.     DATA:  Reviewed in detail past documentation, medications and prior workup available in electronic health records or through outside records.    PERTINENT STUDIES:  Most recent CBC:  WBC   Date Value Ref Range Status   10/20/2019 3.6 (L) 4.0 - 11.0 10e9/L Final     WBC Count   Date Value Ref Range Status   11/03/2022 4.6 4.0 - 11.0 10e3/uL Final   ]  Hemoglobin   Date Value Ref Range Status   11/03/2022 12.8 11.7 - 15.7 g/dL Final   10/20/2019 11.5 (L) 11.7 - 15.7 g/dL Final   ]   Platelet Count   Date Value Ref Range Status   11/03/2022 310 150 - 450 10e3/uL Final   10/20/2019 200 150 - 450 10e9/L Final       Most recent coag:  INR   Date Value Ref Range Status   08/05/2022 1.09 0.85 - 1.15 Final   05/14/2017 1.06 0.86 - 1.14 Final       Most recent hepatic panel:  AST   Date Value Ref Range Status   06/02/2023 13 10 - 35 U/L Final   10/19/2019 6 0 - 45 U/L Final     ALT   Date Value Ref Range Status   06/02/2023 13 10 - 35 U/L Final   10/19/2019 10 0 - 50 U/L Final     No results found for: BILICONJ   Bilirubin Total   Date Value Ref Range Status   06/02/2023 0.5 <=1.2 mg/dL Final   10/19/2019 0.4 0.2 - 1.3 mg/dL Final     Albumin   Date Value Ref Range Status   06/02/2023 4.7 3.5 - 5.2 g/dL Final   11/03/2022 4.2 3.4 - 5.0 g/dL Final   10/19/2019 3.4 3.4 - 5.0 g/dL Final     Alkaline Phosphatase   Date Value Ref Range Status   06/02/2023 45 35 - 104 U/L Final   10/19/2019 41 40 - 150 U/L Final       Most recent creatinine:  Creatinine   Date Value Ref Range Status   11/03/2022 0.58 0.52 - 1.04 mg/dL Final   10/20/2019 0.40 (L) 0.52 - 1.04 mg/dL Final       Endoscopy:    Pouchoscopy 2/2023:                                                                      Impression:            - Multiple ulcers in the ileoanal pouch and                          sendy-terminal ileum and in the  pre-pouch ileum.                          Biopsied. Suspicious for Crohn's disease.                          - Biopsies were taken with a cold forceps for                          histology from the rectal cuff for dysplasia                          surveillance.     PATH  A. JAZZY-TERMINAL ILEUM, ULCER AT 45 CM, BIOPSY:  -Enteric mucosa with erosions and  acute inflammation  -Negative for dysplasia    B. JAZZY-TERMINAL ILEUM, NORMAL TISSUE, BIOPSIES:  -Enteric mucosa with no significant inflammation  -Negative for dysplasia    C. ENTRANCE TO JAZZY-TERMINAL ILEUM, ULCER, BIOPSIES:  -Enteric mucosa with ulceration and granulation tissue  -Negative for dysplasia  -CMV immunostain to follow    D. ENTRANCE TO BLIND LIMB, BIOPSIES:  -Enteric mucosa with ulceration and granulation tissue  -Negative for dysplasia  -CMV immunostain to follow    E. POUCH, BIOPSIES:  -Severe pouchitis with ulceration and granulation tissue  -Negative for dysplasia  -CMV immunostain to follow    F. RECTAL POUCH, BIOPSIES:  -Enteric mucosa with with no significant inflammation  -Squamous mucosa with mild active inflammation  -Negative for dysplasia    Pouchoscopy 2/2020             Imaging:  CT 10/2022:     BOWEL: Multiple dilated central small bowel loops lead to fecalized  small bowel and postoperative change at the anterior mid pelvis series  3 image 136. Other small bowel loops appear decompressed. Colectomy  changes. No free air. No abscess identified.    CT a/p 10/2019:  1.  Suspected small bowel obstruction with dilated loops seen in the  lower abdomen. Transition to nondilated bowel appears to be seen near  the small bowel anastomosis within the right lower quadrant.    AXR 5/2017:  Single supine view of the abdomen. Interval placement of gastric tube  with tip and sidehole projecting over the fundus. Slight interval  decrease in caliber of air-filled loops of small bowel in the left  side of the abdomen with associated unchanged dilated  loops of bowel  in the right side of the abdomen, measuring up to 6.5 cm in greatest  diameter. No free air, pneumatosis, portal venous gas. The visualized  left lung base is clear.    IMPRESSION:    Ms. Buckner is a 37 year old here with IBD s/p RPC/IPAA in 2002 c/b recurrent SBOs, frequent BMs and a recent pouchoscopy c/w Crohn's disease.   Amber started Skizi in April with symptom improvement. However, she does endorse significant fatigue.     DIAGNOSES:  # Crohn's disease, biologic naive  # s/p RPC/IPAA in 2002    PLAN:  --Labs including vitamin B12  --Continue [facility]    Healthcare maintenance recs:              -- Prevnar-20 (for pneumonia)              -- COVID-19 bivalent booster              -- hepatitis B series or combination A/B series              -- Shingrix series (for Shingles)    --Mental health discussed. Amber will try to get back in with her therapist.     Misc:  -- Avoid tobacco use  -- Avoid NSAIDs     RTC 6 months      30 minutes spent on the date of the encounter performing chart review, history and exam, documentation and further activities as noted above.          Again, thank you for allowing me to participate in the care of your patient.      Sincerely,    Marlys Jacobs MD

## 2023-06-27 NOTE — NURSING NOTE
Is the patient currently in the state of [place]? YES    Visit mode:VIDEO    If the visit is dropped, the patient can be reconnected by: VIDEO VISIT: Text to cell phone: [phone]    Will anyone else be joining the visit? NO      How would you like to obtain your AVS? MyChart    Are changes needed to the allergy or medication list? NO    Reason for visit: RECHECK

## 2023-06-27 NOTE — PATIENT INSTRUCTIONS
PLAN  --labs including vitamin D, B12  --Continue Skyrizi  --Mental health discussed. Amber will try to get back in with her therapist.     Healthcare maintenance recs:              -- Prevnar-20 (for pneumonia)              -- COVID-19 bivalent booster              -- hepatitis B series or combination A/B series              -- Shingrix series (for Shingles)

## 2023-06-28 DIAGNOSIS — K50.00 CROHN'S DISEASE OF SMALL INTESTINE WITHOUT COMPLICATION (H): Primary | ICD-10-CM

## 2023-06-28 NOTE — PROGRESS NOTES
Per Dr. Jacobs, order placed for repeat pouchoscopy in October 2023 for disease activity assessment after 6 months on Skyrizi.

## 2023-06-30 ENCOUNTER — ALLIED HEALTH/NURSE VISIT (OUTPATIENT)
Dept: GASTROENTEROLOGY | Facility: CLINIC | Age: 38
End: 2023-06-30
Payer: COMMERCIAL

## 2023-06-30 DIAGNOSIS — K50.00 CROHN'S DISEASE OF SMALL INTESTINE WITHOUT COMPLICATION (H): Primary | ICD-10-CM

## 2023-06-30 PROCEDURE — 99207 PR NO BILLABLE SERVICE THIS VISIT: CPT

## 2023-06-30 NOTE — PROGRESS NOTES
Clinic visit today for first Skyrizi OBI injection. The patient did not bring her medication with her as it has not yet been delivered by pharmacy. Called [facility] who needed clarification on loading infusion dates, which was provided; requested the OBI dose be delivered to the patient today.    Education provided on medication vial/OBI inspection, temperature control (remove from refrigerator 45-90 minutes prior to injection), administration of medication, sharps disposal, and refill protocol.       Training video completed together. Patient practiced self-administration with OBI demonstration kit.      Reviewed symptoms of a reaction. Patient knows to reach out to clinic with mild symptoms. For severe symptoms, she should call 911.     All questions answered at this time. Patient feels comfortable administering her injections independently at home for future doses.    Next dose in 8 weeks.

## 2023-07-03 ENCOUNTER — MYC MEDICAL ADVICE (OUTPATIENT)
Dept: GASTROENTEROLOGY | Facility: CLINIC | Age: 38
End: 2023-07-03
Payer: COMMERCIAL

## 2023-07-03 ENCOUNTER — TELEPHONE (OUTPATIENT)
Dept: GASTROENTEROLOGY | Facility: CLINIC | Age: 38
End: 2023-07-03
Payer: COMMERCIAL

## 2023-07-03 NOTE — TELEPHONE ENCOUNTER
LISE and sent mychart for patient to schedule:    '[facility] IBD' 6 mo follow-up appointment with Dr. Jacobs (around Dec 2023)

## 2023-07-06 ENCOUNTER — LAB (OUTPATIENT)
Dept: LAB | Facility: CLINIC | Age: 38
End: 2023-07-06
Payer: COMMERCIAL

## 2023-07-06 DIAGNOSIS — K50.00 CROHN'S DISEASE OF SMALL INTESTINE WITHOUT COMPLICATION (H): ICD-10-CM

## 2023-07-06 LAB
ALBUMIN SERPL BCG-MCNC: 4.7 G/DL (ref 3.5–5.2)
ALP SERPL-CCNC: 45 U/L (ref 35–104)
ALT SERPL W P-5'-P-CCNC: 9 U/L (ref 0–50)
ANION GAP SERPL CALCULATED.3IONS-SCNC: 11 MMOL/L (ref 7–15)
AST SERPL W P-5'-P-CCNC: 16 U/L (ref 0–45)
BASOPHILS # BLD AUTO: 0 10E3/UL (ref 0–0.2)
BASOPHILS NFR BLD AUTO: 1 %
BILIRUB SERPL-MCNC: 0.6 MG/DL
BUN SERPL-MCNC: 13 MG/DL (ref 6–20)
CALCIUM SERPL-MCNC: 9.6 MG/DL (ref 8.6–10)
CHLORIDE SERPL-SCNC: 104 MMOL/L (ref 98–107)
CREAT SERPL-MCNC: 0.63 MG/DL (ref 0.51–0.95)
CRP SERPL-MCNC: <3 MG/L
DEPRECATED HCO3 PLAS-SCNC: 23 MMOL/L (ref 22–29)
EOSINOPHIL # BLD AUTO: 0.1 10E3/UL (ref 0–0.7)
EOSINOPHIL NFR BLD AUTO: 1 %
ERYTHROCYTE [DISTWIDTH] IN BLOOD BY AUTOMATED COUNT: 12.6 % (ref 10–15)
GFR SERPL CREATININE-BSD FRML MDRD: >90 ML/MIN/1.73M2
GLUCOSE SERPL-MCNC: 87 MG/DL (ref 70–99)
HCT VFR BLD AUTO: 38.3 % (ref 35–47)
HGB BLD-MCNC: 13.1 G/DL (ref 11.7–15.7)
IMM GRANULOCYTES # BLD: 0 10E3/UL
IMM GRANULOCYTES NFR BLD: 0 %
LYMPHOCYTES # BLD AUTO: 1.7 10E3/UL (ref 0.8–5.3)
LYMPHOCYTES NFR BLD AUTO: 35 %
MCH RBC QN AUTO: 34.7 PG (ref 26.5–33)
MCHC RBC AUTO-ENTMCNC: 34.2 G/DL (ref 31.5–36.5)
MCV RBC AUTO: 102 FL (ref 78–100)
MONOCYTES # BLD AUTO: 0.4 10E3/UL (ref 0–1.3)
MONOCYTES NFR BLD AUTO: 9 %
NEUTROPHILS # BLD AUTO: 2.7 10E3/UL (ref 1.6–8.3)
NEUTROPHILS NFR BLD AUTO: 54 %
PLATELET # BLD AUTO: 278 10E3/UL (ref 150–450)
POTASSIUM SERPL-SCNC: 4.2 MMOL/L (ref 3.4–5.3)
PROT SERPL-MCNC: 7.8 G/DL (ref 6.4–8.3)
RBC # BLD AUTO: 3.77 10E6/UL (ref 3.8–5.2)
SODIUM SERPL-SCNC: 138 MMOL/L (ref 136–145)
VIT B12 SERPL-MCNC: 400 PG/ML (ref 232–1245)
WBC # BLD AUTO: 4.9 10E3/UL (ref 4–11)

## 2023-07-06 PROCEDURE — 85025 COMPLETE CBC W/AUTO DIFF WBC: CPT

## 2023-07-06 PROCEDURE — 80053 COMPREHEN METABOLIC PANEL: CPT

## 2023-07-06 PROCEDURE — 82607 VITAMIN B-12: CPT

## 2023-07-06 PROCEDURE — 36415 COLL VENOUS BLD VENIPUNCTURE: CPT

## 2023-07-06 PROCEDURE — 86140 C-REACTIVE PROTEIN: CPT

## 2023-07-13 NOTE — TELEPHONE ENCOUNTER
Called patient to confirm she received her Skyrizi OBI dose and was able to administer the dose. She confirmed she successfully gave the medication to herself on 7/6; no side effects noted. Her next dose will be due 8/31. She is aware she should set-up delivery with the pharmacy about one week prior to that date.

## 2023-08-01 ENCOUNTER — TELEPHONE (OUTPATIENT)
Dept: GASTROENTEROLOGY | Facility: CLINIC | Age: 38
End: 2023-08-01
Payer: COMMERCIAL

## 2023-08-01 NOTE — TELEPHONE ENCOUNTER
Prior Authorization Approval    Medication: SKYRIZI 360 MG/2.4ML SC SOCT  Authorization Effective Date: 8/1/2023  Authorization Expiration Date: 7/31/2024  Approved Dose/Quantity: 2.4 / 56  Reference #: [id number]   Insurance Company: [employer] - Phone [phone] Fax [phone]  Expected CoPay:       CoPay Card Available:      Financial Assistance Needed:    Which Pharmacy is filling the prescription:    Pharmacy Notified:    Patient Notified:  sent [email or web address] message

## 2023-08-01 NOTE — TELEPHONE ENCOUNTER
PA Initiation    Medication: SKYRIZI 360 MG/2.4ML SC SOCT  Insurance Company: [employer] - Phone [phone] Fax [phone]  Pharmacy Filling the Rx:    Filling Pharmacy Phone:    Filling Pharmacy Fax:    Start Date: 8/1/2023  [id number]

## 2023-08-08 ENCOUNTER — VIRTUAL VISIT (OUTPATIENT)
Dept: FAMILY MEDICINE | Facility: CLINIC | Age: 38
End: 2023-08-08
Payer: COMMERCIAL

## 2023-08-08 ENCOUNTER — LAB (OUTPATIENT)
Dept: LAB | Facility: CLINIC | Age: 38
End: 2023-08-08
Attending: PHYSICIAN ASSISTANT
Payer: COMMERCIAL

## 2023-08-08 DIAGNOSIS — J02.9 SORE THROAT: ICD-10-CM

## 2023-08-08 DIAGNOSIS — J02.9 SORE THROAT: Primary | ICD-10-CM

## 2023-08-08 LAB
DEPRECATED S PYO AG THROAT QL EIA: NEGATIVE
GROUP A STREP BY PCR: NOT DETECTED
SARS-COV-2 RNA RESP QL NAA+PROBE: NEGATIVE

## 2023-08-08 PROCEDURE — 87635 SARS-COV-2 COVID-19 AMP PRB: CPT

## 2023-08-08 PROCEDURE — 87651 STREP A DNA AMP PROBE: CPT

## 2023-08-08 PROCEDURE — 99213 OFFICE O/P EST LOW 20 MIN: CPT | Mod: VID | Performed by: PHYSICIAN ASSISTANT

## 2023-08-08 ASSESSMENT — PATIENT HEALTH QUESTIONNAIRE - PHQ9
10. IF YOU CHECKED OFF ANY PROBLEMS, HOW DIFFICULT HAVE THESE PROBLEMS MADE IT FOR YOU TO DO YOUR WORK, TAKE CARE OF THINGS AT HOME, OR GET ALONG WITH OTHER PEOPLE: NOT DIFFICULT AT ALL
SUM OF ALL RESPONSES TO PHQ QUESTIONS 1-9: 2
SUM OF ALL RESPONSES TO PHQ QUESTIONS 1-9: 2

## 2023-08-08 NOTE — PROGRESS NOTES
Amber is a 38 year old who is being evaluated via a billable video visit.      How would you like to obtain your AVS? MyChart  If the video visit is dropped, the invitation should be resent by: Text to cell phone: [phone]  Will anyone else be joining your video visit? No          Assessment & Plan   Problem List Items Addressed This Visit    None  Visit Diagnoses       Sore throat    -  Primary    Relevant Orders    Streptococcus A Rapid Screen w/Reflex to PCR    Symptomatic COVID-19 Virus (Coronavirus) by PCR Nose           Schedule lab visit for swabs  Symptomatic care for sore throat and uri symptom with mucinex dm, Cepacol, Ibuprofen, sudafed, increase fluids    Follow up in 5 days as needed  if not better   10 minutes spent by me on the date of the encounter doing chart review, history and exam, documentation and further activities per the note           SOFI Hayes [facility]    Joshua Clark is a 38 year old, presenting for the following health issues:  Pharyngitis        8/8/2023     7:57 AM   Additional Questions   Roomed by Marlo PETTIT     Acute Illness  Acute illness concerns: sore throat  Onset/Duration: 3-4 days  Symptoms:  Fever: No  Chills/Sweats: No  Headache (location?): YES  Sinus Pressure: YES  Conjunctivitis:  No  Ear Pain: no  Rhinorrhea: YES  Congestion: YES  Sore Throat: YES  Cough: YES-productive of clear sputum  Wheeze: No  Decreased Appetite: YES  Nausea: YES  Vomiting: No  Diarrhea: No  Dysuria/Freq.: No  Dysuria or Hematuria: No  Fatigue/Achiness: YES  Sick/Strep Exposure: YES, patient was at a party where a few people tested positive for strep  Therapies tried and outcome: None        Review of Systems   Constitutional, HEENT, cardiovascular, pulmonary, gi and gu systems are negative, except as otherwise noted.      Objective           Vitals:  No vitals were obtained today due to virtual visit.    Physical Exam   GENERAL:  Healthy, alert and no distress  EYES: Eyes grossly normal to inspection.  No discharge or erythema, or obvious scleral/conjunctival abnormalities.  RESP: No audible wheeze, cough, or visible cyanosis.  No visible retractions or increased work of breathing.    SKIN: Visible skin clear. No significant rash, abnormal pigmentation or lesions.  NEURO: Cranial nerves grossly intact.  Mentation and speech appropriate for age.  PSYCH: Mentation appears normal, affect normal/bright, judgement and insight intact, normal speech and appearance well-groomed.                Video-Visit Details    Type of service:  Video Visit     Originating Location (pt. Location): Home    Distant Location (provider location):  On-site  Platform used for Video Visit: [employer]    Answers submitted by the patient for this visit:  Patient Health Questionnaire (Submitted on 8/8/2023)  If you checked off any problems, how difficult have these problems made it for you to do your work, take care of things at home, or get along with other people?: Not difficult at all  PHQ9 TOTAL SCORE: 2

## 2023-08-10 ENCOUNTER — TELEPHONE (OUTPATIENT)
Dept: GASTROENTEROLOGY | Facility: CLINIC | Age: 38
End: 2023-08-10
Payer: COMMERCIAL

## 2023-08-10 NOTE — TELEPHONE ENCOUNTER
"Endoscopy Scheduling Screen    Have you had a positive Covid test in the last 14 days?  No    Are you active on MyChart?   Yes    What insurance is in the chart?  Other:  [facility]    Ordering/Referring Provider: MOODY   (If ordering provider performs procedure, schedule with ordering provider unless otherwise instructed. )    BMI: Estimated body mass index is 24.69 kg/m  as calculated from the following:    Height as of 2/6/23: 1.575 m (5' 2\").    Weight as of 2/21/23: 61.2 kg (135 lb).     Sedation Ordered  MAC/deep sedation.   BMI<= 45 45 < BMI <= 48 48 < BMI < = 50  BMI > 50   No Restrictions No [facility]  No [facility]  [facility] with exceptions Hospital Only OR Only       Do you have a history of malignant hyperthermia or adverse reaction to anesthesia?  No    (Females) Are you currently pregnant?   No     Have you been diagnosed or told you have pulmonary hypertension?   No    Do you have an LVAD?  No    Have you been told you have moderate to severe sleep apnea?  No    Have you been told you have COPD, asthma, or any other lung disease?  No    Do you have any heart conditions?  No     Have you ever had or are you awaiting a heart or lung transplant?   No    Have you had a stroke or transient ischemic attack (TIA aka \"mini stroke\" in the last 6 months?   No    Have you been diagnosed with or been told you have cirrhosis of the liver?   No    Are you currently on dialysis?   No    Do you need assistance transferring?   No    BMI: Estimated body mass index is 24.69 kg/m  as calculated from the following:    Height as of 2/6/23: 1.575 m (5' 2\").    Weight as of 2/21/23: 61.2 kg (135 lb).     Is patients BMI > 40 and scheduling location [facility]?  No    Do you take the medication Phentermine, Ozempic or Wegovy?  No    Do you take the medication Naltrexone?  No    Do you take blood thinners?  No      Prep   Are you currently on dialysis or do you have chronic kidney disease?  No    Do you have a diagnosis of diabetes?  No    Do " you have a diagnosis of cystic fibrosis (CF)?  No    On a regular basis do you go 3 -5 days between bowel movements?  No    BMI > 40?  No    Preferred Pharmacy:    [id number] - [place], [place] - [address]  [address] [facility] [address]  Phone: [phone] Fax: [phone]      Final Scheduling Details   Colonoscopy prep sent?  N/A    Procedure scheduled  Pouchoscopy    Surgeon:  MOODY     Date of procedure:  10/16/23     Schedule PAC:   No    Location  [facility]    Sedation   MAC/Deep Sedation    Patient Reminders:   You will receive a call from a Nurse to review instructions and health history.  This assessment must be completed prior to your procedure.  Failure to complete the Nurse assessment may result in the procedure being cancelled.      On the day of your procedure, please designate an adult(s) who can drive you home stay with you for the next 24 hours. The medicines used in the exam will make you sleepy. You will not be able to drive.      You cannot take public transportation, ride share services, or non-medical taxi service without a responsible caregiver.  Medical transport services are allowed with the requirement that a responsible caregiver will receive you at your destination.  We require that drivers and caregivers are confirmed prior to your procedure.

## 2023-08-30 ENCOUNTER — PHARMACY VISIT (OUTPATIENT)
Dept: ADMINISTRATIVE | Facility: CLINIC | Age: 38
End: 2023-08-30
Payer: COMMERCIAL

## 2023-08-30 NOTE — PROGRESS NOTES
Crohn's/Ulcerative Colitis Clinical Follow Up Assessment   Completed on  17:07:02 [facility], by Milla Whiting        Patient  Patient Name: SYLVIA HOYT  :   EHR ID: [id number]       Activity Date      Activity Medications    SKYRIZI        Care Details      What is the patient's diagnosis?   ? Crohn's Disease          Summary Notes   Spoke with pt briefly today for TM assessment. Skyrizi OBI 360mg every 8 weeks.   Pt states she is on her second at home self administered dose and feels she has a good handle on how to admin the OBI. Denies SE, questions or concerns today.   Pt has been filling appropriately. PDC: too soon.   Pt is not interested in active TM follow up from [facility] at this time.   Moving to TM annual re-engagement follow up and invited pt to call pharmacy any time if questions or concerns arise.    Milla Whiting, Pharm.D.[facility]  [address] [address]  [email or web address]  Office: [phone]

## 2023-09-15 ENCOUNTER — MYC MEDICAL ADVICE (OUTPATIENT)
Dept: GASTROENTEROLOGY | Facility: CLINIC | Age: 38
End: 2023-09-15
Payer: COMMERCIAL

## 2023-09-15 DIAGNOSIS — K50.90 CROHN'S DISEASE (H): Primary | ICD-10-CM

## 2023-09-15 RX ORDER — BUDESONIDE 3 MG/1
9 CAPSULE, COATED PELLETS ORAL EVERY MORNING
Qty: 90 CAPSULE | Refills: 0 | Status: SHIPPED | OUTPATIENT
Start: 2023-09-15 | End: 2023-12-27

## 2023-09-15 NOTE — TELEPHONE ENCOUNTER
Per Dr. Jacobs, plan for CTE as soon as possible. Also plan to start Entocort for 30 day course. Orders placed appropriately.

## 2023-09-16 ENCOUNTER — ANCILLARY PROCEDURE (OUTPATIENT)
Dept: CT IMAGING | Facility: CLINIC | Age: 38
End: 2023-09-16
Attending: INTERNAL MEDICINE
Payer: COMMERCIAL

## 2023-09-16 PROCEDURE — 74177 CT ABD & PELVIS W/CONTRAST: CPT | Mod: GC | Performed by: STUDENT IN AN ORGANIZED HEALTH CARE EDUCATION/TRAINING PROGRAM

## 2023-09-16 RX ORDER — IOPAMIDOL 755 MG/ML
76 INJECTION, SOLUTION INTRAVASCULAR ONCE
Status: COMPLETED | OUTPATIENT
Start: 2023-09-16 | End: 2023-09-16

## 2023-09-16 RX ADMIN — IOPAMIDOL 76 ML: 755 INJECTION, SOLUTION INTRAVASCULAR at 07:42

## 2023-09-16 NOTE — DISCHARGE INSTRUCTIONS

## 2023-09-27 ENCOUNTER — MYC MEDICAL ADVICE (OUTPATIENT)
Dept: INTERNAL MEDICINE | Facility: CLINIC | Age: 38
End: 2023-09-27
Payer: COMMERCIAL

## 2023-10-02 ENCOUNTER — TELEPHONE (OUTPATIENT)
Dept: GASTROENTEROLOGY | Facility: CLINIC | Age: 38
End: 2023-10-02
Payer: COMMERCIAL

## 2023-10-02 NOTE — TELEPHONE ENCOUNTER
Pre assessment completed for upcoming procedure.      Procedure details:    Patient scheduled for Pouchoscopy  on 10/16/23.     Arrival time: 1135. Procedure time 1235    Pre op exam needed? N/A    Facility location: [facility]; [address] [place] [address], [address] Floor, [place] [address]    Sedation type: MAC    Indication for procedure:   Crohn's disease of small intestine without complication          COVID policy reviewed.    Designated  policy reviewed. Instructed to have someone stay 24 hours post procedure.       Chart review:     Electronic implanted devices? No    Diabetic? No    Diabetic medication HOLDING recommendations: (if applicable)  Oral diabetic medications: No  Diabetic injectables: No  Insulin: No    Medication review:    Anticoagulants? No    NSAIDS? No    Other medication HOLDING recommendations:  N/A      Prep for procedure:     Bowel prep recommendation: Enemas  Due to: standard bowel prep.    Prep instructions sent via [employer]     Reviewed procedure prep instructions.     Patient verbalized understanding and had no questions or concerns at this time.        Lin Mckeon RN  Endoscopy Procedure Pre Assessment RN  [phone] option 4

## 2023-10-16 ENCOUNTER — ANESTHESIA (OUTPATIENT)
Dept: SURGERY | Facility: AMBULATORY SURGERY CENTER | Age: 38
End: 2023-10-16
Payer: COMMERCIAL

## 2023-10-16 ENCOUNTER — ANESTHESIA EVENT (OUTPATIENT)
Dept: SURGERY | Facility: AMBULATORY SURGERY CENTER | Age: 38
End: 2023-10-16
Payer: COMMERCIAL

## 2023-10-16 ENCOUNTER — HOSPITAL ENCOUNTER (OUTPATIENT)
Facility: AMBULATORY SURGERY CENTER | Age: 38
Discharge: HOME | End: 2023-10-16
Attending: INTERNAL MEDICINE
Payer: COMMERCIAL

## 2023-10-16 VITALS
HEIGHT: 62 IN | SYSTOLIC BLOOD PRESSURE: 111 MMHG | TEMPERATURE: 97.3 F | WEIGHT: 135 LBS | RESPIRATION RATE: 15 BRPM | OXYGEN SATURATION: 100 % | BODY MASS INDEX: 24.84 KG/M2 | DIASTOLIC BLOOD PRESSURE: 51 MMHG | HEART RATE: 82 BPM

## 2023-10-16 VITALS — HEART RATE: 78 BPM

## 2023-10-16 PROCEDURE — 88305 TISSUE EXAM BY PATHOLOGIST: CPT | Mod: TC | Performed by: INTERNAL MEDICINE

## 2023-10-16 PROCEDURE — 88305 TISSUE EXAM BY PATHOLOGIST: CPT | Mod: 26 | Performed by: PATHOLOGY

## 2023-10-16 PROCEDURE — 44386 ENDOSCOPY BOWEL POUCH/BIOP: CPT | Performed by: INTERNAL MEDICINE

## 2023-10-16 PROCEDURE — 81025 URINE PREGNANCY TEST: CPT | Performed by: PATHOLOGY

## 2023-10-16 RX ORDER — PROPOFOL 10 MG/ML
INJECTION, EMULSION INTRAVENOUS CONTINUOUS PRN
Status: DISCONTINUED | OUTPATIENT
Start: 2023-10-16 | End: 2023-10-16

## 2023-10-16 RX ORDER — LIDOCAINE 40 MG/G
CREAM TOPICAL
Status: DISCONTINUED | OUTPATIENT
Start: 2023-10-16 | End: 2023-10-17 | Stop reason: HOSPADM

## 2023-10-16 RX ORDER — LIDOCAINE HYDROCHLORIDE 20 MG/ML
INJECTION, SOLUTION INFILTRATION; PERINEURAL PRN
Status: DISCONTINUED | OUTPATIENT
Start: 2023-10-16 | End: 2023-10-16

## 2023-10-16 RX ORDER — RISANKIZUMAB-RZAA 360 MG/2.4
WEARABLE INJECTOR SUBCUTANEOUS
COMMUNITY
Start: 2023-08-28 | End: 2024-04-17

## 2023-10-16 RX ORDER — SODIUM CHLORIDE, SODIUM LACTATE, POTASSIUM CHLORIDE, CALCIUM CHLORIDE 600; 310; 30; 20 MG/100ML; MG/100ML; MG/100ML; MG/100ML
INJECTION, SOLUTION INTRAVENOUS CONTINUOUS PRN
Status: DISCONTINUED | OUTPATIENT
Start: 2023-10-16 | End: 2023-10-16

## 2023-10-16 RX ORDER — PROPOFOL 10 MG/ML
INJECTION, EMULSION INTRAVENOUS PRN
Status: DISCONTINUED | OUTPATIENT
Start: 2023-10-16 | End: 2023-10-16

## 2023-10-16 RX ORDER — ONDANSETRON 2 MG/ML
4 INJECTION INTRAMUSCULAR; INTRAVENOUS
Status: DISCONTINUED | OUTPATIENT
Start: 2023-10-16 | End: 2023-10-17 | Stop reason: HOSPADM

## 2023-10-16 RX ADMIN — PROPOFOL 200 MCG/KG/MIN: 10 INJECTION, EMULSION INTRAVENOUS at 12:52

## 2023-10-16 RX ADMIN — PROPOFOL 50 MG: 10 INJECTION, EMULSION INTRAVENOUS at 12:56

## 2023-10-16 RX ADMIN — PROPOFOL 50 MG: 10 INJECTION, EMULSION INTRAVENOUS at 13:08

## 2023-10-16 RX ADMIN — SODIUM CHLORIDE, SODIUM LACTATE, POTASSIUM CHLORIDE, CALCIUM CHLORIDE: 600; 310; 30; 20 INJECTION, SOLUTION INTRAVENOUS at 12:50

## 2023-10-16 RX ADMIN — LIDOCAINE HYDROCHLORIDE 60 MG: 20 INJECTION, SOLUTION INFILTRATION; PERINEURAL at 12:52

## 2023-10-16 NOTE — DISCHARGE INSTRUCTIONS
[facility]  Home Care Following Anesthesia  For 24 hours after surgery:  Get plenty of rest.  A responsible adult must stay with you for at least 24 hours after you leave the surgery center.  Do not drive or use heavy equipment.  If you have weakness or tingling, don't drive or use heavy equipment until this feeling goes away.   Do not drink alcohol.   Avoid strenuous or risky activities.  Ask for help when climbing stairs.  You may feel lightheaded.  IF so, sit for a few minutes before standing.  Have someone help you get up.   If you have nausea (feel sick to your stomach): Drink only clear liquids such as apple juice, ginger ale, broth or 7-Up.  Rest may also help.  Be sure to drink enough fluids.  Move to a regular diet as you feel able.   You may have a slight fever.  Call the doctor if your fever is over 100 F (37.7 C) (taken under the tongue) or lasts longer than 24 hours.  You may have a dry mouth, a sore throat, muscle aches or trouble sleeping. These should go away after 24 hours.  Do not make important or legal decisions.   It is recommended to avoid smoking.               Tips for taking pain medications  To get the best pain relief possible, remember these points:  Take pain medications as directed, before pain becomes severe.  Pain medication can upset your stomach: taking it with food may help.  Constipation is a common side effect of pain medication. Drink plenty of  fluids.  Eat foods high in fiber. Take a stool softener if recommended by your doctor or pharmacist.  Do not drink alcohol, drive or operate machinery while taking pain medications.  Ask about other ways to control pain, such as with heat, ice or relaxation.    Tylenol/Acetaminophen Consumption    If you feel your pain relief is insufficient, you may take Tylenol/Acetaminophen in addition to your narcotic pain medication.   Be careful not to exceed 4,000 mg of Tylenol/Acetaminophen in a 24 hour  period from all sources.  If you are taking extra strength Tylenol/acetaminophen (500 mg), the maximum dose is 8 tablets in 24 hours.  If you are taking regular strength acetaminophen (325 mg), the maximum dose is 12 tablets in 24 hours.    Call a doctor for any of the following:  Signs of infection (fever, growing tenderness at the surgery site, a large amount of drainage or bleeding, severe pain, foul-smelling drainage, redness, swelling).  It has been over 8 to 10 hours since surgery and you are still not able to urinate (pass water).  Headache for over 24 hours.  Numbness, tingling or weakness the day after surgery (if you had spinal anesthesia).  Signs of Covid-19 infection (temperature over 100 degrees, shortness of breath, cough, loss of taste/smell, generalized body aches, persistent headache, chills, sore throat, nausea/vomiting/diarrhea)  Your doctor is:  Dr. Jacobs: [phone]          Please call if you have the following:   Large amounts of bleeding  Fever greater than 100.3  Abdominal or chest pain not relieved with passing gas.

## 2023-10-16 NOTE — H&P
Amber [facility]  [id number]  female  38 year old      Reason for procedure/surgery: Crohn's disease    Patient Active Problem List   Diagnosis    S/P total colectomy    Polycystic ovaries    Depression with anxiety    Acne    Angioma    High-risk pregnancy [facility] MD patient    Ulcerative colitis (H)    S/P  section    Small bowel obstruction (H)    Moderate major depression (H)    Hx of colectomy    Hx of ulcerative colitis    Crohn's disease (H)    Major depression, recurrent (H24)       Past Surgical History:    Past Surgical History:   Procedure Laterality Date     SECTION  2012    Procedure: SECTION; Surgeon:JAVI BELL; [facility]     SECTION N/A 2014    Procedure:  SECTION;  Surgeon: Shawanda Luna MD;  [facility]: [facility]     SECTION N/A 2017    Procedure:  SECTION;  Repeat  Section ;  Surgeon: Laurie Montoya MD;  [facility]: [facility]    COLECTOMY SUBTOTAL      COLONOSCOPY N/A 4/15/2015    Procedure: COLONOSCOPY;  Surgeon: Al Santo MD;  [facility]    EXAM UNDER ANESTHESIA ANUS N/A 4/15/2015    Procedure: EXAM UNDER ANESTHESIA ANUS;  Surgeon: Al Santo MD;  [facility]    FISTULOTOMY RECTUM N/A 4/15/2015    Procedure: FISTULOTOMY RECTUM;  Surgeon: Al Santo MD;  [facility]    POUCHOSCOPY N/A 2023    Procedure: ENDOSCOPY, POUCH, DIAGNOSTIC WITH BIOPSIES;  Surgeon: Marlys Jacobs MD;  Location: [facility] OR    TAKEDOWN ILEOSTOMY      tonsilectomy      TRANSPERINEAL REPAIR FISTULA RECTAL URETHRAL         Past Medical History:   Past Medical History:   Diagnosis Date    Adolescent depression in college    Anxiety     Depressive disorder 2002    Treated david celexa    Fit/adjust GI carole-device NEC     Perineal abscess     PONV (postoperative nausea and vomiting)     S/P total colectomy 2001    Ulcerative colitis        Social History:   Social History     Tobacco Use  "   Smoking status: Never     Passive exposure: Never    Smokeless tobacco: Never   Substance Use Topics    Alcohol use: Yes       Family History:   Family History   Problem Relation Age of Onset    Diabetes Maternal Grandmother     Diabetes Paternal Grandfather     Genetic Disorder Father         chron's    Gastrointestinal Disease Sister         ulcerative colitis       Allergies: No Known Allergies    Active Medications:   Current Outpatient Medications   Medication Sig Dispense Refill    adapalene (DIFFERIN) 0.1 % external gel Apply topically At Bedtime      citalopram (CELEXA) 20 MG tablet Take 1 tablet (20 mg) by mouth daily 90 tablet 4    SKYRIZI 360 MG/2.4ML SOCT       vitamin D3 (CHOLECALCIFEROL) 50 mcg (2000 units) tablet Take 1 tablet by mouth daily      budesonide (ENTOCORT EC) 3 MG EC capsule Take 3 capsules (9 mg) by mouth every morning for 30 days 90 capsule 0       Systemic Review:   CONSTITUTIONAL: NEGATIVE for fever, chills, change in weight  ENT/MOUTH: NEGATIVE for ear, mouth and throat problems  RESP: NEGATIVE for significant cough or SOB  CV: NEGATIVE for chest pain, palpitations or peripheral edema    Physical Examination:   Vital Signs: /67 (BP Location: Right arm)   Pulse 76   Temp 97  F (36.1  C) (Temporal)   Resp 18   Ht 1.575 m (5' 2\")   Wt 61.2 kg (135 lb)   SpO2 99%   BMI 24.69 kg/m    GENERAL: healthy, alert and no distress  NECK: no adenopathy, no asymmetry, masses, or scars  RESP: lungs clear to auscultation - no rales, rhonchi or wheezes  CV: regular rate and rhythm, normal S1 S2, no S3 or S4, no murmur, click or rub, no peripheral edema and peripheral pulses strong  ABDOMEN: soft, nontender, no hepatosplenomegaly, no masses and bowel sounds normal  MS: no gross musculoskeletal defects noted, no edema    Plan: Appropriate to proceed as scheduled.      Marlys Jacobs MD  10/16/2023    PCP:  Cinthia Charles    "

## 2023-10-16 NOTE — ANESTHESIA PREPROCEDURE EVALUATION
Anesthesia Pre-Procedure Evaluation    Patient: Amber Buckner   MRN: [id number] : 1985        Procedure : Procedure(s):  Pouchoscopy          Past Medical History:   Diagnosis Date    Adolescent depression in college    Anxiety     Depressive disorder 2002    Treated witj celexa    Fit/adjust GI carole-device NEC     Perineal abscess     PONV (postoperative nausea and vomiting)     S/P total colectomy     Ulcerative colitis       Past Surgical History:   Procedure Laterality Date     SECTION  2012    Procedure: SECTION; Surgeon:JAVI BELL; [facility]     SECTION N/A 2014    Procedure:  SECTION;  Surgeon: Shawanda Luna MD;  [facility]     SECTION N/A 2017    Procedure:  SECTION;  Repeat  Section ;  Surgeon: Laurie Montoya MD;  [facility]    COLECTOMY SUBTOTAL      COLONOSCOPY N/A 4/15/2015    Procedure: COLONOSCOPY;  Surgeon: Al Santo MD;  [facility]    EXAM UNDER ANESTHESIA ANUS N/A 4/15/2015    Procedure: EXAM UNDER ANESTHESIA ANUS;  Surgeon: Al Santo MD;  [facility]    FISTULOTOMY RECTUM N/A 4/15/2015    Procedure: FISTULOTOMY RECTUM;  Surgeon: Al Santo MD;  [facility]    POUCHOSCOPY N/A 2023    Procedure: ENDOSCOPY, POUCH, DIAGNOSTIC WITH BIOPSIES;  Surgeon: Marlys Jacobs MD;  Location: [facility] OR    TAKEDOWN ILEOSTOMY      tonsilectomy      TRANSPERINEAL REPAIR FISTULA RECTAL URETHRAL        No Known Allergies   Social History     Tobacco Use    Smoking status: Never     Passive exposure: Never    Smokeless tobacco: Never   Substance Use Topics    Alcohol use: Yes      Wt Readings from Last 1 Encounters:   23 61.2 kg (135 lb)        Anesthesia Evaluation   Pt has had prior anesthetic. Type: General and MAC.    History of anesthetic complications  - PONV.      ROS/MED HX  ENT/Pulmonary:  - neg pulmonary ROS     Neurologic:  -  neg neurologic ROS     Cardiovascular:  - neg cardiovascular ROS     METS/Exercise Tolerance: >4 METS    Hematologic:  - neg hematologic  ROS     Musculoskeletal:  - neg musculoskeletal ROS     GI/Hepatic:     (+)       Inflammatory bowel disease,             Renal/Genitourinary:  - neg Renal ROS     Endo:  - neg endo ROS     Psychiatric/Substance Use:     (+) psychiatric history 1, anxiety and depression       Infectious Disease:  - neg infectious disease ROS     Malignancy:  - neg malignancy ROS     Other:  - neg other ROS          Physical Exam    Airway  airway exam normal      Mallampati: I   TM distance: > 3 FB   Neck ROM: full   Mouth opening: > 3 cm    Respiratory Devices and Support         Dental       (+) Completely normal teeth      Cardiovascular   cardiovascular exam normal       Rhythm and rate: regular and normal     Pulmonary   pulmonary exam normal        breath sounds clear to auscultation           OUTSIDE LABS:  CBC:   Lab Results   Component Value Date    WBC 4.9 07/06/2023    WBC 4.6 11/03/2022    HGB 13.1 07/06/2023    HGB 12.8 11/03/2022    HCT 38.3 07/06/2023    HCT 37.4 11/03/2022     07/06/2023     11/03/2022     BMP:   Lab Results   Component Value Date     07/06/2023     11/03/2022    POTASSIUM 4.2 07/06/2023    POTASSIUM 4.2 11/03/2022    CHLORIDE 104 07/06/2023    CHLORIDE 107 11/03/2022    CO2 23 07/06/2023    CO2 28 11/03/2022    BUN 13.0 07/06/2023    BUN 11 11/03/2022    CR 0.63 07/06/2023    CR 0.58 11/03/2022    GLC 87 07/06/2023    GLC 88 11/03/2022     COAGS:   Lab Results   Component Value Date    INR 1.09 08/05/2022     POC:   Lab Results   Component Value Date    HCG Negative 02/06/2023    HCGS Negative 10/24/2022     HEPATIC:   Lab Results   Component Value Date    ALBUMIN 4.7 07/06/2023    PROTTOTAL 7.8 07/06/2023    ALT 9 07/06/2023    AST 16 07/06/2023    ALKPHOS 45 07/06/2023    BILITOTAL 0.6 07/06/2023     OTHER:   Lab Results   Component  Value Date    PH 7.36 10/24/2022    LACT 0.6 10/24/2022    JUDSON 9.6 07/06/2023    PHOS 3.3 08/05/2022    MAG 2.0 08/05/2022    LIPASE 91 10/24/2022    AMYLASE 49 11/03/2014    TSH 2.07 04/21/2011    CRP 5.1 11/03/2022    SED 9 11/03/2022       Anesthesia Plan    ASA Status:  2    NPO Status:  NPO Appropriate    Anesthesia Type: MAC.     - Reason for MAC: Deep or markedly invasive procedure (G8)   Induction: Propofol.   Maintenance: N/A.        Consents    Anesthesia Plan(s) and associated risks, benefits, and realistic alternatives discussed. Questions answered and patient/representative(s) expressed understanding.     - Discussed:     - Discussed with:  Patient       Use of blood products discussed: No .     Postoperative Care            Comments:           H&P reviewed: Unable to attach H&P to encounter due to EHR limitations. H&P Update: appropriate H&P reviewed, patient examined. No interval changes since H&P (within 30 days).         Ruddy Avila, DO

## 2023-10-16 NOTE — ANESTHESIA POSTPROCEDURE EVALUATION
Patient: Amber Buckner    Procedure: Procedure(s):  POUCHOSCOPY WITH BIOPSY       Anesthesia Type:  MAC    Note:  Disposition: Outpatient   Postop Pain Control: Uneventful            Sign Out: Well controlled pain   PONV: No   Neuro/Psych: Uneventful            Sign Out: Acceptable/Baseline neuro status   Airway/Respiratory: Uneventful            Sign Out: Acceptable/Baseline resp. status   CV/Hemodynamics: Uneventful            Sign Out: Acceptable CV status; No obvious hypovolemia; No obvious fluid overload   Other NRE: NONE   DID A NON-ROUTINE EVENT OCCUR? No           Last vitals:  Vitals Value Taken Time   /51 10/16/23 1346   Temp 36.3  C (97.3  F) 10/16/23 1346   Pulse 82 10/16/23 1346   Resp 15 10/16/23 1346   SpO2 100 % 10/16/23 1346       Electronically Signed By: Ruddy Avila DO  October 16, 2023  3:00 PM

## 2023-10-16 NOTE — ANESTHESIA CARE TRANSFER NOTE
Patient: Amber Buckner    Procedure: Procedure(s):  POUCHOSCOPY WITH BIOPSY       Diagnosis: Crohn's disease of small intestine without complication (H) [K50.00]  Diagnosis Additional Information: No value filed.    Anesthesia Type:   MAC     Note:    Oropharynx: oropharynx clear of all foreign objects and spontaneously breathing  Level of Consciousness: awake  Oxygen Supplementation: room air    Independent Airway: airway patency satisfactory and stable  Dentition: dentition unchanged  Vital Signs Stable: post-procedure vital signs reviewed and stable  Report to RN Given: handoff report given  Patient transferred to: Phase II    Handoff Report: Identifed the Patient, Identified the Reponsible Provider, Reviewed the pertinent medical history, Discussed the surgical course, Reviewed Intra-OP anesthesia mangement and issues during anesthesia, Set expectations for post-procedure period and Allowed opportunity for questions and acknowledgement of understanding      Vitals:  Vitals Value Taken Time   BP 99/60 10/16/23 1317   Temp 36.2  C (97.2  F) 10/16/23 1317   Pulse 80 10/16/23 1317   Resp 15 10/16/23 1317   SpO2 100 % 10/16/23 1317       Electronically Signed By: ESPIDEH Woods CRNA  October 16, 2023  1:18 PM

## 2023-10-17 DIAGNOSIS — K50.00 CROHN'S DISEASE OF SMALL INTESTINE WITHOUT COMPLICATION (H): ICD-10-CM

## 2023-10-17 DIAGNOSIS — K91.850 POUCHITIS (H): Primary | ICD-10-CM

## 2023-10-17 LAB
PATH REPORT.COMMENTS IMP SPEC: NORMAL
PATH REPORT.COMMENTS IMP SPEC: NORMAL
PATH REPORT.FINAL DX SPEC: NORMAL
PATH REPORT.GROSS SPEC: NORMAL
PATH REPORT.MICROSCOPIC SPEC OTHER STN: NORMAL
PATH REPORT.RELEVANT HX SPEC: NORMAL
PHOTO IMAGE: NORMAL

## 2023-10-17 RX ORDER — CIPROFLOXACIN 500 MG/1
500 TABLET, FILM COATED ORAL 2 TIMES DAILY
Qty: 28 TABLET | Refills: 0 | Status: SHIPPED | OUTPATIENT
Start: 2023-10-17 | End: 2024-01-02

## 2023-10-17 NOTE — PROGRESS NOTES
Received communication from Dr. Jacobs following pouchoscopy 10/16/23; plan for 14 day course of Cipro 500mg BID to treat active pouchitis. Order placed appropriately.

## 2023-10-19 ENCOUNTER — TELEPHONE (OUTPATIENT)
Dept: FAMILY MEDICINE | Facility: CLINIC | Age: 38
End: 2023-10-19

## 2023-10-19 NOTE — TELEPHONE ENCOUNTER
Patient Quality Outreach    Patient is due for the following:   Cervical Cancer Screening - PAP Needed  Physical Preventive Adult Physical      Topic Date Due    Flu Vaccine (1) 09/01/2023    COVID-19 Vaccine (4 - 2023-24 season) 09/01/2023    Hepatitis B Vaccine (3 of 3 - 3-dose series) 10/07/2023       Next Steps:   Schedule a Adult Preventative    Type of outreach:    Sent [employer] message.      Questions for provider review:    None       Josefina Bowie MA

## 2023-10-28 ENCOUNTER — HEALTH MAINTENANCE LETTER (OUTPATIENT)
Age: 38
End: 2023-10-28

## 2023-11-10 ENCOUNTER — LAB (OUTPATIENT)
Dept: LAB | Facility: CLINIC | Age: 38
End: 2023-11-10
Payer: COMMERCIAL

## 2023-11-10 ENCOUNTER — OFFICE VISIT (OUTPATIENT)
Dept: INTERNAL MEDICINE | Facility: CLINIC | Age: 38
End: 2023-11-10
Payer: COMMERCIAL

## 2023-11-10 VITALS
WEIGHT: 142.1 LBS | OXYGEN SATURATION: 97 % | SYSTOLIC BLOOD PRESSURE: 105 MMHG | DIASTOLIC BLOOD PRESSURE: 70 MMHG | HEART RATE: 74 BPM | BODY MASS INDEX: 25.99 KG/M2

## 2023-11-10 DIAGNOSIS — Z00.00 VISIT FOR PREVENTIVE HEALTH EXAMINATION: Primary | ICD-10-CM

## 2023-11-10 DIAGNOSIS — Z00.00 VISIT FOR PREVENTIVE HEALTH EXAMINATION: ICD-10-CM

## 2023-11-10 DIAGNOSIS — F41.1 GENERALIZED ANXIETY DISORDER: ICD-10-CM

## 2023-11-10 DIAGNOSIS — Z12.4 CERVICAL CANCER SCREENING: ICD-10-CM

## 2023-11-10 LAB
MEV IGG SER IA-ACNC: 56.7 AU/ML
MEV IGG SER IA-ACNC: POSITIVE
MUMPS ANTIBODY IGG INSTRUMENT VALUE: 5.4 AU/ML
MUV IGG SER QL IA: NORMAL
RUBV IGG SERPL QL IA: 0.86 INDEX
RUBV IGG SERPL QL IA: NORMAL
VZV IGG SER QL IA: 955.5 INDEX
VZV IGG SER QL IA: POSITIVE

## 2023-11-10 PROCEDURE — 99395 PREV VISIT EST AGE 18-39: CPT | Performed by: INTERNAL MEDICINE

## 2023-11-10 PROCEDURE — 99000 SPECIMEN HANDLING OFFICE-LAB: CPT | Performed by: PATHOLOGY

## 2023-11-10 PROCEDURE — 86765 RUBEOLA ANTIBODY: CPT | Performed by: INTERNAL MEDICINE

## 2023-11-10 PROCEDURE — G0124 SCREEN C/V THIN LAYER BY MD: HCPCS | Performed by: PATHOLOGY

## 2023-11-10 PROCEDURE — 36415 COLL VENOUS BLD VENIPUNCTURE: CPT | Performed by: PATHOLOGY

## 2023-11-10 PROCEDURE — 86481 TB AG RESPONSE T-CELL SUSP: CPT | Performed by: INTERNAL MEDICINE

## 2023-11-10 PROCEDURE — 86762 RUBELLA ANTIBODY: CPT | Performed by: INTERNAL MEDICINE

## 2023-11-10 PROCEDURE — 86787 VARICELLA-ZOSTER ANTIBODY: CPT | Performed by: INTERNAL MEDICINE

## 2023-11-10 PROCEDURE — G0145 SCR C/V CYTO,THINLAYER,RESCR: HCPCS | Performed by: INTERNAL MEDICINE

## 2023-11-10 PROCEDURE — 87624 HPV HI-RISK TYP POOLED RSLT: CPT | Performed by: INTERNAL MEDICINE

## 2023-11-10 PROCEDURE — 86735 MUMPS ANTIBODY: CPT | Performed by: INTERNAL MEDICINE

## 2023-11-10 RX ORDER — CITALOPRAM HYDROBROMIDE 40 MG/1
40 TABLET ORAL DAILY
Qty: 90 TABLET | Refills: 3 | Status: SHIPPED | OUTPATIENT
Start: 2023-11-10 | End: 2024-01-02

## 2023-11-10 NOTE — PROGRESS NOTES
Amber is a 38 year old that presents in clinic today for the following:     Chief Complaint   Patient presents with    Physical     Pt here for annual physical and would like to discuss chicken pox and mmr titers           11/10/2023     8:53 AM   Additional Questions   Roomed by MJL, EMT       Screenings from encounters over the past 10 days    No data recorded       Prem Jacob at 8:55 AM on 11/10/2023

## 2023-11-10 NOTE — PROGRESS NOTES
SUBJECTIVE:   CC: Amber Buckner is an 38 year old woman who presents for preventive health visit.       Patient has been advised of split billing requirements and indicates understanding: Yes  Healthy Habits:  Do you get at least three servings of calcium containing foods daily (dairy, green leafy vegetables, etc.)? yes  Amount of exercise or daily activities, outside of work: not regular  Problems taking medications regularly No  Medication side effects: No  Have you had an eye exam in the past two years? no  Do you see a dentist twice per year? yes  Do you have sleep apnea, excessive snoring or daytime drowsiness?no      -------------------------------------    Today's PHQ-2 Score:       6/27/2023     3:31 PM 2/21/2023    12:43 PM   PHQ-2 ( 1999 [employer])   Q1: Little interest or pleasure in doing things 0 0   Q2: Feeling down, depressed or hopeless 0 0   PHQ-2 Score 0 0       Abuse: Current or Past(Physical, Sexual or Emotional)- No  Do you feel safe in your environment? Yes    Have you ever done Advance Care Planning? (For example, a Health Directive, POLST, or a discussion with a medical provider or your loved ones about your wishes): No, advance care planning information given to patient to review.  Patient plans to discuss their wishes with loved ones or provider.      Social History     Tobacco Use    Smoking status: Never     Passive exposure: Never    Smokeless tobacco: Never   Substance Use Topics    Alcohol use: Yes     If you drink alcohol do you typically have >3 drinks per day or >7 drinks per week? No                     Reviewed orders with patient.  Reviewed health maintenance and updated orders accordingly - Yes  Labs reviewed in EPIC    FHS-7:        No data to display                Patient under 40 years of age: Routine Mammogram Screening not recommended.   Pertinent mammograms are reviewed under the imaging tab.    Pertinent mammograms are reviewed under the imaging tab.  History of  abnormal Pap smear: NO - age 30-65 PAP every 5 years with negative HPV co-testing recommended      Latest Ref Rng & Units 2017     2:02 PM 2014    12:00 AM 2011    12:00 AM   PAP / HPV   PAP (Historical)  NIL  NIL  NIL    HPV 16 DNA NEG Negative      HPV 18 DNA NEG Negative      Other HR HPV NEG Negative        Reviewed and updated as needed this visit by clinical staff    Allergies  Meds              Reviewed and updated as needed this visit by Provider                 Past Medical History:   Diagnosis Date    Adolescent depression in college    Anxiety     Depressive disorder 2002    Treated witj celexa    Fit/adjust GI carole-device NEC     Perineal abscess     PONV (postoperative nausea and vomiting)     S/P total colectomy     Ulcerative colitis       Past Surgical History:   Procedure Laterality Date     SECTION  2012    Procedure: SECTION; Surgeon:JAVI BELL; [facility]     SECTION N/A 2014    Procedure:  SECTION;  Surgeon: Shawanda Luna MD;  [facility]     SECTION N/A 2017    Procedure:  SECTION;  Repeat  Section ;  Surgeon: Laurei Montoya MD;  [facility]    COLECTOMY SUBTOTAL      COLONOSCOPY N/A 4/15/2015    Procedure: COLONOSCOPY;  Surgeon: Al Santo MD;  [facility]    EXAM UNDER ANESTHESIA ANUS N/A 4/15/2015    Procedure: EXAM UNDER ANESTHESIA ANUS;  Surgeon: Al Santo MD;  [facility]    FISTULOTOMY RECTUM N/A 4/15/2015    Procedure: FISTULOTOMY RECTUM;  Surgeon: Al Santo MD;  [facility]    POUCHOSCOPY N/A 2023    Procedure: ENDOSCOPY, POUCH, DIAGNOSTIC WITH BIOPSIES;  Surgeon: Marlys Jacobs MD;  Location: [facility]    POUCHOSCOPY N/A 10/16/2023    Procedure: POUCHOSCOPY WITH BIOPSY;  Surgeon: Marlys Jacobs MD;  Location: [facility] OR    TAKEDOWN ILEOSTOMY      tonsilectomy      TRANSPERINEAL REPAIR FISTULA RECTAL URETHRAL   2015       ROS:  CONSTITUTIONAL: NEGATIVE for fever, chills, change in weight  INTEGUMENTARU/SKIN: NEGATIVE for worrisome rashes, moles or lesions  EYES: NEGATIVE for vision changes or irritation  ENT: NEGATIVE for ear, mouth and throat problems  RESP: NEGATIVE for significant cough or SOB  BREAST: NEGATIVE for masses, tenderness or discharge  CV: NEGATIVE for chest pain, palpitations or peripheral edema  GI: frequent bowel movements  : NEGATIVE for unusual urinary or vaginal symptoms. Periods are regular.  MUSCULOSKELETAL: NEGATIVE for significant arthralgias or myalgia  NEURO: NEGATIVE for weakness, dizziness or paresthesias  PSYCHIATRIC: NEGATIVE for changes in mood or affect    OBJECTIVE:   /70 (BP Location: Right arm, Patient Position: Sitting, Cuff Size: Adult Regular)   Pulse 74   Wt 64.5 kg (142 lb 1.6 oz)   SpO2 97%   BMI 25.99 kg/m    EXAM:  GENERAL: healthy, alert and no distress  EYES: Eyes grossly normal to inspection, PERRL and conjunctivae and sclerae normal  RESP: lungs clear to auscultation - no rales, rhonchi or wheezes  CV: regular rate and rhythm, normal S1 S2, no S3 or S4, no murmur, click or rub, no peripheral edema and peripheral pulses strong  ABDOMEN: soft, nontender and bowel sounds normal   (female): normal female external genitalia, normal urethral meatus , vaginal mucosa pink, moist, well rugated, and normal effort  MS: no gross musculoskeletal defects noted, no edema  SKIN: no suspicious lesions or rashes  NEURO: Normal strength and tone, mentation intact and speech normal  PSYCH: mentation appears normal, affect normal/bright    Diagnostic Test Results:  Labs reviewed in Epic    ASSESSMENT/PLAN:       ICD-10-CM    1. Visit for preventive health examination  Z00.00 Rubella Antibody IgG     Rubeola Antibody IgG     Mumps Immune Status, IgG     Varicella Zoster Virus Antibody IgG     Quantiferon TB Gold Plus      2. Cervical cancer screening  Z12.4 Pap Screen with HPV -  "recommended age 30 - 65 years      3. Generalized anxiety disorder  F41.1 citalopram (CELEXA) 40 MG tablet          Patient has been advised of split billing requirements and indicates understanding: Yes  COUNSELING:   Reviewed preventive health counseling, as reflected in patient instructions       Regular exercise       Healthy diet/nutrition    Estimated body mass index is 25.99 kg/m  as calculated from the following:    Height as of 10/16/23: 1.575 m (5' 2\").    Weight as of this encounter: 64.5 kg (142 lb 1.6 oz).        She reports that she has never smoked. She has never been exposed to tobacco smoke. She has never used smokeless tobacco.      Counseling Resources:  ATP IV Guidelines  Pooled Cohorts Equation Calculator  Breast Cancer Risk Calculator  BRCA-Related Cancer Risk Assessment: FHS-7 Tool  FRAX Risk Assessment  ICSI Preventive Guidelines  Dietary Guidelines for Americans, 2010  USDA's MyPlate  ASA Prophylaxis  Lung CA Screening    Cinthia Charles MD  [facility] INTER[facility] [place]    "

## 2023-11-11 LAB
GAMMA INTERFERON BACKGROUND BLD IA-ACNC: 0.05 IU/ML
M TB IFN-G BLD-IMP: NEGATIVE
M TB IFN-G CD4+ BCKGRND COR BLD-ACNC: 9.95 IU/ML
MITOGEN IGNF BCKGRD COR BLD-ACNC: 0.02 IU/ML
MITOGEN IGNF BCKGRD COR BLD-ACNC: 0.02 IU/ML
QUANTIFERON MITOGEN: 10 IU/ML
QUANTIFERON NIL TUBE: 0.05 IU/ML
QUANTIFERON TB1 TUBE: 0.07 IU/ML
QUANTIFERON TB2 TUBE: 0.07

## 2023-11-15 LAB
BKR LAB AP GYN ADEQUACY: ABNORMAL
BKR LAB AP GYN INTERPRETATION: ABNORMAL
BKR LAB AP HPV REFLEX: ABNORMAL
BKR LAB AP PREVIOUS ABNORMAL: ABNORMAL
PATH REPORT.COMMENTS IMP SPEC: ABNORMAL
PATH REPORT.COMMENTS IMP SPEC: ABNORMAL
PATH REPORT.RELEVANT HX SPEC: ABNORMAL

## 2023-11-17 LAB
HUMAN PAPILLOMA VIRUS 16 DNA: NEGATIVE
HUMAN PAPILLOMA VIRUS 18 DNA: NEGATIVE
HUMAN PAPILLOMA VIRUS FINAL DIAGNOSIS: NORMAL
HUMAN PAPILLOMA VIRUS OTHER HR: NEGATIVE

## 2023-12-27 ENCOUNTER — VIRTUAL VISIT (OUTPATIENT)
Dept: GASTROENTEROLOGY | Facility: CLINIC | Age: 38
End: 2023-12-27
Attending: INTERNAL MEDICINE
Payer: COMMERCIAL

## 2023-12-27 DIAGNOSIS — K50.00 CROHN'S DISEASE OF SMALL INTESTINE WITHOUT COMPLICATION (H): Primary | ICD-10-CM

## 2023-12-27 PROCEDURE — 99214 OFFICE O/P EST MOD 30 MIN: CPT | Mod: VID | Performed by: INTERNAL MEDICINE

## 2023-12-27 NOTE — LETTER
12/27/2023         RE: Amber Buckner  [address] [address]        Dear Colleague,    Thank you for referring your patient, Amber Buckner, to the [facility] [place] [facility]. Please see a copy of my visit note below.    [facility] IBD follow up       PATIENT: Amber Buckner    MRN: [id number]    Date of Birth 1985    Tel: [phone] (home)     PCP: Luciana Edwards     HPI: Ms. Buckner is a 37 year old female here to establish care for recurrent SBOs in the setting of a pouch.     UC history  Amber was diagnosed with UC in 2001. She was on flagyl, prednisone. No biologics. Underwent RPC/IPAA in 2002. Did very well for 10 years without any issues.     Delivered Yoan in 2012 via C section.  Around then, developed abdominal pain and more frequent BMs. Developed a perianal abscess during 2nd pregnancy with Riley in 2014. At that time, also had a 2 night hospital stay for possible SBO (XR and US normal).     In 2017, she delivered her daughter, Shantal. During the postpartum period, she developed another SBO that required an NGT.     Had a total of 3 C sections. Since her last pregnancy, she had 3 SBOs (in 2018, 2019 and just recently in 2022). All managed conservatively.     Currently:   Endorses fatigue. Wakes up 5 times in the middle of the night to defecate. 8-10 BMs per day.     Total number of IBD surgeries (except perianal): 1; RPC/IPAA in 2002    Current IBD Medications:  none    Past IBD Medications:   Flagyl, prednisone  No biologics or immunomodulators    Interval history, 2/2023 (virtual visit)  GI symptoms have not changed significantly. Endorses fatigue. Wakes up 5 times in the middle of the night to defecate. 8-10 BMs per day.     Pouchoscopy was c/w Crohn's.     Interval history, 6/2023 (virtual visit)  About 8 BMs per day. Now having 1-3 BMs per day. Urgency has decreased. No blood.   Endorses significant fatigue.      Started Skyrizi on 4/7. Last induction infusion was on 6/2.  Injection education on Friday.       Interval history, 12/2023 (virtual visit)  Dietary indiscretion during the holidays increases BMs and disrupts sleep.     Does think that Skyrizi has helped with symptoms.     Crohn's disease:     HBI:  Overall patient well being (prior day): 1 (Slightly below par)  Abdominal pain (prior day): 0 (None)  Number of liquid or soft stools (prior day): 10 (1 point per stool). At night, 3-5. Affected by diet.    Abdominal mass on exam: virtual  Complications (1 point for each):   None     Has the patient been diagnosed with a new fistula since the last [facility] visit? No  If yes, what type? N/A    Location of Crohn's disease:   Ileal disease    Was location demonstrated at endoscopy: Yes    What is the behavior for this CD patient:   Non-stricturing and non-penetrating    [facility] IBD Registry: FOLLOW-UP    Based on clinical judgement at today's visit, what diagnosis does patient have: Crohn's disease  Did the diagnosis change since last visit? No    Please list any new extra-intestinal manifestations since last [facility] visit: None    Did the patient have any new IBD-related surgeries since the last visit? No    Please comment on below infections and adverse events. Please describe dates of onset and cessation (or note if ongoing). Please note if event resuled in one of the following: life-threatening, hospitalization or prolonged hospitalization, significant/persistent disability or incapacity, congential anomaly or birth defect.     Any new infections since last visit?   No infections since last [facility] visit    Any new cardiac diagnoses since last visit?   No, no new cardiac conditions, drug toxicities, or adverse events (other than infections noted above) since last [facility] visit.    Any new digestive/hepatic diagnoses since last visit?  No new hepatic diagnoses since last [facility]a visit     Any new cancer diagnoses  since last visit?  No, no new cancer diagnoses since last [facility] visit    Any new neurologic diseases since last visit?   No, no new neurologic diagnoses since last [facility] visit.    Any new autoimmune diagnoses since last visit?   No, no new autoimmune conditions since last [facility] visit.    Any new gastrointestinal diseases since last visit?  No, no new gastrointestinal diagnoses since last visit     Any new drug-induced hypersensitivity reactions since last visit?  No, no new drug induced hypersensitivities since last visit     Any new respiratory diagnoses since last visit?  No, no new respiratory diagnoses since last visit     Any new other diagnoses / conditions / adverse events or drug toxicities not addressed above?  No, no other diagnoses or adverse events since last visit     Did the patient have any other event since the last [facility] visit (related or not related to IBD), not reported above toxicity?  No, no other serious events.     Has the patient been treated with TPN since the last [facility] visit?  No    Has the patients health coverage changed since the last [facility] visit?  No change in insurance coverage    Has the patient tested positive for Tb since the last [facility] visit?  No Tb testing since last [facility] visit    Was the patient treated for latent Tb: No    Has the patient used NSAIDs since the last [facility] visit?   Not at all    Were there any biologics prescribed at, or since the last [facility] visit that were never started?   No biologics prescribed that were never started    Has the patient received a medication for the treatment of IBD (other than steroids or antibiotics) since the last [facility] visit, or will any be started, modified, or stopped today?  No    Has the patient received steroids or antibiotics for the treatment of IBD since the last [facility] visit, or are steroids or antibiotics started, modified, or stopped today?   Yes: cipro started this visit      Past Medical History:    Diagnosis Date    Adolescent depression in [facility]    Anxiety     Depressive disorder 2002    Treated witj celexa    Fit/adjust GI carole-device NEC     Perineal abscess     PONV (postoperative nausea and vomiting)     S/P total colectomy     Ulcerative colitis         Past Surgical History:   Procedure Laterality Date     SECTION  2012    Procedure: SECTION; Surgeon:JAVI BELL; [facility]     SECTION N/A 2014    Procedure:  SECTION;  Surgeon: Shawanda Luna MD;  [facility]     SECTION N/A 2017    Procedure:  SECTION;  Repeat  Section ;  Surgeon: Laurie Montoya MD;  [facility]    COLECTOMY SUBTOTAL      COLONOSCOPY N/A 4/15/2015    Procedure: COLONOSCOPY;  Surgeon: Al Santo MD;  [facility]    EXAM UNDER ANESTHESIA ANUS N/A 4/15/2015    Procedure: EXAM UNDER ANESTHESIA ANUS;  Surgeon: Al Santo MD;  [facility]    FISTULOTOMY RECTUM N/A 4/15/2015    Procedure: FISTULOTOMY RECTUM;  Surgeon: Al Santo MD;  [facility]    POUCHOSCOPY N/A 2023    Procedure: ENDOSCOPY, POUCH, DIAGNOSTIC WITH BIOPSIES;  Surgeon: Marlys Jacobs MD;  Location: [facility] OR    POUCHOSCOPY N/A 10/16/2023    Procedure: POUCHOSCOPY WITH BIOPSY;  Surgeon: Marlys Jacobs MD;  Location: [facility] OR    TAKEDOWN ILEOSTOMY      tonsilectomy      TRANSPERINEAL REPAIR FISTULA RECTAL URETHRAL         Social History     Tobacco Use    Smoking status: Never     Passive exposure: Never    Smokeless tobacco: Never   Substance Use Topics    Alcohol use: Yes       Family History   Problem Relation Age of Onset    Diabetes Maternal Grandmother     Diabetes Paternal Grandfather     Genetic Disorder Father         chron's    Gastrointestinal Disease Sister         ulcerative colitis       No Known Allergies     Outpatient Encounter Medications as of 2023   Medication Sig Dispense Refill     adapalene (DIFFERIN) 0.1 % external gel Apply topically At Bedtime      budesonide (ENTOCORT EC) 3 MG EC capsule Take 3 capsules (9 mg) by mouth every morning for 30 days 90 capsule 0    citalopram (CELEXA) 40 MG tablet Take 1 tablet (40 mg) by mouth daily 90 tablet 3    SKYRIZI 360 MG/2.4ML SOCT       vitamin D3 (CHOLECALCIFEROL) 50 mcg (2000 units) tablet Take 1 tablet by mouth daily       Facility-Administered Encounter Medications as of 12/27/2023   Medication Dose Route Frequency Provider Last Rate Last Admin    barium sulfate (EZ-DISK) tablet 700 mg  700 mg Oral Once José Manuel Wolf MD        sod bicarbonate-citric acid-simethicone (EZ GAS) 2.21-1.53-0.04 g packet 4 g  4 g Oral Once José Manuel Wolf MD          NSAID  NO    Review of Systems  Complete 10 System ROS performed. All are negative except as documented below, in the HPI, or in patient questionnaire from today's visit.    1) Constitutional: No fevers, chills, night sweats or malaise, weight loss or gain  2) Skin: No rash  3) Pulmonary: No wheeze, SOB, cough, sputum or hemoptysis  4) Cardiovascular: No Chest pain or palpitations  5) Genitourinary: No blood in urine or dysuria  6) Endocrine: No increased sweating, hunger, thirst or thyroid problems  7) Hematologic: No bruising and easy bleeding  8) Musculoskeletal: no new pain in joints or limitation in ROM  9) Neurologic: No dizziness, paresthesias or weakness or falls  10) Psychiatric:  not depressed/anxious, no sleep problems    PHYSICAL EXAM  Vitals: There were no vitals taken for this visit.    No Pain (0)     General appearance  Healthy appearing adult, in no acute distress     Eyes  Sclera anicteric  Pupils round and reactive to light     Ears, nose, mouth and throat  No obvious external lesions of ears and nose  Hearing intact     Neck  Symmetric  No obvious external lesions     Respiratory  Normal respiration, no use of accessory muscles      MSK  Gait normal     Skin  No rashes  "or jaundice      Psychiatric  Oriented to person, place and time  Appropriate mood and affect.     DATA:  Reviewed in detail past documentation, medications and prior workup available in electronic health records or through outside records.    PERTINENT STUDIES:  Most recent CBC:  WBC   Date Value Ref Range Status   10/20/2019 3.6 (L) 4.0 - 11.0 10e9/L Final     WBC Count   Date Value Ref Range Status   07/06/2023 4.9 4.0 - 11.0 10e3/uL Final   ]  Hemoglobin   Date Value Ref Range Status   07/06/2023 13.1 11.7 - 15.7 g/dL Final   10/20/2019 11.5 (L) 11.7 - 15.7 g/dL Final   ]   Platelet Count   Date Value Ref Range Status   07/06/2023 278 150 - 450 10e3/uL Final   10/20/2019 200 150 - 450 10e9/L Final       Most recent coag:  INR   Date Value Ref Range Status   08/05/2022 1.09 0.85 - 1.15 Final   05/14/2017 1.06 0.86 - 1.14 Final       Most recent hepatic panel:  AST   Date Value Ref Range Status   07/06/2023 16 0 - 45 U/L Final     Comment:     Reference intervals for this test were updated on 6/12/2023 to more accurately reflect our healthy population. There may be differences in the flagging of prior results with similar values performed with this method. Interpretation of those prior results can be made in the context of the updated reference intervals.   10/19/2019 6 0 - 45 U/L Final     ALT   Date Value Ref Range Status   07/06/2023 9 0 - 50 U/L Final     Comment:     Reference intervals for this test were updated on 6/12/2023 to more accurately reflect our healthy population. There may be differences in the flagging of prior results with similar values performed with this method. Interpretation of those prior results can be made in the context of the updated reference intervals.     10/19/2019 10 0 - 50 U/L Final     No results found for: \"BILICONJ\"   Bilirubin Total   Date Value Ref Range Status   07/06/2023 0.6 <=1.2 mg/dL Final   10/19/2019 0.4 0.2 - 1.3 mg/dL Final     Albumin   Date Value Ref Range " Status   07/06/2023 4.7 3.5 - 5.2 g/dL Final   11/03/2022 4.2 3.4 - 5.0 g/dL Final   10/19/2019 3.4 3.4 - 5.0 g/dL Final     Alkaline Phosphatase   Date Value Ref Range Status   07/06/2023 45 35 - 104 U/L Final   10/19/2019 41 40 - 150 U/L Final       Most recent creatinine:  Creatinine   Date Value Ref Range Status   07/06/2023 0.63 0.51 - 0.95 mg/dL Final   10/20/2019 0.40 (L) 0.52 - 1.04 mg/dL Final       Endoscopy:  Pouchoscopy 10/2023:                 Impression:            - Rectal cuff with healthy appearing mucosa seen.                          Biopsied.                          - Four small ulcers in the ileoanal pouch.                          - A single small ulcer in the blind limb.                          - Four ulcers in the jazzy-terminal ileum.     PATH  A. RECTAL CUFF, BIOPSY:  - Rectal and enteric-type mucosa with acute cryptitis  - Epithelioid granulomas, dysplasia, or malignancy are not identified    Pouchoscopy 2/2023:                                                                      Impression:            - Multiple ulcers in the ileoanal pouch and                          jazzy-terminal ileum and in the pre-pouch ileum.                          Biopsied. Suspicious for Crohn's disease.                          - Biopsies were taken with a cold forceps for                          histology from the rectal cuff for dysplasia                          surveillance.     PATH  A. JAZZY-TERMINAL ILEUM, ULCER AT 45 CM, BIOPSY:  -Enteric mucosa with erosions and  acute inflammation  -Negative for dysplasia    B. JAZZY-TERMINAL ILEUM, NORMAL TISSUE, BIOPSIES:  -Enteric mucosa with no significant inflammation  -Negative for dysplasia    C. ENTRANCE TO JAZZY-TERMINAL ILEUM, ULCER, BIOPSIES:  -Enteric mucosa with ulceration and granulation tissue  -Negative for dysplasia  -CMV immunostain to follow    D. ENTRANCE TO BLIND LIMB, BIOPSIES:  -Enteric mucosa with ulceration and granulation tissue  -Negative for  dysplasia  -CMV immunostain to follow    E. POUCH, BIOPSIES:  -Severe pouchitis with ulceration and granulation tissue  -Negative for dysplasia  -CMV immunostain to follow    F. RECTAL POUCH, BIOPSIES:  -Enteric mucosa with with no significant inflammation  -Squamous mucosa with mild active inflammation  -Negative for dysplasia    Pouchoscopy 2/2020             Imaging:  CT 10/2022:     BOWEL: Multiple dilated central small bowel loops lead to fecalized  small bowel and postoperative change at the anterior mid pelvis series  3 image 136. Other small bowel loops appear decompressed. Colectomy  changes. No free air. No abscess identified.    CT a/p 10/2019:  1.  Suspected small bowel obstruction with dilated loops seen in the  lower abdomen. Transition to nondilated bowel appears to be seen near  the small bowel anastomosis within the right lower quadrant.    AXR 5/2017:  Single supine view of the abdomen. Interval placement of gastric tube  with tip and sidehole projecting over the fundus. Slight interval  decrease in caliber of air-filled loops of small bowel in the left  side of the abdomen with associated unchanged dilated loops of bowel  in the right side of the abdomen, measuring up to 6.5 cm in greatest  diameter. No free air, pneumatosis, portal venous gas. The visualized  left lung base is clear.    IMPRESSION:    Mrs. Buckner is a 38 year old here with IBD s/p RPC/IPAA in 2002 c/b recurrent SBOs, frequent BMs and a recent pouchoscopy c/w Crohn's disease.   Amber started Skyrizi in April with symptom improvement. She does endorse worsening urgency and stool frequency in the s/o dietary indiscretion during the holiday season.     I suspect that her fatigue is due to interrupted sleep due to nocturnal BMs. These increase in frequency with alcohol at night.     DIAGNOSES:  # Crohn's disease, biologic naive  # s/p RPC/IPAA in 2002    PLAN:  --Start cipro to help heal pouchitis seen on last pouchoscopy  --Continue  Lala   ---------monitoring labs every 3 months   --nutrition referral. Please  about a Mediterranean diet that is low in ultra-processed foods and red meat  --Decrease alcohol use    Healthcare maintenance recs:            -- Shingrix series (for Shingles)    Misc:  -- Avoid tobacco use  -- Avoid NSAIDs     RTC 6 months    30 minutes spent on the date of the encounter performing chart review, history and exam, documentation and further activities as noted above.          Again, thank you for allowing me to participate in the care of your patient.      Sincerely,    Marlys Jacobs MD

## 2023-12-27 NOTE — PROGRESS NOTES
Virtual Visit Details    Type of service:  Video Visit     Originating Location (pt. Location): Home    Distant Location (provider location):  Off-site  Platform used for Video Visit: [facility] IBD follow up       PATIENT: Amber Buckner    MRN: [id number]    Date of Birth 1985    Tel: [phone] (home)     PCP: Luciana Edwards     HPI: Ms. Buckner is a 37 year old female here to establish care for recurrent SBOs in the setting of a pouch.     UC history  Amber was diagnosed with UC in 2001. She was on flagyl, prednisone. No biologics. Underwent RPC/IPAA in 2002. Did very well for 10 years without any issues.     Delivered Yoan in 2012 via C section.  Around then, developed abdominal pain and more frequent BMs. Developed a perianal abscess during 2nd pregnancy with Riley in 2014. At that time, also had a 2 night hospital stay for possible SBO (XR and US normal).     In 2017, she delivered her daughter, Shantal. During the postpartum period, she developed another SBO that required an NGT.     Had a total of 3 C sections. Since her last pregnancy, she had 3 SBOs (in 2018, 2019 and just recently in 2022). All managed conservatively.     Currently:   Endorses fatigue. Wakes up 5 times in the middle of the night to defecate. 8-10 BMs per day.     Total number of IBD surgeries (except perianal): 1; RPC/IPAA in 2002    Current IBD Medications:  none    Past IBD Medications:   Flagyl, prednisone  No biologics or immunomodulators    Interval history, 2/2023 (virtual visit)  GI symptoms have not changed significantly. Endorses fatigue. Wakes up 5 times in the middle of the night to defecate. 8-10 BMs per day.     Pouchoscopy was c/w Crohn's.     Interval history, 6/2023 (virtual visit)  About 8 BMs per day. Now having 1-3 BMs per day. Urgency has decreased. No blood.   Endorses significant fatigue.     Started Skyrizi on 4/7. Last induction infusion was on 6/2.  Injection education on  Friday.       Interval history, 12/2023 (virtual visit)  Dietary indiscretion during the holidays increases BMs and disrupts sleep.     Does think that Skyrizi has helped with symptoms.     Crohn's disease:     HBI:  Overall patient well being (prior day): 1 (Slightly below par)  Abdominal pain (prior day): 0 (None)  Number of liquid or soft stools (prior day): 10 (1 point per stool). At night, 3-5. Affected by diet.    Abdominal mass on exam: virtual  Complications (1 point for each):   None     Has the patient been diagnosed with a new fistula since the last Corrona visit? No  If yes, what type? N/A    Location of Crohn's disease:   Ileal disease    Was location demonstrated at endoscopy: Yes    What is the behavior for this CD patient:   Non-stricturing and non-penetrating    [facility] IBD Registry: FOLLOW-UP    Based on clinical judgement at today's visit, what diagnosis does patient have: Crohn's disease  Did the diagnosis change since last visit? No    Please list any new extra-intestinal manifestations since last CORRONA visit: None    Did the patient have any new IBD-related surgeries since the last visit? No    Please comment on below infections and adverse events. Please describe dates of onset and cessation (or note if ongoing). Please note if event resuled in one of the following: life-threatening, hospitalization or prolonged hospitalization, significant/persistent disability or incapacity, congential anomaly or birth defect.     Any new infections since last visit?   No infections since last Corrona visit    Any new cardiac diagnoses since last visit?   No, no new cardiac conditions, drug toxicities, or adverse events (other than infections noted above) since last Corrona visit.    Any new digestive/hepatic diagnoses since last visit?  No new hepatic diagnoses since last corrona visit     Any new cancer diagnoses since last visit?  No, no new cancer diagnoses since last Corrona visit    Any new  neurologic diseases since last visit?   No, no new neurologic diagnoses since last Corrona visit.    Any new autoimmune diagnoses since last visit?   No, no new autoimmune conditions since last Corrona visit.    Any new gastrointestinal diseases since last visit?  No, no new gastrointestinal diagnoses since last visit     Any new drug-induced hypersensitivity reactions since last visit?  No, no new drug induced hypersensitivities since last visit     Any new respiratory diagnoses since last visit?  No, no new respiratory diagnoses since last visit     Any new other diagnoses / conditions / adverse events or drug toxicities not addressed above?  No, no other diagnoses or adverse events since last visit     Did the patient have any other event since the last Corrona visit (related or not related to IBD), not reported above toxicity?  No, no other serious events.     Has the patient been treated with TPN since the last Corrona visit?  No    Has the patients health coverage changed since the last Corrona visit?  No change in insurance coverage    Has the patient tested positive for Tb since the last Corrona visit?  No Tb testing since last Corrona visit    Was the patient treated for latent Tb: No    Has the patient used NSAIDs since the last Corrona visit?   Not at all    Were there any biologics prescribed at, or since the last Kassidy visit that were never started?   No biologics prescribed that were never started    Has the patient received a medication for the treatment of IBD (other than steroids or antibiotics) since the last corrona visit, or will any be started, modified, or stopped today?  No    Has the patient received steroids or antibiotics for the treatment of IBD since the last corrona visit, or are steroids or antibiotics started, modified, or stopped today?   Yes: cipro started this visit      Past Medical History:   Diagnosis Date    Adolescent depression in [facility]    Anxiety     Depressive disorder  2002    Treated witj celexa    Fit/adjust GI carole-device NEC     Perineal abscess     PONV (postoperative nausea and vomiting)     S/P total colectomy     Ulcerative colitis         Past Surgical History:   Procedure Laterality Date     SECTION  2012    Procedure: SECTION; Surgeon:JAVI BELL; Location:[facility] L[facility]     SECTION N/A 2014    Procedure:  SECTION;  Surgeon: Shawanda Luna MD;  Location: [facility] L[facility]     SECTION N/A 2017    Procedure:  SECTION;  Repeat  Section ;  Surgeon: Laurie Montoya MD;  [facility]: [facility]    COLECTOMY SUBTOTAL      COLONOSCOPY N/A 4/15/2015    Procedure: COLONOSCOPY;  Surgeon: Al Santo MD;  Location: [facility]    EXAM UNDER ANESTHESIA ANUS N/A 4/15/2015    Procedure: EXAM UNDER ANESTHESIA ANUS;  Surgeon: Al Santo MD;  [facility]    FISTULOTOMY RECTUM N/A 4/15/2015    Procedure: FISTULOTOMY RECTUM;  Surgeon: Al Santo MD;  [facility]    POUCHOSCOPY N/A 2023    Procedure: ENDOSCOPY, POUCH, DIAGNOSTIC WITH BIOPSIES;  Surgeon: Marlys Jacobs MD;  Location: [facility] OR    POUCHOSCOPY N/A 10/16/2023    Procedure: POUCHOSCOPY WITH BIOPSY;  Surgeon: Marlys Jacobs MD;  Location: [facility] OR    TAKEDOWN ILEOSTOMY      tonsilectomy      TRANSPERINEAL REPAIR FISTULA RECTAL URETHRAL         Social History     Tobacco Use    Smoking status: Never     Passive exposure: Never    Smokeless tobacco: Never   Substance Use Topics    Alcohol use: Yes       Family History   Problem Relation Age of Onset    Diabetes Maternal Grandmother     Diabetes Paternal Grandfather     Genetic Disorder Father         chron's    Gastrointestinal Disease Sister         ulcerative colitis       No Known Allergies     Outpatient Encounter Medications as of 2023   Medication Sig Dispense Refill    adapalene (DIFFERIN) 0.1 % external gel Apply topically At Bedtime      budesonide (ENTOCORT  EC) 3 MG EC capsule Take 3 capsules (9 mg) by mouth every morning for 30 days 90 capsule 0    citalopram (CELEXA) 40 MG tablet Take 1 tablet (40 mg) by mouth daily 90 tablet 3    SKYRIZI 360 MG/2.4ML SOCT       vitamin D3 (CHOLECALCIFEROL) 50 mcg (2000 units) tablet Take 1 tablet by mouth daily       Facility-Administered Encounter Medications as of 12/27/2023   Medication Dose Route Frequency Provider Last Rate Last Admin    barium sulfate (EZ-DISK) tablet 700 mg  700 mg Oral Once José Manuel Wolf MD        sod bicarbonate-citric acid-simethicone (EZ GAS) 2.21-1.53-0.04 g packet 4 g  4 g Oral Once José Manuel Wolf MD          NSAID  NO    Review of Systems  Complete 10 System ROS performed. All are negative except as documented below, in the HPI, or in patient questionnaire from today's visit.    1) Constitutional: No fevers, chills, night sweats or malaise, weight loss or gain  2) Skin: No rash  3) Pulmonary: No wheeze, SOB, cough, sputum or hemoptysis  4) Cardiovascular: No Chest pain or palpitations  5) Genitourinary: No blood in urine or dysuria  6) Endocrine: No increased sweating, hunger, thirst or thyroid problems  7) Hematologic: No bruising and easy bleeding  8) Musculoskeletal: no new pain in joints or limitation in ROM  9) Neurologic: No dizziness, paresthesias or weakness or falls  10) Psychiatric:  not depressed/anxious, no sleep problems    PHYSICAL EXAM  Vitals: There were no vitals taken for this visit.    No Pain (0)     General appearance  Healthy appearing adult, in no acute distress     Eyes  Sclera anicteric  Pupils round and reactive to light     Ears, nose, mouth and throat  No obvious external lesions of ears and nose  Hearing intact     Neck  Symmetric  No obvious external lesions     Respiratory  Normal respiration, no use of accessory muscles      MSK  Gait normal     Skin  No rashes or jaundice      Psychiatric  Oriented to person, place and time  Appropriate mood and  "affect.     DATA:  Reviewed in detail past documentation, medications and prior workup available in electronic health records or through outside records.    PERTINENT STUDIES:  Most recent CBC:  WBC   Date Value Ref Range Status   10/20/2019 3.6 (L) 4.0 - 11.0 10e9/L Final     WBC Count   Date Value Ref Range Status   07/06/2023 4.9 4.0 - 11.0 10e3/uL Final   ]  Hemoglobin   Date Value Ref Range Status   07/06/2023 13.1 11.7 - 15.7 g/dL Final   10/20/2019 11.5 (L) 11.7 - 15.7 g/dL Final   ]   Platelet Count   Date Value Ref Range Status   07/06/2023 278 150 - 450 10e3/uL Final   10/20/2019 200 150 - 450 10e9/L Final       Most recent coag:  INR   Date Value Ref Range Status   08/05/2022 1.09 0.85 - 1.15 Final   05/14/2017 1.06 0.86 - 1.14 Final       Most recent hepatic panel:  AST   Date Value Ref Range Status   07/06/2023 16 0 - 45 U/L Final     Comment:     Reference intervals for this test were updated on 6/12/2023 to more accurately reflect our healthy population. There may be differences in the flagging of prior results with similar values performed with this method. Interpretation of those prior results can be made in the context of the updated reference intervals.   10/19/2019 6 0 - 45 U/L Final     ALT   Date Value Ref Range Status   07/06/2023 9 0 - 50 U/L Final     Comment:     Reference intervals for this test were updated on 6/12/2023 to more accurately reflect our healthy population. There may be differences in the flagging of prior results with similar values performed with this method. Interpretation of those prior results can be made in the context of the updated reference intervals.     10/19/2019 10 0 - 50 U/L Final     No results found for: \"BILICONJ\"   Bilirubin Total   Date Value Ref Range Status   07/06/2023 0.6 <=1.2 mg/dL Final   10/19/2019 0.4 0.2 - 1.3 mg/dL Final     Albumin   Date Value Ref Range Status   07/06/2023 4.7 3.5 - 5.2 g/dL Final   11/03/2022 4.2 3.4 - 5.0 g/dL Final "   10/19/2019 3.4 3.4 - 5.0 g/dL Final     Alkaline Phosphatase   Date Value Ref Range Status   07/06/2023 45 35 - 104 U/L Final   10/19/2019 41 40 - 150 U/L Final       Most recent creatinine:  Creatinine   Date Value Ref Range Status   07/06/2023 0.63 0.51 - 0.95 mg/dL Final   10/20/2019 0.40 (L) 0.52 - 1.04 mg/dL Final       Endoscopy:  Pouchoscopy 10/2023:                 Impression:            - Rectal cuff with healthy appearing mucosa seen.                          Biopsied.                          - Four small ulcers in the ileoanal pouch.                          - A single small ulcer in the blind limb.                          - Four ulcers in the jazzy-terminal ileum.     PATH  A. RECTAL CUFF, BIOPSY:  - Rectal and enteric-type mucosa with acute cryptitis  - Epithelioid granulomas, dysplasia, or malignancy are not identified    Pouchoscopy 2/2023:                                                                      Impression:            - Multiple ulcers in the ileoanal pouch and                          jazzy-terminal ileum and in the pre-pouch ileum.                          Biopsied. Suspicious for Crohn's disease.                          - Biopsies were taken with a cold forceps for                          histology from the rectal cuff for dysplasia                          surveillance.     PATH  A. JZAZY-TERMINAL ILEUM, ULCER AT 45 CM, BIOPSY:  -Enteric mucosa with erosions and  acute inflammation  -Negative for dysplasia    B. JAZZY-TERMINAL ILEUM, NORMAL TISSUE, BIOPSIES:  -Enteric mucosa with no significant inflammation  -Negative for dysplasia    C. ENTRANCE TO JAZZY-TERMINAL ILEUM, ULCER, BIOPSIES:  -Enteric mucosa with ulceration and granulation tissue  -Negative for dysplasia  -CMV immunostain to follow    D. ENTRANCE TO BLIND LIMB, BIOPSIES:  -Enteric mucosa with ulceration and granulation tissue  -Negative for dysplasia  -CMV immunostain to follow    E. POUCH, BIOPSIES:  -Severe pouchitis with  ulceration and granulation tissue  -Negative for dysplasia  -CMV immunostain to follow    F. RECTAL POUCH, BIOPSIES:  -Enteric mucosa with with no significant inflammation  -Squamous mucosa with mild active inflammation  -Negative for dysplasia    Pouchoscopy 2/2020             Imaging:  CT 10/2022:     BOWEL: Multiple dilated central small bowel loops lead to fecalized  small bowel and postoperative change at the anterior mid pelvis series  3 image 136. Other small bowel loops appear decompressed. Colectomy  changes. No free air. No abscess identified.    CT a/p 10/2019:  1.  Suspected small bowel obstruction with dilated loops seen in the  lower abdomen. Transition to nondilated bowel appears to be seen near  the small bowel anastomosis within the right lower quadrant.    AXR 5/2017:  Single supine view of the abdomen. Interval placement of gastric tube  with tip and sidehole projecting over the fundus. Slight interval  decrease in caliber of air-filled loops of small bowel in the left  side of the abdomen with associated unchanged dilated loops of bowel  in the right side of the abdomen, measuring up to 6.5 cm in greatest  diameter. No free air, pneumatosis, portal venous gas. The visualized  left lung base is clear.    IMPRESSION:    Mrs. Buckner is a 38 year old here with IBD s/p RPC/IPAA in 2002 c/b recurrent SBOs, frequent BMs and a recent pouchoscopy c/w Crohn's disease.   Amber started Skyrizi in April with symptom improvement. She does endorse worsening urgency and stool frequency in the s/o dietary indiscretion during the holiday season.     I suspect that her fatigue is due to interrupted sleep due to nocturnal BMs. These increase in frequency with alcohol at night.     DIAGNOSES:  # Crohn's disease, biologic naive  # s/p RPC/IPAA in 2002    PLAN:  --Start cipro to help heal pouchitis seen on last pouchoscopy  --Continue Skyrizi   ---------monitoring labs every 3 months   --nutrition referral. Please   about a Mediterranean diet that is low in ultra-processed foods and red meat  --Decrease alcohol use    Healthcare maintenance recs:            -- Shingrix series (for Shingles)    Misc:  -- Avoid tobacco use  -- Avoid NSAIDs     RTC 6 months    30 minutes spent on the date of the encounter performing chart review, history and exam, documentation and further activities as noted above.    Marlys Jacobs MD  Associate Professor of Medicine  Division of Gastroenterology, Hepatology and Nutrition  [facility]

## 2023-12-27 NOTE — PATIENT INSTRUCTIONS
PLAN  --nutrition referral? IBD diet Mediterranean diet low in ultraprocessed foods   --try cipro   --Shingles vaccine

## 2023-12-27 NOTE — NURSING NOTE
Is the patient currently in the state of [place]? YES    Visit mode:VIDEO    If the visit is dropped, the patient can be reconnected by: VIDEO VISIT: Text to cell phone:   Telephone Information:   Mobile [phone]       Will anyone else be joining the visit? NO  (If patient encounters technical issues they should call [phone]    How would you like to obtain your AVS? MyChart    Are changes needed to the allergy or medication list? No    Reason for visit: RECHECK    Get GILLIS

## 2023-12-28 ENCOUNTER — MYC MEDICAL ADVICE (OUTPATIENT)
Dept: GASTROENTEROLOGY | Facility: CLINIC | Age: 38
End: 2023-12-28
Payer: COMMERCIAL

## 2023-12-28 ENCOUNTER — TELEPHONE (OUTPATIENT)
Dept: GASTROENTEROLOGY | Facility: CLINIC | Age: 38
End: 2023-12-28
Payer: COMMERCIAL

## 2023-12-28 DIAGNOSIS — K50.90 CROHN'S DISEASE (H): Primary | ICD-10-CM

## 2023-12-28 DIAGNOSIS — K50.00 CROHN'S DISEASE OF SMALL INTESTINE WITHOUT COMPLICATION (H): ICD-10-CM

## 2023-12-28 DIAGNOSIS — K91.850 POUCHITIS (H): ICD-10-CM

## 2023-12-28 NOTE — TELEPHONE ENCOUNTER
Left Voicemail (1st Attempt) for the patient to call back and schedule the following:    Appointment type: RET IBD   Provider:    Return date: 6 mo ( 06/27/2024 )   Specialty phone number: [phone]  Additional appointment(s) needed: no  Additonal Notes: no

## 2024-01-02 RX ORDER — CIPROFLOXACIN 500 MG/1
500 TABLET, FILM COATED ORAL 2 TIMES DAILY
Qty: 28 TABLET | Refills: 0 | Status: SHIPPED | OUTPATIENT
Start: 2024-01-02 | End: 2024-05-07

## 2024-01-02 NOTE — TELEPHONE ENCOUNTER
-- Order placed for Cipro 500mg BID for 14 days  _________________________________________________________  Per clinic visit azam/ Dr. Jacobs 12/27/23:

## 2024-01-08 ENCOUNTER — LAB (OUTPATIENT)
Dept: LAB | Facility: CLINIC | Age: 39
End: 2024-01-08
Payer: COMMERCIAL

## 2024-01-08 DIAGNOSIS — K50.90 CROHN'S DISEASE (H): ICD-10-CM

## 2024-01-08 DIAGNOSIS — K50.00 CROHN'S DISEASE OF SMALL INTESTINE WITHOUT COMPLICATION (H): ICD-10-CM

## 2024-01-08 LAB
ALBUMIN SERPL BCG-MCNC: 4.5 G/DL (ref 3.5–5.2)
ALP SERPL-CCNC: 44 U/L (ref 40–150)
ALT SERPL W P-5'-P-CCNC: 10 U/L (ref 0–50)
ANION GAP SERPL CALCULATED.3IONS-SCNC: 11 MMOL/L (ref 7–15)
AST SERPL W P-5'-P-CCNC: 18 U/L (ref 0–45)
BASOPHILS # BLD AUTO: 0 10E3/UL (ref 0–0.2)
BASOPHILS NFR BLD AUTO: 1 %
BILIRUB SERPL-MCNC: 0.3 MG/DL
BUN SERPL-MCNC: 13.9 MG/DL (ref 6–20)
CALCIUM SERPL-MCNC: 9.5 MG/DL (ref 8.6–10)
CHLORIDE SERPL-SCNC: 103 MMOL/L (ref 98–107)
CREAT SERPL-MCNC: 0.68 MG/DL (ref 0.51–0.95)
CRP SERPL-MCNC: <3 MG/L
DEPRECATED HCO3 PLAS-SCNC: 24 MMOL/L (ref 22–29)
EGFRCR SERPLBLD CKD-EPI 2021: >90 ML/MIN/1.73M2
EOSINOPHIL # BLD AUTO: 0.1 10E3/UL (ref 0–0.7)
EOSINOPHIL NFR BLD AUTO: 1 %
ERYTHROCYTE [DISTWIDTH] IN BLOOD BY AUTOMATED COUNT: 12.6 % (ref 10–15)
ERYTHROCYTE [SEDIMENTATION RATE] IN BLOOD BY WESTERGREN METHOD: 7 MM/HR (ref 0–20)
GLUCOSE SERPL-MCNC: 92 MG/DL (ref 70–99)
HCT VFR BLD AUTO: 39 % (ref 35–47)
HGB BLD-MCNC: 13.1 G/DL (ref 11.7–15.7)
IMM GRANULOCYTES # BLD: 0 10E3/UL
IMM GRANULOCYTES NFR BLD: 0 %
LYMPHOCYTES # BLD AUTO: 1.9 10E3/UL (ref 0.8–5.3)
LYMPHOCYTES NFR BLD AUTO: 39 %
MCH RBC QN AUTO: 34.3 PG (ref 26.5–33)
MCHC RBC AUTO-ENTMCNC: 33.6 G/DL (ref 31.5–36.5)
MCV RBC AUTO: 102 FL (ref 78–100)
MONOCYTES # BLD AUTO: 0.5 10E3/UL (ref 0–1.3)
MONOCYTES NFR BLD AUTO: 10 %
NEUTROPHILS # BLD AUTO: 2.5 10E3/UL (ref 1.6–8.3)
NEUTROPHILS NFR BLD AUTO: 50 %
PLATELET # BLD AUTO: 253 10E3/UL (ref 150–450)
POTASSIUM SERPL-SCNC: 4.5 MMOL/L (ref 3.4–5.3)
PROT SERPL-MCNC: 7.4 G/DL (ref 6.4–8.3)
RBC # BLD AUTO: 3.82 10E6/UL (ref 3.8–5.2)
SODIUM SERPL-SCNC: 138 MMOL/L (ref 135–145)
WBC # BLD AUTO: 4.9 10E3/UL (ref 4–11)

## 2024-01-08 PROCEDURE — 80053 COMPREHEN METABOLIC PANEL: CPT

## 2024-01-08 PROCEDURE — 36415 COLL VENOUS BLD VENIPUNCTURE: CPT

## 2024-01-08 PROCEDURE — 86787 VARICELLA-ZOSTER ANTIBODY: CPT

## 2024-01-08 PROCEDURE — 85025 COMPLETE CBC W/AUTO DIFF WBC: CPT

## 2024-01-08 PROCEDURE — 86765 RUBEOLA ANTIBODY: CPT

## 2024-01-08 PROCEDURE — 85652 RBC SED RATE AUTOMATED: CPT

## 2024-01-08 PROCEDURE — 86735 MUMPS ANTIBODY: CPT

## 2024-01-08 PROCEDURE — 86140 C-REACTIVE PROTEIN: CPT

## 2024-01-09 LAB
MEV IGG SER IA-ACNC: 53.3 AU/ML
MEV IGG SER IA-ACNC: POSITIVE
MUMPS ANTIBODY IGG INSTRUMENT VALUE: <5 AU/ML
MUV IGG SER QL IA: NORMAL
VZV IGG SER QL IA: 801.4 INDEX
VZV IGG SER QL IA: POSITIVE

## 2024-03-12 ENCOUNTER — VIRTUAL VISIT (OUTPATIENT)
Dept: URGENT CARE | Facility: CLINIC | Age: 39
End: 2024-03-12
Payer: COMMERCIAL

## 2024-03-12 DIAGNOSIS — R68.89 FLU-LIKE SYMPTOMS: Primary | ICD-10-CM

## 2024-03-12 PROCEDURE — 99213 OFFICE O/P EST LOW 20 MIN: CPT | Mod: 95

## 2024-03-12 NOTE — PATIENT INSTRUCTIONS
Make a lab only appointment through [facility] for your strep, flu and COVID test.  Once these results are back we will notify you through "[employer] with test results and treatments if indicated.  If the COVID test is positive, you will be called by Center nurse to discuss treatments.    Max dose of Tylenol in a 24-hour period is 3 g

## 2024-03-12 NOTE — PROGRESS NOTES
Amber is a 38 year old who is being evaluated via a billable video visit.      How would you like to obtain your AVS? MyChart  If the video visit is dropped, the invitation should be resent by:   Will anyone else be joining your video visit? No          Assessment & Plan     Flu-like symptoms  Is on Skyrizi for her Crohn's disease.  On qualify for antivirals for flu and COVID if they come back positive.  Symptoms started less than 24 hours ago.    - Streptococcus A Rapid Screen w/Reflex to PCR; Future  - Influenza A & B Antigen; Future  - Symptomatic COVID-19 Virus (Coronavirus) by PCR; Future        Return in about 1 week (around 3/19/2024), or if symptoms worsen or fail to improve.    Subjective   Amber is a 38 year old, presenting for the following health issues:  No chief complaint on file.    HPI     Acute Illness  Acute illness concerns: fever, body aches and headache  Onset/Duration: less than 24 hours ago  Symptoms:  Fever: YES- 103  Chills/Sweats: YES  Headache (location?): YES  Sinus Pressure: No  Conjunctivitis:  No  Ear Pain: no  Rhinorrhea: No  Congestion: No  Sore Throat: YES  Cough: no  Wheeze: No  Decreased Appetite: YES  Fatigue/Achiness: YES  Sick/Strep Exposure: son was sick recently but different sx  Therapies tried and outcome: Tylenol        Review of Systems  Constitutional, HEENT, cardiovascular, pulmonary, gi and gu systems are negative, except as otherwise noted.      Objective           Vitals:  No vitals were obtained today due to virtual visit.    Physical Exam   GENERAL: alert and no distress  RESP: No audible wheeze, cough, or visible cyanosis.    PSYCH: Appropriate affect, tone, and pace of words          Video-Visit Details    Type of service:  Video Visit     Originating Location (pt. Location): Home    Distant Location (provider location):  Off-site  Platform used for Video Visit: Arvin  Signed Electronically by: [facility]

## 2024-03-13 ENCOUNTER — LAB (OUTPATIENT)
Dept: URGENT CARE | Facility: URGENT CARE | Age: 39
End: 2024-03-13
Attending: NURSE PRACTITIONER
Payer: COMMERCIAL

## 2024-03-13 DIAGNOSIS — J02.0 STREPTOCOCCAL PHARYNGITIS: Primary | ICD-10-CM

## 2024-03-13 DIAGNOSIS — R68.89 FLU-LIKE SYMPTOMS: ICD-10-CM

## 2024-03-13 LAB
DEPRECATED S PYO AG THROAT QL EIA: POSITIVE
FLUAV AG SPEC QL IA: NEGATIVE
FLUBV AG SPEC QL IA: NEGATIVE
SARS-COV-2 RNA RESP QL NAA+PROBE: NEGATIVE

## 2024-03-13 PROCEDURE — 87880 STREP A ASSAY W/OPTIC: CPT

## 2024-03-13 PROCEDURE — 87804 INFLUENZA ASSAY W/OPTIC: CPT

## 2024-03-13 PROCEDURE — 87635 SARS-COV-2 COVID-19 AMP PRB: CPT

## 2024-03-13 PROCEDURE — 99207 PR NO CHARGE LOS: CPT

## 2024-03-13 RX ORDER — AMOXICILLIN 500 MG/1
1000 CAPSULE ORAL DAILY
Qty: 20 CAPSULE | Refills: 0 | Status: SHIPPED | OUTPATIENT
Start: 2024-03-13 | End: 2024-03-23

## 2024-04-04 RX ORDER — RISANKIZUMAB-RZAA 360 MG/2.4
WEARABLE INJECTOR SUBCUTANEOUS
Status: CANCELLED | OUTPATIENT
Start: 2024-04-04

## 2024-04-08 DIAGNOSIS — K50.00 CROHN'S DISEASE OF SMALL INTESTINE WITHOUT COMPLICATION (H): Primary | ICD-10-CM

## 2024-04-08 NOTE — TELEPHONE ENCOUNTER
[facility] Call Center    Phone Message    May a detailed message be left on voicemail: yes     Reason for Call: Medication Refill Request    Has the patient contacted the pharmacy for the refill? Yes   Name of medication being requested: Lala  Provider who prescribed the medication: Marlys Jacobs  Pharmacy:  [facility]  Date medication is needed: ASAP    Action Taken: Message routed to:  [facility] ([facility] GI    Travel Screening: Not Applicable

## 2024-04-10 NOTE — TELEPHONE ENCOUNTER
[facility] Call Center    Phone Message    May a detailed message be left on voicemail: yes     Reason for Call: Other: Kimberley from [facility] called in regards to wanting to know what the status of this refill request for pt'brois Reeves is.      Action Taken: Other: [facility] keegan    Travel Screening: Not Applicable

## 2024-04-17 NOTE — TELEPHONE ENCOUNTER
Please review refill request:    SKYRIZI 360 MG/2.4ML SOCT   Patient reported    Last Office Visit: 2023 with Shmidt  Future Office visit: 2024 with Shmidt     Standing Lab Orders: Hepatic, CBC, and CRP  Standing Lab Orders : 2024  Last complete: 2024  Next due: now  MTM: none- sent message      Note per LOV:  --Continue Skyrizi   ---------monitoring labs every 3 months     Thank you,   Tish VALDEZ  [phone]

## 2024-04-18 RX ORDER — RISANKIZUMAB-RZAA 360 MG/2.4
360 WEARABLE INJECTOR SUBCUTANEOUS
Qty: 2.4 ML | Refills: 0 | Status: SHIPPED | OUTPATIENT
Start: 2024-04-18 | End: 2024-05-07

## 2024-04-18 NOTE — TELEPHONE ENCOUNTER
Last clinic visit: 12/27/23   Next clinic visit: 7/17/24  Last set of labs: 1/8/24  Last Quant Gold TB:11/10/23  MTM: not completed    Single-dose refill sent to pharmacy. Patient to schedule MTM visit and complete labs for further refills.

## 2024-04-30 ENCOUNTER — TELEPHONE (OUTPATIENT)
Dept: GASTROENTEROLOGY | Facility: CLINIC | Age: 39
End: 2024-04-30
Payer: COMMERCIAL

## 2024-04-30 NOTE — TELEPHONE ENCOUNTER
Left Voicemail (1st Attempt) for the patient to call back and schedule the following:    Appointment type: return IBD  Provider: Dr. Jacobs  Return date: 7/17  Specialty phone number: [phone]  Additional appointment(s) needed:   Additonal Notes:      Cardiac

## 2024-05-02 ENCOUNTER — TELEPHONE (OUTPATIENT)
Dept: GASTROENTEROLOGY | Facility: CLINIC | Age: 39
End: 2024-05-02
Payer: COMMERCIAL

## 2024-05-02 NOTE — TELEPHONE ENCOUNTER
Left Voicemail (2nd Attempt) for the patient to call back and schedule the following:    Appointment type: return IBD   Provider: Dr. Jacobs   Return date: nexrt available  Specialty phone number: [phone]   Additional appointment(s) needed:   Additonal Notes:

## 2024-05-06 NOTE — PROGRESS NOTES
Medication Therapy Management (MTM) Encounter    ASSESSMENT:                            Medication Adherence/Access: No issues identified    Crohn's Disease:    Amber would benefit from continued treatment with Skyrizi (Risankizumab) every 8 weeks.  She is past due on routine maintenance labs, will schedule lab appointment.  She is up to date on annual tuberculosis screening.  No access issues for her advanced therapy are present.  She is not currently indicated for any vaccinations but will continue with annual vaccination and consider Shingrix for future due to having high VZV antibodies currently.  She is uncertain if getting adequate calcium from diet, provided calcium content information to help guide if calcium supplementation is needed.  She is not indicated for a DEXA scan at this time and I recommend continued monitoring for need. Reminders for routine cancer screening were provided, she would like a referral for dermatology.  Informed Amber Dr. Marlys Jacobs will be returning to clinic mid November, she has appointment scheduled 11/19/24 which may need to be adjusted.     PLAN:                            Amber to consider the following vaccines:    - Shingrix (Shingles), 2 dose series, could consider for future  - Flu / Covid, annually    You are past due for routine quarterly labs, please schedule lab appointment at your earliest convenience     Refills for Skyrizi (Risankizumab) has been sent to [facility]     Appointment with Dr. Marlys Jacobs MD 11/19/24, may need to be adjusted due to her leave through mid November    It is recommended to get 1,000 - 1,200 mg of Calcium per day from all sources, if you are unable to achieve this from diet alone then supplementation is appropriate.  Recommend taking 500 or 600 mg tablet up to twice daily to achieve daily Calcium goal.    - You may review this reference of Calcium content in food for guidance:   https://www.dietaryguidelines.gov/food-sources-calcium or the [employer] has a great chart as well!    I've placed a referral to dermatology for a routine annual skin check. If you don't hear from a  in 2-3 business days, you can call [phone] to schedule an appointment.    Follow-up: 10/29/24 9:30 am 6 month MTM    SUBJECTIVE/OBJECTIVE:                          Amber Buckner is a 38 year old female contacted via secure video for an initial visit. She was referred to me from Dr. Marlys Jacobs MD.      Reason for visit: Skyrizi (Risankizumab) maintenance, Annual IBD Health Maintenance Review     Allergies/ADRs: Reviewed in chart  Past Medical History: Reviewed in chart  Tobacco: She reports that she has never smoked. She has never been exposed to tobacco smoke. She has never used smokeless tobacco.  Alcohol: 1-3 beverages / week 1 barb 3x per week     Medication Adherence/Access: no issues reported  Getting medication going well, no issues.     Crohn's Disease:    - Skyrizi (Risankizumab) every 8 weeks   Original start date:  4/2023  Last dose:  4/24/24   Next dose: 6/19/24  - [facility]     - Skyrizi (Risankizumab) going well, not much to report, does get fatigued for about a  week after giving dose, however, doesn't impact life and abillity to do normal life tasks. States overall her pouch discomfort has improved with Skyrizi.  The course of Cipro in December helped short term, but symptoms have historically flucutated based on diet, stress, and alcohol intake.  Sleep has improved but gets tired after taking Skyrizi dose, but sleep overall is better than it has been.  Currently having 1-4 BM/ night, previously was 5 - 8.  Since being on Skyrizi her overall symptoms have been best she has had yet, feels could be better if she made lifestyle changes like no caffeine, but is not ready to give that up yet.  She has yet to meet with nutritionist but is  still interested in this for future.     GI symptoms:  Stool frequency (baseline 8-10): 8-10  Rectal bleeding: none  Blood in stool:  none  Abdominal pain:  none  Urgency:  none    Lab Results   Component Value Date    CALPRF 43.7 11/03/2022    CRPI <3.00 01/08/2024      PRO-3 for Crohn's Disease    Please select the one best answer for the patient's ability at this time     Over the past week, how many liquid or soft stools have you had on average per day?   18 (When scoring, multiply number by 2)   Over the past week, please rate your average abdominal pain  None : 0 points  3. Over the past week, please rate your general well-being    Generally Well: 0 points    Score: 18  <13: Remission  13-21: Mild Activity   22-52: Moderate Activity  >/= 53: Severe Activity      Last provider visit:  12/27/23 Dr. Marlys Jacobs MD   Next provider visit:  11/19/24 Dr. Marlys Jacobs MD  Last labs completed:  1/8/24  Lab frequency: every 3 months   - standing labs thru 12/28/24 (CBC w/diff, CRP, CMP)   Next labs due: 4/8/24 past due   Last TB: 11/10/2023    Last IBD Health Maintenance Review: 3/2/23, 5/7/24  PDC: 100%      IBD Health Maintenance    Vaccinations:  All patients on biologics should avoid live vaccines unless specifically indicated.    -- Influenza (every year)  last 9/2023  -- TdaP (every 10 years) last 2017  -- Pneumococcal Pneumonia    Prevnar-13: not on file    Pneumovax-23: not on file    Prevnar-20: 8/2023  -- COVID-19 last 10/2023    One time confirmation of immunity or serologies:  -- Hepatitis A (serologies or immunizations) vaccine x3   -- Hepatitis B (serologies or immunizations) insufficient immunity per 11/2022 serology, however, received 3 doses Hep A/B, 8/2023, 10/2023, and 2/2024  -- Varicella/Zoster    Varicella confirmed chickenpox infection as a child twice   Zoster not on file, recent VZV rg test was 800, >135 is immune, will consider shingrix for future   -- MMR vaccine x1   -- Meningococcal  meningitis (all patients at risk for meningitis)--  not on file     Due to the immunosuppression in this patient, I would not advise administration of live vaccines such as varicella/VZV, intranasal influenza, MMR, or yellow fever vaccine (if traveling).      Immunosuppressive Screening:  -- Hep B Surface Antibody non-reactive   -- Hep B Surface Antigen non-reactive   -- Hep B Core Antibody non-reactive   -- Hep C Antibody non-reactive   -- Yearly assessment of TB Negative    Lab Results   Component Value Date    AUSAB 0.00 11/03/2022    HEPBANG Nonreactive 11/03/2022    HBCAB Nonreactive 11/03/2022    HCVAB Nonreactive 08/31/2021    TBRES Negative 11/10/2023     Bone mineral density screening   -- Recommend all patients supplement with calcium and vitamin D   - unsure getting adequate calcium from diet  -- Given minimal prior steroid use recommend continued monitoring need for DEXA     - has not been on prednisone in 20 years     Cancer Screening:  Colon cancer screening:  S/P RPC/IPAA in 2002     Cervical cancer screening: Per [facility]BRONN, last in 2023    Skin cancer screening: Annual visual exam of skin by dermatologist since patient is immunocompromised, haven't seen dermatology in 5 years, would like referral    Depression Screening:    PHQ-2 Score:         12/27/2023     3:17 PM 6/27/2023     3:31 PM   PHQ-2 ( 1999 Pf[employer])   Q1: Little interest or pleasure in doing things 0 0   Q2: Feeling down, depressed or hopeless 0 0   PHQ-2 Score 0 0     Research:  Are you interested in being contacted about enrollment in clinical research studies? Yes    Would you like to receive a quarterly newsletter on research via email. YES [email or web address]       Misc:  -- Avoid tobacco use  -- Avoid NSAIDs as there is potentially a 25% chance of causing an IBD flare    ----------------    I spent 39 minutes with this patient today. All changes were made via collaborative practice agreement with Marlys Jacobs. A copy of the visit note  was provided to the patient's provider(s).    A summary of these recommendations was sent via [employer].    Celsa Edwards, PharmD    Medication Therapy Management Pharmacist  [facility]   [phone]    Telemedicine Visit Details  Type of service:  Video Conference via [employer]  Start Time:  9:00 am   End Time:  9:39 am     Medication Therapy Recommendations  Crohn's disease (H)    Rationale: Medication requires monitoring - Needs additional monitoring - Safety   Recommendation: Referral to Service  - Dermatology referral   Status: Patient Agreed - Adherence/Education   Note: recommend regular skin checks, patient would like referral

## 2024-05-07 ENCOUNTER — VIRTUAL VISIT (OUTPATIENT)
Dept: GASTROENTEROLOGY | Facility: CLINIC | Age: 39
End: 2024-05-07
Attending: INTERNAL MEDICINE
Payer: COMMERCIAL

## 2024-05-07 DIAGNOSIS — K50.00 CROHN'S DISEASE OF SMALL INTESTINE WITHOUT COMPLICATION (H): Primary | ICD-10-CM

## 2024-05-07 RX ORDER — RISANKIZUMAB-RZAA 360 MG/2.4
360 WEARABLE INJECTOR SUBCUTANEOUS
Qty: 2.4 ML | Refills: 2 | Status: SHIPPED | OUTPATIENT
Start: 2024-05-07

## 2024-05-07 ASSESSMENT — PATIENT HEALTH QUESTIONNAIRE - PHQ9: SUM OF ALL RESPONSES TO PHQ QUESTIONS 1-9: 1

## 2024-05-07 NOTE — Clinical Note
5/7/2024         RE: Amber Buckner  [address]  [facility] [address]        Dear Colleague,    Thank you for referring your patient, Amber Buckner, to the [facility]. Please see a copy of my visit note below.    Medication Therapy Management (MTM) Encounter    ASSESSMENT:                            Medication Adherence/Access: {adherencechoices:379990}    Crohn's Disease:    Amber would benefit from continued treatment with Skyrizi (Risankizumab) every 8 weeks.  She is past due on routine maintenance labs, will schedule lab appointment.  She is up to date on annual tuberculosis screening.  No access issues for her advanced therapy are present.  She is not currently indicated for any vaccinations but will continue with annual vaccination and consider Shingrix for future due to having high VZV antibodies currently.  She is uncertain if getting adequate calcium from diet, provided calcium content information to help guide if calcium supplementation is needed.  She is not indicated for a DEXA scan at this time and I recommend continued monitoring for need. Reminders for routine cancer screening were provided, she would like a referral for dermatology.  Informed Amber Dr. Marlys Jacobs will be returning to clinic mid November, she has appointment scheduled 11/19/24 which may need to be adjusted.     ***      PLAN:                            Amber to consider the following vaccines:    - Shingrix (Shingles), 2 dose series, could consider for future  - Flu / Covid, annually    You are past due for routine quarterly labs, please schedule lab appointment at your earliest convenience     Refills for Skyrizi (Risankizumab) has been sent to [facility]     Appointment with Dr. Marlys Jacobs MD may need to be adjusted for leave     It is recommended to get 1,000 - 1,200 mg of Calcium per day from all sources, if you are unable to achieve this from diet alone  then supplementation is appropriate.  Recommend taking 500 or 600 mg tablet up to twice daily to achieve daily Calcium goal.    - You may review this reference of Calcium content in food for guidance:  https://www.dietaryguidelines.gov/food-sources-calcium or the International Osteoporosis Foundation has a great chart as well!    I've placed a referral to dermatology for a routine annual skin check. If you don't hear from a  in 2-3 business days, you can call [phone] to schedule an appointment.      Follow-up: 10/29/24 9:30 am     SUBJECTIVE/OBJECTIVE:                          Amber Buckner is a 38 year old female contacted via secure video for an initial visit. She was referred to me from Dr. Marlys Jacobs MD.      Reason for visit: Skyrizi (Risankizumab) maintenance, Annual IBD Health Maintenance Review     Allergies/ADRs: Reviewed in chart  Past Medical History: Reviewed in chart  Tobacco: She reports that she has never smoked. She has never been exposed to tobacco smoke. She has never used smokeless tobacco.  Alcohol: 1-3 beverages / week 1 barb 3x per week     Medication Adherence/Access: no issues reported  Getting medication going well, no issues.     Crohn's Disease:    - Skyrizi (Risankizumab) every 8 weeks   Original start date:  4/2023  Last dose:  4/24/24   Next dose: 6/19/24  - [facility]     - Skyrizi (Risankizumab) going well, not much to report, does get fatigued for about a  week after giving dose, however, doesn't impact life and abillity to do normal life tasks. States overall her pouch discomfort has improved with Skyrizi.  The course of Cipro in December helped short term, but symptoms have historically flucutated based on diet, stress, and alcohol intake.  Sleep has improved but gets tired after taking Skyrizi dose, but sleep overall is better than it has been.  Currently having 1-4 BM/ night, previously was 5 - 8.  Since being on Skyrizi her overall  symptoms have been best she has had yet, feels could be better if she made lifestyle changes like no caffeine, but is not ready to give that up yet.  She has yet to meet with nutritionist but is still interested in this for future.     GI symptoms:  Stool frequency (baseline 8-10): 8-10  Rectal bleeding: none  Blood in stool:  none  Abdominal pain:  none  Urgency:  none    Lab Results   Component Value Date    CALPRF 43.7 11/03/2022    CRPI <3.00 01/08/2024      PRO-3 for Crohn's Disease    Please select the one best answer for the patient's ability at this time     Over the past week, how many liquid or soft stools have you had on average per day?   18 (When scoring, multiply number by 2)   Over the past week, please rate your average abdominal pain  None : 0 points  3. Over the past week, please rate your general well-being    Generally Well: 0 points    Score: 18  <13: Remission  13-21: Mild Activity   22-52: Moderate Activity  >/= 53: Severe Activity      Last provider visit:  12/27/23 Dr. Marlys Jacobs MD   Next provider visit:  11/19/24 Dr. Marlys Jacobs MD  Last labs completed:  1/8/24  Lab frequency: every 3 months   - standing labs thru 12/28/24 (CBC w/diff, CRP, CMP)   Next labs due: 4/8/24 past due   Last TB: 11/10/2023    Last IBD Health Maintenance Review: 3/2/23, 5/7/24  PDC: 100%      IBD Health Maintenance    Vaccinations:  All patients on biologics should avoid live vaccines unless specifically indicated.    -- Influenza (every year)  last 9/2023  -- TdaP (every 10 years) last 2017  -- Pneumococcal Pneumonia    Prevnar-13: not on file    Pneumovax-23: not on file    Prevnar-20: 8/2023  -- COVID-19 last 10/2023    One time confirmation of immunity or serologies:  -- Hepatitis A (serologies or immunizations) vaccine x3   -- Hepatitis B (serologies or immunizations) insufficient immunity per 11/2022 serology, however, received 3 doses Hep A/B, 8/2023, 10/2023, and 2/2024  -- Varicella/Zoster     Varicella confirmed chickenpox infection as a child twice   Zoster not on file, recent VZV rg test was 800, >135 is immune, will consider shingrix for future   -- MMR vaccine x1   -- Meningococcal meningitis (all patients at risk for meningitis)--  not on file     Due to the immunosuppression in this patient, I would not advise administration of live vaccines such as varicella/VZV, intranasal influenza, MMR, or yellow fever vaccine (if traveling).      Immunosuppressive Screening:  -- Hep B Surface Antibody non-reactive   -- Hep B Surface Antigen non-reactive   -- Hep B Core Antibody non-reactive   -- Hep C Antibody non-reactive   -- Yearly assessment of TB Negative    Lab Results   Component Value Date    AUSAB 0.00 11/03/2022    HEPBANG Nonreactive 11/03/2022    HBCAB Nonreactive 11/03/2022    HCVAB Nonreactive 08/31/2021    TBRES Negative 11/10/2023     Bone mineral density screening   -- Recommend all patients supplement with calcium and vitamin D   - unsure getting adequate calcium from diet  -- ***Given prior steroid use recommend DEXA if not already done   - combined total prednisone use days of *** since ***    - has not been on prednisone in 20 years     Cancer Screening:  Colon cancer screening:  S/P RPC/IPAA in 2002     Cervical cancer screening: Per OBGYN, last in 2023    Skin cancer screening: Annual visual exam of skin by dermatologist since patient is immunocompromised, haven't in 5 years, would like referral ***    Depression Screening:    PHQ-2 Score:         12/27/2023     3:17 PM 6/27/2023     3:31 PM   PHQ-2 ( 1999 Pfizer)   Q1: Little interest or pleasure in doing things 0 0   Q2: Feeling down, depressed or hopeless 0 0   PHQ-2 Score 0 0     Research:  Are you interested in being contacted about enrollment in clinical research studies? Yes    Would you like to receive a quarterly newsletter on research via email. YES [email or web address]       Misc:  -- Avoid tobacco use  -- Avoid NSAIDs as  there is potentially a 25% chance of causing an IBD flare    IMPRESSION: Dr. Marlys Jacobs MD note 12/2023     Mrs. Buckner is a 38 year old here with IBD s/p RPC/IPAA in 2002 c/b recurrent SBOs, frequent BMs and a recent pouchoscopy c/w Crohn's disease.   Amber started Skyrizi in April with symptom improvement. She does endorse worsening urgency and stool frequency in the s/o dietary indiscretion during the holiday season.      I suspect that her fatigue is due to interrupted sleep due to nocturnal BMs. These increase in frequency with alcohol at night.      DIAGNOSES:  # Crohn's disease, biologic naive  # s/p RPC/IPAA in 2002     PLAN:  --Start cipro to help heal pouchitis seen on last pouchoscopy  --Continue Skyrizi   ---------monitoring labs every 3 months   --nutrition referral. Please  about a Mediterranean diet that is low in ultra-processed foods and red meat  --Decrease alcohol use     Healthcare maintenance recs:            -- Shingrix series (for Shingles)     ***      ----------------      I spent 39 minutes with this patient today. All changes were made via collaborative practice agreement with Marlys Jacobs. A copy of the visit note was provided to the patient's provider(s).    A summary of these recommendations was sent via GOSO.    Celsa Edwards, PharmD    Medication Therapy Management Pharmacist  [facility]   [phone]    Telemedicine Visit Details  Type of service:  Video Conference via K[employer]ufang  Start Time:  9:00 am   End Time:  9:39 am     Medication Therapy Recommendations  No medication therapy recommendations to display       Medication Therapy Management (MTM) Encounter    ASSESSMENT:                            Medication Adherence/Access: No issues identified    Crohn's Disease:    Amber would benefit from continued treatment with Skyrizi (Risankizumab) every 8 weeks.  She is past due on routine maintenance labs, will schedule lab appointment.  She is up  to date on annual tuberculosis screening.  No access issues for her advanced therapy are present.  She is not currently indicated for any vaccinations but will continue with annual vaccination and consider Shingrix for future due to having high VZV antibodies currently.  She is uncertain if getting adequate calcium from diet, provided calcium content information to help guide if calcium supplementation is needed.  She is not indicated for a DEXA scan at this time and I recommend continued monitoring for need. Reminders for routine cancer screening were provided, she would like a referral for dermatology.  Informed Amber Dr. Marlys Jacobs will be returning to clinic mid November, she has appointment scheduled 11/19/24 which may need to be adjusted.     PLAN:                            Amber to consider the following vaccines:    - Shingrix (Shingles), 2 dose series, could consider for future  - Flu / Covid, annually    You are past due for routine quarterly labs, please schedule lab appointment at your earliest convenience     Refills for Skyrizi (Risankizumab) has been sent to [facility]     Appointment with Dr. Marlys Jacobs MD 11/19/24, may need to be adjusted due to her leave through mid November    It is recommended to get 1,000 - 1,200 mg of Calcium per day from all sources, if you are unable to achieve this from diet alone then supplementation is appropriate.  Recommend taking 500 or 600 mg tablet up to twice daily to achieve daily Calcium goal.    - You may review this reference of Calcium content in food for guidance:  https://www.dietaryguidelines.gov/food-sources-calcium or the International Osteoporosis Foundation has a great chart as well!    I've placed a referral to dermatology for a routine annual skin check. If you don't hear from a  in 2-3 business days, you can call [phone] to schedule an appointment.    Follow-up: 10/29/24 9:30 am 6 month  MTM    SUBJECTIVE/OBJECTIVE:                          Amber Buckner is a 38 year old female contacted via secure video for an initial visit. She was referred to me from Dr. Marlys Jacobs MD.      Reason for visit: Skyrizi (Risankizumab) maintenance, Annual IBD Health Maintenance Review     Allergies/ADRs: Reviewed in chart  Past Medical History: Reviewed in chart  Tobacco: She reports that she has never smoked. She has never been exposed to tobacco smoke. She has never used smokeless tobacco.  Alcohol: 1-3 beverages / week 1 barb 3x per week     Medication Adherence/Access: no issues reported  Getting medication going well, no issues.     Crohn's Disease:    - Skyrizi (Risankizumab) every 8 weeks   Original start date:  4/2023  Last dose:  4/24/24   Next dose: 6/19/24  - [facility]     - Skyrizi (Risankizumab) going well, not much to report, does get fatigued for about a  week after giving dose, however, doesn't impact life and abillity to do normal life tasks. States overall her pouch discomfort has improved with Skyrizi.  The course of Cipro in December helped short term, but symptoms have historically flucutated based on diet, stress, and alcohol intake.  Sleep has improved but gets tired after taking Skyrizi dose, but sleep overall is better than it has been.  Currently having 1-4 BM/ night, previously was 5 - 8.  Since being on Skyrizi her overall symptoms have been best she has had yet, feels could be better if she made lifestyle changes like no caffeine, but is not ready to give that up yet.  She has yet to meet with nutritionist but is still interested in this for future.     GI symptoms:  Stool frequency (baseline 8-10): 8-10  Rectal bleeding: none  Blood in stool:  none  Abdominal pain:  none  Urgency:  none    Lab Results   Component Value Date    CALPRF 43.7 11/03/2022    CRPI <3.00 01/08/2024      PRO-3 for Crohn's Disease    Please select the one best answer for the patient's ability at  this time     Over the past week, how many liquid or soft stools have you had on average per day?   18 (When scoring, multiply number by 2)   Over the past week, please rate your average abdominal pain  None : 0 points  3. Over the past week, please rate your general well-being    Generally Well: 0 points    Score: 18  <13: Remission  13-21: Mild Activity   22-52: Moderate Activity  >/= 53: Severe Activity      Last provider visit:  12/27/23 Dr. Marlys Jacobs MD   Next provider visit:  11/19/24 Dr. Marlys Jacobs MD  Last labs completed:  1/8/24  Lab frequency: every 3 months   - standing labs thru 12/28/24 (CBC w/diff, CRP, CMP)   Next labs due: 4/8/24 past due   Last TB: 11/10/2023    Last IBD Health Maintenance Review: 3/2/23, 5/7/24  PDC: 100%      IBD Health Maintenance    Vaccinations:  All patients on biologics should avoid live vaccines unless specifically indicated.    -- Influenza (every year)  last 9/2023  -- TdaP (every 10 years) last 2017  -- Pneumococcal Pneumonia    Prevnar-13: not on file    Pneumovax-23: not on file    Prevnar-20: 8/2023  -- COVID-19 last 10/2023    One time confirmation of immunity or serologies:  -- Hepatitis A (serologies or immunizations) vaccine x3   -- Hepatitis B (serologies or immunizations) insufficient immunity per 11/2022 serology, however, received 3 doses Hep A/B, 8/2023, 10/2023, and 2/2024  -- Varicella/Zoster    Varicella confirmed chickenpox infection as a child twice   Zoster not on file, recent VZV rg test was 800, >135 is immune, will consider shingrix for future   -- MMR vaccine x1   -- Meningococcal meningitis (all patients at risk for meningitis)--  not on file     Due to the immunosuppression in this patient, I would not advise administration of live vaccines such as varicella/VZV, intranasal influenza, MMR, or yellow fever vaccine (if traveling).      Immunosuppressive Screening:  -- Hep B Surface Antibody non-reactive   -- Hep B Surface Antigen  non-reactive   -- Hep B Core Antibody non-reactive   -- Hep C Antibody non-reactive   -- Yearly assessment of TB Negative    Lab Results   Component Value Date    AUSAB 0.00 11/03/2022    HEPBANG Nonreactive 11/03/2022    HBCAB Nonreactive 11/03/2022    HCVAB Nonreactive 08/31/2021    TBRES Negative 11/10/2023     Bone mineral density screening   -- Recommend all patients supplement with calcium and vitamin D   - unsure getting adequate calcium from diet  -- Given minimal prior steroid use recommend continued monitoring need for DEXA     - has not been on prednisone in 20 years     Cancer Screening:  Colon cancer screening:  S/P RPC/IPAA in 2002     Cervical cancer screening: Per OBGYN, last in 2023    Skin cancer screening: Annual visual exam of skin by dermatologist since patient is immunocompromised, haven't seen dermatology in 5 years, would like referral    Depression Screening:    PHQ-2 Score:         12/27/2023     3:17 PM 6/27/2023     3:31 PM   PHQ-2 ( 1999 Pfizer)   Q1: Little interest or pleasure in doing things 0 0   Q2: Feeling down, depressed or hopeless 0 0   PHQ-2 Score 0 0     Research:  Are you interested in being contacted about enrollment in clinical research studies? Yes    Would you like to receive a quarterly newsletter on research via email. YES [email or web address]       Misc:  -- Avoid tobacco use  -- Avoid NSAIDs as there is potentially a 25% chance of causing an IBD flare    ----------------    I spent 39 minutes with this patient today. All changes were made via collaborative practice agreement with Marlys Jacobs. A copy of the visit note was provided to the patient's provider(s).    A summary of these recommendations was sent via [employer].    Celsa Edwards, PharmD    Medication Therapy Management Pharmacist  [facility]   [phone]    Telemedicine Visit Details  Type of service:  Video Conference via Huoli  Start Time:  9:00 am   End Time:  9:39 am      Medication Therapy Recommendations  No medication therapy recommendations to display         Again, thank you for allowing me to participate in the care of your patient.        Sincerely,        Celsa Edwards RPH

## 2024-05-07 NOTE — PATIENT INSTRUCTIONS
"Recommendations from today's MTM visit:                                                      Amber to consider the following vaccines:    - Shingrix (Shingles), 2 dose series, could consider for future  - Flu / Covid, annually    You are past due for routine quarterly labs, please schedule lab appointment at your earliest convenience     Refills for Skyrizi (Risankizumab) has been sent to [facility]     Appointment with Dr. Marlys Jacobs MD 11/19/24, may need to be adjusted due to her leave through mid November    It is recommended to get 1,000 - 1,200 mg of Calcium per day from all sources, if you are unable to achieve this from diet alone then supplementation is appropriate.  Recommend taking 500 or 600 mg tablet up to twice daily to achieve daily Calcium goal.    - You may review this reference of Calcium content in food for guidance:  https://www.dietaryguidelines.gov/food-sources-calcium or the International Osteoporosis Foundation has a great chart as well!    I've placed a referral to dermatology for a routine annual skin check. If you don't hear from a  in 2-3 business days, you can call [phone] to schedule an appointment.    Follow-up: 10/29/24 9:30 am 6 month MTM    It was great speaking with you today.  I value your experience and would be very thankful for your time in providing feedback in our clinic survey. In the next few days, you may receive an email or text message from u.[employer] with a link to a survey related to your  clinical pharmacist.\"     To schedule another MT appointment, please call the clinic directly or you may call the [facility] scheduling line at [phone] or toll-free at [phone].     My Clinical Pharmacist's contact information:                                                      Please feel free to contact me with any questions or concerns you have.      Celsa Edwards, PharmD    Medication Therapy Management Pharmacist  [facility] " [facility]   [phone]

## 2024-05-17 ENCOUNTER — LAB (OUTPATIENT)
Dept: LAB | Facility: CLINIC | Age: 39
End: 2024-05-17
Payer: COMMERCIAL

## 2024-05-17 ENCOUNTER — TELEPHONE (OUTPATIENT)
Dept: GASTROENTEROLOGY | Facility: CLINIC | Age: 39
End: 2024-05-17

## 2024-05-17 DIAGNOSIS — K50.00 CROHN'S DISEASE OF SMALL INTESTINE WITHOUT COMPLICATION (H): ICD-10-CM

## 2024-05-17 DIAGNOSIS — K50.90 CROHN'S DISEASE (H): ICD-10-CM

## 2024-05-17 DIAGNOSIS — K50.00 CROHN'S DISEASE OF SMALL INTESTINE WITHOUT COMPLICATION (H): Primary | ICD-10-CM

## 2024-05-17 LAB
ALBUMIN SERPL BCG-MCNC: 4.6 G/DL (ref 3.5–5.2)
ALP SERPL-CCNC: 48 U/L (ref 40–150)
ALT SERPL W P-5'-P-CCNC: 8 U/L (ref 0–50)
ANION GAP SERPL CALCULATED.3IONS-SCNC: 12 MMOL/L (ref 7–15)
AST SERPL W P-5'-P-CCNC: 16 U/L (ref 0–45)
BASOPHILS # BLD AUTO: 0 10E3/UL (ref 0–0.2)
BASOPHILS NFR BLD AUTO: 1 %
BILIRUB SERPL-MCNC: 0.3 MG/DL
BUN SERPL-MCNC: 10.7 MG/DL (ref 6–20)
CALCIUM SERPL-MCNC: 9.6 MG/DL (ref 8.6–10)
CHLORIDE SERPL-SCNC: 104 MMOL/L (ref 98–107)
CREAT SERPL-MCNC: 0.64 MG/DL (ref 0.51–0.95)
CRP SERPL-MCNC: 3 MG/L
DEPRECATED HCO3 PLAS-SCNC: 21 MMOL/L (ref 22–29)
EGFRCR SERPLBLD CKD-EPI 2021: >90 ML/MIN/1.73M2
EOSINOPHIL # BLD AUTO: 0.1 10E3/UL (ref 0–0.7)
EOSINOPHIL NFR BLD AUTO: 2 %
ERYTHROCYTE [DISTWIDTH] IN BLOOD BY AUTOMATED COUNT: 13.2 % (ref 10–15)
GLUCOSE SERPL-MCNC: 97 MG/DL (ref 70–99)
HCT VFR BLD AUTO: 38.9 % (ref 35–47)
HGB BLD-MCNC: 12.9 G/DL (ref 11.7–15.7)
IMM GRANULOCYTES # BLD: 0 10E3/UL
IMM GRANULOCYTES NFR BLD: 0 %
LYMPHOCYTES # BLD AUTO: 2.1 10E3/UL (ref 0.8–5.3)
LYMPHOCYTES NFR BLD AUTO: 37 %
MCH RBC QN AUTO: 34.3 PG (ref 26.5–33)
MCHC RBC AUTO-ENTMCNC: 33.2 G/DL (ref 31.5–36.5)
MCV RBC AUTO: 104 FL (ref 78–100)
MONOCYTES # BLD AUTO: 0.6 10E3/UL (ref 0–1.3)
MONOCYTES NFR BLD AUTO: 11 %
NEUTROPHILS # BLD AUTO: 2.8 10E3/UL (ref 1.6–8.3)
NEUTROPHILS NFR BLD AUTO: 50 %
PLATELET # BLD AUTO: 294 10E3/UL (ref 150–450)
POTASSIUM SERPL-SCNC: 4.5 MMOL/L (ref 3.4–5.3)
PROT SERPL-MCNC: 7.8 G/DL (ref 6.4–8.3)
RBC # BLD AUTO: 3.76 10E6/UL (ref 3.8–5.2)
SODIUM SERPL-SCNC: 137 MMOL/L (ref 135–145)
VIT B12 SERPL-MCNC: 419 PG/ML (ref 232–1245)
WBC # BLD AUTO: 5.7 10E3/UL (ref 4–11)

## 2024-05-17 PROCEDURE — 86140 C-REACTIVE PROTEIN: CPT

## 2024-05-17 PROCEDURE — 85025 COMPLETE CBC W/AUTO DIFF WBC: CPT

## 2024-05-17 PROCEDURE — 36415 COLL VENOUS BLD VENIPUNCTURE: CPT

## 2024-05-17 PROCEDURE — 82607 VITAMIN B-12: CPT

## 2024-05-17 PROCEDURE — 80053 COMPREHEN METABOLIC PANEL: CPT

## 2024-05-17 NOTE — TELEPHONE ENCOUNTER
----- Message from Marlys Jacobs MD sent at 5/17/2024  4:00 PM CDT -----  Hi L,    Do you mind adding a Vitamin B12 level check?    Thanks,  E

## 2024-06-12 ENCOUNTER — TELEPHONE (OUTPATIENT)
Dept: GASTROENTEROLOGY | Facility: CLINIC | Age: 39
End: 2024-06-12

## 2024-06-12 NOTE — TELEPHONE ENCOUNTER
PA Needed    Medication: SKYRIZI  QTY/DS: 2.4ML PER 28 DAYS   NEW INS: NO  Insurance Company:  [employer]  Pharmacy Filling the Rx:  [facility]    Date of last fill: 24

## 2024-06-13 NOTE — TELEPHONE ENCOUNTER
Prior Authorization Not Needed per Insurance    Medication: SKYRIZI 360 MG/2.4ML SC SOCT  Insurance Company: [employer] - Phone [phone] Fax [phone]  Expected CoPay: $    Pharmacy Filling the Rx: [facility] - [place], [place] - [address]  Pharmacy Notified:  yes  Patient Notified:  yes  Pharmacy was running it for q28d dosing instead of the q56d dosing

## 2024-06-25 ENCOUNTER — ANCILLARY PROCEDURE (OUTPATIENT)
Dept: MAMMOGRAPHY | Facility: CLINIC | Age: 39
End: 2024-06-25
Attending: INTERNAL MEDICINE
Payer: COMMERCIAL

## 2024-06-25 DIAGNOSIS — Z12.31 VISIT FOR SCREENING MAMMOGRAM: ICD-10-CM

## 2024-06-25 PROCEDURE — 77067 SCR MAMMO BI INCL CAD: CPT | Mod: TC | Performed by: RADIOLOGY

## 2024-06-25 PROCEDURE — 77063 BREAST TOMOSYNTHESIS BI: CPT | Mod: TC | Performed by: RADIOLOGY

## 2024-07-22 ENCOUNTER — TELEPHONE (OUTPATIENT)
Dept: GASTROENTEROLOGY | Facility: CLINIC | Age: 39
End: 2024-07-22
Payer: COMMERCIAL

## 2024-07-22 NOTE — TELEPHONE ENCOUNTER
Prior Authorization Approval    Medication: SKYRIZI 150 MG/ML SC SOSY  Authorization Effective Date: 7/22/2024  Authorization Expiration Date: 7/22/2025  Approved Dose/Quantity: every 56 d  Reference #: [id number]   Insurance Company: [employer] - Phone [phone] Fax [phone]  Expected CoPay: $    Which Pharmacy is filling the prescription: [facility] - [place], [place] - [address]

## 2024-07-23 ENCOUNTER — TELEPHONE (OUTPATIENT)
Dept: GASTROENTEROLOGY | Facility: CLINIC | Age: 39
End: 2024-07-23
Payer: COMMERCIAL

## 2024-07-23 NOTE — TELEPHONE ENCOUNTER
Prior Authorization Approval    Medication: SKYRIZI 360 MG/2.4ML SC SOCT  Authorization Effective Date: 7/22/2024  Authorization Expiration Date: 7/22/2025  Approved Dose/Quantity: 2.4/56  Reference #:     Insurance Company: [employer] - Phone [phone] Fax [phone]  Expected CoPay: $    CoPay Card Available:      Financial Assistance Needed:    Which Pharmacy is filling the prescription: [facility] - [place], [place] - [address]  Pharmacy Notified:    Patient Notified:

## 2024-07-25 ENCOUNTER — APPOINTMENT (OUTPATIENT)
Dept: CT IMAGING | Facility: CLINIC | Age: 39
DRG: 387 | End: 2024-07-25
Attending: EMERGENCY MEDICINE
Payer: COMMERCIAL

## 2024-07-25 ENCOUNTER — HOSPITAL ENCOUNTER (INPATIENT)
Facility: CLINIC | Age: 39
LOS: 1 days | Discharge: HOME | DRG: 387 | End: 2024-07-26
Attending: EMERGENCY MEDICINE | Admitting: INTERNAL MEDICINE
Payer: COMMERCIAL

## 2024-07-25 DIAGNOSIS — K56.609 SBO (SMALL BOWEL OBSTRUCTION) (H): ICD-10-CM

## 2024-07-25 LAB
ALBUMIN SERPL BCG-MCNC: 4.8 G/DL (ref 3.5–5.2)
ALP SERPL-CCNC: 53 U/L (ref 40–150)
ALT SERPL W P-5'-P-CCNC: 11 U/L (ref 0–50)
ANION GAP SERPL CALCULATED.3IONS-SCNC: 13 MMOL/L (ref 7–15)
AST SERPL W P-5'-P-CCNC: 23 U/L (ref 0–45)
BASOPHILS # BLD AUTO: 0 10E3/UL (ref 0–0.2)
BASOPHILS NFR BLD AUTO: 0 %
BILIRUB SERPL-MCNC: 0.5 MG/DL
BUN SERPL-MCNC: 12.7 MG/DL (ref 6–20)
CALCIUM SERPL-MCNC: 9.8 MG/DL (ref 8.8–10.4)
CHLORIDE SERPL-SCNC: 104 MMOL/L (ref 98–107)
CREAT SERPL-MCNC: 0.59 MG/DL (ref 0.51–0.95)
CRP SERPL-MCNC: <3 MG/L
EGFRCR SERPLBLD CKD-EPI 2021: >90 ML/MIN/1.73M2
EOSINOPHIL # BLD AUTO: 0.1 10E3/UL (ref 0–0.7)
EOSINOPHIL NFR BLD AUTO: 2 %
ERYTHROCYTE [DISTWIDTH] IN BLOOD BY AUTOMATED COUNT: 12.3 % (ref 10–15)
GLUCOSE SERPL-MCNC: 103 MG/DL (ref 70–99)
HCG INTACT+B SERPL-ACNC: <1 MIU/ML
HCO3 SERPL-SCNC: 21 MMOL/L (ref 22–29)
HCT VFR BLD AUTO: 40.9 % (ref 35–47)
HGB BLD-MCNC: 13.9 G/DL (ref 11.7–15.7)
IMM GRANULOCYTES # BLD: 0 10E3/UL
IMM GRANULOCYTES NFR BLD: 0 %
LACTATE SERPL-SCNC: 0.7 MMOL/L (ref 0.7–2)
LIPASE SERPL-CCNC: 23 U/L (ref 13–60)
LYMPHOCYTES # BLD AUTO: 1.8 10E3/UL (ref 0.8–5.3)
LYMPHOCYTES NFR BLD AUTO: 19 %
MCH RBC QN AUTO: 33.8 PG (ref 26.5–33)
MCHC RBC AUTO-ENTMCNC: 34 G/DL (ref 31.5–36.5)
MCV RBC AUTO: 100 FL (ref 78–100)
MONOCYTES # BLD AUTO: 0.6 10E3/UL (ref 0–1.3)
MONOCYTES NFR BLD AUTO: 6 %
NEUTROPHILS # BLD AUTO: 6.8 10E3/UL (ref 1.6–8.3)
NEUTROPHILS NFR BLD AUTO: 72 %
NRBC # BLD AUTO: 0 10E3/UL
NRBC BLD AUTO-RTO: 0 /100
PLATELET # BLD AUTO: 310 10E3/UL (ref 150–450)
POTASSIUM SERPL-SCNC: 4.5 MMOL/L (ref 3.4–5.3)
PROT SERPL-MCNC: 8.3 G/DL (ref 6.4–8.3)
RBC # BLD AUTO: 4.11 10E6/UL (ref 3.8–5.2)
SODIUM SERPL-SCNC: 138 MMOL/L (ref 135–145)
WBC # BLD AUTO: 9.3 10E3/UL (ref 4–11)

## 2024-07-25 PROCEDURE — 258N000003 HC RX IP 258 OP 636: Performed by: EMERGENCY MEDICINE

## 2024-07-25 PROCEDURE — 99285 EMERGENCY DEPT VISIT HI MDM: CPT | Mod: 25

## 2024-07-25 PROCEDURE — 85025 COMPLETE CBC W/AUTO DIFF WBC: CPT | Performed by: EMERGENCY MEDICINE

## 2024-07-25 PROCEDURE — 83605 ASSAY OF LACTIC ACID: CPT | Performed by: EMERGENCY MEDICINE

## 2024-07-25 PROCEDURE — 96375 TX/PRO/DX INJ NEW DRUG ADDON: CPT

## 2024-07-25 PROCEDURE — 250N000011 HC RX IP 250 OP 636: Performed by: EMERGENCY MEDICINE

## 2024-07-25 PROCEDURE — 258N000003 HC RX IP 258 OP 636: Performed by: STUDENT IN AN ORGANIZED HEALTH CARE EDUCATION/TRAINING PROGRAM

## 2024-07-25 PROCEDURE — 250N000011 HC RX IP 250 OP 636: Performed by: STUDENT IN AN ORGANIZED HEALTH CARE EDUCATION/TRAINING PROGRAM

## 2024-07-25 PROCEDURE — 120N000001 HC R&B MED SURG/OB

## 2024-07-25 PROCEDURE — 82040 ASSAY OF SERUM ALBUMIN: CPT | Performed by: EMERGENCY MEDICINE

## 2024-07-25 PROCEDURE — 96376 TX/PRO/DX INJ SAME DRUG ADON: CPT

## 2024-07-25 PROCEDURE — 83690 ASSAY OF LIPASE: CPT | Performed by: EMERGENCY MEDICINE

## 2024-07-25 PROCEDURE — 96361 HYDRATE IV INFUSION ADD-ON: CPT

## 2024-07-25 PROCEDURE — 99222 1ST HOSP IP/OBS MODERATE 55: CPT | Performed by: PHYSICIAN ASSISTANT

## 2024-07-25 PROCEDURE — 84702 CHORIONIC GONADOTROPIN TEST: CPT | Performed by: EMERGENCY MEDICINE

## 2024-07-25 PROCEDURE — 74177 CT ABD & PELVIS W/CONTRAST: CPT

## 2024-07-25 PROCEDURE — S5010 5% DEXTROSE AND 0.45% SALINE: HCPCS | Performed by: STUDENT IN AN ORGANIZED HEALTH CARE EDUCATION/TRAINING PROGRAM

## 2024-07-25 PROCEDURE — 96374 THER/PROPH/DIAG INJ IV PUSH: CPT | Mod: 59

## 2024-07-25 PROCEDURE — 99222 1ST HOSP IP/OBS MODERATE 55: CPT | Performed by: STUDENT IN AN ORGANIZED HEALTH CARE EDUCATION/TRAINING PROGRAM

## 2024-07-25 PROCEDURE — 250N000009 HC RX 250: Performed by: EMERGENCY MEDICINE

## 2024-07-25 PROCEDURE — 86140 C-REACTIVE PROTEIN: CPT | Performed by: PHYSICIAN ASSISTANT

## 2024-07-25 PROCEDURE — 36415 COLL VENOUS BLD VENIPUNCTURE: CPT | Performed by: EMERGENCY MEDICINE

## 2024-07-25 RX ORDER — NALOXONE HYDROCHLORIDE 0.4 MG/ML
0.4 INJECTION, SOLUTION INTRAMUSCULAR; INTRAVENOUS; SUBCUTANEOUS
Status: DISCONTINUED | OUTPATIENT
Start: 2024-07-25 | End: 2024-07-26 | Stop reason: HOSPADM

## 2024-07-25 RX ORDER — IOPAMIDOL 755 MG/ML
70 INJECTION, SOLUTION INTRAVASCULAR ONCE
Status: COMPLETED | OUTPATIENT
Start: 2024-07-25 | End: 2024-07-25

## 2024-07-25 RX ORDER — LIDOCAINE 40 MG/G
CREAM TOPICAL
Status: DISCONTINUED | OUTPATIENT
Start: 2024-07-25 | End: 2024-07-26 | Stop reason: HOSPADM

## 2024-07-25 RX ORDER — HYDROMORPHONE HCL IN WATER/PF 6 MG/30 ML
0.2 PATIENT CONTROLLED ANALGESIA SYRINGE INTRAVENOUS
Status: DISCONTINUED | OUTPATIENT
Start: 2024-07-25 | End: 2024-07-26 | Stop reason: HOSPADM

## 2024-07-25 RX ORDER — AMOXICILLIN 250 MG
2 CAPSULE ORAL 2 TIMES DAILY PRN
Status: DISCONTINUED | OUTPATIENT
Start: 2024-07-25 | End: 2024-07-26 | Stop reason: HOSPADM

## 2024-07-25 RX ORDER — PROCHLORPERAZINE MALEATE 10 MG
10 TABLET ORAL EVERY 6 HOURS PRN
Status: DISCONTINUED | OUTPATIENT
Start: 2024-07-25 | End: 2024-07-26 | Stop reason: HOSPADM

## 2024-07-25 RX ORDER — PROCHLORPERAZINE 25 MG
25 SUPPOSITORY, RECTAL RECTAL EVERY 12 HOURS PRN
Status: DISCONTINUED | OUTPATIENT
Start: 2024-07-25 | End: 2024-07-26 | Stop reason: HOSPADM

## 2024-07-25 RX ORDER — CALCIUM CARBONATE 500 MG/1
1000 TABLET, CHEWABLE ORAL 4 TIMES DAILY PRN
Status: DISCONTINUED | OUTPATIENT
Start: 2024-07-25 | End: 2024-07-26 | Stop reason: HOSPADM

## 2024-07-25 RX ORDER — NALOXONE HYDROCHLORIDE 0.4 MG/ML
0.2 INJECTION, SOLUTION INTRAMUSCULAR; INTRAVENOUS; SUBCUTANEOUS
Status: DISCONTINUED | OUTPATIENT
Start: 2024-07-25 | End: 2024-07-26 | Stop reason: HOSPADM

## 2024-07-25 RX ORDER — ONDANSETRON 2 MG/ML
4 INJECTION INTRAMUSCULAR; INTRAVENOUS EVERY 30 MIN PRN
Status: DISCONTINUED | OUTPATIENT
Start: 2024-07-25 | End: 2024-07-25

## 2024-07-25 RX ORDER — ACETAMINOPHEN 500 MG
1000 TABLET ORAL PRN
COMMUNITY

## 2024-07-25 RX ORDER — DEXTROSE MONOHYDRATE, SODIUM CHLORIDE, AND POTASSIUM CHLORIDE 50; .745; 4.5 G/1000ML; G/1000ML; G/1000ML
INJECTION, SOLUTION INTRAVENOUS CONTINUOUS
Status: DISCONTINUED | OUTPATIENT
Start: 2024-07-25 | End: 2024-07-25

## 2024-07-25 RX ORDER — AMOXICILLIN 250 MG
1 CAPSULE ORAL 2 TIMES DAILY PRN
Status: DISCONTINUED | OUTPATIENT
Start: 2024-07-25 | End: 2024-07-26 | Stop reason: HOSPADM

## 2024-07-25 RX ORDER — CITALOPRAM HYDROBROMIDE 10 MG/1
10 TABLET ORAL DAILY
Status: CANCELLED | OUTPATIENT
Start: 2024-07-25

## 2024-07-25 RX ORDER — SALIVA STIMULANT COMB. NO.3
1 SPRAY, NON-AEROSOL (ML) MUCOUS MEMBRANE 4 TIMES DAILY PRN
Status: DISCONTINUED | OUTPATIENT
Start: 2024-07-25 | End: 2024-07-26 | Stop reason: HOSPADM

## 2024-07-25 RX ORDER — ONDANSETRON 4 MG/1
4 TABLET, ORALLY DISINTEGRATING ORAL EVERY 6 HOURS PRN
Status: DISCONTINUED | OUTPATIENT
Start: 2024-07-25 | End: 2024-07-26 | Stop reason: HOSPADM

## 2024-07-25 RX ORDER — HYDROMORPHONE HYDROCHLORIDE 1 MG/ML
0.5 INJECTION, SOLUTION INTRAMUSCULAR; INTRAVENOUS; SUBCUTANEOUS EVERY 30 MIN PRN
Status: COMPLETED | OUTPATIENT
Start: 2024-07-25 | End: 2024-07-25

## 2024-07-25 RX ORDER — ONDANSETRON 2 MG/ML
4 INJECTION INTRAMUSCULAR; INTRAVENOUS EVERY 6 HOURS PRN
Status: DISCONTINUED | OUTPATIENT
Start: 2024-07-25 | End: 2024-07-26 | Stop reason: HOSPADM

## 2024-07-25 RX ORDER — SALIVA STIMULANT COMB. NO.3
1 SPRAY, NON-AEROSOL (ML) MUCOUS MEMBRANE 4 TIMES DAILY
Status: DISCONTINUED | OUTPATIENT
Start: 2024-07-25 | End: 2024-07-25

## 2024-07-25 RX ORDER — HYDROMORPHONE HCL IN WATER/PF 6 MG/30 ML
0.4 PATIENT CONTROLLED ANALGESIA SYRINGE INTRAVENOUS
Status: DISCONTINUED | OUTPATIENT
Start: 2024-07-25 | End: 2024-07-26 | Stop reason: HOSPADM

## 2024-07-25 RX ADMIN — ONDANSETRON 4 MG: 2 INJECTION INTRAMUSCULAR; INTRAVENOUS at 14:38

## 2024-07-25 RX ADMIN — ONDANSETRON 4 MG: 2 INJECTION INTRAMUSCULAR; INTRAVENOUS at 05:43

## 2024-07-25 RX ADMIN — SODIUM CHLORIDE, POTASSIUM CHLORIDE, SODIUM LACTATE AND CALCIUM CHLORIDE 1000 ML: 600; 310; 30; 20 INJECTION, SOLUTION INTRAVENOUS at 06:41

## 2024-07-25 RX ADMIN — SODIUM CHLORIDE 60 ML: 9 INJECTION, SOLUTION INTRAVENOUS at 05:52

## 2024-07-25 RX ADMIN — IOPAMIDOL 70 ML: 755 INJECTION, SOLUTION INTRAVENOUS at 05:52

## 2024-07-25 RX ADMIN — HYDROMORPHONE HYDROCHLORIDE 0.5 MG: 1 INJECTION, SOLUTION INTRAMUSCULAR; INTRAVENOUS; SUBCUTANEOUS at 08:29

## 2024-07-25 RX ADMIN — POTASSIUM CHLORIDE: 2 INJECTION, SOLUTION, CONCENTRATE INTRAVENOUS at 22:53

## 2024-07-25 RX ADMIN — ONDANSETRON 4 MG: 2 INJECTION INTRAMUSCULAR; INTRAVENOUS at 08:40

## 2024-07-25 RX ADMIN — SODIUM CHLORIDE 1000 ML: 9 INJECTION, SOLUTION INTRAVENOUS at 04:31

## 2024-07-25 RX ADMIN — HYDROMORPHONE HYDROCHLORIDE 0.5 MG: 1 INJECTION, SOLUTION INTRAMUSCULAR; INTRAVENOUS; SUBCUTANEOUS at 04:27

## 2024-07-25 RX ADMIN — POTASSIUM CHLORIDE: 2 INJECTION, SOLUTION, CONCENTRATE INTRAVENOUS at 10:13

## 2024-07-25 RX ADMIN — HYDROMORPHONE HYDROCHLORIDE 0.5 MG: 1 INJECTION, SOLUTION INTRAMUSCULAR; INTRAVENOUS; SUBCUTANEOUS at 07:03

## 2024-07-25 ASSESSMENT — COLUMBIA-SUICIDE SEVERITY RATING SCALE - C-SSRS
6. HAVE YOU EVER DONE ANYTHING, STARTED TO DO ANYTHING, OR PREPARED TO DO ANYTHING TO END YOUR LIFE?: NO
1. IN THE PAST MONTH, HAVE YOU WISHED YOU WERE DEAD OR WISHED YOU COULD GO TO SLEEP AND NOT WAKE UP?: NO
2. HAVE YOU ACTUALLY HAD ANY THOUGHTS OF KILLING YOURSELF IN THE PAST MONTH?: NO

## 2024-07-25 ASSESSMENT — ACTIVITIES OF DAILY LIVING (ADL)
ADLS_ACUITY_SCORE: 37
ADLS_ACUITY_SCORE: 20
ADLS_ACUITY_SCORE: 35
ADLS_ACUITY_SCORE: 37
ADLS_ACUITY_SCORE: 37
ADLS_ACUITY_SCORE: 20
ADLS_ACUITY_SCORE: 37
ADLS_ACUITY_SCORE: 37
ADLS_ACUITY_SCORE: 20
ADLS_ACUITY_SCORE: 37
ADLS_ACUITY_SCORE: 20
ADLS_ACUITY_SCORE: 37
ADLS_ACUITY_SCORE: 20

## 2024-07-25 NOTE — CONSULTS
"[facility]  Colon and Rectal Surgery Consult Note  Name: Amber Buckner    MRN: [id number]  YOB: 1985    Age: 39 year old  Date of admission: 2024  Primary care provider: Cinthia Charles     Requesting Physician:  Mattie Cano PA-C  Reason for consult:  SBO           History of Present Illness:   Amber Buckner is a 39 year old female with a h/o UC s/p TPC with IPAA in , more recent diagnosis of Crohn's disease and Skyrizi, previous SBOs, seen at the request of Mattie Cano PA-C, who presents with abdominal pain.  Yesterday she developed abdominal pain and nausea that felt similar to her previous obstructions.  She stopped passing gas and stool yesterday when she normally has multiple bowel movements a day.  She presented to the ED overnight for evaluation.  Vitals were normal.  Labs were overall unremarkable.  CT abdomen/pelvis was done which showed a small bowel obstruction with a transition point at the small bowel anastomosis and fecalization proximal to this.  She was admitted for further evaluation and management.    Amber reports that she is feeling better currently.  She did vomit in the ED and again after getting up to the floor recently, however following that she went to the bathroom and passed some gas and a \"very substantial\" amount of stool.  Following that bowel movement her abdominal pain and nausea are significantly improved.  She was diagnosed with Crohn's disease based on pouchoscopy findings/biopsy results in .  She has been on Skyrizi since then and reports her symptoms have been under good control.  Her previous SBOs (most recently 10/2022) have resolved with conservative management. The transition point in  was also at the small bowel anastomosis.      Colonoscopy History: Most recent pouchoscopy was 10/2023, multiple ulcers noted    Past abdominal surgery: TPC with IPAA ,  x 3             Past Medical History: "     Past Medical History:   Diagnosis Date    Adolescent depression in college    Anxiety     Depressive disorder 2002    Treated witj celexa    Fit/adjust GI carole-device NEC     Perineal abscess     PONV (postoperative nausea and vomiting)     S/P total colectomy     Ulcerative colitis              Past Surgical History:     Past Surgical History:   Procedure Laterality Date     SECTION  2012    Procedure: SECTION; Surgeon:JAVI BELL; Location:[facility]     SECTION N/A 2014    Procedure:  SECTION;  Surgeon: Shawanda Luna MD;  Location: [facility] L[facility]     SECTION N/A 2017    Procedure:  SECTION;  Repeat  Section ;  Surgeon: Laurie Montoya MD;  Location: [facility]    COLECTOMY SUBTOTAL      COLONOSCOPY N/A 4/15/2015    Procedure: COLONOSCOPY;  Surgeon: Al Santo MD;  Location: [facility] OR    EXAM UNDER ANESTHESIA ANUS N/A 4/15/2015    Procedure: EXAM UNDER ANESTHESIA ANUS;  Surgeon: Al Santo MD;  Location: [facility] OR    FISTULOTOMY RECTUM N/A 4/15/2015    Procedure: FISTULOTOMY RECTUM;  Surgeon: Al Santo MD;  Location: [facility] OR    POUCHOSCOPY N/A 2023    Procedure: ENDOSCOPY, POUCH, DIAGNOSTIC WITH BIOPSIES;  Surgeon: Marlys Jacobs MD;  Location: [facility] OR    POUCHOSCOPY N/A 10/16/2023    Procedure: POUCHOSCOPY WITH BIOPSY;  Surgeon: Marlys Jacobs MD;  Location: [facility] OR    TAKEDOWN ILEOSTOMY      tonsilectomy      TRANSPERINEAL REPAIR FISTULA RECTAL URETHRAL                 Social History:     Social History     Tobacco Use    Smoking status: Never     Passive exposure: Never    Smokeless tobacco: Never   Substance Use Topics    Alcohol use: Yes             Family History:     Family History   Problem Relation Age of Onset    Diabetes Maternal Grandmother     Diabetes Paternal Grandfather     Genetic Disorder Father         chron's    Gastrointestinal Disease Sister         ulcerative colitis            "  Allergies:   No Known Allergies          Medications:     Current Facility-Administered Medications   Medication Dose Route Frequency Provider Last Rate Last Admin    artificial saliva (BIOTENE MT) solution 1 spray  1 spray Mouth/Throat 4x Daily Berenice Gale DO        sodium chloride (PF) 0.9% PF flush 3 mL  3 mL Intracatheter Q8H Berenice Gale DO                 Review of Systems:   A comprehensive greater than 10 system review of systems was carried out.  Pertinent positives and negatives are noted above.  Otherwise negative for contributory info.            Physical Exam:     Blood pressure 101/68, pulse 75, temperature 97.5  F (36.4  C), temperature source Oral, resp. rate 16, height 1.575 m (5' 2\"), weight 62.6 kg (138 lb), SpO2 98%, not currently breastfeeding.  No intake or output data in the 24 hours ending 07/25/24 1154  Exam:  General - Awake alert and oriented, appears stated age  Pulm - Non-labored breathing with normal respiratory effort  CVS - reg rate and rhythm, no peripheral edema  Abd - soft, minimally tender and distended.  No guarding, rigidity or peritoneal signs. Lower midline scar.  Neuro - CN II-XII grossly intact  Musculoskeletal - extremities with no clubbing, cyanosis or edema; able to ambulate  Psych - responsive, alert, cooperative; oriented x3; appropriate mood and affect  External/skin - inspection reveals no rashes, lesions or ulcers, normal coloring             Data Reviewed:     Recent Results (from the past 24 hour(s))   CT Abdomen Pelvis w Contrast    Narrative    EXAM: CT ABDOMEN PELVIS W CONTRAST  LOCATION: [facility]  DATE: 7/25/2024    INDICATION: abodminal pain, history of bowel obstructions  COMPARISON: 10/24/2022.  TECHNIQUE: CT scan of the abdomen and pelvis was performed following injection of IV contrast. Multiplanar reformats were obtained. Dose reduction techniques were used.  CONTRAST: 70 mL Isovue   370    FINDINGS:   LOWER " CHEST: Mild dependent atelectasis at the lung bases.    HEPATOBILIARY: Normal.    PANCREAS: Normal.    SPLEEN: Normal.    ADRENAL GLANDS: Normal.    KIDNEYS/BLADDER: There is a single 3 mm stone in the lower pole of the right kidney. No ureteral stone or hydronephrosis.    BOWEL: There are multiple dilated small bowel loops with transition point in the mid pelvis at a bowel anastomosis. There is equalization of the small bowel proximal to the transition point. The colon has been resected. No free intraperitoneal gas or   fluid.    LYMPH NODES: Normal.    VASCULATURE: Normal.    PELVIC ORGANS: 1.3 cm collapsing right ovarian follicle.    MUSCULOSKELETAL: Normal.      Impression    IMPRESSION:   1.  Distal small bowel obstruction.       Recent Labs   Lab 07/25/24  0431   WBC 9.3   HGB 13.9   HCT 40.9           Recent Labs   Lab 07/25/24  0431      POTASSIUM 4.5   CHLORIDE 104   CO2 21*   ANIONGAP 13   *   BUN 12.7   CR 0.59   GFRESTIMATED >90   JUDSON 9.8   PROTTOTAL 8.3   ALBUMIN 4.8   BILITOTAL 0.5   ALKPHOS 53   AST 23   ALT 11         Assessment and Plan:   Amber Buckner is a 39 year old female with a history of UC s/p TPC with IPAA in 2001 with a history of UC s/p TPC with IPAA in 2001, more recent diagnosis of Crohn's disease now on Skyrizi, previous SBOs that have resolved with conservative management, who presented with abdominal pain and nausea.  Labs and vitals were unremarkable but CT abdomen/pelvis showed a SBO with a transition point at the small bowel anastomosis and fecalization proximal to this.  She was admitted and we are consulted for further evaluation and management.  She has vomited twice today, however since then has passed some gas and a substantial amount of stool and is feeling significantly better.  Abdominal exam is benign.    No indication for surgical intervention at this time.  Recommend continuing conservative management with bowel rest, IV fluids, and serial  abdominal exams.  If she has recurrent vomiting or worsening nausea this afternoon would recommend NGT placement and she is agreeable to this.  If she does not continue to make progress then we will plan for Gastrografin challenge tomorrow.  The obstruction is at the same location now as it was in 2022, however there is no notable inflammation seen on CT so likely not due to a Crohn's flare.    Plan:  Admit to medicine  Surgery: No indication for urgent surgery at this time.  Diet: NPO  IV Fluids: Continue  Antibiotics:  No indication  Medications:  Per medicine/GI  I&O s:  strict I&O s   Labs:   - Reviewed: Yes  Imaging:   - Dr. Link and myself have personally viewed: CT abd/pelvis  - Gastrografin challenge if not continuing to make progress  Activity:  OOB, ambulate as able  DVT prophylaxis: Per medicine  This plan has been discussed with Dr. Link    Patient specific identified risk factors considered as part of today s evaluation include: prior abdominal surgery, IBD     Additional history obtained from patient, chart review, previous [facility] records.  Time spent on consultation: 40 minutes, greater than 50% spent on counseling and/or coordination of care.      Macario Gillette PA-C  Colorectal Physician Assistant    [facility]  [address]  [place] [address]  T: [phone]  F: [phone]

## 2024-07-25 NOTE — PHARMACY-ADMISSION MEDICATION HISTORY
Pharmacist Admission Medication History    Admission medication history is complete. The information provided in this note is only as accurate as the sources available at the time of the update.    Information Source(s): Patient and CareEverywhere/SureScripts via in-person    Pertinent Information: none    Changes made to PTA medication list:  Added: acetaminophen  Deleted: citalopram (patient stopped this ~2 months ago)  Changed: None    Allergies reviewed with patient and updates made in EHR: no, already reviewed    Medication History Completed By: Alma Delia Song RPH 7/25/2024 8:49 AM    PTA Med List   Medication Sig Last Dose    acetaminophen (TYLENOL) 500 MG tablet Take 1,000 mg by mouth as needed for pain     adapalene (DIFFERIN) 0.1 % external gel Apply topically At Bedtime     SKYRIZI 360 MG/2.4ML SOCT Inject 2.4 mLs (360 mg) Subcutaneous once every eight weeks 7/8/2024    vitamin D3 (CHOLECALCIFEROL) 50 mcg (2000 units) tablet Take 1 tablet by mouth daily

## 2024-07-25 NOTE — ED TRIAGE NOTES
Patient here  with abdominal pain with nausea.her symptoms started yesterday. Hx of bowel obstruction     Triage Assessment (Adult)       Row Name 07/25/24 0343          Triage Assessment    Airway WDL WDL        Respiratory WDL    Respiratory WDL WDL        Skin Circulation/Temperature WDL    Skin Circulation/Temperature WDL WDL        Cardiac WDL    Cardiac WDL WDL        Peripheral/Neurovascular WDL    Peripheral Neurovascular WDL WDL        Cognitive/Neuro/Behavioral WDL    Cognitive/Neuro/Behavioral WDL WDL

## 2024-07-25 NOTE — PROGRESS NOTES
RECEIVING UNIT ED HANDOFF REVIEW    ED Nurse Handoff Report was reviewed by: Bhupinder King RN on July 25, 2024 at 10:47 AM

## 2024-07-25 NOTE — PROGRESS NOTES
History and imaging reviewed. This looks like a problem with Crohn's disease. She had REGINA. We do not treat IBD patients so I will ask you consult the Colon and Rectal team. They have more expertise in this field.

## 2024-07-25 NOTE — CONSULTS
"  Gastroenterology Consultation      Date of Admission:  7/25/2024  Reason for Admission: SBO   Date of Consult  7/25/2024   Requesting Physician:  Bhupinder Coronel*           ASSESSMENT AND RECOMMENDATIONS:   Assessment:  39 year old female with a history of UC (dx 2001) s/p restorative proctocolectomy with ileal pouch anal anastomosis (2002), pouchoscopy 2023 c/x Crohn's disease currently on Skyrizi, recurrent SBOs (managed conservatively) who presents to [facility] on 7/25/2024 with abdominal pain, admitted with small bowel obstruction. GI consulted for \"Crohn's bowel obstruction\"      # Recurrent SBO   # Crohn's disease on Skyrizi  # S/p RPC/IPAA (2002)   Patient presents with abdominal pain and nausea. Diagnosed with UC in 2001 s/p RPC/IPAA in 2002 with relatively recent diagnosis of Crohn's disease on pouchoscopy now on Skyrizi every 8 weeks (last dose 7/8). Follows with Dr. Jacobs in IBD clinic.   On admission, lab eval showed normal electrolytes, Cr 0.59, normal LFTs, lactic acid 0.7, normal lipase, WBC 9.3, hgb 13.9, plts 310. Imaging obtained with CT A/P that showed multiple dilated small bowel loops with transition point in the mid pelvis at a bowel anastomosis consistent with distal small bowel obstruction.   This is her 4th SBO (2018,2019,2022, and now) which appear to be at the same anastomosis. Concern for mechanical cause ?cold stricture rather than Crohn's flare.   - Continue supportive cares   - If persistent nausea, vomiting consider NGT placement   - NPO, IVF   - Analgesia per primary team, limit narcotics as able  - Encourage ambulation/ OOB  - Will review imaging with AE team to determine if possible outpatient dilation vs stenting would be helpful/indicated    Gastroenterology follow up recommendations:   Outpatient follow up scheduled 11/2024 with Dr. Jacobs    Thank you for involving us in this patient's care. Please do not hesitate to contact the GI service with any questions or concerns. " "    Pt seen and care plan discussed with Dr. Jacobs, GI staff physician, and Dr. Gale, primary team.      Overall time spent on the date of this encounter preparing to see the patient (including chart review of available notes, clinical status events, imaging and labs); obtaining and/or reviewing separately obtained history; ordering medications, tests or procedures; communicating with other health care professionals; and documenting the above clinical information in the electronic medical record was 75 minutes.    Milla Ga PA-C  GI Service  [facility]  Text Page (Monday-Friday, 8am-4pm)    To page the [facility] GI provider 24 hours a day, please click [facility] and select GASTROENTEROLOGY MEDICINE ADULT / [facility] in the drop-down menu.   -------------------------------------------------------------------------------------------------------------------       Reason for Consultation:   We were asked by Bhupinder Coronel* of medicine to evaluate this patient with \"Crohn's bowel obstruction\"    History is obtained from the patient and the medical record.            History of Present Illness:     Amber Buckner is a 39 year old female with a PMH significant for UC (dx 2001) s/p restorative proctocolectomy with ileal pouch anal anastomosis (2002), pouchoscopy 2023 c/x Crohn's disease currently on Skyrizi, recurrent SBOs (managed conservatively) who presents to [facility] on 7/25/2024 with abdominal pain, admitted with small bowel obstruction.     She reports yesterday evening after dinner she had acute onset mid abdominal pain associated with nausea. She reports being in her normal state of health prior this this. She reports having 1 bowel movement last evening and again a small watery stool this morning in the ED. No bloody stools. She reports no vomiting PTA but vomited after she arrived on the floor. She reports that helped her pain somewhat. No fevers or chills.     At " "baseline, she has 8-12 stools a day. No blood. She reports since starting Skyrizi in Spring 2023, she has had decreased nocturinal urgency and bowel movements. Reports waking 1-2 times at night. She also has more formed stools since starting Skyrizi. She is currently on a weight loss program and has been eating more fruits and vegetables than usual. She also feels that she had increased her fluid intake. Her last Skyrizi dose was 7/8.     In the ED, she was afebrile, normotensive, not tachycardic or hypoxic. Lab eval showed normal electrolytes, Cr 0.59, normal LFTs, lactic acid 0.7, normal lipase, WBC 9.3, hgb 13.9, plts 310. Imaging obtained with CT A/P that showed distal small bowel obstruction. She was given IV dilaudid, LR 1L and NS 1L, zofran, and admitted to medicine.     Currently, the patient reports that her pain is better after pain meds that were just given, she reports feeling \"numb\". She is nauseous after vomiting once she arrived to the floor. No fevers.      Previous Procedures:  Pouchoscopy on 10/16/2023  Impression:            - Rectal cuff with healthy appearing mucosa seen.                          Biopsied.                          - Four small ulcers in the ileoanal pouch.                          - A single small ulcer in the blind limb.                          - Four ulcers in the jazzy-terminal ileum.     PATH  A. RECTAL CUFF, BIOPSY:  - Rectal and enteric-type mucosa with acute cryptitis  - Epithelioid granulomas, dysplasia, or malignancy are not identified    Pouchoscopy on 2/6/23  Impression:            - Multiple ulcers in the ileoanal pouch and                          jazzy-terminal ileum and in the pre-pouch ileum.                          Biopsied. Suspicious for Crohn's disease.                          - Biopsies were taken with a cold forceps for                          histology from the rectal cuff for dysplasia                          surveillance.     PATH  A. JAZZY-TERMINAL " ILEUM, ULCER AT 45 CM, BIOPSY:  -Enteric mucosa with erosions and  acute inflammation  -Negative for dysplasia    B. JAZZY-TERMINAL ILEUM, NORMAL TISSUE, BIOPSIES:  -Enteric mucosa with no significant inflammation  -Negative for dysplasia    C. ENTRANCE TO JAZZY-TERMINAL ILEUM, ULCER, BIOPSIES:  -Enteric mucosa with ulceration and granulation tissue  -Negative for dysplasia  -CMV immunostain to follow    D. ENTRANCE TO BLIND LIMB, BIOPSIES:  -Enteric mucosa with ulceration and granulation tissue  -Negative for dysplasia  -CMV immunostain to follow    E. POUCH, BIOPSIES:  -Severe pouchitis with ulceration and granulation tissue  -Negative for dysplasia  -CMV immunostain to follow    F. RECTAL POUCH, BIOPSIES:  -Enteric mucosa with with no significant inflammation  -Squamous mucosa with mild active inflammation  -Negative for dysplasia    Pouchoscopy 2020                     Past Medical History:   Reviewed and edited as appropriate  Past Medical History:   Diagnosis Date    Adolescent depression in [facility]    Anxiety     Depressive disorder 2002    Treated [facility] celexa    Fit/adjust GI carole-device NEC     Perineal abscess     PONV (postoperative nausea and vomiting)     S/P total colectomy     Ulcerative colitis             Past Surgical History:   Reviewed and edited as appropriate   Past Surgical History:   Procedure Laterality Date     SECTION  2012    Procedure: SECTION; Surgeon:JAVI BELL; Location:[facility] L[facility]     SECTION N/A 2014    Procedure:  SECTION;  Surgeon: Shawanda Luna MD;  Location: [facility] L[facility]D     SECTION N/A 2017    Procedure:  SECTION;  Repeat  Section ;  Surgeon: Laurie Montoya MD;  Location: [facility] L[facility]    COLECTOMY SUBTOTAL      COLONOSCOPY N/A 4/15/2015    Procedure: COLONOSCOPY;  Surgeon: Al Santo MD;  Location: [facility] OR    EXAM UNDER ANESTHESIA ANUS N/A 4/15/2015    Procedure: EXAM UNDER  ANESTHESIA ANUS;  Surgeon: Al Santo MD;  Location: [facility] OR    FISTULOTOMY RECTUM N/A 4/15/2015    Procedure: FISTULOTOMY RECTUM;  Surgeon: Al Santo MD;  Location: [facility] OR    POUCHOSCOPY N/A 2/6/2023    Procedure: ENDOSCOPY, POUCH, DIAGNOSTIC WITH BIOPSIES;  Surgeon: Marlys Jacobs MD;  Location: [facility] OR    POUCHOSCOPY N/A 10/16/2023    Procedure: POUCHOSCOPY WITH BIOPSY;  Surgeon: Marlys Jacobs MD;  Location: [facility] OR    TAKEDOWN ILEOSTOMY      tonsilectomy      TRANSPERINEAL REPAIR FISTULA RECTAL URETHRAL  2015              Social History:   Reviewed and edited as appropriate  Social History     Socioeconomic History    Marital status:      Spouse name: Not on file    Number of children: Not on file    Years of education: Not on file    Highest education level: Not on file   Occupational History    Occupation: Casual RN Oncology     Comment: [employer]   Tobacco Use    Smoking status: Never     Passive exposure: Never    Smokeless tobacco: Never   Vaping Use    Vaping status: Never Used   Substance and Sexual Activity    Alcohol use: Yes    Drug use: No    Sexual activity: Yes     Partners: Male     Birth control/protection: Male Surgical, None   Other Topics Concern    Parent/sibling w/ CABG, MI or angioplasty before 65F 55M? No     Service No    Blood Transfusions Yes     Comment: ?    Caffeine Concern No    Occupational Exposure Yes     Comment: RN casual Oncology/student      Hobby Hazards No    Sleep Concern Yes     Comment: doesn't sleep    Stress Concern Yes    Weight Concern No    Special Diet No    Back Care No    Exercise No    Bike Helmet Yes    Seat Belt Yes    Self-Exams Yes   Social History Narrative    How much exercise per week? Walk a lot     How much calcium per day? dairy       How much caffeine per day? 0    How much vitamin D per day? 5000 IU    Do you/your family wear seatbelts?  Yes    Do you/your family use safety helmets? Yes    Do you/your family use  sunscreen? Yes    Do you/your family keep firearms in the home? Yes    Do you/your family have a smoke detector(s)? Yes        Do you feel safe in your home? Yes    Has anyone ever touched you in an unwanted manner?No     Explain Gene SMALL     Social Determinants of Health     Financial Resource Strain: Low Risk  (11/10/2023)    Financial Resource Strain     Within the past 12 months, have you or your family members you live with been unable to get utilities (heat, electricity) when it was really needed?: No   Food Insecurity: Low Risk  (11/10/2023)    Food Insecurity     Within the past 12 months, did you worry that your food would run out before you got money to buy more?: No     Within the past 12 months, did the food you bought just not last and you didn t have money to get more?: No   Transportation Needs: Low Risk  (11/10/2023)    Transportation Needs     Within the past 12 months, has lack of transportation kept you from medical appointments, getting your medicines, non-medical meetings or appointments, work, or from getting things that you need?: No   Physical Activity: Not on file   Stress: Not on file   Social Connections: Not on file   Interpersonal Safety: Low Risk  (11/10/2023)    Interpersonal Safety     Do you feel physically and emotionally safe where you currently live?: Yes     Within the past 12 months, have you been hit, slapped, kicked or otherwise physically hurt by someone?: No     Within the past 12 months, have you been humiliated or emotionally abused in other ways by your partner or ex-partner?: No   Housing Stability: Low Risk  (11/10/2023)    Housing Stability     Do you have housing? : Yes     Are you worried about losing your housing?: No              Family History:   Patient's family history is reviewed today and is non-contributory  Family History   Problem Relation Age of Onset    Diabetes Maternal Grandmother     Diabetes Paternal Grandfather     Genetic Disorder Father          chron's    Gastrointestinal Disease Sister         ulcerative colitis             Allergies:   Reviewed and edited as appropriate   No Known Allergies         Medications:     Medications Prior to Admission   Medication Sig Dispense Refill Last Dose    acetaminophen (TYLENOL) 500 MG tablet Take 1,000 mg by mouth as needed for pain       adapalene (DIFFERIN) 0.1 % external gel Apply topically At Bedtime       SKYRIZI 360 MG/2.4ML SOCT Inject 2.4 mLs (360 mg) Subcutaneous once every eight weeks 2.4 mL 2 7/8/2024    vitamin D3 (CHOLECALCIFEROL) 50 mcg (2000 units) tablet Take 1 tablet by mouth daily                Review of Systems:     A complete review of systems was performed and is negative except as noted in the HPI             Physical Exam:   Temp: 98.4  F (36.9  C) Temp src: Oral BP: 101/64 Pulse: 71   Resp: 15 SpO2: 96 % O2 Device: None (Room air)    Wt:   Wt Readings from Last 2 Encounters:   07/25/24 62.6 kg (138 lb)   11/10/23 64.5 kg (142 lb 1.6 oz)        General: 39 year old female in NAD.  Answers appropriately.    HEENT: Head is AT/NC. Sclera anicteric. No conjunctival injection.  Oropharynx is clear, moist   Neck: Supple  Lungs: Non labored breathing on room air   Heart: Regular rate   Abdomen: Soft, mild mid abdominal tender, non-distended.  No rebound or peritoneal signs. Healed surgical scar   Extremities: No pedal edema.  Skin: No jaundice or rash  Neurologic: Grossly non-focal            Data:   Labs and imaging below were independently reviewed and interpreted    LAB WORK:    BMP  Recent Labs   Lab 07/25/24  0431      POTASSIUM 4.5   CHLORIDE 104   JUDSON 9.8   CO2 21*   BUN 12.7   CR 0.59   *     CBC  Recent Labs   Lab 07/25/24  0431   WBC 9.3   RBC 4.11   HGB 13.9   HCT 40.9      MCH 33.8*   MCHC 34.0   RDW 12.3        INRNo lab results found in last 7 days.  LFTs  Recent Labs   Lab 07/25/24  0431   ALKPHOS 53   AST 23   ALT 11   BILITOTAL 0.5   PROTTOTAL 8.3   ALBUMIN  4.8      PANC  Recent Labs   Lab 07/25/24  0634   LIPASE 23     CULTURES:   7-Day Micro Results       No results found for the last 168 hours.          IMAGING:  CT A/P on 7/25/2024   FINDINGS:   LOWER CHEST: Mild dependent atelectasis at the lung bases.     HEPATOBILIARY: Normal.     PANCREAS: Normal.     SPLEEN: Normal.     ADRENAL GLANDS: Normal.     KIDNEYS/BLADDER: There is a single 3 mm stone in the lower pole of the right kidney. No ureteral stone or hydronephrosis.     BOWEL: There are multiple dilated small bowel loops with transition point in the mid pelvis at a bowel anastomosis. There is equalization of the small bowel proximal to the transition point. The colon has been resected. No free intraperitoneal gas or   fluid.     LYMPH NODES: Normal.     VASCULATURE: Normal.     PELVIC ORGANS: 1.3 cm collapsing right ovarian follicle.     MUSCULOSKELETAL: Normal.                                                                      IMPRESSION:   1.  Distal small bowel obstruction.      =======================================================================

## 2024-07-25 NOTE — PLAN OF CARE
Goal Outcome Evaluation:      Plan of Care Reviewed With: patient        Pt alert and oriented X4. Denied SOB, CP, N/V. Pt had 2 emesis this shift. About 90 total ML. Two small BM, one with small solids. IV running per order 100ml pr hr. Independent. Ambulating the pierce. NPO. IV zofran at 1440. Pain 4/10 denied medication. Requested heat/cold.

## 2024-07-25 NOTE — ED NOTES
"   [facility]  ED Nurse Handoff Report    ED Chief complaint: Abdominal Pain      ED Diagnosis:   Final diagnoses:   SBO (small bowel obstruction) (H)       Code Status: to be addressed    Allergies: No Known Allergies    Patient Story: presented to ED due to abdominal pain especially on the epigastrium with nausea.  Focused Assessment:  not distress,borderline BP,alert,having pain but refused another pain medication    Treatments and/or interventions provided: iv access,albs,CT,see MAR  Patient's response to treatments and/or interventions: tolerated    To be done/followed up on inpatient unit:  as per admission order    Does this patient have any cognitive concerns?:  none    Activity level - Baseline/Home:  Independent  Activity Level - Current:   Independent    Patient's Preferred language: English   Needed?: No    Isolation: None  Infection: Not Applicable  Patient tested for COVID 19 prior to admission: NO  Bariatric?: No    Vital Signs:   Vitals:    07/25/24 0342 07/25/24 0530 07/25/24 0538 07/25/24 0600   BP: 98/68 97/65  100/67   Pulse: 96 69  69   Resp: 20  14    Temp: 98.4  F (36.9  C)      TempSrc: Oral      SpO2: 98% 96% 96% 96%   Weight: 62.6 kg (138 lb)      Height: 1.575 m (5' 2\")          Cardiac Rhythm:     Was the PSS-3 completed:   Yes  What interventions are required if any?               Family Comments: none  OBS brochure/video discussed/provided to patient/family: No              Name of person given brochure if not patient:               Relationship to patient:     For the majority of the shift this patient's behavior was Green.   Behavioral interventions performed were none.    ED NURSE PHONE NUMBER: [phone]        "

## 2024-07-25 NOTE — ED PROVIDER NOTES
"  Emergency Department Note      History of Present Illness     Chief Complaint   Abdominal Pain    HPI   Amber Buckner is a 39 year old female with a history of ulcerative colitis, chron's disease and full collectomy who presents for epigastric pain than onset yesterday evening (24). Amber reports that at onset she took tylenol and then went to bed. She woke up at around 2330 with pain. She endorses having passed gas since onset. She characterizes her abdominal pain as coming in waves followed by nausea. She denies vomiting but says she has dry heaved. She denies leg edema. She endorses history of alcohol consumption. She denies history of smoking.     Independent Historian   None as detailed above.    Review of External Notes   I reviewed medical records from: Hospital admission on 10/20/2022 for small bowel obstruction    Past Medical History     Medical History and Problem List   Anxiety  Depressive disorder  Perineal abscess  Premenstrual tension syndromes  Ulcerative colitis  Small bowel obstruction    Medications   Citalopram    Surgical History    section  Colectomy subtotal  Colonoscopy  Fistulotomy rectum  Pouchoscopy  Takedown ileostomy  Tonsillectomy   Transperineal repair fistula rectal urethral     Physical Exam     Patient Vitals for the past 24 hrs:   BP Temp Temp src Pulse Resp SpO2 Height Weight   24 0600 100/67 -- -- 69 -- 96 % -- --   24 0538 -- -- -- -- 14 96 % -- --   24 0530 97/65 -- -- 69 -- 96 % -- --   24 0342 98/68 98.4  F (36.9  C) Oral 96 20 98 % 1.575 m (5' 2\") 62.6 kg (138 lb)     Physical Exam  Constitutional: Well developed, nontox appearance, uncomfortable  Head: Atraumatic.   Neck:  no stridor  Eyes: no scleral icterus  Cardiovascular: RRR, 2+ bilat radial pulses  Pulmonary/Chest: nml resp effort, Clear BS bilat  Abdominal: ND, soft, diffuse abdominal tenderness, no rebound or guarding   Ext: Warm, well perfused, no edema  Neurological: A&O, " symmetric facies, moves ext x4  Skin: Skin is warm and dry.   Psychiatric: Behavior is normal. Thought content normal.   Nursing note and vitals reviewed.    Diagnostics     Lab Results   Labs Ordered and Resulted from Time of ED Arrival to Time of ED Departure   COMPREHENSIVE METABOLIC PANEL - Abnormal       Result Value    Sodium 138      Potassium 4.5      Carbon Dioxide (CO2) 21 (*)     Anion Gap 13      Urea Nitrogen 12.7      Creatinine 0.59      GFR Estimate >90      Calcium 9.8      Chloride 104      Glucose 103 (*)     Alkaline Phosphatase 53      AST 23      ALT 11      Protein Total 8.3      Albumin 4.8      Bilirubin Total 0.5     CBC WITH PLATELETS AND DIFFERENTIAL - Abnormal    WBC Count 9.3      RBC Count 4.11      Hemoglobin 13.9      Hematocrit 40.9            MCH 33.8 (*)     MCHC 34.0      RDW 12.3      Platelet Count 310      % Neutrophils 72      % Lymphocytes 19      % Monocytes 6      % Eosinophils 2      % Basophils 0      % Immature Granulocytes 0      NRBCs per 100 WBC 0      Absolute Neutrophils 6.8      Absolute Lymphocytes 1.8      Absolute Monocytes 0.6      Absolute Eosinophils 0.1      Absolute Basophils 0.0      Absolute Immature Granulocytes 0.0      Absolute NRBCs 0.0     HCG QUANTITATIVE PREGNANCY - Normal    hCG Quantitative <1     LIPASE   LACTIC ACID WHOLE BLOOD WITH 1X REPEAT IN 2 HR WHEN >2       Imaging   CT Abdomen Pelvis w Contrast   Final Result   IMPRESSION:    1.  Distal small bowel obstruction.            Independent Interpretation   None    ED Course      Medications Administered   Medications   HYDROmorphone (PF) (DILAUDID) injection 0.5 mg (0.5 mg Intravenous $Given 7/25/24 9823)   ondansetron (ZOFRAN) injection 4 mg (4 mg Intravenous $Given 7/25/24 4645)   sodium chloride 0.9% BOLUS 1,000 mL (0 mLs Intravenous Stopped 7/25/24 5228)   iopamidol (ISOVUE-370) solution 70 mL (70 mLs Intravenous $Given 7/25/24 7019)   Saline Flush (60 mLs Intravenous $Given  7/25/24 0552)       Procedures   Procedures     Discussion of Management   none    ED Course   ED Course as of 07/25/24 0637   Thu Jul 25, 2024   [id number] I evaluated the patient and obtained the history as noted above.          Optional/Additional Documentation  None  None    Medical Decision Making / Diagnosis     CMS Diagnoses: None    MIPS       None    MDM   Amber Buckner is a 39 year old female presenting w/ abdominal pain, nausea     DDx includes small bowel obstruction, hepatitis, pancreatitis, gastritis, hollow viscus perforation, abdominal pain NOS, ileus.  Doubt vascular catastrophe, atypical ACS given history and physical exam.  Labs significant for negative pregnancy test, normal white blood cell count, normal creatinine.  Imaging sig for distal small bowel obstruction.  Interventions as noted above.  Patient subsequently admitted to the hospital service for further evaluation management.  Given no vomiting in the ED, an NG tube was not placed.   Pt counseled on all results, disposition and diagnosis.  They are understanding and agreeable to plan.  The patient was signed out in stable condition awaiting hospitalist admission.    Disposition   The patient was signed out to my colleague Dr. Coronel awaiting hospitalist callback for admission    Diagnosis     ICD-10-CM    1. SBO (small bowel obstruction) (H)  K56.609            Discharge Medications   New Prescriptions    No medications on file         Scribe Disclosure:  IRylee, am serving as a scribe at 4:37 AM on 7/25/2024 to document services personally performed by Phillip Castellanos MD based on my observations and the provider's statements to me.        Phillip Castellanos MD  07/25/24 0677

## 2024-07-25 NOTE — H&P
"[facility]    History and Physical - Hospitalist Service       Date of Admission:  7/25/2024    Assessment & Plan      Amber Buckner is a 39 year old female with history of UC s/p colectomy, several previous SBOs and recent dx of Chron's disease admitted on 7/25/2024 for bowel obstruction.    Acute SBO  Ulcerative colitis s/p colectomy in 2002  Chron's disease  Developed acute onset of abdominal pain 1 day prior to presenting to the ED. Progressively worsening and constant similar to previous bowel obstructions. No vomiting but nauseated. Last BM the day prior to admission, she normally has several a day. She follows closely with [facility] and was recently dx with Chron's disease via pouchoscopy and started on Skyrizi. Within the ED, vitals stable. Lactic acid 0.7. CT positive for SBO with transition point at mid pelvis bowel anastomosis.     - admit with bowel rest. Hold NGT for now unless she starts vomiting  - general surgery consult  - [facility] consult  - no obvious acute Chron's flare, hold any treatment until GI evaluates  - start IVF with pain medications          Diet:  NPO  DVT Prophylaxis: Pneumatic Compression Devices  Rodriguez Catheter: Not present  Lines: None     Cardiac Monitoring: None  Code Status:  FULL    Clinically Significant Risk Factors Present on Admission                          # Overweight: Estimated body mass index is 25.24 kg/m  as calculated from the following:    Height as of this encounter: 1.575 m (5' 2\").    Weight as of this encounter: 62.6 kg (138 lb).              Disposition Plan     Medically Ready for Discharge: Anticipated in 2-4 Days           Berenice Gale DO  Hospitalist Service  [facility]  Securely message with Vito (more info)  Text page via [facility] Paging/Directory     ______________________________________________________________________    Chief Complaint   SBO    History is obtained from the patient    History of " Present Illness   Amber Buckner is a 39 year old female with history of UC s/p colectomy, several previous SBOs and recent dx of Chron's disease admitted on 2024 for bowel obstruction.    Patient has many previous SBOs. She reports being in normal state of health until the night prior to presenting to the ED. She developed a weird bowel feeling and then pain. It progressed to constant pain. She did have 1BM yesterday but she normally has many. Upoon my assessment she reports the pain comes in waves and wakes her up. She has no appetite. NO vomiting yet. She is tired. No fever or chills.       Past Medical History    Past Medical History:   Diagnosis Date    Adolescent depression in college    Anxiety     Depressive disorder 2002    Treated [facility] celexa    Fit/adjust GI carole-device NEC     Perineal abscess     PONV (postoperative nausea and vomiting)     S/P total colectomy     Ulcerative colitis        Past Surgical History   Past Surgical History:   Procedure Laterality Date     SECTION  2012    Procedure: SECTION; Surgeon:JAVI BELL; Location:[facility] L[facility]D     SECTION N/A 2014    Procedure:  SECTION;  Surgeon: Shawanda Luna MD;  Location: [facility] L+D     SECTION N/A 2017    Procedure:  SECTION;  Repeat  Section ;  Surgeon: Laurie Montoya MD;  Location: [facility] L[facility]    COLECTOMY SUBTOTAL      COLONOSCOPY N/A 4/15/2015    Procedure: COLONOSCOPY;  Surgeon: Al Santo MD;  [facility]: [facility] OR    EXAM UNDER ANESTHESIA ANUS N/A 4/15/2015    Procedure: EXAM UNDER ANESTHESIA ANUS;  Surgeon: Al Santo MD;  Location: [facility] OR    FISTULOTOMY RECTUM N/A 4/15/2015    Procedure: FISTULOTOMY RECTUM;  Surgeon: Al Santo MD;  Location: [facility] OR    POUCHOSCOPY N/A 2023    Procedure: ENDOSCOPY, POUCH, DIAGNOSTIC WITH BIOPSIES;  Surgeon: Marlys Jacobs MD;  [facility]    POUCHOSCOPY N/A 10/16/2023    Procedure:  POUCHOSCOPY WITH BIOPSY;  Surgeon: Marlys Jacobs MD;  Location: [facility] OR    TAKEDOWN ILEOSTOMY      tonsilectomy      TRANSPERINEAL REPAIR FISTULA RECTAL URETHRAL  2015       Prior to Admission Medications   Prior to Admission Medications   Prescriptions Last Dose Informant Patient Reported? Taking?   SKYRIZI 360 MG/2.4ML SOCT   No No   Sig: Inject 2.4 mLs (360 mg) Subcutaneous once every eight weeks   adapalene (DIFFERIN) 0.1 % external gel   Yes No   Sig: Apply topically At Bedtime   citalopram (CELEXA) 10 MG tablet   No No   Sig: Take 1 tablet (10 mg) by mouth daily   vitamin D3 (CHOLECALCIFEROL) 50 mcg (2000 units) tablet   Yes No   Sig: Take 1 tablet by mouth daily      Facility-Administered Medications: None        Review of Systems    The 10 point Review of Systems is negative other than noted in the HPI or here.      Physical Exam   Vital Signs: Temp: 98.4  F (36.9  C) Temp src: Oral BP: 101/70 Pulse: 71   Resp: 15 SpO2: 99 % O2 Device: None (Room air)    Weight: 138 lbs 0 oz    Constitutional: Awake, alert, cooperative, no apparent distress.  Eyes: Conjunctiva and pupils examined and normal.  HEENT: Moist mucous membranes, normal dentition.  Respiratory: Clear to auscultation bilaterally, no crackles or wheezing.  Cardiovascular: Regular rate and rhythm, normal S1 and S2, and no murmur noted.  GI: soft, pain in upper abdomen, no guarding, +BS  Lymph/Hematologic: No anterior cervical or supraclavicular adenopathy.  Skin: midline abdominal scar and RLQ scar  Musculoskeletal: No joint swelling, erythema or tenderness.  Neurologic: Cranial nerves 2-12 intact, normal strength and sensation.  Psychiatric: Alert, oriented to person, place and time, no obvious anxiety or depression.     Medical Decision Making       65 MINUTES SPENT BY ME on the date of service doing chart review, history, exam, documentation & further activities per the note.      Data     I have personally reviewed the following data over the  past 24 hrs:    9.3  \   13.9   / 310     138 104 12.7 /  103 (H)   4.5 21 (L) 0.59 \     ALT: 11 AST: 23 AP: 53 TBILI: 0.5   ALB: 4.8 TOT PROTEIN: 8.3 LIPASE: 23     Procal: N/A CRP: N/A Lactic Acid: 0.7         Imaging results reviewed over the past 24 hrs:   Recent Results (from the past 24 hour(s))   CT Abdomen Pelvis w Contrast    Narrative    EXAM: CT ABDOMEN PELVIS W CONTRAST  LOCATION: [facility]  DATE: 7/25/2024    INDICATION: abodminal pain, history of bowel obstructions  COMPARISON: 10/24/2022.  TECHNIQUE: CT scan of the abdomen and pelvis was performed following injection of IV contrast. Multiplanar reformats were obtained. Dose reduction techniques were used.  CONTRAST: 70 mL Isovue   370    FINDINGS:   LOWER CHEST: Mild dependent atelectasis at the lung bases.    HEPATOBILIARY: Normal.    PANCREAS: Normal.    SPLEEN: Normal.    ADRENAL GLANDS: Normal.    KIDNEYS/BLADDER: There is a single 3 mm stone in the lower pole of the right kidney. No ureteral stone or hydronephrosis.    BOWEL: There are multiple dilated small bowel loops with transition point in the mid pelvis at a bowel anastomosis. There is equalization of the small bowel proximal to the transition point. The colon has been resected. No free intraperitoneal gas or   fluid.    LYMPH NODES: Normal.    VASCULATURE: Normal.    PELVIC ORGANS: 1.3 cm collapsing right ovarian follicle.    MUSCULOSKELETAL: Normal.      Impression    IMPRESSION:   1.  Distal small bowel obstruction.

## 2024-07-26 ENCOUNTER — MYC MEDICAL ADVICE (OUTPATIENT)
Dept: GASTROENTEROLOGY | Facility: CLINIC | Age: 39
End: 2024-07-26
Payer: COMMERCIAL

## 2024-07-26 VITALS
OXYGEN SATURATION: 98 % | SYSTOLIC BLOOD PRESSURE: 101 MMHG | HEART RATE: 79 BPM | TEMPERATURE: 99.1 F | DIASTOLIC BLOOD PRESSURE: 62 MMHG | HEIGHT: 62 IN | WEIGHT: 138 LBS | BODY MASS INDEX: 25.4 KG/M2 | RESPIRATION RATE: 16 BRPM

## 2024-07-26 DIAGNOSIS — K56.609 SBO (SMALL BOWEL OBSTRUCTION) (H): Primary | ICD-10-CM

## 2024-07-26 DIAGNOSIS — K50.00 CROHN'S DISEASE OF SMALL INTESTINE WITHOUT COMPLICATION (H): ICD-10-CM

## 2024-07-26 LAB
ANION GAP SERPL CALCULATED.3IONS-SCNC: 9 MMOL/L (ref 7–15)
BASOPHILS # BLD AUTO: 0 10E3/UL (ref 0–0.2)
BASOPHILS NFR BLD AUTO: 0 %
BUN SERPL-MCNC: 3.5 MG/DL (ref 6–20)
CALCIUM SERPL-MCNC: 9 MG/DL (ref 8.8–10.4)
CHLORIDE SERPL-SCNC: 105 MMOL/L (ref 98–107)
CREAT SERPL-MCNC: 0.57 MG/DL (ref 0.51–0.95)
EGFRCR SERPLBLD CKD-EPI 2021: >90 ML/MIN/1.73M2
EOSINOPHIL # BLD AUTO: 0.1 10E3/UL (ref 0–0.7)
EOSINOPHIL NFR BLD AUTO: 2 %
ERYTHROCYTE [DISTWIDTH] IN BLOOD BY AUTOMATED COUNT: 12.4 % (ref 10–15)
GLUCOSE SERPL-MCNC: 106 MG/DL (ref 70–99)
HCO3 SERPL-SCNC: 25 MMOL/L (ref 22–29)
HCT VFR BLD AUTO: 35.9 % (ref 35–47)
HGB BLD-MCNC: 11.9 G/DL (ref 11.7–15.7)
IMM GRANULOCYTES # BLD: 0 10E3/UL
IMM GRANULOCYTES NFR BLD: 0 %
LYMPHOCYTES # BLD AUTO: 2.1 10E3/UL (ref 0.8–5.3)
LYMPHOCYTES NFR BLD AUTO: 40 %
MCH RBC QN AUTO: 33.4 PG (ref 26.5–33)
MCHC RBC AUTO-ENTMCNC: 33.1 G/DL (ref 31.5–36.5)
MCV RBC AUTO: 101 FL (ref 78–100)
MONOCYTES # BLD AUTO: 0.4 10E3/UL (ref 0–1.3)
MONOCYTES NFR BLD AUTO: 7 %
NEUTROPHILS # BLD AUTO: 2.6 10E3/UL (ref 1.6–8.3)
NEUTROPHILS NFR BLD AUTO: 50 %
NRBC # BLD AUTO: 0 10E3/UL
NRBC BLD AUTO-RTO: 0 /100
PLATELET # BLD AUTO: 278 10E3/UL (ref 150–450)
POTASSIUM SERPL-SCNC: 3.9 MMOL/L (ref 3.4–5.3)
RBC # BLD AUTO: 3.56 10E6/UL (ref 3.8–5.2)
SODIUM SERPL-SCNC: 139 MMOL/L (ref 135–145)
WBC # BLD AUTO: 5.2 10E3/UL (ref 4–11)

## 2024-07-26 PROCEDURE — 258N000003 HC RX IP 258 OP 636: Performed by: STUDENT IN AN ORGANIZED HEALTH CARE EDUCATION/TRAINING PROGRAM

## 2024-07-26 PROCEDURE — 36415 COLL VENOUS BLD VENIPUNCTURE: CPT | Performed by: STUDENT IN AN ORGANIZED HEALTH CARE EDUCATION/TRAINING PROGRAM

## 2024-07-26 PROCEDURE — 99232 SBSQ HOSP IP/OBS MODERATE 35: CPT | Performed by: PHYSICIAN ASSISTANT

## 2024-07-26 PROCEDURE — 250N000011 HC RX IP 250 OP 636: Performed by: STUDENT IN AN ORGANIZED HEALTH CARE EDUCATION/TRAINING PROGRAM

## 2024-07-26 PROCEDURE — 85025 COMPLETE CBC W/AUTO DIFF WBC: CPT | Performed by: STUDENT IN AN ORGANIZED HEALTH CARE EDUCATION/TRAINING PROGRAM

## 2024-07-26 PROCEDURE — S5010 5% DEXTROSE AND 0.45% SALINE: HCPCS | Performed by: STUDENT IN AN ORGANIZED HEALTH CARE EDUCATION/TRAINING PROGRAM

## 2024-07-26 PROCEDURE — 80048 BASIC METABOLIC PNL TOTAL CA: CPT | Performed by: STUDENT IN AN ORGANIZED HEALTH CARE EDUCATION/TRAINING PROGRAM

## 2024-07-26 PROCEDURE — 99239 HOSP IP/OBS DSCHRG MGMT >30: CPT | Performed by: INTERNAL MEDICINE

## 2024-07-26 RX ADMIN — POTASSIUM CHLORIDE: 2 INJECTION, SOLUTION, CONCENTRATE INTRAVENOUS at 09:38

## 2024-07-26 ASSESSMENT — ACTIVITIES OF DAILY LIVING (ADL)
ADLS_ACUITY_SCORE: 20

## 2024-07-26 NOTE — TELEPHONE ENCOUNTER
Received communication from Dr. Jacobs regarding the plan after patient was hospitalized with SBO.    -- CTE in 1-2 weeks  -- Pouchoscopy in ~4 weeks (request for Dr. Castellanos to complete this procedure while on service at the end of August)  -- Present patient at next IBD conference

## 2024-07-26 NOTE — PLAN OF CARE
Goal Outcome Evaluation:  Shift Summary [id number]    Admitting Diagnosis: SBO (small bowel obstruction) (H) [K56.609]   Vitals Vital signs:  Temp: 99  F (37.2  C) Temp src: Oral BP: 93/61 Pulse: 80   Resp: 16 SpO2: 98 % O2 Device: None (Room air)    Pain 2/10. Taking warm pack PRN.  A&Ox4  Voiding Independent continent  Mobility ambulates Independent   CMS Intact  Lung Sounds clear on all fields  GI: small bowel obstruction.     Orders Placed This Encounter      NPO for Medical/Clinical Reasons Except for: Meds       Plan: Pt. On continuous IV fluids, c/o pain on upper abdomen at 2/10. C/o feeling nauseated a little bit, no emesis this shift.

## 2024-07-26 NOTE — DISCHARGE SUMMARY
"[facility]  Hospitalist Discharge Summary      Date of Admission:  7/25/2024  Date of Discharge:  7/26/2024  Discharging Provider: Yanci Alfaro MD  Discharge Service: Hospitalist Service    Discharge Diagnoses       Clinically Significant Risk Factors     # Overweight: Estimated body mass index is 25.24 kg/m  as calculated from the following:    Height as of this encounter: 1.575 m (5' 2\").    Weight as of this encounter: 62.6 kg (138 lb).       Follow-ups Needed After Discharge   Follow-up Appointments     Follow-up and recommended labs and tests       Follow up with primary care provider, Cinthia Charles, within 7 days   for hospital follow- up.  The following labs/tests are recommended:   CBC,CMP.            Unresulted Labs Ordered in the Past 30 Days of this Admission       No orders found from 6/25/2024 to 7/26/2024.        These results will be followed up by PCP    Discharge Disposition   Discharged to home  Condition at discharge: Stable    Hospital Course   Amber Buckner is a 39 year old female with history of UC s/p colectomy, several previous SBOs and recent dx of Chron's disease admitted on 7/25/2024 for bowel obstruction.     Acute SBO  Ulcerative colitis s/p colectomy in 2002  Chron's disease  Developed acute onset of abdominal pain 1 day prior to presenting to the ED. Progressively worsening and constant similar to previous bowel obstructions. No vomiting but nauseated. Last BM the day prior to admission, she normally has several a day. She follows closely with [facility] GI and was recently dx with Chron's disease via pouchoscopy and started on Skyrizi. Within the ED, vitals stable. Lactic acid 0.7. CT positive for SBO with transition point at mid pelvis bowel anastomosis.      Patient upon admission to the hospital was placed on bowel rest with n.p.o., consulted to colorectal surgery, gastroenterology.  Patient progressively improved following which diet was slowly " advanced, on day of discharge patient was able to tolerate oral intake and was cleared by CRS and gastroenterology for discharge and follow-up in clinic.    Consultations This Hospital Stay   SURGERY GENERAL IP CONSULT  GI LUMINAL ADULT IP CONSULT  COLORECTAL SURGERY IP CONSULT    Code Status   Prior    Time Spent on this Encounter   Yanci LEMOS MD, personally saw the patient today and spent greater than 30 minutes discharging this patient.       Yanci Alfaro MD  [facility]  [address] [facility] [address]  Phone: [phone]  Fax: [phone]  ______________________________________________________________________    Physical Exam   Vital Signs:                    Weight: 138 lbs 0 oz  Constitutional: Awake, alert, cooperative, no apparent distress  Respiratory: Clear to auscultation bilaterally, no crackles or wheezing  Cardiovascular: Regular rate and rhythm, normal S1 and S2, and no murmur noted  GI: Normal bowel sounds, soft, non-distended, non-tender  Skin/Integumen: No rashes, no cyanosis, no edema         Primary Care Physician   Cinthia Charles    Discharge Orders      Reason for your hospital stay    Small bowel obstruction     Follow-up and recommended labs and tests     Follow up with primary care provider, Cinthia Charles, within 7 days for hospital follow- up.  The following labs/tests are recommended: CBC,CMP.     Activity    Your activity upon discharge: activity as tolerated     Diet    Follow this diet upon discharge:       Low Fiber Diet       Significant Results and Procedures   Results for orders placed or performed during the hospital encounter of 07/25/24   CT Abdomen Pelvis w Contrast    Narrative    EXAM: CT ABDOMEN PELVIS W CONTRAST  LOCATION: [facility]  DATE: 7/25/2024    INDICATION: abodminal pain, history of bowel obstructions  COMPARISON: 10/24/2022.  TECHNIQUE: CT scan of the abdomen and pelvis  was performed following injection of IV contrast. Multiplanar reformats were obtained. Dose reduction techniques were used.  CONTRAST: 70 mL Isovue   370    FINDINGS:   LOWER CHEST: Mild dependent atelectasis at the lung bases.    HEPATOBILIARY: Normal.    PANCREAS: Normal.    SPLEEN: Normal.    ADRENAL GLANDS: Normal.    KIDNEYS/BLADDER: There is a single 3 mm stone in the lower pole of the right kidney. No ureteral stone or hydronephrosis.    BOWEL: There are multiple dilated small bowel loops with transition point in the mid pelvis at a bowel anastomosis. There is equalization of the small bowel proximal to the transition point. The colon has been resected. No free intraperitoneal gas or   fluid.    LYMPH NODES: Normal.    VASCULATURE: Normal.    PELVIC ORGANS: 1.3 cm collapsing right ovarian follicle.    MUSCULOSKELETAL: Normal.      Impression    IMPRESSION:   1.  Distal small bowel obstruction.       Discharge Medications   Discharge Medication List as of 7/26/2024  1:55 PM        CONTINUE these medications which have NOT CHANGED    Details   acetaminophen (TYLENOL) 500 MG tablet Take 1,000 mg by mouth as needed for pain, Historical      adapalene (DIFFERIN) 0.1 % external gel Apply topically At BedtimeHistorical      SKYRIZI 360 MG/2.4ML SOCT Inject 2.4 mLs (360 mg) Subcutaneous once every eight weeks, Disp-2.4 mL, R-2, [facility]O, [facility]      vitamin D3 (CHOLECALCIFEROL) 50 mcg (2000 units) tablet Take 1 tablet by mouth daily, Historical           Allergies   No Known Allergies

## 2024-07-26 NOTE — PLAN OF CARE
Goal Outcome Evaluation:         Pt AO4.  Independent in room.  Denied pain and nausea.  Slept in between cares.

## 2024-07-26 NOTE — PROGRESS NOTES
Alert and oriented x4. VSS. On RA. Clear lung sounds bilaterally. Denies SOB. Faint bowel sounds. Tolerating low fiber diet, denies nausea and vomiting, denies abdominal pain. Pt had two BMs during this shift. Up independently, and ambulated in the hallways several times during this shift. Pt was discharged on 7/26/24 at 1430. Pt was medically stable upon discharge. AVS, was reviewed with pt. She verbally admitted to understanding the instructions. Pt drove herself home.

## 2024-07-26 NOTE — PROGRESS NOTES
COLON & RECTAL SURGERY  PROGRESS NOTE    July 26, 2024    SUBJECTIVE:  Feeling much better. Continues to have BMs. Pain much improved, nausea resolved. AVSS. AM labs not drawn yet. Family at bedside.     OBJECTIVE:  Temp:  [97.5  F (36.4  C)-99.1  F (37.3  C)] 99.1  F (37.3  C)  Pulse:  [68-80] 79  Resp:  [16] 16  BP: ()/(51-68) 101/62  SpO2:  [96 %-98 %] 98 %    Intake/Output Summary (Last 24 hours) at 7/26/2024 0848  Last data filed at 7/25/2024 1515  Gross per 24 hour   Intake --   Output 120 ml   Net -120 ml       GENERAL:  Awake, alert, no acute distress  HEAD: Normocephalic atraumatic  SCLERA: Anicteric  EXTREMITIES: Warm and well perfused  ABDOMEN:  Soft, minimally tender, non-distended. No guarding, rigidity, or peritoneal signs.     LABS:  Lab Results   Component Value Date    WBC 9.3 07/25/2024    WBC 3.6 10/20/2019     Lab Results   Component Value Date    HGB 13.9 07/25/2024    HGB 11.5 10/20/2019     Lab Results   Component Value Date    HCT 40.9 07/25/2024    HCT 33.4 10/20/2019     Lab Results   Component Value Date     07/25/2024     10/20/2019     Last Basic Metabolic Panel:  Lab Results   Component Value Date     07/25/2024     10/20/2019      Lab Results   Component Value Date    POTASSIUM 4.5 07/25/2024    POTASSIUM 4.2 11/03/2022    POTASSIUM 3.5 10/20/2019     Lab Results   Component Value Date    CHLORIDE 104 07/25/2024    CHLORIDE 107 11/03/2022    CHLORIDE 110 10/20/2019     Lab Results   Component Value Date    JUDSON 9.8 07/25/2024    JUDSON 8.4 10/20/2019     Lab Results   Component Value Date    CO2 21 07/25/2024    CO2 28 11/03/2022    CO2 23 10/20/2019     Lab Results   Component Value Date    BUN 12.7 07/25/2024    BUN 11 11/03/2022    BUN 1 10/20/2019     Lab Results   Component Value Date    CR 0.59 07/25/2024    CR 0.40 10/20/2019     Lab Results   Component Value Date     07/25/2024    GLC 88 11/03/2022     10/20/2019       ASSESSMENT/PLAN:  38 yo F with h/o UC s/p TPC & IPAA in 2001. Now diagnosed with Crohn's in 2023, on Skyrizi. Admitted with SBO at small bowel anastomosis, prior obstructions were at the same location. Improving with conservative measures.     - Clear liquid diet, advised to back down to NPO if recurrent pain/nausea  - PRN pain meds  - Continue IVF  - OOB, ambulate  - If current episode resolves with conservative measures, would recommend outpatient pouchoscopy and CRS follow up to discuss possible revision/resection of small bowel anastomosis.    Discussed with Dr. Link.    For questions/paging, please contact the CRS office at [phone].    Macario Gillette PA-C  Colorectal Physician Assistant    [facility]  [address]  [place] [address]  T: [phone]  F: [phone]        CRS Staff    Seen and examined  Agree with above.    I advanced her diet.  She can probably discharge later today if lunch goes well.  Our office will arrange for follow up with us.  I recommend follow up with GI for pouchoscopy as an outpatient. As well.    I performed a history and physical examination of the patient and discussed their management with the physician assistant. I reviewed the physician assistants note and agree with the documented findings and plan of care.     Artie Link MD FACS FASCRS  Colorectal Surgeon       [facility]  [address], [address]  [place] [address]  T: [phone]  F: [phone]  Pager: [phone]  [email or web address]

## 2024-07-31 ENCOUNTER — TELEPHONE (OUTPATIENT)
Dept: GASTROENTEROLOGY | Facility: CLINIC | Age: 39
End: 2024-07-31
Payer: COMMERCIAL

## 2024-07-31 ENCOUNTER — PATIENT OUTREACH (OUTPATIENT)
Dept: CARE COORDINATION | Facility: CLINIC | Age: 39
End: 2024-07-31
Payer: COMMERCIAL

## 2024-07-31 NOTE — PROGRESS NOTES
Clinic Care Coordination Contact  Transitions of Care Outreach    No chief complaint on file.      Most Recent Admission Date: 7/25/2024   Most Recent Admission Diagnosis: SBO (small bowel obstruction) (H) - K56.609     Most Recent Discharge Date: 7/26/2024   Most Recent Discharge Diagnosis: SBO (small bowel obstruction) (H) - K56.609     Transitions of Care Assessment    Discharge Assessment  How are you doing now that you are home?: RN called and spoke with patient. Pt reports that she is doing better since being home- she states that she has a scan coming up with GI, then she has endoscopy appointments. Pt states that she is keeping food down, bowels functioning, no concerns. Pt was offered appt with Dr. Charles, and pt states that being that she has so much upcoming with GI, she would like to defer and follow up with them, and then she will follow up with Dr. Charles once those things are done. RN offered patient further care coordination assistance and patient declined, but stated thank you for the call.  How are your symptoms? (Red Flag symptoms escalate to triage hotline per guidelines): Improved  Do you know how to contact your clinic care team if you have future questions or changes to your health status? : Yes  Does the patient have their discharge instructions? : Yes  Does the patient have questions regarding their discharge instructions? : No  Were you started on any new medications or were there changes to any of your previous medications? : No  Does the patient have all of their medications?: Yes  Do you have questions regarding any of your medications? : No  Do you have all of your needed medical supplies or equipment (DME)?  (i.e. oxygen tank, CPAP, cane, etc.): Yes                Follow up Plan     Discharge Follow-Up  Discharge follow up appointment scheduled in alignment with recommended follow up timeframe or Transitions of Risk Category? (Low = within 30 days; Moderate= within 14 days; High=  within 7 days): Yes  Discharge Follow Up Appointment Date: 08/29/24  Discharge Follow Up Appointment Scheduled with?: Specialty Care Provider (pouchoscopy, sooner imaging appt with follow up planned from GI)    Future Appointments   Date Time Provider Department Center   8/5/2024  7:00 AM [id number]   10/29/2024  9:30 AM Celsa Edwards RPH [facility]   11/19/2024  2:20 PM Marlys Jacobs MD [facility]   12/20/2024  8:00 AM Polina Friedman APRN CNP [facility]       Outpatient Plan as outlined on AVS reviewed with patient.      For any urgent concerns, please contact our 24 hour nurse triage line: [phone]       GARY RANDOLPH RN

## 2024-07-31 NOTE — TELEPHONE ENCOUNTER
"Endoscopy Scheduling Screen    Have you had a positive Covid test in the last 14 days?  No    What is your communication preference for Instructions and/or Bowel Prep?   MyChart    What insurance is in the chart?  Other:  [facility]    Ordering/Referring Provider: KELLIE URIOSTEGUI   (If ordering provider performs procedure, schedule with ordering provider unless otherwise instructed. )    BMI: Estimated body mass index is 25.24 kg/m  as calculated from the following:    Height as of 7/25/24: 1.575 m (5' 2\").    Weight as of 7/25/24: 62.6 kg (138 lb).     Sedation Ordered  MAC/deep sedation.   BMI<= 45 45 < BMI <= 48 48 < BMI < = 50  BMI > 50   No Restrictions No [facility]  No [facility]  [facility] with exceptions Hospital Only OR Only       Do you have a history of malignant hyperthermia?  No    (Females) Are you currently pregnant?   No     Have you been diagnosed or told you have pulmonary hypertension?   No    Do you have an LVAD?  No    Have you been told you have moderate to severe sleep apnea?  No    Have you been told you have COPD, asthma, or any other lung disease?  No    Do you have any heart conditions?  No     Have you ever had or are you waiting for an organ transplant?  No. Continue scheduling, no site restrictions.    Have you had a stroke or transient ischemic attack (TIA aka \"mini stroke\" in the last 6 months?   No    Have you been diagnosed with or been told you have cirrhosis of the liver?   No    Are you currently on dialysis?   No    Do you need assistance transferring?   No    BMI: Estimated body mass index is 25.24 kg/m  as calculated from the following:    Height as of 7/25/24: 1.575 m (5' 2\").    Weight as of 7/25/24: 62.6 kg (138 lb).     Is patients BMI > 40 and scheduling location [facility]?  No    Do you take an injectable medication for weight loss or diabetes (excluding insulin)?  No    Do you take the medication Naltrexone?  No    Do you take blood thinners?  No       Prep   Are you currently on " dialysis or do you have chronic kidney disease?  No    Do you have a diagnosis of diabetes?  No    Do you have a diagnosis of cystic fibrosis (CF)?  No    On a regular basis do you go 3 -5 days between bowel movements?  No    BMI > 40?  No    Preferred Pharmacy:    [id number] - [place], [place] - [address]  [address]  [facility] [address]  Phone: [phone] Fax: [phone]      Final Scheduling Details     Procedure scheduled  Pouchoscopy    Surgeon:  MONTY     Date of procedure:  8/29/2024     Pre-OP / PAC:   No - Not required for this site.    Location  [facility] - Per order.    Sedation   MAC/Deep Sedation - Per order.      Patient Reminders:   You will receive a call from a Nurse to review instructions and health history.  This assessment must be completed prior to your procedure.  Failure to complete the Nurse assessment may result in the procedure being cancelled.      On the day of your procedure, please designate an adult(s) who can drive you home stay with you for the next 24 hours. The medicines used in the exam will make you sleepy. You will not be able to drive.      You cannot take public transportation, ride share services, or non-medical taxi service without a responsible caregiver.  Medical transport services are allowed with the requirement that a responsible caregiver will receive you at your destination.  We require that drivers and caregivers are confirmed prior to your procedure.

## 2024-07-31 NOTE — TELEPHONE ENCOUNTER
CTE scheduled for 8/5/24.  Pouchoscopy scheduled for 8/29/24 with Dr. Castellanos at [facility].

## 2024-08-01 ENCOUNTER — PATIENT OUTREACH (OUTPATIENT)
Dept: CARE COORDINATION | Facility: CLINIC | Age: 39
End: 2024-08-01
Payer: COMMERCIAL

## 2024-08-01 NOTE — PROGRESS NOTES
Clinical Product Navigator RN reviewed chart; patient on payer product coverage.  Review results:   CPN Initial Information Gathering  Referral Source: Health Plan    Obtaining further information to determine needs and next steps. Patient identified by Health Plan as a potential candidate for Primary Care Coordination due to recent admission. TCM program was opened at time of discharge, RNCC spoke with patient yesterday and offered care coordination services. Patient declined, no further outreaches indicated at this time.     Brittany Velasquez RN  Clinical Product Navigator  Ph: [phone]

## 2024-08-05 ENCOUNTER — ANCILLARY PROCEDURE (OUTPATIENT)
Dept: CT IMAGING | Facility: CLINIC | Age: 39
End: 2024-08-05
Attending: INTERNAL MEDICINE
Payer: COMMERCIAL

## 2024-08-05 DIAGNOSIS — K50.00 CROHN'S DISEASE OF SMALL INTESTINE WITHOUT COMPLICATION (H): ICD-10-CM

## 2024-08-05 DIAGNOSIS — K56.609 SBO (SMALL BOWEL OBSTRUCTION) (H): ICD-10-CM

## 2024-08-05 LAB — RADIOLOGIST FLAGS: NORMAL

## 2024-08-05 PROCEDURE — 74177 CT ABD & PELVIS W/CONTRAST: CPT | Mod: GC | Performed by: RADIOLOGY

## 2024-08-05 RX ORDER — IOPAMIDOL 755 MG/ML
78 INJECTION, SOLUTION INTRAVASCULAR ONCE
Status: COMPLETED | OUTPATIENT
Start: 2024-08-05 | End: 2024-08-05

## 2024-08-05 RX ADMIN — IOPAMIDOL 78 ML: 755 INJECTION, SOLUTION INTRAVASCULAR at 07:57

## 2024-08-05 NOTE — DISCHARGE INSTRUCTIONS

## 2024-08-08 ENCOUNTER — MYC MEDICAL ADVICE (OUTPATIENT)
Dept: GASTROENTEROLOGY | Facility: CLINIC | Age: 39
End: 2024-08-08
Payer: COMMERCIAL

## 2024-08-09 ENCOUNTER — TELEPHONE (OUTPATIENT)
Dept: UROLOGY | Facility: CLINIC | Age: 39
End: 2024-08-09
Payer: COMMERCIAL

## 2024-08-09 NOTE — TELEPHONE ENCOUNTER
Patient confirmed scheduled appointment:  Date: Aug 16th  Time: 8am  Visit type: New  Provider: Dr. Navarro  Location: [facility]  Additional notes: Referral from primary care

## 2024-08-12 ENCOUNTER — ANCILLARY PROCEDURE (OUTPATIENT)
Dept: ULTRASOUND IMAGING | Facility: CLINIC | Age: 39
End: 2024-08-12
Attending: INTERNAL MEDICINE
Payer: COMMERCIAL

## 2024-08-12 DIAGNOSIS — N13.30 HYDRONEPHROSIS, LEFT: ICD-10-CM

## 2024-08-12 PROCEDURE — 76770 US EXAM ABDO BACK WALL COMP: CPT | Performed by: RADIOLOGY

## 2024-08-13 ENCOUNTER — PRE VISIT (OUTPATIENT)
Dept: UROLOGY | Facility: CLINIC | Age: 39
End: 2024-08-13
Payer: COMMERCIAL

## 2024-08-13 NOTE — TELEPHONE ENCOUNTER
Reason for visit: consult     Relevant information: left hydronephrosis, renal stones    Records/imaging/labs/orders: imaging available    Pt called: No need for a call    At Rooming: darien Stuart  8/13/2024  2:06 AM

## 2024-08-15 NOTE — TELEPHONE ENCOUNTER
Action August 15, 2024 JTV 10:38 AM    Action Taken CSS called patient. Patient did not  . [facility] LVM for patient to return call.   MEDICAL RECORDS REQUEST   [facility]  Urology Clinic  [address] [place] [id number]  PHONE: [phone]  Fax: [phone]        FUTURE VISIT INFORMATION                                                   Amber Buckner, : 1985 scheduled for future visit at [facility]    APPOINTMENT INFORMATION:  Date: 2024  Provider:  Perry Navarro MD  Reason for Visit/Diagnosis: Dilated ureter possible hydronephrosis    REFERRAL INFORMATION:  Referring provider:  Cinthia Charles MD in [facility]    RECORDS REQUESTED FOR VISIT                                                     NOTES  STATUS/DETAILS   OFFICE NOTE from referring provider  yes, Cinthia Charles MD in [facility]   MEDICATION LIST  yes   LABS     URINALYSIS (UA)  no   images  YES, 2024 -- US RENAL  2024, 2023 -- CT ENTEROGRAPHY  2024 -- CT ABD PELVIS     PRE-VISIT CHECKLIST      Joint diagnostic appointment coordinated correctly          (ensure right order & amount of time) Yes   RECORD COLLECTION COMPLETE Yes

## 2024-08-16 ENCOUNTER — OFFICE VISIT (OUTPATIENT)
Dept: UROLOGY | Facility: CLINIC | Age: 39
End: 2024-08-16
Payer: COMMERCIAL

## 2024-08-16 ENCOUNTER — PRE VISIT (OUTPATIENT)
Dept: UROLOGY | Facility: CLINIC | Age: 39
End: 2024-08-16

## 2024-08-16 VITALS — SYSTOLIC BLOOD PRESSURE: 104 MMHG | HEART RATE: 75 BPM | DIASTOLIC BLOOD PRESSURE: 70 MMHG

## 2024-08-16 DIAGNOSIS — N13.30 HYDRONEPHROSIS, LEFT: ICD-10-CM

## 2024-08-16 PROCEDURE — 99203 OFFICE O/P NEW LOW 30 MIN: CPT | Performed by: UROLOGY

## 2024-08-16 ASSESSMENT — PAIN SCALES - GENERAL: PAINLEVEL: NO PAIN (0)

## 2024-08-16 NOTE — PROGRESS NOTES
[facility]IN[facility] [place].  Phone: [phone]   Fax: [phone]  CR Navarro MD  Visit Date: Aug 16, 2024  Patient:  Amber Buckner  Date of birth 1985, 39 year old female       ASSESSMENT and PLAN     Kidney stones  7/25/24 CT 3mm right lower pole kidney stone.  8/12/24 renal ultrasound.  Right small lower pole stone.  Left 9mm lower pole echogenic focus.  No hydronephrosis.    Crohns   History of colectomy  7/26/24 Admitted for SBO.      Counseling during this visit:  We covered the natural history of kidney stones, the risk of progression to symptomatic pain/infection, and the possibility of renal failure/kidney damage.    We covered treatment options including observation, ureteroscopy, extracorporeal shock wave lithotripsy (ESWL), and percutaneous nephrolithotomy (PCNL).    We covered surgical risks which include but are not limited to heart attack, stroke, blood clot in the legs or lungs, death, injury to surrounding organs (intestine, liver, spleen, pancreas, lung, muscles, nerves), loss of sensation around incisions, decreased renal function, infection, injury to ureter, injury to bladder, and urethral strictures.  Additional procedures may be necessary in the perioperative period.    Standard recommendations on kidney stone prevention were provided.  These include to maintain fluid intake of 3 liters per day or more with a goal of making 2 or more liters of urine per day.  If alcoholic or caffeinated beverages are consumed then she needs to drink water along with these beverages to maintain hydration.  A few ounces of lemon juice concentrate a day diluted in water can help prevent stones.  She should limit her intake of red meat, salt, and salty processed foods.  She should maintain calcium intake in her diet through continued consumption of dairy products etc.  She should limit foods that are high in oxalate such as spinach, sweet potatoe, dark chocolate, soy products,  and some nuts such as peanuts.      Plan  Follow-up in 1 year with CT to evaluate stone.  PA visit after.  ____________________________________________________________________    HPI  She has long term history of Crohn's disease and recently was admitted at [facility] for a small bowel obstruction.  While there she had a CT scan showing a 3 mm kidney stone.  She had a subsequent renal ultrasound showing nones of any hydronephrosis.  She is here for discussion of the kidney stone.    This is her first kidney stone.    I reviewed the radiologic images and reports from the radiologic exam (7/25/24 CT) listed above.  My independent interpretation is listed there as well.      I reviewed the following laboratory data and went over findings with patient:  Recent Labs   Lab Test 07/26/24  1147 07/25/24  0431 05/17/24  0744 01/08/24  0733   WBC 5.2 9.3 5.7 4.9   HGB 11.9 13.9 12.9 13.1    310 294 253     Recent Labs   Lab Test 07/26/24  1147 07/25/24  0431 05/17/24  0744 01/08/24  0733 08/05/22  0920 10/20/19  0741 10/19/19  0650 10/18/19  1729 10/18/19  1540   CR 0.57 0.59 0.64 0.68   < > 0.40* 0.53 0.50* Canceled, Test credited, specimen discarded   GFRESTIMATED >90 >90 >90 >90   < > >90 >90 >90 Canceled, Test credited, specimen discarded   GFRESTBLACK  --   --   --   --   --  >90 >90 >90 Canceled, Test credited, specimen discarded   * 103* 97 92   < > 116* 108* 115* Canceled, Test credited, specimen discarded    < > = values in this interval not displayed.       CC  Patient Care Team:  Cinthia Charles MD as PCP - General  Cinthia Charles MD (Internal Medicine)  Oralia, Preeti Rocio, RN as Specialty Care Coordinator (Gastroenterology)  Marlys Jacobs MD as MD (Gastroenterology)  Marlys Jacobs MD as Assigned Surgical Provider  Cinthia Charles MD as Assigned PCP  Celsa Edwards [facility] as Pharmacist (Pharmacist Ambulatory Care)  Polina Friedman APRN CNP as Nurse Practitioner  (Dermatology)  Catia Lee RD as Registered Dietitian (Dietitian, Registered)  Celsa [place] RPH as Assigned MTM Pharmacist  SAMARA RAMIREZ    Copy to patient  SYLVIA KEENAN EVELINA  [address] [facility] [address]

## 2024-08-16 NOTE — PROGRESS NOTES
Chief Complaint   Patient presents with    Consult     - Dilated ureter  - Hydronephrosis       Blood pressure 104/70, pulse 75, not currently breastfeeding. There is no height or weight on file to calculate BMI.    Patient Active Problem List   Diagnosis    S/P total colectomy    Polycystic ovaries    Depression with anxiety    Acne    Angioma    High-risk pregnancy [facility] MD patient    Ulcerative colitis (H)    S/P  section    Small bowel obstruction (H)    Moderate major depression (H)    Hx of colectomy    Hx of ulcerative colitis    Crohn's disease (H)    Major depression, recurrent (H24)    SBO (small bowel obstruction) (H)       No Known Allergies    Current Outpatient Medications   Medication Sig Dispense Refill    acetaminophen (TYLENOL) 500 MG tablet Take 1,000 mg by mouth as needed for pain      adapalene (DIFFERIN) 0.1 % external gel Apply topically At Bedtime      SKYRIZI 360 MG/2.4ML SOCT Inject 2.4 mLs (360 mg) Subcutaneous once every eight weeks 2.4 mL 2    vitamin D3 (CHOLECALCIFEROL) 50 mcg (2000 units) tablet Take 1 tablet by mouth daily         Social History     Tobacco Use    Smoking status: Never     Passive exposure: Never    Smokeless tobacco: Never   Vaping Use    Vaping status: Never Used   Substance Use Topics    Alcohol use: Yes    Drug use: No       Thierry Morrison  2024  8:02 AM

## 2024-08-16 NOTE — LETTER
8/16/2024       RE: Amber Buckner  [address]  [facility] [address]     Dear Colleague,    Thank you for referring your patient, Amber Buckner, to the [facility] [place] at [facility]. Please see a copy of my visit note below.     [facility] [place].  Phone: [phone]   Fax: [phone]  CR Navarro MD  Visit Date: Aug 16, 2024  Patient:  Amber Buckner  Date of birth 1985, 39 year old female       ASSESSMENT and PLAN     Kidney stones  7/25/24 CT 3mm right lower pole kidney stone.  8/12/24 renal ultrasound.  Right small lower pole stone.  Left 9mm lower pole echogenic focus.  No hydronephrosis.    Crohns   History of colectomy  7/26/24 Admitted for SBO.      Counseling during this visit:  We covered the natural history of kidney stones, the risk of progression to symptomatic pain/infection, and the possibility of renal failure/kidney damage.    We covered treatment options including observation, ureteroscopy, extracorporeal shock wave lithotripsy (ESWL), and percutaneous nephrolithotomy (PCNL).    We covered surgical risks which include but are not limited to heart attack, stroke, blood clot in the legs or lungs, death, injury to surrounding organs (intestine, liver, spleen, pancreas, lung, muscles, nerves), loss of sensation around incisions, decreased renal function, infection, injury to ureter, injury to bladder, and urethral strictures.  Additional procedures may be necessary in the perioperative period.    Standard recommendations on kidney stone prevention were provided.  These include to maintain fluid intake of 3 liters per day or more with a goal of making 2 or more liters of urine per day.  If alcoholic or caffeinated beverages are consumed then she needs to drink water along with these beverages to maintain hydration.  A few ounces of lemon juice concentrate a day diluted in  water can help prevent stones.  She should limit her intake of red meat, salt, and salty processed foods.  She should maintain calcium intake in her diet through continued consumption of dairy products etc.  She should limit foods that are high in oxalate such as spinach, sweet potatoe, dark chocolate, soy products, and some nuts such as peanuts.      Plan  Follow-up in 1 year with CT to evaluate stone.  PA visit after.  ____________________________________________________________________    HPI  She has long term history of Crohn's disease and recently was admitted at [facility] for a small bowel obstruction.  While there she had a CT scan showing a 3 mm kidney stone.  She had a subsequent renal ultrasound showing nones of any hydronephrosis.  She is here for discussion of the kidney stone.    This is her first kidney stone.    I reviewed the radiologic images and reports from the radiologic exam (7/25/24 CT) listed above.  My independent interpretation is listed there as well.      I reviewed the following laboratory data and went over findings with patient:  Recent Labs   Lab Test 07/26/24  1147 07/25/24  0431 05/17/24  0744 01/08/24  0733   WBC 5.2 9.3 5.7 4.9   HGB 11.9 13.9 12.9 13.1    310 294 253     Recent Labs   Lab Test 07/26/24  1147 07/25/24  0431 05/17/24  0744 01/08/24  0733 08/05/22  0920 10/20/19  0741 10/19/19  0650 10/18/19  1729 10/18/19  1540   CR 0.57 0.59 0.64 0.68   < > 0.40* 0.53 0.50* Canceled, Test credited, specimen discarded   GFRESTIMATED >90 >90 >90 >90   < > >90 >90 >90 Canceled, Test credited, specimen discarded   GFRESTBLACK  --   --   --   --   --  >90 >90 >90 Canceled, Test credited, specimen discarded   * 103* 97 92   < > 116* 108* 115* Canceled, Test credited, specimen discarded    < > = values in this interval not displayed.       CC  Patient Care Team:  Cinthia Charles MD as PCP - General  Cinthia Charles MD (Internal Medicine)  Preeti Barton,  RN as Specialty Care Coordinator (Gastroenterology)  Marlys Jacobs MD as MD (Gastroenterology)  Marlys Jacobs MD as Assigned Surgical Provider  Cinthia Ramirez MD as Assigned PCP  Celsa Edwards RPH as Pharmacist (Pharmacist Ambulatory Care)  Polina Friedman APRN CNP as Nurse Practitioner (Dermatology)  Catia Lee RD as Registered Dietitian (Dietitian, Registered)  Celsa Edwards RPH as Assigned [facility] Pharmacist  CINTHIA RAMIREZ    Copy to patient  SYLVIA MYLESZL  [address] [facility] [address]    Chief Complaint   Patient presents with     Consult     - Dilated ureter  - Hydronephrosis       Blood pressure 104/70, pulse 75, not currently breastfeeding. There is no height or weight on file to calculate BMI.    Patient Active Problem List   Diagnosis     S/P total colectomy     Polycystic ovaries     Depression with anxiety     Acne     Angioma     High-risk pregnancy [facility] MD patient     Ulcerative colitis (H)     S/P  section     Small bowel obstruction (H)     Moderate major depression (H)     Hx of colectomy     Hx of ulcerative colitis     Crohn's disease (H)     Major depression, recurrent (H24)     SBO (small bowel obstruction) (H)       No Known Allergies    Current Outpatient Medications   Medication Sig Dispense Refill     acetaminophen (TYLENOL) 500 MG tablet Take 1,000 mg by mouth as needed for pain       adapalene (DIFFERIN) 0.1 % external gel Apply topically At Bedtime       SKYRIZI 360 MG/2.4ML SOCT Inject 2.4 mLs (360 mg) Subcutaneous once every eight weeks 2.4 mL 2     vitamin D3 (CHOLECALCIFEROL) 50 mcg (2000 units) tablet Take 1 tablet by mouth daily         Social History     Tobacco Use     Smoking status: Never     Passive exposure: Never     Smokeless tobacco: Never   Vaping Use     Vaping status: Never Used   Substance Use Topics     Alcohol use: Yes     Drug use: No       Thierry Morrison  2024  8:02 AM         Again, thank you for allowing me  to participate in the care of your patient.      Sincerely,    Perry Navarro MD

## 2024-08-19 ENCOUNTER — TELEPHONE (OUTPATIENT)
Dept: GASTROENTEROLOGY | Facility: CLINIC | Age: 39
End: 2024-08-19
Payer: COMMERCIAL

## 2024-08-19 NOTE — TELEPHONE ENCOUNTER
Pre assessment completed for upcoming procedure.   (Please see previous telephone encounter notes for complete details)      Procedure details:    Arrival time and facility location reviewed.    Pre op exam needed? No.    Designated  policy reviewed. Instructed to have someone stay 24  hours post procedure.       Medication review:    Medications reviewed. Please see supporting documentation below. Holding recommendations discussed (if applicable).   N/A      Prep for procedure:     Procedure prep instructions reviewed.        Any additional information needed:  N/A      Patient  verbalized understanding and had no questions or concerns at this time.      Lou Kay RN  Endoscopy Procedure Pre Assessment   [phone] option 4

## 2024-08-19 NOTE — TELEPHONE ENCOUNTER
Please see below [employer] Message below   Pre visit planning completed.      Procedure details:    Patient scheduled for Pouchoscopy on 8/29/2024.     Arrival time: 1030. Procedure time 1200    Facility location: [facility]; [address] [place] [address], [address] Floor, [place] [address]. Check in location: Main entrance at registration desk.    Sedation type: MAC    Pre op exam needed? No.    Indication for procedure:   SBO (small bowel obstruction) (H) [K56.609]  - Primary      Crohn's disease of small intestine without complication (H) [K50.00]            Chart review:     Electronic implanted devices? No    Recent diagnosis of diverticulitis within the last 6 weeks? No      Medication review:    Diabetic? No    Anticoagulants? No    Weight loss medication/injectable? No.    NSAIDS? No NSAID medications per patient's medication list.  RN will verify with pre-assessment call.    Other medication HOLDING recommendations:  N/A      Prep for procedure:     Bowel prep recommendation: Enemas  Due to: standard bowel prep.    Prep instructions sent via [employer]         Lou Kay RN  Endoscopy Procedure Pre Assessment RN  [phone] option 4

## 2024-08-29 ENCOUNTER — HOSPITAL ENCOUNTER (OUTPATIENT)
Facility: CLINIC | Age: 39
Discharge: HOME | End: 2024-08-29
Attending: INTERNAL MEDICINE | Admitting: INTERNAL MEDICINE
Payer: COMMERCIAL

## 2024-08-29 ENCOUNTER — ANESTHESIA (OUTPATIENT)
Dept: GASTROENTEROLOGY | Facility: CLINIC | Age: 39
End: 2024-08-29
Payer: COMMERCIAL

## 2024-08-29 ENCOUNTER — ANESTHESIA EVENT (OUTPATIENT)
Dept: GASTROENTEROLOGY | Facility: CLINIC | Age: 39
End: 2024-08-29
Payer: COMMERCIAL

## 2024-08-29 VITALS
OXYGEN SATURATION: 100 % | TEMPERATURE: 98.2 F | RESPIRATION RATE: 15 BRPM | DIASTOLIC BLOOD PRESSURE: 69 MMHG | SYSTOLIC BLOOD PRESSURE: 94 MMHG

## 2024-08-29 LAB — POUCHOSCOPY: NORMAL

## 2024-08-29 PROCEDURE — 88305 TISSUE EXAM BY PATHOLOGIST: CPT | Mod: TC | Performed by: INTERNAL MEDICINE

## 2024-08-29 PROCEDURE — 250N000009 HC RX 250: Performed by: INTERNAL MEDICINE

## 2024-08-29 PROCEDURE — 44385 ENDOSCOPY OF BOWEL POUCH: CPT | Performed by: NURSE ANESTHETIST, CERTIFIED REGISTERED

## 2024-08-29 PROCEDURE — 88305 TISSUE EXAM BY PATHOLOGIST: CPT | Mod: 26 | Performed by: PATHOLOGY

## 2024-08-29 PROCEDURE — 44385 ENDOSCOPY OF BOWEL POUCH: CPT | Performed by: INTERNAL MEDICINE

## 2024-08-29 PROCEDURE — 44386 ENDOSCOPY BOWEL POUCH/BIOP: CPT | Performed by: INTERNAL MEDICINE

## 2024-08-29 PROCEDURE — 258N000003 HC RX IP 258 OP 636: Performed by: INTERNAL MEDICINE

## 2024-08-29 PROCEDURE — 370N000017 HC ANESTHESIA TECHNICAL FEE, PER MIN: Performed by: INTERNAL MEDICINE

## 2024-08-29 PROCEDURE — 250N000011 HC RX IP 250 OP 636: Performed by: INTERNAL MEDICINE

## 2024-08-29 RX ORDER — NALOXONE HYDROCHLORIDE 0.4 MG/ML
0.1 INJECTION, SOLUTION INTRAMUSCULAR; INTRAVENOUS; SUBCUTANEOUS
Status: DISCONTINUED | OUTPATIENT
Start: 2024-08-29 | End: 2024-08-29 | Stop reason: HOSPADM

## 2024-08-29 RX ORDER — NALOXONE HYDROCHLORIDE 0.4 MG/ML
0.2 INJECTION, SOLUTION INTRAMUSCULAR; INTRAVENOUS; SUBCUTANEOUS
Status: DISCONTINUED | OUTPATIENT
Start: 2024-08-29 | End: 2024-08-29 | Stop reason: HOSPADM

## 2024-08-29 RX ORDER — OXYCODONE HYDROCHLORIDE 10 MG/1
10 TABLET ORAL
Status: DISCONTINUED | OUTPATIENT
Start: 2024-08-29 | End: 2024-08-29 | Stop reason: HOSPADM

## 2024-08-29 RX ORDER — PROCHLORPERAZINE MALEATE 5 MG
10 TABLET ORAL EVERY 6 HOURS PRN
Status: DISCONTINUED | OUTPATIENT
Start: 2024-08-29 | End: 2024-08-29 | Stop reason: HOSPADM

## 2024-08-29 RX ORDER — LIDOCAINE HYDROCHLORIDE 20 MG/ML
INJECTION, SOLUTION INFILTRATION; PERINEURAL PRN
Status: DISCONTINUED | OUTPATIENT
Start: 2024-08-29 | End: 2024-08-29

## 2024-08-29 RX ORDER — ONDANSETRON 4 MG/1
4 TABLET, ORALLY DISINTEGRATING ORAL EVERY 6 HOURS PRN
Status: DISCONTINUED | OUTPATIENT
Start: 2024-08-29 | End: 2024-08-29 | Stop reason: HOSPADM

## 2024-08-29 RX ORDER — LIDOCAINE 40 MG/G
CREAM TOPICAL
Status: DISCONTINUED | OUTPATIENT
Start: 2024-08-29 | End: 2024-08-29 | Stop reason: HOSPADM

## 2024-08-29 RX ORDER — ONDANSETRON 2 MG/ML
4 INJECTION INTRAMUSCULAR; INTRAVENOUS EVERY 6 HOURS PRN
Status: DISCONTINUED | OUTPATIENT
Start: 2024-08-29 | End: 2024-08-29 | Stop reason: HOSPADM

## 2024-08-29 RX ORDER — ONDANSETRON 2 MG/ML
4 INJECTION INTRAMUSCULAR; INTRAVENOUS EVERY 30 MIN PRN
Status: DISCONTINUED | OUTPATIENT
Start: 2024-08-29 | End: 2024-08-29 | Stop reason: HOSPADM

## 2024-08-29 RX ORDER — SODIUM CHLORIDE, SODIUM LACTATE, POTASSIUM CHLORIDE, CALCIUM CHLORIDE 600; 310; 30; 20 MG/100ML; MG/100ML; MG/100ML; MG/100ML
INJECTION, SOLUTION INTRAVENOUS CONTINUOUS
Status: DISCONTINUED | OUTPATIENT
Start: 2024-08-29 | End: 2024-08-29 | Stop reason: HOSPADM

## 2024-08-29 RX ORDER — FLUMAZENIL 0.1 MG/ML
0.2 INJECTION, SOLUTION INTRAVENOUS
Status: DISCONTINUED | OUTPATIENT
Start: 2024-08-29 | End: 2024-08-29 | Stop reason: HOSPADM

## 2024-08-29 RX ORDER — PROPOFOL 10 MG/ML
INJECTION, EMULSION INTRAVENOUS CONTINUOUS PRN
Status: DISCONTINUED | OUTPATIENT
Start: 2024-08-29 | End: 2024-08-29

## 2024-08-29 RX ORDER — NALOXONE HYDROCHLORIDE 0.4 MG/ML
0.4 INJECTION, SOLUTION INTRAMUSCULAR; INTRAVENOUS; SUBCUTANEOUS
Status: DISCONTINUED | OUTPATIENT
Start: 2024-08-29 | End: 2024-08-29 | Stop reason: HOSPADM

## 2024-08-29 RX ORDER — PROPOFOL 10 MG/ML
INJECTION, EMULSION INTRAVENOUS PRN
Status: DISCONTINUED | OUTPATIENT
Start: 2024-08-29 | End: 2024-08-29

## 2024-08-29 RX ORDER — ONDANSETRON 4 MG/1
4 TABLET, ORALLY DISINTEGRATING ORAL EVERY 30 MIN PRN
Status: DISCONTINUED | OUTPATIENT
Start: 2024-08-29 | End: 2024-08-29 | Stop reason: HOSPADM

## 2024-08-29 RX ORDER — ONDANSETRON 2 MG/ML
INJECTION INTRAMUSCULAR; INTRAVENOUS PRN
Status: DISCONTINUED | OUTPATIENT
Start: 2024-08-29 | End: 2024-08-29

## 2024-08-29 RX ORDER — OXYCODONE HYDROCHLORIDE 5 MG/1
5 TABLET ORAL
Status: DISCONTINUED | OUTPATIENT
Start: 2024-08-29 | End: 2024-08-29 | Stop reason: HOSPADM

## 2024-08-29 RX ORDER — DEXAMETHASONE SODIUM PHOSPHATE 4 MG/ML
4 INJECTION, SOLUTION INTRA-ARTICULAR; INTRALESIONAL; INTRAMUSCULAR; INTRAVENOUS; SOFT TISSUE
Status: DISCONTINUED | OUTPATIENT
Start: 2024-08-29 | End: 2024-08-29 | Stop reason: HOSPADM

## 2024-08-29 RX ORDER — SODIUM CHLORIDE, SODIUM LACTATE, POTASSIUM CHLORIDE, CALCIUM CHLORIDE 600; 310; 30; 20 MG/100ML; MG/100ML; MG/100ML; MG/100ML
INJECTION, SOLUTION INTRAVENOUS CONTINUOUS PRN
Status: DISCONTINUED | OUTPATIENT
Start: 2024-08-29 | End: 2024-08-29

## 2024-08-29 RX ADMIN — PROPOFOL 150 MCG/KG/MIN: 10 INJECTION, EMULSION INTRAVENOUS at 12:00

## 2024-08-29 RX ADMIN — PROPOFOL 30 MG: 10 INJECTION, EMULSION INTRAVENOUS at 12:25

## 2024-08-29 RX ADMIN — SODIUM CHLORIDE, POTASSIUM CHLORIDE, SODIUM LACTATE AND CALCIUM CHLORIDE: 600; 310; 30; 20 INJECTION, SOLUTION INTRAVENOUS at 11:54

## 2024-08-29 RX ADMIN — ONDANSETRON 4 MG: 2 INJECTION INTRAMUSCULAR; INTRAVENOUS at 12:05

## 2024-08-29 RX ADMIN — LIDOCAINE HYDROCHLORIDE 60 MG: 20 INJECTION, SOLUTION INFILTRATION; PERINEURAL at 12:00

## 2024-08-29 RX ADMIN — PROPOFOL 50 MG: 10 INJECTION, EMULSION INTRAVENOUS at 12:00

## 2024-08-29 RX ADMIN — MIDAZOLAM 2 MG: 1 INJECTION INTRAMUSCULAR; INTRAVENOUS at 11:59

## 2024-08-29 ASSESSMENT — COPD QUESTIONNAIRES: COPD: 0

## 2024-08-29 ASSESSMENT — ACTIVITIES OF DAILY LIVING (ADL)
ADLS_ACUITY_SCORE: 37

## 2024-08-29 ASSESSMENT — LIFESTYLE VARIABLES: TOBACCO_USE: 0

## 2024-08-29 NOTE — OR NURSING
Pt underwent pouchoscopy with ileocolonic dilation and biopsies under MAC. Specimens: sent to lab. Pt transferred to recovery and report given to endo RN.       Praveena Frank RN

## 2024-08-29 NOTE — ANESTHESIA CARE TRANSFER NOTE
Patient: Amber Buckner    Procedure: Procedure(s):  Pouchoscopy       Diagnosis: SBO (small bowel obstruction) (H) [K56.609]  Crohn's disease of small intestine without complication (H) [K50.00]  Diagnosis Additional Information: No value filed.    Anesthesia Type:   MAC     Note:    Oropharynx: oropharynx clear of all foreign objects    Oxygen Supplementation: face mask  Level of Supplemental Oxygen (L/min / FiO2): 6  Independent Airway: airway patency satisfactory and stable  Dentition: dentition unchanged  Vital Signs Stable: post-procedure vital signs reviewed and stable    Patient transferred to: Phase II    Handoff Report: Identifed the Patient, Identified the Reponsible Provider, Reviewed the pertinent medical history, Discussed the surgical course, Reviewed Intra-OP anesthesia mangement and issues during anesthesia, Set expectations for post-procedure period and Allowed opportunity for questions and acknowledgement of understanding  Vitals:  Vitals Value Taken Time   /75 08/29/24 1238   Temp     Pulse     Resp     SpO2 100 % 08/29/24 1238   Vitals shown include unfiled device data.    Electronically Signed By: Praveena Yoo MD  August 29, 2024  12:39 PM

## 2024-08-29 NOTE — ANESTHESIA POSTPROCEDURE EVALUATION
Patient: Amber Buckner    Procedure: Procedure(s):  Pouchoscopy       Anesthesia Type:  MAC    Note:  Disposition: Outpatient   Postop Pain Control: Uneventful            Sign Out: Well controlled pain   PONV: No   Neuro/Psych: Uneventful            Sign Out: Acceptable/Baseline neuro status   Airway/Respiratory: Uneventful            Sign Out: Acceptable/Baseline resp. status   CV/Hemodynamics: Uneventful            Sign Out: Acceptable CV status; No obvious hypovolemia; No obvious fluid overload   Other NRE: NONE   DID A NON-ROUTINE EVENT OCCUR? No           Last vitals:  Vitals Value Taken Time   /74 08/29/24 1303   Temp     Pulse     Resp 16 08/29/24 1240   SpO2 100 % 08/29/24 1250   Vitals shown include unfiled device data.    Electronically Signed By: Praveena Yoo MD  August 29, 2024  1:05 PM

## 2024-08-29 NOTE — ANESTHESIA PREPROCEDURE EVALUATION
Anesthesia Pre-Procedure Evaluation    Patient: Amber Buckner   MRN: [id number] : 1985        Procedure : Procedure(s):  Pouchoscopy          Past Medical History:   Diagnosis Date    Adolescent depression in college    Anxiety     Depressive disorder 2002    Treated witj celexa    Fit/adjust GI carole-device NEC     Perineal abscess     PONV (postoperative nausea and vomiting)     S/P total colectomy     Ulcerative colitis       Past Surgical History:   Procedure Laterality Date     SECTION  2012    Procedure: SECTION; Surgeon:JAVI BELL; [facility]     SECTION N/A 2014    Procedure:  SECTION;  Surgeon: Shawanda Luna MD;  Location: [facility]     SECTION N/A 2017    Procedure:  SECTION;  Repeat  Section ;  Surgeon: Laurie Montoya MD;  [facility]: [facility]    COLECTOMY SUBTOTAL      COLONOSCOPY N/A 4/15/2015    Procedure: COLONOSCOPY;  Surgeon: Al Santo MD;  Location: [facility]    EXAM UNDER ANESTHESIA ANUS N/A 4/15/2015    Procedure: EXAM UNDER ANESTHESIA ANUS;  Surgeon: Al Santo MD;  Location: [facility]    FISTULOTOMY RECTUM N/A 4/15/2015    Procedure: FISTULOTOMY RECTUM;  Surgeon: Al Santo MD;  Location: [facility]    POUCHOSCOPY N/A 2023    Procedure: ENDOSCOPY, POUCH, DIAGNOSTIC WITH BIOPSIES;  Surgeon: Marlys Jacobs MD;  Location: [facility] OR    POUCHOSCOPY N/A 10/16/2023    Procedure: POUCHOSCOPY WITH BIOPSY;  Surgeon: Marlys Jacobs MD;  Location: [facility] OR    TAKEDOWN ILEOSTOMY      tonsilectomy      TRANSPERINEAL REPAIR FISTULA RECTAL URETHRAL        No Known Allergies   Social History     Tobacco Use    Smoking status: Never     Passive exposure: Never    Smokeless tobacco: Never   Substance Use Topics    Alcohol use: Yes      Wt Readings from Last 1 Encounters:   24 62.6 kg (138 lb)        Anesthesia Evaluation   Pt has had prior anesthetic.     History of anesthetic  complications  - PONV.      ROS/MED HX  ENT/Pulmonary:    (-) tobacco use, asthma, COPD, JUAN JOSE risk factors and recent URI   Neurologic:       Cardiovascular:       METS/Exercise Tolerance: >4 METS    Hematologic:       Musculoskeletal:       GI/Hepatic: Comment: Ulcerative colitis       Renal/Genitourinary:       Endo:       Psychiatric/Substance Use:     (+) psychiatric history anxiety and depression       Infectious Disease:       Malignancy:       Other:            Physical Exam    Airway        Mallampati: III   TM distance: > 3 FB   Neck ROM: full   Mouth opening: > 3 cm    Respiratory Devices and Support         Dental       (+) Completely normal teeth      Cardiovascular             Pulmonary   pulmonary exam normal        breath sounds clear to auscultation           OUTSIDE LABS:  CBC:   Lab Results   Component Value Date    WBC 5.2 07/26/2024    WBC 9.3 07/25/2024    HGB 11.9 07/26/2024    HGB 13.9 07/25/2024    HCT 35.9 07/26/2024    HCT 40.9 07/25/2024     07/26/2024     07/25/2024     BMP:   Lab Results   Component Value Date     07/26/2024     07/25/2024    POTASSIUM 3.9 07/26/2024    POTASSIUM 4.5 07/25/2024    CHLORIDE 105 07/26/2024    CHLORIDE 104 07/25/2024    CO2 25 07/26/2024    CO2 21 (L) 07/25/2024    BUN 3.5 (L) 07/26/2024    BUN 12.7 07/25/2024    CR 0.57 07/26/2024    CR 0.59 07/25/2024     (H) 07/26/2024     (H) 07/25/2024     COAGS:   Lab Results   Component Value Date    INR 1.09 08/05/2022     POC:   Lab Results   Component Value Date    HCG Negative 10/16/2023    HCGS Negative 10/24/2022     HEPATIC:   Lab Results   Component Value Date    ALBUMIN 4.8 07/25/2024    PROTTOTAL 8.3 07/25/2024    ALT 11 07/25/2024    AST 23 07/25/2024    ALKPHOS 53 07/25/2024    BILITOTAL 0.5 07/25/2024     OTHER:   Lab Results   Component Value Date    PH 7.36 10/24/2022    LACT 0.7 07/25/2024    JUDSON 9.0 07/26/2024    PHOS 3.3 08/05/2022    MAG 2.0 08/05/2022     LIPASE 23 07/25/2024    AMYLASE 49 11/03/2014    TSH 2.07 04/21/2011    CRP 5.1 11/03/2022    SED 7 01/08/2024       Anesthesia Plan    ASA Status:  2    NPO Status:  NPO Appropriate    Anesthesia Type: MAC.   Induction: Intravenous.   Maintenance: TIVA.        Consents    Anesthesia Plan(s) and associated risks, benefits, and realistic alternatives discussed. Questions answered and patient/representative(s) expressed understanding.     - Discussed: Risks, Benefits and Alternatives for BOTH SEDATION and the PROCEDURE were discussed     - Discussed with:  Patient            Postoperative Care       PONV prophylaxis: Ondansetron (or other 5HT-3), Dexamethasone or Solumedrol, Background Propofol Infusion     Comments:               Praveena Yoo MD    I have reviewed the pertinent notes and labs in the chart from the past 30 days and (re)examined the patient.  Any updates or changes from those notes are reflected in this note.

## 2024-10-25 ENCOUNTER — MYC MEDICAL ADVICE (OUTPATIENT)
Dept: INTERNAL MEDICINE | Facility: CLINIC | Age: 39
End: 2024-10-25
Payer: COMMERCIAL

## 2024-10-25 DIAGNOSIS — Z11.1 SCREENING FOR TUBERCULOSIS: Primary | ICD-10-CM

## 2024-10-29 ENCOUNTER — LAB (OUTPATIENT)
Dept: LAB | Facility: CLINIC | Age: 39
End: 2024-10-29
Payer: COMMERCIAL

## 2024-10-29 ENCOUNTER — VIRTUAL VISIT (OUTPATIENT)
Dept: PHARMACY | Facility: CLINIC | Age: 39
End: 2024-10-29
Attending: INTERNAL MEDICINE
Payer: COMMERCIAL

## 2024-10-29 VITALS — HEIGHT: 62 IN | BODY MASS INDEX: 24.84 KG/M2 | WEIGHT: 135 LBS

## 2024-10-29 DIAGNOSIS — Z11.1 SCREENING FOR TUBERCULOSIS: ICD-10-CM

## 2024-10-29 DIAGNOSIS — K50.90 CROHN'S DISEASE (H): ICD-10-CM

## 2024-10-29 DIAGNOSIS — K50.00 CROHN'S DISEASE OF SMALL INTESTINE WITHOUT COMPLICATION (H): Primary | ICD-10-CM

## 2024-10-29 LAB
BASOPHILS # BLD AUTO: 0 10E3/UL (ref 0–0.2)
BASOPHILS NFR BLD AUTO: 1 %
EOSINOPHIL # BLD AUTO: 0.1 10E3/UL (ref 0–0.7)
EOSINOPHIL NFR BLD AUTO: 1 %
ERYTHROCYTE [DISTWIDTH] IN BLOOD BY AUTOMATED COUNT: 12.6 % (ref 10–15)
HCT VFR BLD AUTO: 38.3 % (ref 35–47)
HGB BLD-MCNC: 12.8 G/DL (ref 11.7–15.7)
IMM GRANULOCYTES # BLD: 0 10E3/UL
IMM GRANULOCYTES NFR BLD: 0 %
LYMPHOCYTES # BLD AUTO: 2.3 10E3/UL (ref 0.8–5.3)
LYMPHOCYTES NFR BLD AUTO: 41 %
MCH RBC QN AUTO: 33.8 PG (ref 26.5–33)
MCHC RBC AUTO-ENTMCNC: 33.4 G/DL (ref 31.5–36.5)
MCV RBC AUTO: 101 FL (ref 78–100)
MONOCYTES # BLD AUTO: 0.4 10E3/UL (ref 0–1.3)
MONOCYTES NFR BLD AUTO: 7 %
NEUTROPHILS # BLD AUTO: 2.8 10E3/UL (ref 1.6–8.3)
NEUTROPHILS NFR BLD AUTO: 50 %
PLATELET # BLD AUTO: 315 10E3/UL (ref 150–450)
RBC # BLD AUTO: 3.79 10E6/UL (ref 3.8–5.2)
WBC # BLD AUTO: 5.6 10E3/UL (ref 4–11)

## 2024-10-29 PROCEDURE — 86481 TB AG RESPONSE T-CELL SUSP: CPT

## 2024-10-29 PROCEDURE — 36415 COLL VENOUS BLD VENIPUNCTURE: CPT

## 2024-10-29 PROCEDURE — 85025 COMPLETE CBC W/AUTO DIFF WBC: CPT

## 2024-10-29 PROCEDURE — 80053 COMPREHEN METABOLIC PANEL: CPT

## 2024-10-29 PROCEDURE — 86140 C-REACTIVE PROTEIN: CPT

## 2024-10-29 RX ORDER — RISANKIZUMAB-RZAA 360 MG/2.4
360 WEARABLE INJECTOR SUBCUTANEOUS
Qty: 2.4 ML | Refills: 2 | Status: SHIPPED | OUTPATIENT
Start: 2024-10-29

## 2024-10-29 ASSESSMENT — PAIN SCALES - GENERAL: PAINLEVEL_OUTOF10: NO PAIN (0)

## 2024-10-29 NOTE — PATIENT INSTRUCTIONS
"Recommendations from today's MTM visit:                                                      You are due for routine quarterly labs, you have lab appointment today   - request your annual Tuberculosis (quantiferon) also be drawn    Refills for Skyrizi (Risankizumab) has been sent to [facility]    It is recommended to get 1,000 - 1,200 mg of Calcium per day from all sources, if you are unable to achieve this from diet alone then supplementation is appropriate.  Recommend taking 500 or 600 mg tablet up to twice daily to achieve daily Calcium goal.    - You may review this reference of Calcium content in food for guidance:  https://www.dietaryguidelines.gov/food-sources-calcium or the International Osteoporosis Foundation has a great chart as well!  - If adding calcium supplement, take at least 4 hours apart from your smoothie that contains milk for maximium absorption of calcium  - Aim to get at least 1,000 units of Vitamin D per day, can get a combination Calcium with Vitamin D to reduce pill burden     Follow-up: 6 month MTM, 4/30/25 at 9:30 am with Dolly Carlin PharmD     It was great speaking with you today.  I value your experience and would be very thankful for your time in providing feedback in our clinic survey. In the next few days, you may receive an email or text message from [employer] with a link to a survey related to your  clinical pharmacist.\"     To schedule another MTM appointment, please call the clinic directly or you may call the MTM scheduling line at [phone] or toll-free at [phone].     My Clinical Pharmacist's contact information:                                                      Please feel free to contact me with any questions or concerns you have.      Celsa Edwards, PharmD    Medication Therapy Management Pharmacist  [facility]   [phone]     "

## 2024-10-29 NOTE — NURSING NOTE
Current patient location:  Pts work    Is the patient currently in the state of [place]? YES    Visit mode:VIDEO    If the visit is dropped, the patient can be reconnected by: VIDEO VISIT: Send to e-mail at: [email or web address]    Will anyone else be joining the visit? NO  (If patient encounters technical issues they should call [phone]    Are changes needed to the allergy or medication list? No    Are refills needed on medications prescribed by this physician? NO    Rooming Documentation:  Questionnaire(s) not done per department protocol    Reason for visit: YONNY GILLIS

## 2024-10-29 NOTE — PROGRESS NOTES
Medication Therapy Management (MTM) Encounter    ASSESSMENT:                            Medication Adherence/Access: No issues identified    Crohn's Disease:  Amber would benefit from continued treatment with Skyrizi (Risankizumab) every 8 weeks.  She is past due on routine maintenance labs, will schedule lab appointment.  She is up to date on annual tuberculosis screening.  No access issues for her advanced therapy are present.  She is not currently indicated for any vaccinations but will continue with annual vaccination and consider Shingrix for future due to having high VZV antibodies currently.  She is uncertain if getting adequate calcium from diet, provided calcium content information to help guide if calcium supplementation is needed.  She is not indicated for a DEXA scan at this time and I recommend continued monitoring for need. Reminders for routine cancer screening were provided, she would like a referral for dermatology.  Informed Amber Dr. Marlys Jacobs will be returning to clinic mid November, she has appointment scheduled 11/19/24 which may need to be adjusted.     PLAN:                            You are due for routine quarterly labs, you have lab appointment today   - request your annual Tuberculosis (quantiferon) also be drawn    Refills for Skyrizi (Risankizumab) has been sent to [facility]    It is recommended to get 1,000 - 1,200 mg of Calcium per day from all sources, if you are unable to achieve this from diet alone then supplementation is appropriate.  Recommend taking 500 or 600 mg tablet up to twice daily to achieve daily Calcium goal.    - You may review this reference of Calcium content in food for guidance:  https://www.dietaryguidelines.gov/food-sources-calcium or the International Osteoporosis Foundation has a great chart as well!  - If adding calcium supplement, take at least 4 hours apart from your smoothie that contains milk for maximium absorption of calcium  -  Aim to get at least 1,000 units of Vitamin D per day, can get a combination Calcium with Vitamin D to reduce pill burden     Follow-up: 6 month MTM, 4/30/25 at 9:30 am with Dolly Carlin PharmD     SUBJECTIVE/OBJECTIVE:                          Amber Buckner is a 38 year old female contacted via secure video for a follow-up visit. She was referred to me from Dr. Marlys Jacobs MD.      Reason for visit: Skyrizi (Risankizumab) maintenance    Allergies/ADRs: Reviewed in chart  Past Medical History: Reviewed in chart  Tobacco: She reports that she has never smoked. She has never been exposed to tobacco smoke. She has never used smokeless tobacco.  Alcohol: 1-3 beverages / week - 1 barb 3x per week     Medication Adherence/Access: no issues reported  Getting medication going well, no issues.     Crohn's Disease:  - Vitamin D 2,000 units once daily   - Skyrizi (Risankizumab) 360 mg every 8 weeks   Original start date: 4/2023  Pharmacy: [facility]   Last dose:  10/19/24  Next dose: 12/14/24  Treatment Goal:  Stay in remission.    Trying to get calcium in her diet with daily smoothie with spinach and milk.  She purchased an oura ring to track sleep and found she gets up typically once per night for about an hour.  She is in bed at 9 pm, sleeps well till about 2 am, then she is up from 2-3 am to use restroom multiple times during an average of one hour, then she sleeps till she wakes for the day.  She wakes with some urgency, then takes a several trips to restroom to completely empty.  Overall she feels she is getting enough rest given she sleeps well for a few hours, is up for an hour, then sleeps well till rising.  Reports no injection site reaction, however she does have tiredness for a day or more after OBI dose. Overall she feels she is tolerating food, sleep, exercise, and work stress.  Discussed calcium and recommended daily amounts, she feels she is likely not getting enough calcium from diet and  will consider adding a supplement.     GI symptoms:  -- In the past 2 weeks:    - doing well right now from GI stand point    Lab Results   Component Value Date    CALPRF 43.7 11/03/2022    CRPI <3.00 07/25/2024    VITDT 25 11/03/2022    B12 419 05/17/2024       Last provider visit:  12/27/23 Dr. Marlys Jacobs MD   Next provider visit:  11/19/24 Dr. Marlys Jacobs MD  Last labs completed:  7/26/24  Lab frequency: every 3 months   - standing labs thru 12/28/24 (CBC w/diff, CRP, CMP)   Next labs due: 10/26/24 - lab appointment today   Last TB: 11/10/2023 - lab scheduled today  Last IBD Health Maintenance Review: 5/7/24  PDC: 95%    ----------------    I spent 17 minutes with this patient today. All changes were made via collaborative practice agreement with Marlys Jacobs. A copy of the visit note was provided to the patient's provider(s).    A summary of these recommendations was sent via [employer].    Celsa Edwards, PharmD    Medication Therapy Management Pharmacist  [facility]   [phone]    Telemedicine Visit Details  The patient's medications can be safely assessed via a telemedicine encounter.  Type of service:  Video Conference via [employer]  Originating Location (pt. Location): Other work  Distant Location (provider location):  Off-site  Start Time: 9:31 AM  End Time: 9:48 AM     Medication Therapy Recommendations  Crohn's disease (H)   1 Rationale: Preventive therapy - Needs additional medication therapy - Indication   Recommendation: Start Medication - CALCIUM 500/D PO   Status: Patient Agreed - Adherence/Education   Identified Date: 10/29/2024 Completed Date: 10/29/2024   Note: Recommend taking a daily calcium supplement to achieve recommended daily amount, may combo with Vitamin D 1,000 units or continue separate Vitamin D supplementation

## 2024-10-30 LAB
ALBUMIN SERPL BCG-MCNC: 4.5 G/DL (ref 3.5–5.2)
ALP SERPL-CCNC: 55 U/L (ref 40–150)
ALT SERPL W P-5'-P-CCNC: 13 U/L (ref 0–50)
ANION GAP SERPL CALCULATED.3IONS-SCNC: 16 MMOL/L (ref 7–15)
AST SERPL W P-5'-P-CCNC: 18 U/L (ref 0–45)
BILIRUB SERPL-MCNC: 0.3 MG/DL
BUN SERPL-MCNC: 12.3 MG/DL (ref 6–20)
CALCIUM SERPL-MCNC: 9.5 MG/DL (ref 8.8–10.4)
CHLORIDE SERPL-SCNC: 101 MMOL/L (ref 98–107)
CREAT SERPL-MCNC: 0.63 MG/DL (ref 0.51–0.95)
CRP SERPL-MCNC: 3.96 MG/L
EGFRCR SERPLBLD CKD-EPI 2021: >90 ML/MIN/1.73M2
GLUCOSE SERPL-MCNC: 88 MG/DL (ref 70–99)
HCO3 SERPL-SCNC: 21 MMOL/L (ref 22–29)
POTASSIUM SERPL-SCNC: 4.1 MMOL/L (ref 3.4–5.3)
PROT SERPL-MCNC: 7.7 G/DL (ref 6.4–8.3)
SODIUM SERPL-SCNC: 138 MMOL/L (ref 135–145)

## 2024-10-31 LAB
GAMMA INTERFERON BACKGROUND BLD IA-ACNC: 0.1 IU/ML
M TB IFN-G BLD-IMP: NEGATIVE
M TB IFN-G CD4+ BCKGRND COR BLD-ACNC: 9.9 IU/ML
MITOGEN IGNF BCKGRD COR BLD-ACNC: 0.01 IU/ML
MITOGEN IGNF BCKGRD COR BLD-ACNC: 0.02 IU/ML
QUANTIFERON MITOGEN: 10 IU/ML
QUANTIFERON NIL TUBE: 0.1 IU/ML
QUANTIFERON TB1 TUBE: 0.11 IU/ML
QUANTIFERON TB2 TUBE: 0.12

## 2024-11-19 ENCOUNTER — VIRTUAL VISIT (OUTPATIENT)
Dept: GASTROENTEROLOGY | Facility: CLINIC | Age: 39
End: 2024-11-19
Payer: COMMERCIAL

## 2024-11-19 VITALS — WEIGHT: 140 LBS | BODY MASS INDEX: 25.76 KG/M2 | HEIGHT: 62 IN

## 2024-11-19 DIAGNOSIS — K51.919 ULCERATIVE COLITIS WITH COMPLICATION, UNSPECIFIED LOCATION (H): Primary | ICD-10-CM

## 2024-11-19 DIAGNOSIS — D84.9 IMMUNOSUPPRESSION (H): ICD-10-CM

## 2024-11-19 PROCEDURE — 99215 OFFICE O/P EST HI 40 MIN: CPT | Mod: GC | Performed by: INTERNAL MEDICINE

## 2024-11-19 ASSESSMENT — PAIN SCALES - GENERAL: PAINLEVEL_OUTOF10: NO PAIN (0)

## 2024-11-19 NOTE — PROGRESS NOTES
Virtual Visit Details    Type of service:  Video Visit     Originating Location (pt. Location): Home    Distant Location (provider location):  On-site  Platform used for Video Visit: Ar[employer]

## 2024-11-19 NOTE — LETTER
11/19/2024      Amber Buckner  [address] [facility] [facility] [address]      Dear Colleague,    Thank you for referring your patient, Amber Buckner, to the [facility] GASTROENTEROLOGY [facility]INIC [place]. Please see a copy of my visit note below.    Virtual Visit Details    Type of service:  Video Visit     Originating Location (pt. Location): Home    Distant Location (provider location):  Onsite  Platform used for Video Visit: [employer]    Video start time: 2:05 PM  Video end time: 2:38 PM      [facility] IBD follow up       PATIENT: Amber Buckner    MRN: [id number]    Date of Birth 1985    Tel: [phone] (home)     PCP: Luciana Edwards     HPI: Ms. Buckner is a 37 year old female here to establish care for recurrent SBOs in the setting of a pouch.     UC history  Amber was diagnosed with UC in 2001. She was on flagyl, prednisone. No biologics. Underwent RPC/IPAA in 2002. Did very well for 10 years without any issues.     Delivered Yoan in 2012 via C section.  Around then, developed abdominal pain and more frequent BMs. Developed a perianal abscess during 2nd pregnancy with Riley in 2014. At that time, also had a 2 night hospital stay for possible SBO (XR and US normal).     In 2017, she delivered her daughter, Shantal. During the postpartum period, she developed another SBO that required an NGT.     Had a total of 3 C sections. Since her last pregnancy, she had 3 SBOs (in 2018, 2019 and just recently in 2022). All managed conservatively.     Currently:   Endorses fatigue. Wakes up 5 times in the middle of the night to defecate. 8-10 BMs per day.     Total number of IBD surgeries (except perianal): 1; RPC/IPAA in 2002    Current IBD Medications:  none    Past IBD Medications:   Flagyl, prednisone  No biologics or immunomodulators    Interval history, 2/2023 (virtual visit)  GI symptoms have not changed significantly. Endorses fatigue. Wakes up 5 times in the middle of the night  to defecate. 8-10 BMs per day.     Pouchoscopy was c/w Crohn's.     Interval history, 6/2023 (virtual visit)  About 8 BMs per day. Now having 1-3 BMs per day. Urgency has decreased. No blood.   Endorses significant fatigue.     Started Skyrizi on 4/7. Last induction infusion was on 6/2.  Injection education on Friday.       Interval history, 12/2023 (virtual visit)  Dietary indiscretion during the holidays increases BMs and disrupts sleep.     Does think that Skyrizi has helped with symptoms.     Interval history 11/2024 (virtual visit)   Overall doing well. Stressful personal life with grad school which can sometimes cause flare but no flare of abdominal systems this fall. Getting up 3-6 times a night and having 8-10 stools daily. This has been her baseline. Has tried fiber, imodium in the past but then feels backed up. Previously tried ciprofloxacin when rifaximin was denied by insurance and recalls that may have helped. She notes that consistency of stools is affected by her diet and makes changes accordingly.  No urgency, blood, or fecal incontinence.    CTE in August showed resolved obstruction and no evidence of active inflammation.   Pouchoscopy with Dr. Castellanos 8/2024 showed mild pouchitis and stricture in ileum at site of prior ileostomy dilated to 18mm. Noted sharp angle at anastomosis which was widely patent but could lead to intermittent obstruction if high fiber/residue diet. No obstructive symptoms since dilation.       HBI:  Overall patient well being (prior day): 1 (Slightly below par - mostly related to her mental health)   Abdominal pain (prior day): 0 (None)  Number of liquid or soft stools (prior day): 4 (1 point per stool). Mostly at night.   Abdominal mass on exam: virtual  Complications (1 point for each):   None     Has the patient been diagnosed with a new fistula since the last Corrona visit? No  If yes, what type? N/A    Location of Crohn's disease:   Ileal disease    Was location  demonstrated at endoscopy: Yes    What is the behavior for this CD patient:   Non-stricturing and non-penetrating    [facility] IBD Registry: FOLLOW-UP    Based on clinical judgement at today's visit, what diagnosis does patient have: Crohn's disease  Did the diagnosis change since last visit? No    Please list any new extra-intestinal manifestations since last CORRONA visit: None    Did the patient have any new IBD-related surgeries since the last visit? No    Please comment on below infections and adverse events. Please describe dates of onset and cessation (or note if ongoing). Please note if event resuled in one of the following: life-threatening, hospitalization or prolonged hospitalization, significant/persistent disability or incapacity, congential anomaly or birth defect.     Any new infections since last visit?   No infections since last Corrona visit    Any new cardiac diagnoses since last visit?   No, no new cardiac conditions, drug toxicities, or adverse events (other than infections noted above) since last Corrona visit.    Any new digestive/hepatic diagnoses since last visit?  No new hepatic diagnoses since last corrona visit     Any new cancer diagnoses since last visit?  No, no new cancer diagnoses since last Corrona visit    Any new neurologic diseases since last visit?   No, no new neurologic diagnoses since last Corrona visit.    Any new autoimmune diagnoses since last visit?   No, no new autoimmune conditions since last Corrona visit.    Any new gastrointestinal diseases since last visit?  No, no new gastrointestinal diagnoses since last visit     Any new drug-induced hypersensitivity reactions since last visit?  No, no new drug induced hypersensitivities since last visit     Any new respiratory diagnoses since last visit?  No, no new respiratory diagnoses since last visit     Any new other diagnoses / conditions / adverse events or drug toxicities not addressed above?  No, no other diagnoses or  adverse events since last visit     Did the patient have any other event since the last Corrona visit (related or not related to IBD), not reported above toxicity?  - admission for SBO    Has the patient been treated with TPN since the last Corrona visit?  No    Has the patients health coverage changed since the last Corrona visit?  No change in insurance coverage    Has the patient tested positive for Tb since the last Corrona visit?  No Tb testing since last Corrona visit    Was the patient treated for latent Tb: No    Has the patient used NSAIDs since the last Corrona visit?   Not at all    Were there any biologics prescribed at, or since the last Kassidy visit that were never started?   No biologics prescribed that were never started    Has the patient received a medication for the treatment of IBD (other than steroids or antibiotics) since the last corrona visit, or will any be started, modified, or stopped today?  - yes continues on Skyrizi     Has the patient received steroids or antibiotics for the treatment of IBD since the last corrona visit, or are steroids or antibiotics started, modified, or stopped today?   No      Past Medical History:   Diagnosis Date     Adolescent depression in [facility]     Anxiety      Depressive disorder 2002    Treated [facility] celexa     Fit/adjust GI carole-device NEC      Perineal abscess      PONV (postoperative nausea and vomiting)      S/P total colectomy      Ulcerative colitis         Past Surgical History:   Procedure Laterality Date      SECTION  2012    Procedure: SECTION; Surgeon:JAVI BELL; Location:[facility] L+D      SECTION N/A 2014    Procedure:  SECTION;  Surgeon: Shawanda Luna MD;  Location: [facility] L+D      SECTION N/A 2017    Procedure:  SECTION;  Repeat  Section ;  Surgeon: Laurie Montoya MD;  Location: [facility] L[facility]D     COLECTOMY SUBTOTAL       COLONOSCOPY N/A 4/15/2015     Procedure: COLONOSCOPY;  Surgeon: Al Santo MD;  Location: [facility] OR     EXAM UNDER ANESTHESIA ANUS N/A 4/15/2015    Procedure: EXAM UNDER ANESTHESIA ANUS;  Surgeon: Al Santo MD;  [facility] OR     FISTULOTOMY RECTUM N/A 4/15/2015    Procedure: FISTULOTOMY RECTUM;  Surgeon: Al Santo MD;  [facility] OR     POUCHOSCOPY N/A 2/6/2023    Procedure: ENDOSCOPY, POUCH, DIAGNOSTIC WITH BIOPSIES;  Surgeon: Marlys Jacobs MD;  Location: [facility] OR     POUCHOSCOPY N/A 10/16/2023    Procedure: POUCHOSCOPY WITH BIOPSY;  Surgeon: Marlys Jacobs MD;  Location: [facility] OR     POUCHOSCOPY N/A 8/29/2024    Procedure: Pouchoscopy;  Surgeon: Cayden Castellanos MD;  Location: [facility] GI     TAKEDOWN ILEOSTOMY       tonsilectomy       TRANSPERINEAL REPAIR FISTULA RECTAL URETHRAL  2015       Social History     Tobacco Use     Smoking status: Never     Passive exposure: Never     Smokeless tobacco: Never   Substance Use Topics     Alcohol use: Yes       Family History   Problem Relation Age of Onset     Diabetes Maternal Grandmother      Diabetes Paternal Grandfather      Genetic Disorder Father         chron's     Gastrointestinal Disease Sister         ulcerative colitis       No Known Allergies     Outpatient Encounter Medications as of 11/19/2024   Medication Sig Dispense Refill     acetaminophen (TYLENOL) 500 MG tablet Take 1,000 mg by mouth as needed for pain       adapalene (DIFFERIN) 0.1 % external gel Apply topically At Bedtime       SKYRIZI 360 MG/2.4ML SOCT Inject 2.4 mLs (360 mg) subcutaneously once every eight weeks. 2.4 mL 2     vitamin D3 (CHOLECALCIFEROL) 50 mcg (2000 units) tablet Take 1 tablet by mouth daily       No facility-administered encounter medications on file as of 11/19/2024.      NSAID  NO    Review of Systems  Complete 10 System ROS performed. All are negative except as documented below, in the HPI, or in patient questionnaire from today's visit.    1) Constitutional: No fevers, chills, night  sweats or malaise, weight loss or gain  2) Skin: No rash  3) Pulmonary: No wheeze, SOB, cough, sputum or hemoptysis  4) Cardiovascular: No Chest pain or palpitations  5) Genitourinary: No blood in urine or dysuria  6) Endocrine: No increased sweating, hunger, thirst or thyroid problems  7) Hematologic: No bruising and easy bleeding  8) Musculoskeletal: no new pain in joints or limitation in ROM  9) Neurologic: No dizziness, paresthesias or weakness or falls  10) Psychiatric:  not depressed/anxious, no sleep problems    PHYSICAL EXAM  Vitals: There were no vitals taken for this visit.    Data Unavailable     General appearance  Healthy appearing adult, in no acute distress     Eyes  Sclera anicteric  Pupils round and reactive to light     Ears, nose, mouth and throat  No obvious external lesions of ears and nose  Hearing intact     Neck  Symmetric  No obvious external lesions     Respiratory  Normal respiration, no use of accessory muscles      MSK  Gait normal     Skin  No rashes or jaundice      Psychiatric  Oriented to person, place and time  Appropriate mood and affect.     DATA:  Reviewed in detail past documentation, medications and prior workup available in electronic health records or through outside records.    PERTINENT STUDIES:  Most recent CBC:  WBC   Date Value Ref Range Status   10/20/2019 3.6 (L) 4.0 - 11.0 10e9/L Final     WBC Count   Date Value Ref Range Status   10/29/2024 5.6 4.0 - 11.0 10e3/uL Final   ]  Hemoglobin   Date Value Ref Range Status   10/29/2024 12.8 11.7 - 15.7 g/dL Final   10/20/2019 11.5 (L) 11.7 - 15.7 g/dL Final   ]   Platelet Count   Date Value Ref Range Status   10/29/2024 315 150 - 450 10e3/uL Final   10/20/2019 200 150 - 450 10e9/L Final       Most recent coag:  INR   Date Value Ref Range Status   08/05/2022 1.09 0.85 - 1.15 Final   05/14/2017 1.06 0.86 - 1.14 Final       Most recent hepatic panel:  AST   Date Value Ref Range Status   10/29/2024 18 0 - 45 U/L Final  "  10/19/2019 6 0 - 45 U/L Final     ALT   Date Value Ref Range Status   10/29/2024 13 0 - 50 U/L Final   10/19/2019 10 0 - 50 U/L Final     No results found for: \"BILICONJ\"   Bilirubin Total   Date Value Ref Range Status   10/29/2024 0.3 <=1.2 mg/dL Final   10/19/2019 0.4 0.2 - 1.3 mg/dL Final     Albumin   Date Value Ref Range Status   10/29/2024 4.5 3.5 - 5.2 g/dL Final   11/03/2022 4.2 3.4 - 5.0 g/dL Final   10/19/2019 3.4 3.4 - 5.0 g/dL Final     Alkaline Phosphatase   Date Value Ref Range Status   10/29/2024 55 40 - 150 U/L Final   10/19/2019 41 40 - 150 U/L Final       Most recent creatinine:  Creatinine   Date Value Ref Range Status   10/29/2024 0.63 0.51 - 0.95 mg/dL Final   10/20/2019 0.40 (L) 0.52 - 1.04 mg/dL Final       Endoscopy:    Pouchoscopy 8/2024   Impression:            - Inflammation was found in the ileoanal pouch                          secondary to pouchitis. Overall this was mild.                          Biopsied.                          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                          It was technically difficult to reach this area.                          Dilated to 18mm     Final Diagnosis   A.  ILEAL POUCH, BIOPSY:  - Enteric-type mucosa with active inflammation and erosion, consistent with pouchitis  - No epithelioid granulomas, dysplasia, or malignancy identified       Pouchoscopy 10/2023:                 Impression:            - Rectal cuff with healthy appearing mucosa seen.                          Biopsied.                          - Four small ulcers in the ileoanal pouch.                          - A single small ulcer in the blind limb.                          - Four ulcers in the sendy-terminal ileum.     PATH  A. RECTAL CUFF, BIOPSY:  - Rectal and enteric-type mucosa with acute cryptitis  - Epithelioid granulomas, dysplasia, or malignancy are not " identified    Pouchoscopy 2/2023:                                                                      Impression:            - Multiple ulcers in the ileoanal pouch and                          jazzy-terminal ileum and in the pre-pouch ileum.                          Biopsied. Suspicious for Crohn's disease.                          - Biopsies were taken with a cold forceps for                          histology from the rectal cuff for dysplasia                          surveillance.     PATH  A. JAZZY-TERMINAL ILEUM, ULCER AT 45 CM, BIOPSY:  -Enteric mucosa with erosions and  acute inflammation  -Negative for dysplasia    B. JAZZY-TERMINAL ILEUM, NORMAL TISSUE, BIOPSIES:  -Enteric mucosa with no significant inflammation  -Negative for dysplasia    C. ENTRANCE TO JAZZY-TERMINAL ILEUM, ULCER, BIOPSIES:  -Enteric mucosa with ulceration and granulation tissue  -Negative for dysplasia  -CMV immunostain to follow    D. ENTRANCE TO BLIND LIMB, BIOPSIES:  -Enteric mucosa with ulceration and granulation tissue  -Negative for dysplasia  -CMV immunostain to follow    E. POUCH, BIOPSIES:  -Severe pouchitis with ulceration and granulation tissue  -Negative for dysplasia  -CMV immunostain to follow    F. RECTAL POUCH, BIOPSIES:  -Enteric mucosa with with no significant inflammation  -Squamous mucosa with mild active inflammation  -Negative for dysplasia    Pouchoscopy 2/2020             Imaging:    CTE 8/2024  IMPRESSION:   1. Resolved small bowel obstruction. No evidence of active small bowel  inflammation.  2. Left hydroureter measuring up to 1.4 cm and mild left  hydronephrosis with abrupt but smooth tapering of the ureter as it  courses between the uterus and left external iliac artery. Correlate  for clinical signs of urinary tract obstruction.  3. Nonobstructing calyceal stones in the lower pole of the right  kidney.    CT 10/2022:     BOWEL: Multiple dilated central small bowel loops lead to fecalized  small bowel and  postoperative change at the anterior mid pelvis series  3 image 136. Other small bowel loops appear decompressed. Colectomy  changes. No free air. No abscess identified.    CT a/p 10/2019:  1.  Suspected small bowel obstruction with dilated loops seen in the  lower abdomen. Transition to nondilated bowel appears to be seen near  the small bowel anastomosis within the right lower quadrant.    AXR 5/2017:  Single supine view of the abdomen. Interval placement of gastric tube  with tip and sidehole projecting over the fundus. Slight interval  decrease in caliber of air-filled loops of small bowel in the left  side of the abdomen with associated unchanged dilated loops of bowel  in the right side of the abdomen, measuring up to 6.5 cm in greatest  diameter. No free air, pneumatosis, portal venous gas. The visualized  left lung base is clear.    IMPRESSION:    Mrs. Buckner is a 39 year old here with Crohn's disease (initial UC dx) s/p RPC/IPAA in 2002 c/b recurrent SBOs currently on Skyrizi (started 4/2024). Admitted over the summer with SBO which was managed conservatively. Then underwent pouchoscopy 8/2024 which showed stricture at previous diverting ileostomy site s/p dilation to 18mm.     Overall doing well on Skyrizi. No further obstructive symptoms after dilation. If obstruction occurs again, would discuss option of stenting with advanced endoscopy team. She is at baseline bowel movements but if frequency increases we discuss trial of fiber, imodium and/or rifaximin or ciprofloxacin course.    DIAGNOSES:  # Crohn's disease, biologic naive  # s/p RPC/IPAA in 2002    PLAN:  --Continue Skyrizi   ---------monitoring labs every 3 months   -- Continue following with [facility] pharmacy   -- if stool frequency increases or develops urgency/blood, would consider repeating course of rifaximin or ciprofloxacin   -- if obstruction occurs again, would discuss option of stenting with advanced endoscopy team.       Misc:  -- Avoid  tobacco use  -- Avoid NSAIDs     RTC 6 months    Patient seen with Dr. Jacobs.     Caden Vyas MD  GI IBD fellow                           Attestation signed by Marlys Jacobs MD at 11/19/2024  3:27 PM:  I was present for the key and critical portions of the video visit and discussed the management with Dr. Vyas on 11/19/2024 and the patient. I reviewed the note and there are no changes to the past medical, family or social history.  A complete 10 point review of systems was obtained. Please see the HPI for pertinent positives and negatives. All other systems were reviewed and were found to be negative.     I agree with the documented findings and plan of care as outlined.    Marlys Jacobs MD  GI Attending  Pager: [phone]     Virtual Visit Details    Type of service:  Video Visit     Originating Location (pt. Location): Home    Distant Location (provider location):  On-site  Platform used for Video Visit: [employer]      Again, thank you for allowing me to participate in the care of your patient.        Sincerely,        Marlys Jacobs MD

## 2024-11-19 NOTE — PATIENT INSTRUCTIONS
It was a pleasure meeting with you today and discussing your healthcare plan. Below is a summary of what we covered:    -- continue skyrizi    -- let us know if bowel movements become more prominent, we could try fiber, imodium or a course of ciprofloxacin in future    --return to clinic in 6 months       Please see below for any additional questions and scheduling guidelines.    Sign up for [employer]: [employer] patient portal serves as a secure platform for accessing your medical records from the [facility]. Additionally, [employer] facilitates easy, timely, and secure messaging with your care team. If you have not signed up, you may do so by using the provided code or calling [phone].    Coordinating your care after your visit:  There are multiple options for scheduling your follow-up care based on your provider's recommendation.    How do I schedule a follow-up clinic appointment:   After your appointment, you may receive scheduling assistance with the Clinic Coordinators by having a seat in the waiting room and a Clinic Coordinator will call you up to schedule.  Virtual visits or after you leave the clinic:  Your provider has placed a follow-up order in the [employer] portal for scheduling your return appointment. A member of the scheduling team will contact you to schedule.  [employer] Scheduling: Timely scheduling through [employer] is advised to ensure appointment availability.   Call to schedule: You may schedule your follow-up appointment(s) by calling [phone], option 1.    How do I schedule my endoscopy or colonoscopy procedure:  If a procedure, such as a colonoscopy or upper endoscopy was ordered by your provider, the scheduling team will contact you to schedule this procedure. Or you may choose to call to schedule at   [phone], option 2.  Please allow 20-30 minutes when scheduling a procedure.    How do I get my blood work done? To get your blood work done, you need to schedule a lab  appointment at an [facility]. There are multiple ways to schedule:   At the clinic: The Clinic Coordinator you meet after your visit can help you schedule a lab appointment.   [employer] scheduling: [employer] offers online lab scheduling at all [facility] laboratory locations.   Call to schedule: You can call [phone] to schedule your lab appointment.    How do I schedule my imaging study: To schedule imaging studies, such as CT scans, ultrasounds, MRIs, or X-rays, contact Imaging Services at [phone].    How do I schedule a referral to another doctor: If your provider recommended a referral to another specialist(s), the referral order was placed by your provider. You will receive a phone call to schedule this referral, or you may choose to call the number attached to the referral to self-schedule.    For Post-Visit Question(s):  For any inquiries following today's visit:  Please utilize [employer] messaging and allow 48 hours for reply or contact the Call Center during normal business hours at [phone], option 3.  For Emergent After-hours questions, contact the On-Call GI Fellow through the [facility]  at [phone].  In addition, you may contact your Nurse directly using the provided contact information.    Test Results:  Test results will be accessible via [employer] in compliance with the 21st Century Cures Act. This means that your results will be available to you at the same time as your provider. Often you may see your results before your provider does. Results are reviewed by staff within two weeks with communication follow-up. Results may be released in the patient portal prior to your care team review.    Prescription Refill(s):  Medication prescribed by your provider will be addressed during your visit. For future refills, please coordinate with your pharmacy. If you have not had a recent clinic visit or routine labs, for your safety, your provider may not be  able to refill your prescription.

## 2024-11-19 NOTE — PROGRESS NOTES
Virtual Visit Details    Type of service:  Video Visit     Originating Location (pt. Location): Home    Distant Location (provider location):  Onsite  Platform used for Video Visit: Arvin    Video start time: 2:05 PM  Video end time: 2:38 PM      [facility] IBD follow up       PATIENT: Amber Buckner    MRN: [id number]    Date of Birth 1985    Tel: [phone] (home)     PCP: Luciana Edwards     HPI: Ms. Buckner is a 37 year old female here to establish care for recurrent SBOs in the setting of a pouch.     UC history  Amber was diagnosed with UC in 2001. She was on flagyl, prednisone. No biologics. Underwent RPC/IPAA in 2002. Did very well for 10 years without any issues.     Delivered Yoan in 2012 via C section.  Around then, developed abdominal pain and more frequent BMs. Developed a perianal abscess during 2nd pregnancy with Riley in 2014. At that time, also had a 2 night hospital stay for possible SBO (XR and US normal).     In 2017, she delivered her daughter, Shantal. During the postpartum period, she developed another SBO that required an NGT.     Had a total of 3 C sections. Since her last pregnancy, she had 3 SBOs (in 2018, 2019 and just recently in 2022). All managed conservatively.     Currently:   Endorses fatigue. Wakes up 5 times in the middle of the night to defecate. 8-10 BMs per day.     Total number of IBD surgeries (except perianal): 1; RPC/IPAA in 2002    Current IBD Medications:  none    Past IBD Medications:   Flagyl, prednisone  No biologics or immunomodulators    Interval history, 2/2023 (virtual visit)  GI symptoms have not changed significantly. Endorses fatigue. Wakes up 5 times in the middle of the night to defecate. 8-10 BMs per day.     Pouchoscopy was c/w Crohn's.     Interval history, 6/2023 (virtual visit)  About 8 BMs per day. Now having 1-3 BMs per day. Urgency has decreased. No blood.   Endorses significant fatigue.     Started Skyrizi on 4/7. Last  induction infusion was on 6/2.  Injection education on Friday.       Interval history, 12/2023 (virtual visit)  Dietary indiscretion during the holidays increases BMs and disrupts sleep.     Does think that Skyrizi has helped with symptoms.     Interval history 11/2024 (virtual visit)   Overall doing well. Stressful personal life with grad school which can sometimes cause flare but no flare of abdominal systems this fall. Getting up 3-6 times a night and having 8-10 stools daily. This has been her baseline. Has tried fiber, imodium in the past but then feels backed up. Previously tried ciprofloxacin when rifaximin was denied by insurance and recalls that may have helped. She notes that consistency of stools is affected by her diet and makes changes accordingly.  No urgency, blood, or fecal incontinence.    CTE in August showed resolved obstruction and no evidence of active inflammation.   Pouchoscopy with Dr. Castellanos 8/2024 showed mild pouchitis and stricture in ileum at site of prior ileostomy dilated to 18mm. Noted sharp angle at anastomosis which was widely patent but could lead to intermittent obstruction if high fiber/residue diet. No obstructive symptoms since dilation.       HBI:  Overall patient well being (prior day): 1 (Slightly below par - mostly related to her mental health)   Abdominal pain (prior day): 0 (None)  Number of liquid or soft stools (prior day): 4 (1 point per stool). Mostly at night.   Abdominal mass on exam: virtual  Complications (1 point for each):   None     Has the patient been diagnosed with a new fistula since the last [facility] visit? No  If yes, what type? N/A    Location of Crohn's disease:   Ileal disease    Was location demonstrated at endoscopy: Yes    What is the behavior for this CD patient:   Non-stricturing and non-penetrating    [facility] IBD Registry: FOLLOW-UP    Based on clinical judgement at today's visit, what diagnosis does patient have: Crohn's disease  Did the diagnosis  change since last visit? No    Please list any new extra-intestinal manifestations since last [facility] visit: None    Did the patient have any new IBD-related surgeries since the last visit? No    Please comment on below infections and adverse events. Please describe dates of onset and cessation (or note if ongoing). Please note if event resuled in one of the following: life-threatening, hospitalization or prolonged hospitalization, significant/persistent disability or incapacity, congential anomaly or birth defect.     Any new infections since last visit?   No infections since last [facility] visit    Any new cardiac diagnoses since last visit?   No, no new cardiac conditions, drug toxicities, or adverse events (other than infections noted above) since last [facility] visit.    Any new digestive/hepatic diagnoses since last visit?  No new hepatic diagnoses since last [facility] visit     Any new cancer diagnoses since last visit?  No, no new cancer diagnoses since last [facility] visit    Any new neurologic diseases since last visit?   No, no new neurologic diagnoses since last [facility] visit.    Any new autoimmune diagnoses since last visit?   No, no new autoimmune conditions since last [facility] visit.    Any new gastrointestinal diseases since last visit?  No, no new gastrointestinal diagnoses since last visit     Any new drug-induced hypersensitivity reactions since last visit?  No, no new drug induced hypersensitivities since last visit     Any new respiratory diagnoses since last visit?  No, no new respiratory diagnoses since last visit     Any new other diagnoses / conditions / adverse events or drug toxicities not addressed above?  No, no other diagnoses or adverse events since last visit     Did the patient have any other event since the last [facility] visit (related or not related to IBD), not reported above toxicity?  - admission for SBO    Has the patient been treated with TPN since the last [facility] visit?  No    Has the  patients health coverage changed since the last [facility] visit?  No change in insurance coverage    Has the patient tested positive for Tb since the last [facility] visit?  No Tb testing since last [facility] visit    Was the patient treated for latent Tb: No    Has the patient used NSAIDs since the last [facility] visit?   Not at all    Were there any biologics prescribed at, or since the last [facility] visit that were never started?   No biologics prescribed that were never started    Has the patient received a medication for the treatment of IBD (other than steroids or antibiotics) since the last [facility] visit, or will any be started, modified, or stopped today?  - yes continues on Skyrizi     Has the patient received steroids or antibiotics for the treatment of IBD since the last [facility] visit, or are steroids or antibiotics started, modified, or stopped today?   No      Past Medical History:   Diagnosis Date    Adolescent depression in [facility]    Anxiety     Depressive disorder 2002    Treated [facility] celexa    Fit/adjust GI carole-device NEC     Perineal abscess     PONV (postoperative nausea and vomiting)     S/P total colectomy     Ulcerative colitis         Past Surgical History:   Procedure Laterality Date     SECTION  2012    Procedure: SECTION; Surgeon:JAVI BELL; Location:[facility] L[facility]     SECTION N/A 2014    Procedure:  SECTION;  Surgeon: Shawanda Luna MD;  Location: [facility] L[facility]     SECTION N/A 2017    Procedure:  SECTION;  Repeat  Section ;  Surgeon: Laurie Montoya MD;  Location: [facility] L[facility]    COLECTOMY SUBTOTAL      COLONOSCOPY N/A 4/15/2015    Procedure: COLONOSCOPY;  Surgeon: Al Santo MD;  [facility]: [facility] OR    EXAM UNDER ANESTHESIA ANUS N/A 4/15/2015    Procedure: EXAM UNDER ANESTHESIA ANUS;  Surgeon: Al Santo MD;  [facility]    FISTULOTOMY RECTUM N/A 4/15/2015    Procedure: FISTULOTOMY RECTUM;   Surgeon: Al Santo MD;  [facility]    POUCHOSCOPY N/A 2/6/2023    Procedure: ENDOSCOPY, POUCH, DIAGNOSTIC WITH BIOPSIES;  Surgeon: Marlys Jacobs MD;  Location: [facility] OR    POUCHOSCOPY N/A 10/16/2023    Procedure: POUCHOSCOPY WITH BIOPSY;  Surgeon: Marlys Jacobs MD;  Location: [facility] OR    POUCHOSCOPY N/A 8/29/2024    Procedure: Pouchoscopy;  Surgeon: Cayden Castellanos MD;  Location: [facility] GI    TAKEDOWN ILEOSTOMY      tonsilectomy      TRANSPERINEAL REPAIR FISTULA RECTAL URETHRAL  2015       Social History     Tobacco Use    Smoking status: Never     Passive exposure: Never    Smokeless tobacco: Never   Substance Use Topics    Alcohol use: Yes       Family History   Problem Relation Age of Onset    Diabetes Maternal Grandmother     Diabetes Paternal Grandfather     Genetic Disorder Father         chron's    Gastrointestinal Disease Sister         ulcerative colitis       No Known Allergies     Outpatient Encounter Medications as of 11/19/2024   Medication Sig Dispense Refill    acetaminophen (TYLENOL) 500 MG tablet Take 1,000 mg by mouth as needed for pain      adapalene (DIFFERIN) 0.1 % external gel Apply topically At Bedtime      SKYRIZI 360 MG/2.4ML SOCT Inject 2.4 mLs (360 mg) subcutaneously once every eight weeks. 2.4 mL 2    vitamin D3 (CHOLECALCIFEROL) 50 mcg (2000 units) tablet Take 1 tablet by mouth daily       No facility-administered encounter medications on file as of 11/19/2024.      NSAID  NO    Review of Systems  Complete 10 System ROS performed. All are negative except as documented below, in the HPI, or in patient questionnaire from today's visit.    1) Constitutional: No fevers, chills, night sweats or malaise, weight loss or gain  2) Skin: No rash  3) Pulmonary: No wheeze, SOB, cough, sputum or hemoptysis  4) Cardiovascular: No Chest pain or palpitations  5) Genitourinary: No blood in urine or dysuria  6) Endocrine: No increased sweating, hunger, thirst or thyroid problems  7)  "Hematologic: No bruising and easy bleeding  8) Musculoskeletal: no new pain in joints or limitation in ROM  9) Neurologic: No dizziness, paresthesias or weakness or falls  10) Psychiatric:  not depressed/anxious, no sleep problems    PHYSICAL EXAM  Vitals: There were no vitals taken for this visit.    Data Unavailable     General appearance  Healthy appearing adult, in no acute distress     Eyes  Sclera anicteric  Pupils round and reactive to light     Ears, nose, mouth and throat  No obvious external lesions of ears and nose  Hearing intact     Neck  Symmetric  No obvious external lesions     Respiratory  Normal respiration, no use of accessory muscles      MSK  Gait normal     Skin  No rashes or jaundice      Psychiatric  Oriented to person, place and time  Appropriate mood and affect.     DATA:  Reviewed in detail past documentation, medications and prior workup available in electronic health records or through outside records.    PERTINENT STUDIES:  Most recent CBC:  WBC   Date Value Ref Range Status   10/20/2019 3.6 (L) 4.0 - 11.0 10e9/L Final     WBC Count   Date Value Ref Range Status   10/29/2024 5.6 4.0 - 11.0 10e3/uL Final   ]  Hemoglobin   Date Value Ref Range Status   10/29/2024 12.8 11.7 - 15.7 g/dL Final   10/20/2019 11.5 (L) 11.7 - 15.7 g/dL Final   ]   Platelet Count   Date Value Ref Range Status   10/29/2024 315 150 - 450 10e3/uL Final   10/20/2019 200 150 - 450 10e9/L Final       Most recent coag:  INR   Date Value Ref Range Status   08/05/2022 1.09 0.85 - 1.15 Final   05/14/2017 1.06 0.86 - 1.14 Final       Most recent hepatic panel:  AST   Date Value Ref Range Status   10/29/2024 18 0 - 45 U/L Final   10/19/2019 6 0 - 45 U/L Final     ALT   Date Value Ref Range Status   10/29/2024 13 0 - 50 U/L Final   10/19/2019 10 0 - 50 U/L Final     No results found for: \"BILICONJ\"   Bilirubin Total   Date Value Ref Range Status   10/29/2024 0.3 <=1.2 mg/dL Final   10/19/2019 0.4 0.2 - 1.3 mg/dL Final "     Albumin   Date Value Ref Range Status   10/29/2024 4.5 3.5 - 5.2 g/dL Final   11/03/2022 4.2 3.4 - 5.0 g/dL Final   10/19/2019 3.4 3.4 - 5.0 g/dL Final     Alkaline Phosphatase   Date Value Ref Range Status   10/29/2024 55 40 - 150 U/L Final   10/19/2019 41 40 - 150 U/L Final       Most recent creatinine:  Creatinine   Date Value Ref Range Status   10/29/2024 0.63 0.51 - 0.95 mg/dL Final   10/20/2019 0.40 (L) 0.52 - 1.04 mg/dL Final       Endoscopy:    Pouchoscopy 8/2024   Impression:            - Inflammation was found in the ileoanal pouch                          secondary to pouchitis. Overall this was mild.                          Biopsied.                          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                          It was technically difficult to reach this area.                          Dilated to 18mm     Final Diagnosis   A.  ILEAL POUCH, BIOPSY:  - Enteric-type mucosa with active inflammation and erosion, consistent with pouchitis  - No epithelioid granulomas, dysplasia, or malignancy identified       Pouchoscopy 10/2023:                 Impression:            - Rectal cuff with healthy appearing mucosa seen.                          Biopsied.                          - Four small ulcers in the ileoanal pouch.                          - A single small ulcer in the blind limb.                          - Four ulcers in the sendy-terminal ileum.     PATH  A. RECTAL CUFF, BIOPSY:  - Rectal and enteric-type mucosa with acute cryptitis  - Epithelioid granulomas, dysplasia, or malignancy are not identified    Pouchoscopy 2/2023:                                                                      Impression:            - Multiple ulcers in the ileoanal pouch and                          sendy-terminal ileum and in the pre-pouch ileum.                          Biopsied. Suspicious for Crohn's disease.                           - Biopsies were taken with a cold forceps for                          histology from the rectal cuff for dysplasia                          surveillance.     PATH  A. JAZZY-TERMINAL ILEUM, ULCER AT 45 CM, BIOPSY:  -Enteric mucosa with erosions and  acute inflammation  -Negative for dysplasia    B. JAZZY-TERMINAL ILEUM, NORMAL TISSUE, BIOPSIES:  -Enteric mucosa with no significant inflammation  -Negative for dysplasia    C. ENTRANCE TO JAZZY-TERMINAL ILEUM, ULCER, BIOPSIES:  -Enteric mucosa with ulceration and granulation tissue  -Negative for dysplasia  -CMV immunostain to follow    D. ENTRANCE TO BLIND LIMB, BIOPSIES:  -Enteric mucosa with ulceration and granulation tissue  -Negative for dysplasia  -CMV immunostain to follow    E. POUCH, BIOPSIES:  -Severe pouchitis with ulceration and granulation tissue  -Negative for dysplasia  -CMV immunostain to follow    F. RECTAL POUCH, BIOPSIES:  -Enteric mucosa with with no significant inflammation  -Squamous mucosa with mild active inflammation  -Negative for dysplasia    Pouchoscopy 2/2020             Imaging:    CTE 8/2024  IMPRESSION:   1. Resolved small bowel obstruction. No evidence of active small bowel  inflammation.  2. Left hydroureter measuring up to 1.4 cm and mild left  hydronephrosis with abrupt but smooth tapering of the ureter as it  courses between the uterus and left external iliac artery. Correlate  for clinical signs of urinary tract obstruction.  3. Nonobstructing calyceal stones in the lower pole of the right  kidney.    CT 10/2022:     BOWEL: Multiple dilated central small bowel loops lead to fecalized  small bowel and postoperative change at the anterior mid pelvis series  3 image 136. Other small bowel loops appear decompressed. Colectomy  changes. No free air. No abscess identified.    CT a/p 10/2019:  1.  Suspected small bowel obstruction with dilated loops seen in the  lower abdomen. Transition to nondilated bowel appears to be seen  near  the small bowel anastomosis within the right lower quadrant.    AXR 5/2017:  Single supine view of the abdomen. Interval placement of gastric tube  with tip and sidehole projecting over the fundus. Slight interval  decrease in caliber of air-filled loops of small bowel in the left  side of the abdomen with associated unchanged dilated loops of bowel  in the right side of the abdomen, measuring up to 6.5 cm in greatest  diameter. No free air, pneumatosis, portal venous gas. The visualized  left lung base is clear.    IMPRESSION:    Mrs. Buckner is a 39 year old here with Crohn's disease (initial UC dx) s/p RPC/IPAA in 2002 c/b recurrent SBOs currently on Skyrizi (started 4/2024). Admitted over the summer with SBO which was managed conservatively. Then underwent pouchoscopy 8/2024 which showed stricture at previous diverting ileostomy site s/p dilation to 18mm.     Overall doing well on Skyrizi. No further obstructive symptoms after dilation. If obstruction occurs again, would discuss option of stenting with advanced endoscopy team. She is at baseline bowel movements but if frequency increases we discuss trial of fiber, imodium and/or rifaximin or ciprofloxacin course.    DIAGNOSES:  # Crohn's disease, biologic naive  # s/p RPC/IPAA in 2002    PLAN:  --Continue Skyrizi   ---------monitoring labs every 3 months   -- Continue following with [facility] pharmacy   -- if stool frequency increases or develops urgency/blood, would consider repeating course of rifaximin or ciprofloxacin   -- if obstruction occurs again, would discuss option of stenting with advanced endoscopy team.       Misc:  -- Avoid tobacco use  -- Avoid NSAIDs     RTC 6 months    Patient seen with Dr. Jacobs.     Caden Vyas MD  GI IBD fellow

## 2024-11-19 NOTE — NURSING NOTE
Current patient location: Patient declined to provide     Is the patient currently in the state of [place]? YES    Visit mode:VIDEO    If the visit is dropped, the patient can be reconnected by:VIDEO VISIT: Text to cell phone:   Telephone Information:   Mobile [phone]       Will anyone else be joining the visit? NO  (If patient encounters technical issues they should call [phone])    Are changes needed to the allergy or medication list? No    Are refills needed on medications prescribed by this physician? NO    Rooming Documentation:  Patient declined to complete questionnaire(s)    Reason for visit: RECHECK (IBD)    Paula GILLIS      30-Dec-2018 14:23

## 2024-12-04 ENCOUNTER — TELEPHONE (OUTPATIENT)
Dept: GASTROENTEROLOGY | Facility: CLINIC | Age: 39
End: 2024-12-04
Payer: COMMERCIAL

## 2024-12-04 NOTE — TELEPHONE ENCOUNTER
Left Voicemail (1st Attempt) and Sent Mychart (1st Attempt) for the patient to call back and schedule the following:    Appointment type: Return IBD  Provider: Dr. Jacobs  Return date: 6 mo (around May 2025)  Specialty phone number: [phone]  Additional appointment(s) needed: NA  Additonal Notes: LVM/MyC x1

## 2024-12-19 ENCOUNTER — E-VISIT (OUTPATIENT)
Dept: URGENT CARE | Facility: CLINIC | Age: 39
End: 2024-12-19
Payer: COMMERCIAL

## 2024-12-19 DIAGNOSIS — J11.1 INFLUENZA-LIKE ILLNESS: Primary | ICD-10-CM

## 2024-12-19 DIAGNOSIS — Z20.828 EXPOSURE TO INFLUENZA: ICD-10-CM

## 2024-12-19 RX ORDER — OSELTAMIVIR PHOSPHATE 75 MG/1
75 CAPSULE ORAL 2 TIMES DAILY
Qty: 10 CAPSULE | Refills: 0 | Status: SHIPPED | OUTPATIENT
Start: 2024-12-19 | End: 2024-12-24

## 2024-12-19 NOTE — PATIENT INSTRUCTIONS
Dear Amber Buckner,    You very likely have influenza. This is a viral infection and does not respond to antibiotics. However, you are in the first 48 hours of your symptoms so Tamiflu, an antiviral medication, can be used to help you clear the virus faster.  I sent this to your pharmacy. Drinking plenty of fluids and resting is the most important thing you can do to help get rid of the flu. Take Tylenol and ibuprofen as needed for fever and body aches. If your symptoms are significantly worsening or not improving after 7 days, be seen in clinic for further evaluation.    Thanks for choosing us as your health care partner,    Michelle Hernandez PA-C    Influenza (Flu): Care Instructions  Overview     Influenza (flu) is an infection in the lungs and breathing passages. It is caused by the influenza virus. There are different strains, or types, of the flu virus from year to year. Unlike the common cold, the flu comes on suddenly and the symptoms can be more severe. These symptoms include a cough, congestion, fever, chills, fatigue, aches, and pains. These symptoms may last for a few weeks. Although the flu can make you feel very sick, it usually doesn't cause serious health problems.  Home treatment is usually all you need for flu symptoms. But your doctor may prescribe antiviral medicine to prevent other health problems, such as pneumonia, from developing. The risk of other health problems from the flu is highest for young children (under 2), older adults (over 65), pregnant women, and people with long-term health conditions.  Follow-up care is a key part of your treatment and safety. Be sure to make and go to all appointments, and call your doctor if you are having problems. It's also a good idea to know your test results and keep a list of the medicines you take.  How can you care for yourself at home?  Get plenty of rest.  Drink plenty of fluids. If you have to limit fluids because of a health problem, talk with  your doctor before you increase the amount of fluids you drink.  Take an over-the-counter pain medicine if needed, such as acetaminophen (Tylenol), ibuprofen (Advil, Motrin), or naproxen (Aleve), to relieve fever, headache, and muscle aches. Be safe with medicines. Read and follow all instructions on the label.  No one younger than 20 should take aspirin. It has been linked to Reye syndrome, a serious illness.  Take any prescribed medicine exactly as directed.  Do not smoke. Smoking can make the flu worse. If you need help quitting, talk to your doctor about stop-smoking programs and medicines. These can increase your chances of quitting for good.  If the skin around your nose and lips becomes sore, put some petroleum jelly (such as Vaseline) on the area.  To ease coughing:  Suck on cough drops or plain, hard candy.  Try an over-the-counter cough or cold medicine. Read and follow all instructions on the label.  Raise your head at night with an extra pillow. This may help you rest if coughing keeps you awake.  To avoid spreading the flu  Wash your hands regularly, and keep your hands away from your face.  Stay home from school, work, and other public places until you are feeling better and your fever has been gone for at least 24 hours. The fever needs to have gone away on its own without the help of medicine.  Ask people living with you to talk to their doctors about preventing the flu. They may get antiviral medicine to keep from getting the flu from you.  To prevent the flu in the future, get the flu vaccine every fall. Encourage people living with you to get the vaccine.  Cover your mouth when you cough or sneeze. If you can, cough or sneeze into the bend of your elbow, not your hands.  When should you call for help?   Call 911 anytime you think you may need emergency care. For example, call if:    You have severe trouble breathing.     You have a seizure.   Call your doctor now or seek immediate medical care  "if:    You have trouble breathing.     You have a fever with a stiff neck or a severe headache.     You have pain or pressure in your chest or belly.     You have a fever or cough that returns after getting better.     You feel very sleepy, dizzy, or confused.     You are not urinating.     You have severe muscle pain.     You have severe weakness, or you are unsteady.     You have medical conditions that are getting worse.   Watch closely for changes in your health, and be sure to contact your doctor if:    You do not get better as expected.     You are having a problem with your medicine.   Where can you learn more?  Go to https://www.[employer].net/patiented  Enter L652 in the search box to learn more about \"Influenza (Flu): Care Instructions.\"  Current as of: April 30, 2024  Content Version: 14.3    2024 [employer].   Care instructions adapted under license by your healthcare professional. If you have questions about a medical condition or this instruction, always ask your healthcare professional. [employer] Agent disclaims any warranty or liability for your use of this information.    "

## 2024-12-20 PROCEDURE — 87651 STREP A DNA AMP PROBE: CPT | Performed by: NURSE PRACTITIONER

## 2024-12-20 PROCEDURE — 99000 SPECIMEN HANDLING OFFICE-LAB: CPT | Performed by: PATHOLOGY

## 2024-12-21 ENCOUNTER — HEALTH MAINTENANCE LETTER (OUTPATIENT)
Age: 39
End: 2024-12-21

## 2024-12-27 ENCOUNTER — MYC MEDICAL ADVICE (OUTPATIENT)
Dept: INTERNAL MEDICINE | Facility: CLINIC | Age: 39
End: 2024-12-27

## 2024-12-31 ENCOUNTER — VIRTUAL VISIT (OUTPATIENT)
Dept: INTERNAL MEDICINE | Facility: CLINIC | Age: 39
End: 2024-12-31
Payer: COMMERCIAL

## 2024-12-31 DIAGNOSIS — F41.8 DEPRESSION WITH ANXIETY: Primary | ICD-10-CM

## 2024-12-31 RX ORDER — CITALOPRAM HYDROBROMIDE 10 MG/1
20 TABLET ORAL DAILY
Qty: 60 TABLET | Refills: 5 | Status: SHIPPED | OUTPATIENT
Start: 2024-12-31

## 2024-12-31 ASSESSMENT — ANXIETY QUESTIONNAIRES
IF YOU CHECKED OFF ANY PROBLEMS ON THIS QUESTIONNAIRE, HOW DIFFICULT HAVE THESE PROBLEMS MADE IT FOR YOU TO DO YOUR WORK, TAKE CARE OF THINGS AT HOME, OR GET ALONG WITH OTHER PEOPLE: EXTREMELY DIFFICULT
5. BEING SO RESTLESS THAT IT IS HARD TO SIT STILL: MORE THAN HALF THE DAYS
1. FEELING NERVOUS, ANXIOUS, OR ON EDGE: MORE THAN HALF THE DAYS
7. FEELING AFRAID AS IF SOMETHING AWFUL MIGHT HAPPEN: NOT AT ALL
6. BECOMING EASILY ANNOYED OR IRRITABLE: MORE THAN HALF THE DAYS
2. NOT BEING ABLE TO STOP OR CONTROL WORRYING: MORE THAN HALF THE DAYS
3. WORRYING TOO MUCH ABOUT DIFFERENT THINGS: MORE THAN HALF THE DAYS
GAD7 TOTAL SCORE: 13
GAD7 TOTAL SCORE: 13

## 2024-12-31 ASSESSMENT — PATIENT HEALTH QUESTIONNAIRE - PHQ9: 5. POOR APPETITE OR OVEREATING: NEARLY EVERY DAY

## 2024-12-31 NOTE — PROGRESS NOTES
"Amber is a 39 year old who is being evaluated via a billable video visit.    How would you like to obtain your AVS? MyChart  If the video visit is dropped, the invitation should be resent by: Send to e-mail at: [email or web address]  Will anyone else be joining your video visit? No      Are refills needed on medications prescribed by this physician? NO    Elza Babin VVF      Assessment & Plan     Depression with anxiety  Discussed management of depression with patient.  Given seasonal component, recommend restarting light therapy.  Patient will also continue with counseling.  Recommend ongoing therapy at this time.  Given prior response to citalopram, recommend restarting at 10 mg and increasing dose to 20 mg if tolerated within 1 to 2 weeks.  Recommend follow up in 1 months to assess response.  Patient expressed understanding and agreement with the plan.  - citalopram (CELEXA) 10 MG tablet; Take 2 tablets (20 mg) by mouth daily.      I spent a total of 23 minutes on the day of the visit.   Time spent by me today doing chart review, history and exam, documentation and further activities per the note      BMI  Estimated body mass index is 25.47 kg/m  as calculated from the following:    Height as of 12/20/24: 1.575 m (5' 2.01\").    Weight as of 12/20/24: 63.2 kg (139 lb 4.8 oz).             Return in about 1 month (around 1/31/2025).    Subjective   Amber is a 39 year old, presenting for the following health issues:  RECHECK and Depression/Anxiety flare up    HPI     Patient reports that she has stopped Celexa recently. She is currently in therapy, however, she has noticed that her mood is getting worse again. She was previously on 10 mg of Celexa in the past with good response.  She also reports that she was using light therapy previously as she has substantial seasonal component to depression.  She has not restarted using light therapy at this point.      Objective           Vitals:  No vitals were obtained today due " to virtual visit.    Physical Exam   GENERAL: alert and no distress  EYES: Eyes grossly normal to inspection.  No discharge or erythema, or obvious scleral/conjunctival abnormalities.  RESP: No audible wheeze, cough, or visible cyanosis.    SKIN: Visible skin clear. No significant rash, abnormal pigmentation or lesions.  NEURO: Cranial nerves grossly intact.  Mentation and speech appropriate for age.  PSYCH: Appropriate affect, tone, and pace of words          Video-Visit Details    Type of service:  Video Visit   Originating Location (pt. Location): Home    Distant Location (provider location):  Off-site  Platform used for Video Visit: Arvin  Signed Electronically by: Cinthia Charles MD

## 2024-12-31 NOTE — PATIENT INSTRUCTIONS
Thank you for visiting the Primary Care Center today at the [facility]! The following is some information about our clinic:     Primary Care Center Frequently-Asked Questions    (1) How do I schedule appointments at the [facility]?     Primary Care--to schedule or make changes to an existing appointment, please call our primary care line at [phone].    Labs--to schedule a lab appointment at the [facility] you can use [employer] or call [phone]. If you have a [facility] location that is closer to home, you can reach out to that location for scheduling options.     Imaging--if you need to schedule a CT, X-ray, MRI, ultrasound, or other imaging study you can call [phone] to schedule at the [facility] or any other [facility] imaging location.     Referrals--if a referral to another specialty was ordered you can expect a phone call from their scheduling team. If you have not heard from them in a week, please call us or send us a Sponduu message to check the status or get a scheduling number. Please note that this only applies to internal [facility] referrals. If the referral is external you would need to contact their office for scheduling.     (2) I have a question about my visit, who do I contact?     You can call us at the primary care line at [phone] to ask questions about your visit. You can also send a secure message through Sponduu, which is reviewed by clinic staff. Please note that Sponduu messages have a twenty-four to forty-eight business hour turnaround time and should not be used for urgent concerns.    (3) How will I get the results of my tests?    If you are signed up for [employer] all tests will be released to you within twenty-four hours of resulting. Please allow three to five days for your doctor to review your results and place a note interpreting the results. If you do not have [employer] you will receive your  results through mail seven to ten business days following the return of the tests. Please note that if there should be any urgent or concerning results that your doctor or their registered nurse will reach out to you the same day as the tests come back. If you have follow up questions about your results or would like to discuss the results in detail please schedule a follow up with your provider either in person or virtually.     (4) How do I get refills of my prescriptions?     You should always first contact your pharmacy for refills of your medications. If submitting a refill request on [email or web address], please be sure to submit the request only once--repeat requests can cause delays in refill. If you are requesting a NEW medication or a medication related to new symptoms you will need to schedule an appointment with a provider prior to approval. Please note: Routine medication refills have up to one to three business day turnaround whereas controlled substances refills have up to five to seven business day turnaround.    (5) I have new symptoms, what do I do?     If you are having an immediate medical emergency, you should dial 911 for assistance.   For anything urgent that needs to be seen within a few hours to one day you should visit a local urgent care for assistance.  For non-urgent symptoms that need to be seen within a few days to a week you can schedule with an available provider in primary care by going to [employer] or calling [phone].   If you are not sure how serious your symptoms are or you would like to receive medical advice you can always call [phone] to speak with a triage nurse.     Consent: Written consent was obtained and risks were reviewed including but not limited to scarring, infection, bleeding, scabbing, incomplete removal, nerve damage and allergy to anesthesia.

## 2025-02-08 SDOH — HEALTH STABILITY: PHYSICAL HEALTH: ON AVERAGE, HOW MANY DAYS PER WEEK DO YOU ENGAGE IN MODERATE TO STRENUOUS EXERCISE (LIKE A BRISK WALK)?: 2 DAYS

## 2025-02-08 SDOH — HEALTH STABILITY: PHYSICAL HEALTH: ON AVERAGE, HOW MANY MINUTES DO YOU ENGAGE IN EXERCISE AT THIS LEVEL?: 30 MIN

## 2025-02-08 ASSESSMENT — SOCIAL DETERMINANTS OF HEALTH (SDOH): HOW OFTEN DO YOU GET TOGETHER WITH FRIENDS OR RELATIVES?: MORE THAN THREE TIMES A WEEK

## 2025-02-10 ENCOUNTER — OFFICE VISIT (OUTPATIENT)
Dept: INTERNAL MEDICINE | Facility: CLINIC | Age: 40
End: 2025-02-10
Payer: COMMERCIAL

## 2025-02-10 VITALS
RESPIRATION RATE: 18 BRPM | OXYGEN SATURATION: 97 % | HEART RATE: 80 BPM | WEIGHT: 137.8 LBS | SYSTOLIC BLOOD PRESSURE: 102 MMHG | TEMPERATURE: 98.2 F | BODY MASS INDEX: 25.36 KG/M2 | HEIGHT: 62 IN | DIASTOLIC BLOOD PRESSURE: 69 MMHG

## 2025-02-10 DIAGNOSIS — Z00.00 PREVENTATIVE HEALTH CARE: Primary | ICD-10-CM

## 2025-02-10 DIAGNOSIS — F41.8 DEPRESSION WITH ANXIETY: ICD-10-CM

## 2025-02-10 ASSESSMENT — ANXIETY QUESTIONNAIRES
5. BEING SO RESTLESS THAT IT IS HARD TO SIT STILL: NOT AT ALL
3. WORRYING TOO MUCH ABOUT DIFFERENT THINGS: SEVERAL DAYS
1. FEELING NERVOUS, ANXIOUS, OR ON EDGE: SEVERAL DAYS
GAD7 TOTAL SCORE: 5
7. FEELING AFRAID AS IF SOMETHING AWFUL MIGHT HAPPEN: NOT AT ALL
GAD7 TOTAL SCORE: 5
6. BECOMING EASILY ANNOYED OR IRRITABLE: SEVERAL DAYS
2. NOT BEING ABLE TO STOP OR CONTROL WORRYING: SEVERAL DAYS
IF YOU CHECKED OFF ANY PROBLEMS ON THIS QUESTIONNAIRE, HOW DIFFICULT HAVE THESE PROBLEMS MADE IT FOR YOU TO DO YOUR WORK, TAKE CARE OF THINGS AT HOME, OR GET ALONG WITH OTHER PEOPLE: SOMEWHAT DIFFICULT

## 2025-02-10 ASSESSMENT — PATIENT HEALTH QUESTIONNAIRE - PHQ9
SUM OF ALL RESPONSES TO PHQ QUESTIONS 1-9: 2
5. POOR APPETITE OR OVEREATING: SEVERAL DAYS

## 2025-02-10 NOTE — PROGRESS NOTES
"Preventive Care Visit  [facility]  Cinthia Charles MD, Internal Medicine  Feb 10, 2025      Assessment & Plan     Preventative health care  Discussed preventive healthcare needs with patient.  She is up-to-date on breast and cervical cancer screening.  Recommend COVID-vaccine.  Discussed shingles vaccine as well.  - COVID-19 12+ (PFIZER)    Depression with anxiety  Patient wishes to continue with current dose of antidepressant therapy.  She is also scheduled for regular therapy.  No medication changes were done today.  - REVIEW OF HEALTH MAINTENANCE PROTOCOL ORDERS  - TSH with free T4 reflex; Future        BMI  Estimated body mass index is 25.2 kg/m  as calculated from the following:    Height as of this encounter: 1.575 m (5' 2.01\").    Weight as of this encounter: 62.5 kg (137 lb 12.8 oz).       Counseling  Appropriate preventive services were addressed with this patient via screening, questionnaire, or discussion as appropriate for fall prevention, nutrition, physical activity, Tobacco-use cessation, social engagement, weight loss and cognition.  Checklist reviewing preventive services available has been given to the patient.  Reviewed patient's diet, addressing concerns and/or questions.   She is at risk for lack of exercise and has been provided with information to increase physical activity for the benefit of her well-being.   She is at risk for psychosocial distress and has been provided with information to reduce risk.           No follow-ups on file.    Joshua Clark is a 39 year old, presenting for the following:  Physical        2/10/2025     2:53 PM   Additional Questions   Roomed by MR PETTIT    Patient is here for a routine preventive visit.  She reports that overall she has been feeling well.  She denies new issues.  She states that she is having some symptoms of depression, however overall feels that her symptoms are well-controlled.  Her " anxiety symptoms are more prevalent, however manageable.    Health Care Directive  Patient does not have a Health Care Directive: Advance Directive received and scanned. Click on Code in the patient header to view.      2/8/2025   General Health   How would you rate your overall physical health? Good   Feel stress (tense, anxious, or unable to sleep) To some extent   (!) STRESS CONCERN      2/8/2025   Nutrition   Three or more servings of calcium each day? (!) I DON'T KNOW   Diet: Regular (no restrictions)   How many servings of fruit and vegetables per day? (!) 2-3   How many sweetened beverages each day? 0-1         2/8/2025   Exercise   Days per week of moderate/strenous exercise 2 days   Average minutes spent exercising at this level 30 min   (!) EXERCISE CONCERN      2/8/2025   Social Factors   Frequency of gathering with friends or relatives More than three times a week   Worry food won't last until get money to buy more No   Food not last or not have enough money for food? No   Do you have housing? (Housing is defined as stable permanent housing and does not include staying ouside in a car, in a tent, in an abandoned building, in an overnight shelter, or couch-surfing.) Yes   Are you worried about losing your housing? No   Lack of transportation? No   Unable to get utilities (heat,electricity)? No         2/8/2025   Dental   Dentist two times every year? Yes                    2/8/2025   Substance Use   Alcohol more than 3/day or more than 7/wk No   Do you use any other substances recreationally? No     Social History     Tobacco Use    Smoking status: Never     Passive exposure: Never    Smokeless tobacco: Never   Vaping Use    Vaping status: Never Used   Substance Use Topics    Alcohol use: Yes    Drug use: No           6/25/2024   LAST FHS-7 RESULTS   1st degree relative breast or ovarian cancer No   Any relative bilateral breast cancer No   Any male have breast cancer No   Any ONE woman have BOTH breast  AND ovarian cancer No   Any woman with breast cancer before 50yrs No   2 or more relatives with breast AND/OR ovarian cancer No   2 or more relatives with breast AND/OR bowel cancer No        Mammogram Screening - Mammogram every 1-2 years updated in Health Maintenance based on mutual decision making        2025   STI Screening   New sexual partner(s) since last STI/HIV test? No     History of abnormal Pap smear: No - age 21 - 64 - Patient with other risk factor requiring more frequent screening - see link Cervical Cytology Screening Guidelines        Latest Ref Rng & Units 11/10/2023     9:15 AM 2017     2:02 PM 2014    12:00 AM   PAP / HPV   PAP  Atypical squamous cells of undetermined significance (ASC-US)      PAP (Historical)   NIL  NIL    HPV 16 DNA Negative Negative  Negative     HPV 18 DNA Negative Negative  Negative     Other HR HPV Negative Negative  Negative       ASCVD Risk   The 10-year ASCVD risk score (Monserrat ALBERTO, et al., 2019) is: 0.2%    Values used to calculate the score:      Age: 39 years      Sex: Female      Is Non- : No      Diabetic: No      Tobacco smoker: No      Systolic Blood Pressure: 102 mmHg      Is BP treated: No      HDL Cholesterol: 72 mg/dL      Total Cholesterol: 209 mg/dL        2025   Contraception/Family Planning   Questions about contraception or family planning No        Reviewed and updated as needed this visit by Provider                    Past Medical History:   Diagnosis Date    Adolescent depression in college    Anxiety     Depressive disorder 2002    Treated witj celexa    Fit/adjust GI carole-device NEC     Perineal abscess     PONV (postoperative nausea and vomiting)     S/P total colectomy     Ulcerative colitis      Past Surgical History:   Procedure Laterality Date     SECTION  2012    Procedure: SECTION; Surgeon:JAVI BELL; Location:[facility]     SECTION N/A 2014  "   Procedure:  SECTION;  Surgeon: Shawanda Luna MD;  Location: [facility] L[facility]     SECTION N/A 2017    Procedure:  SECTION;  Repeat  Section ;  Surgeon: Laurie Montoya MD;  Location: [facility]    COLECTOMY SUBTOTAL      COLONOSCOPY N/A 4/15/2015    Procedure: COLONOSCOPY;  Surgeon: Al Santo MD;  Location: [facility] OR    EXAM UNDER ANESTHESIA ANUS N/A 4/15/2015    Procedure: EXAM UNDER ANESTHESIA ANUS;  Surgeon: Al Santo MD;  Location: [facility] OR    FISTULOTOMY RECTUM N/A 4/15/2015    Procedure: FISTULOTOMY RECTUM;  Surgeon: Al Santo MD;  Location: [facility] OR    POUCHOSCOPY N/A 2023    Procedure: ENDOSCOPY, POUCH, DIAGNOSTIC WITH BIOPSIES;  Surgeon: Marlys Jacobs MD;  Location: [facility] OR    POUCHOSCOPY N/A 10/16/2023    Procedure: POUCHOSCOPY WITH BIOPSY;  Surgeon: Marlys Jacobs MD;  Location: [facility] OR    POUCHOSCOPY N/A 2024    Procedure: Pouchoscopy;  Surgeon: Cayden Castellanos MD;  Location: [facility] GI    TAKEDOWN ILEOSTOMY      tonsilectomy      TRANSPERINEAL REPAIR FISTULA RECTAL URETHRAL              Objective    Exam  /69 (BP Location: Right arm, Patient Position: Sitting, Cuff Size: Adult Regular)   Pulse 80   Temp 98.2  F (36.8  C) (Oral)   Resp 18   Ht 1.575 m (5' 2.01\")   Wt 62.5 kg (137 lb 12.8 oz)   LMP 2025 (Exact Date)   SpO2 97%   BMI 25.20 kg/m     Estimated body mass index is 25.2 kg/m  as calculated from the following:    Height as of this encounter: 1.575 m (5' 2.01\").    Weight as of this encounter: 62.5 kg (137 lb 12.8 oz).    Physical Exam  GENERAL: alert and no distress  EYES: Eyes grossly normal to inspection, PERRL and conjunctivae and sclerae normal  NECK: no adenopathy, no asymmetry, masses, or scars  RESP: lungs clear to auscultation - no rales, rhonchi or wheezes  CV: regular rate and rhythm, normal S1 S2, no S3 or S4, no murmur, click or rub, no peripheral edema  ABDOMEN: soft, nontender and bowel " sounds normal  MS: no gross musculoskeletal defects noted, no edema  SKIN: no suspicious lesions or rashes  NEURO: Normal strength and tone, mentation intact and speech normal  PSYCH: mentation appears normal, affect normal/bright        Signed Electronically by: Cinthia Charles MD

## 2025-03-26 ENCOUNTER — TELEPHONE (OUTPATIENT)
Dept: GASTROENTEROLOGY | Facility: CLINIC | Age: 40
End: 2025-03-26
Payer: COMMERCIAL

## 2025-03-26 DIAGNOSIS — K50.00 CROHN'S DISEASE OF SMALL INTESTINE WITHOUT COMPLICATION (H): Primary | ICD-10-CM

## 2025-03-26 NOTE — TELEPHONE ENCOUNTER
Received communication from Dr. Jacobs and patient. Amber reports a new abscess on her left groin about the size of a quarter. Denies fevers. Per Dr. Jacobs, plan for patient to be evaluated by colorectal surgery team. Referral placed.

## 2025-04-09 ENCOUNTER — TELEPHONE (OUTPATIENT)
Dept: DERMATOLOGY | Facility: CLINIC | Age: 40
End: 2025-04-09
Payer: COMMERCIAL

## 2025-04-09 NOTE — TELEPHONE ENCOUNTER
This encounter is being sent to inform the clinic that this patient has a referral from AYLA SULTANA for the diagnoses of L02.92 (ICD-10-CM) - Boil and has requested that this patient be seen within 1-2 Weeks   Based on the availability of our provider(s), we are unable to accommodate this request.    Were all sites offered this patient?  Yes    Does scheduling algorithm request to schedule next available?  Patient has been scheduled for the first available opening with Yahaira Trimble on 9/3.  We have informed the patient that the clinic will review their referral and reach out if a sooner appointment is medically necessary.

## 2025-04-09 NOTE — TELEPHONE ENCOUNTER
Spoke with patient and rescheduled appt to 4/10/25. Patient expressed understanding.     Roula GONSALES RN BSN  [facility]  [phone]

## 2025-04-10 ENCOUNTER — OFFICE VISIT (OUTPATIENT)
Dept: DERMATOLOGY | Facility: CLINIC | Age: 40
End: 2025-04-10
Payer: COMMERCIAL

## 2025-04-10 DIAGNOSIS — L72.0 EPIDERMAL CYST: ICD-10-CM

## 2025-04-10 NOTE — LETTER
4/10/2025      Amber Buckner  [address] [facility] [address]      Dear Colleague,    Thank you for referring your patient, Amber Buckner, to the [facility]. Please see a copy of my visit note below.    Chief complaint  Concerns about a subcutaneous nodule in the left inguinal fold, present for approximately 5 weeks.     History of present illness  Amber Buckner, a 39-year-old female, reports a boil or cyst in the left inguinal region that began approximately five weeks ago. Initially thought to be an ingrown hair, she attempted to pop it but realized it was deeper. She visited a primary care doctor on March 28, 2025, and was prescribed doxycycline, which she has been taking once or twice daily, though experiencing nausea. She also received a prescription for Duac gel but did not fill this. Prior to the appointment, she managed to release a small amount of foul-smelling material from the nodule. The nodule has since reduced in size and is currently about one cm, with no significant drainage since the appointment. 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       Past medical history  Patient has Crohn's and ulcerative colitis and is on Skyrizi for management  History of large cystic lesion near the anus about 10 years ago that was incised and drained and needed repair due to a fistula     Current medications  Current Outpatient Medications   Medication Sig Dispense Refill     acetaminophen (TYLENOL) 500 MG tablet Take 1,000 mg by mouth as needed for pain       adapalene (DIFFERIN) 0.1 % external gel Apply topically At Bedtime       clindamycin phos-benzoyl perox (DUAC) 1.2-5 % external gel Wash area of concern 2x daily. 45 g 0     doxycycline hyclate (VIBRA-TABS) 100 MG tablet Take 1 tablet (100 mg) by mouth 2 times daily. 28 tablet 0      SKYRIZI 360 MG/2.4ML SOCT Inject 2.4 mLs (360 mg) subcutaneously once every eight weeks. 2.4 mL 2     citalopram (CELEXA) 10 MG tablet Take 2 tablets (20 mg) by mouth daily. (Patient not taking: Reported on 4/10/2025) 60 tablet 5     vitamin D3 (CHOLECALCIFEROL) 50 mcg (2000 units) tablet Take 1 tablet by mouth daily (Patient not taking: Reported on 4/10/2025)       No current facility-administered medications for this visit.         Physical exam  On the left inguinal fold, approximately a 1 cm palpable subcutaneous nodule, movable, with a central punctum and some serous drainage upon pressure. Hyperpigmentation present, absence of erythema or signs of infection.     Assessment  - Subcutaneous nodule in the left inguinal fold, likely a cyst, with no signs of infection.  - Inflammation likely due to the body's response to the cyst, not necessarily an infection.     Plan  - Stop taking doxycycline.  - Consider excision to remove the cyst if it remains bothersome after healing for several weeks.   - Use warm compresses to encourage drainage if needed for future flares.   - Use ibuprofen for pain and inflammation if the cyst becomes inflamed again or painful  - Avoid pressing or squeezing at the cyst to prevent further irritation.     Prescription  - Discontinue doxycycline due to nausea.     Appointments  follow up as needed for new or worsening concerns or for excision if desires     Pilar Friedman CNP   Dermatology      This note has been generated using dictation services and may contain typos.         Again, thank you for allowing me to participate in the care of your patient.        Sincerely,        SEPIDEH Robbins CNP    Electronically signed

## 2025-04-10 NOTE — PROGRESS NOTES
Chief complaint  Concerns about a subcutaneous nodule in the left inguinal fold, present for approximately 5 weeks.     History of present illness  Amber Buckner, a 39-year-old female, reports a boil or cyst in the left inguinal region that began approximately five weeks ago. Initially thought to be an ingrown hair, she attempted to pop it but realized it was deeper. She visited a primary care doctor on March 28, 2025, and was prescribed doxycycline, which she has been taking once or twice daily, though experiencing nausea. She also received a prescription for Duac gel but did not fill this. Prior to the appointment, she managed to release a small amount of foul-smelling material from the nodule. The nodule has since reduced in size and is currently about one cm, with no significant drainage since the appointment. 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       Past medical history  Patient has Crohn's and ulcerative colitis and is on Skyrizi for management  History of large cystic lesion near the anus about 10 years ago that was incised and drained and needed repair due to a fistula     Current medications  Current Outpatient Medications   Medication Sig Dispense Refill    acetaminophen (TYLENOL) 500 MG tablet Take 1,000 mg by mouth as needed for pain      adapalene (DIFFERIN) 0.1 % external gel Apply topically At Bedtime      clindamycin phos-benzoyl perox (DUAC) 1.2-5 % external gel Wash area of concern 2x daily. 45 g 0    doxycycline hyclate (VIBRA-TABS) 100 MG tablet Take 1 tablet (100 mg) by mouth 2 times daily. 28 tablet 0    SKYRIZI 360 MG/2.4ML SOCT Inject 2.4 mLs (360 mg) subcutaneously once every eight weeks. 2.4 mL 2    citalopram (CELEXA) 10 MG tablet Take 2 tablets (20 mg) by mouth daily. (Patient not taking: Reported on 4/10/2025) 60 tablet 5    vitamin D3  (CHOLECALCIFEROL) 50 mcg (2000 units) tablet Take 1 tablet by mouth daily (Patient not taking: Reported on 4/10/2025)       No current facility-administered medications for this visit.         Physical exam  On the left inguinal fold, approximately a 1 cm palpable subcutaneous nodule, movable, with a central punctum and some serous drainage upon pressure. Hyperpigmentation present, absence of erythema or signs of infection.     Assessment  - Subcutaneous nodule in the left inguinal fold, likely a cyst, with no signs of infection.  - Inflammation likely due to the body's response to the cyst, not necessarily an infection.     Plan  - Stop taking doxycycline.  - Consider excision to remove the cyst if it remains bothersome after healing for several weeks.   - Use warm compresses to encourage drainage if needed for future flares.   - Use ibuprofen for pain and inflammation if the cyst becomes inflamed again or painful  - Avoid pressing or squeezing at the cyst to prevent further irritation.     Prescription  - Discontinue doxycycline due to nausea.     Appointments  follow up as needed for new or worsening concerns or for excision if desires     Pilar Friedman CNP   Dermatology      This note has been generated using dictation services and may contain typos.

## 2025-04-30 NOTE — H&P
Amber [facility]  [id number]  female  39 year old      Reason for procedure/surgery: Small bowel obstructions / plan to dilate prior surgical site    Patient Active Problem List   Diagnosis    S/P total colectomy    Polycystic ovaries    Depression with anxiety    Acne    Angioma    High-risk pregnancy [facility] MD patient    Ulcerative colitis (H)    S/P  section    Small bowel obstruction (H)    Moderate major depression (H)    Hx of colectomy    Hx of ulcerative colitis    Crohn's disease (H)    Major depression, recurrent (H24)    SBO (small bowel obstruction) (H)       Past Surgical History:    Past Surgical History:   Procedure Laterality Date     SECTION  2012    Procedure: SECTION; Surgeon:JAVI BELL; Location:[facility] L+D     SECTION N/A 2014    Procedure:  SECTION;  Surgeon: Shawanda Luna MD;  Location: [facility] L[facility]     SECTION N/A 2017    Procedure:  SECTION;  Repeat  Section ;  Surgeon: Laurie Montoya MD;  Location: [facility] L[facility]    COLECTOMY SUBTOTAL      COLONOSCOPY N/A 4/15/2015    Procedure: COLONOSCOPY;  Surgeon: Al Santo MD;  Location: [facility] OR    EXAM UNDER ANESTHESIA ANUS N/A 4/15/2015    Procedure: EXAM UNDER ANESTHESIA ANUS;  Surgeon: Al Santo MD;  Location: [facility]    FISTULOTOMY RECTUM N/A 4/15/2015    Procedure: FISTULOTOMY RECTUM;  Surgeon: Al Santo MD;  Location: [facility]    POUCHOSCOPY N/A 2023    Procedure: ENDOSCOPY, POUCH, DIAGNOSTIC WITH BIOPSIES;  Surgeon: Marlys Jacobs MD;  Location: [facility] OR    POUCHOSCOPY N/A 10/16/2023    Procedure: POUCHOSCOPY WITH BIOPSY;  Surgeon: Marlys Jacobs MD;  Location: [facility] OR    TAKEDOWN ILEOSTOMY      tonsilectomy      TRANSPERINEAL REPAIR FISTULA RECTAL URETHRAL         Past Medical History:   Past Medical History:   Diagnosis Date    Adolescent depression in college    Anxiety     Depressive disorder 2002    Treated witj celexa     Fit/adjust GI carole-device NEC 2001    Perineal abscess     PONV (postoperative nausea and vomiting)     S/P total colectomy 2001    Ulcerative colitis        Social History:   Social History     Tobacco Use    Smoking status: Never     Passive exposure: Never    Smokeless tobacco: Never   Substance Use Topics    Alcohol use: Yes       Family History:   Family History   Problem Relation Age of Onset    Diabetes Maternal Grandmother     Diabetes Paternal Grandfather     Genetic Disorder Father         chron's    Gastrointestinal Disease Sister         ulcerative colitis       Allergies: No Known Allergies    Active Medications:   No current outpatient medications on file.       Systemic Review:   CONSTITUTIONAL: NEGATIVE for fever, chills, change in weight  ENT/MOUTH: NEGATIVE for ear, mouth and throat problems  RESP: NEGATIVE for significant cough or SOB  CV: NEGATIVE for chest pain, palpitations or peripheral edema    Physical Examination:   Vital Signs: BP 95/64   Temp 98.2  F (36.8  C) (Oral)   Resp 16   SpO2 100%   GENERAL: healthy, alert and no distress  NECK: no adenopathy, no asymmetry, masses, or scars  RESP: lungs clear to auscultation - no rales, rhonchi or wheezes  CV: regular rate and rhythm, normal S1 S2, no S3 or S4, no murmur, click or rub, no peripheral edema and peripheral pulses strong  ABDOMEN: soft, nontender, no hepatosplenomegaly, no masses and bowel sounds normal  MS: no gross musculoskeletal defects noted, no edema    Plan: Appropriate to proceed as scheduled.      Cayden Castellanos MD  8/29/2024    PCP:  Cinthia Charles     Consent (Spinal Accessory)/Introductory Paragraph: The rationale for Mohs was explained to the patient and consent was obtained. The risks, benefits and alternatives to therapy were discussed in detail. Specifically, the risks of damage to the spinal accessory nerve, infection, scarring, bleeding, prolonged wound healing, incomplete removal, allergy to anesthesia, and recurrence were addressed. Prior to the procedure, the treatment site was clearly identified and confirmed by the patient. All components of Universal Protocol/PAUSE Rule completed.

## 2025-05-12 DIAGNOSIS — K50.00 CROHN'S DISEASE OF SMALL INTESTINE WITHOUT COMPLICATION (H): ICD-10-CM

## 2025-05-13 DIAGNOSIS — K50.00 CROHN'S DISEASE OF SMALL INTESTINE WITHOUT COMPLICATION (H): ICD-10-CM

## 2025-05-13 RX ORDER — RISANKIZUMAB-RZAA 360 MG/2.4
360 WEARABLE INJECTOR SUBCUTANEOUS
Qty: 2.4 ML | Refills: 2 | Status: CANCELLED | OUTPATIENT
Start: 2025-05-13

## 2025-05-13 RX ORDER — RISANKIZUMAB-RZAA 360 MG/2.4
360 WEARABLE INJECTOR SUBCUTANEOUS
Qty: 2.4 ML | Refills: 2 | Status: SHIPPED | OUTPATIENT
Start: 2025-05-13

## 2025-05-13 NOTE — TELEPHONE ENCOUNTER
Last provider visit: 11/19/24 Marlys Jacobs MD   Next provider visit: 7/23/25 Marlys Jacobs MD   Last labs completed: 10/29/24  Lab frequency: every 3 months   - standing labs available until 7/31/25  Next labs due: overdue now  Last TB screening: 10/2024  PDC: 100 Skyrizi    Refilling Skyrizi per CPA with Marlys Jacobs MD. Will send [employer] message to remind Amber to get safety labs done as these are overdue at this time.

## 2025-06-12 ENCOUNTER — TELEPHONE (OUTPATIENT)
Dept: GASTROENTEROLOGY | Facility: CLINIC | Age: 40
End: 2025-06-12
Payer: COMMERCIAL

## 2025-06-12 NOTE — TELEPHONE ENCOUNTER
Left Voicemail (2nd Attempt) for the patient to call back and schedule the following:    Appointment type: return   Provider: Dr. Jacobs   Return date: 7/23/2025  Specialty phone number: [phone]  Additional appointment(s) needed:   Additonal Notes:

## 2025-06-30 ENCOUNTER — VIRTUAL VISIT (OUTPATIENT)
Dept: PHARMACY | Facility: CLINIC | Age: 40
End: 2025-06-30
Attending: PHARMACIST
Payer: COMMERCIAL

## 2025-06-30 DIAGNOSIS — K50.00 CROHN'S DISEASE OF SMALL INTESTINE WITHOUT COMPLICATION (H): Primary | ICD-10-CM

## 2025-06-30 NOTE — NURSING NOTE
Current patient location: Work    Is the patient currently in the state of [place]? YES    Visit mode: VIDEO    If the visit is dropped, the patient can be reconnected by:TELEPHONE VISIT: Phone number:   Telephone Information:   Mobile [phone]       Will anyone else be joining the visit? NO  (If patient encounters technical issues they should call [phone])    Are changes needed to the allergy or medication list? Pt stated no changes to allergies and Pt stated no med changes    Are refills needed on medications prescribed by this physician? NO    Rooming Documentation:  Not applicable    Reason for visit: Consult    Rick GILLIS

## 2025-06-30 NOTE — PROGRESS NOTES
Medication Therapy Management (MTM) Encounter    ASSESSMENT:                            Medication Adherence/Access: No issues identified.    Crohn's Disease: Amber would benefit from continuing on Skyrizi 360 mg subcutaneously every eight weeks as maintenance therapy. IBD provider follow-up in place. She is overdue for labs, which she will plan to do later this week. Her vaccinations are largely up-to-date. Discussed trying to coordinate Shingrix series through a [facility] if able. We discussed that the epidermal cysts she has been getting could be related to Skyrizi, however, this is not a common occurrence based on known data. Will continue to monitor.    PLAN:                            Reminder for labs when able. Will plan on renewing Skyrizi after labs.   -- Currently scheduled for 7/3.    Continue Skyrizi 360 mg subcutaneously every eight weeks.    Amber to consider the Shingrix series. An order was placed to the [facility] - [place]. You can setup a vaccine appointment through [email or web address].    - Please reach out if you have any difficulty getting this     Follow-up:    -- 1/7 at 7:30 AM for a telephone call with MTM    SUBJECTIVE/OBJECTIVE:                          Amber Buckner is a 40 year old female seen for a follow-up visit.       Reason for visit: Skyrizi check-in    Allergies/ADRs: None  Tobacco: She reports that she has never smoked. She has never been exposed to tobacco smoke. She has never used smokeless tobacco.  Alcohol: 1-3 beverages / week    Medication Adherence/Access: no issues reported.    Crohn's Disease  Skyrizi 360 mg subcutaneously every eight weeks  Vitamin D 50 mcg daily (when she remembers)    Last Skyrizi dose was about 5/30. No major concerns today. She has been able to get Skyrizi from the pharmacy on time. She is wondering if she got off track with labs. She has developed a couple of cysts in her groin area. She has been working with  dermatology on that, and they had wondered if it was related to Lala. She did try a couple of times to get the Shingrix series, but notes there were barriers to the pharmacist allowing this since she is not 49 years old.    Last provider visit: 2024 with Dr. Jacobs  Next provider visit: 2025 with Dr. Jacobs  Last labs completed: 10/2024  Lab frequency: every 3 months   - standing labs   Next labs due: now  Last TB screening: 10/2024  PDC: 100% (Skyrizi)    Therapy Start Date: 2023 Pouchoscopy  Impression:            - Inflammation was found in the ileoanal pouch                          secondary to pouchitis. Overall this was mild.                          Biopsied.                          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                          It was technically difficult to reach this area.                          Dilated to 18mm     MRE 2024  IMPRESSION:   1. Resolved small bowel obstruction. No evidence of active small bowel  inflammation.  2. Left hydroureter measuring up to 1.4 cm and mild left  hydronephrosis with abrupt but smooth tapering of the ureter as it  courses between the uterus and left external iliac artery. Correlate  for clinical signs of urinary tract obstruction.  3. Nonobstructing calyceal stones in the lower pole of the right  kidney.    IBD Health Maintenance    Vaccinations:  All patients on immunosuppression should avoid live vaccines unless specifically indicated.    -- Influenza (last dose) 2024  -- Tetanus booster (last dose) 2017  -- Pneumococcal Pneumonia    PCV-13: not on file   PPSV-23: not on file   PCV-20/PCV-21: 2023  -- COVID-19    Initial series:    Last Dose: 2/10/2025    One time confirmation of immunity or serologies:  -- Hepatitis A (serologies or immunizations) 2023 and 10/27/2023  -- Hepatitis B (serologies or  immunizations) 8/12/2023, 10/27/2023, and 2/18/2024  -- Varicella/Zoster    Varicella patient reports history of chickenpox   Zoster not on file  -- MMR 12/18/2014    Due to the immunosuppression in this patient, I would not advise administration of live vaccines such as varicella/VZV, intranasal influenza, MMR, or yellow fever vaccine (if traveling).      Immunosuppressive Screening:    Lab Results   Component Value Date    AUSAB 0.00 11/03/2022    HEPBANG Nonreactive 11/03/2022    HBCAB Nonreactive 11/03/2022    HCVAB Nonreactive 08/31/2021    TBRES Negative 10/29/2024     Cancer Screening:    Cervical cancer screening: Per USPSTF guidelines   -- completed 11/2023, due 11/2028    Skin cancer screening: Recommended periodically due to patient's immunosuppression and diagnosis of IBD.    Depression Screening:    PHQ-2 Score:         2/10/2025     3:41 PM 11/19/2024     2:11 PM   PHQ-2 ( 1999 Pfizer)   Q1: Little interest or pleasure in doing things 0 0   Q2: Feeling down, depressed or hopeless 0 0   PHQ-2 Score 0 0    -- No concerns today. Did stop celexa. Sees a therapist weekly, and feels this is under good control.  Misc:  -- Avoid tobacco use  -- Avoid NSAIDs as there is potentially a 25% chance of causing an IBD flare    ----------------    I spent 17 minutes with this patient today. All changes were made via collaborative practice agreement with Marlys Jacobs.     A summary of these recommendations was sent via [employer].    Dolly DouglassD, BCACP  MTM Pharmacist   [facility]   Phone: [phone]    Telemedicine Visit Details  The patient's medications can be safely assessed via a telemedicine encounter.  Type of service:  Telephone visit  Originating Location (pt. Location): Home    Distant Location (provider location):  Off-site  Start Time: 1:32 PM  End Time: 1:49 PM     Medication Therapy Recommendations  Crohn's disease (H)   1 Rationale: Preventive therapy - Needs  additional medication therapy - Indication   Recommendation: Start Medication - Prevnar 20 0.5 ML Christine   Status: Accepted per CPA   Identified Date: 6/30/2025 Completed Date: 6/30/2025   Note: Given planned immunosuppression start, would recommend  Shingrix series.           MTM Visit Date: 6/30/2025  Diagnosis: Crohn's disease  Medications: Skyrizi 360 mg subcutaneously every 8 weeks  Med/Allergy Changes: None  PDC/Adherence: 100% (Skyrizi)  QOL: PRO-3 consistent with clinical remission  Side effects/tolerance: No concerns reported  MTM Follow-up: Recommended in 6 months (1/7/26)    PRO-3 for Crohn's Disease    Please select the one best answer for the patient's ability at this time     Over the past week, how many liquid or soft stools have you had on average per day?   3 (When scoring, multiply number by 2=6)   Over the past week, please rate your average abdominal pain  None : 0 points  3. Over the past week, please rate your general well-being    Generally Well: 0 points    Score: 6  <13: Remission  13-21: Mild Activity   22-52: Moderate Activity  >/= 53: Severe Activity

## 2025-06-30 NOTE — PATIENT INSTRUCTIONS
"Recommendations from today's MTM visit:                                                       Reminder for labs when able. Will plan on renewing Skyrizi after labs.   -- Currently scheduled for 7/3.    Continue Skyrizi 360 mg subcutaneously every eight weeks.    Amber to consider the Shingrix series. An order was placed to the [facility] [place]. You can setup a vaccine appointment through [email or web address].    - Please reach out if you have any difficulty getting this     Follow-up:    -- 1/7 at 7:30 AM for a telephone call with MTM    It was great speaking with you today.  I value your experience and would be very thankful for your time in providing feedback in our clinic survey. In the next few days, you may receive an email or text message from [employer] with a link to a survey related to your  clinical pharmacist.\"     To schedule another MTM appointment, please call the clinic directly or you may call the MTM scheduling line at [phone].    My Clinical Pharmacist's contact information:                                                      Please feel free to contact me with any questions or concerns you have.      Dolly Carlin PharmD, BCACP  MTM Pharmacist   [facility]   Phone: [phone]    "

## 2025-07-03 ENCOUNTER — LAB (OUTPATIENT)
Dept: LAB | Facility: CLINIC | Age: 40
End: 2025-07-03
Payer: COMMERCIAL

## 2025-07-03 DIAGNOSIS — F41.8 DEPRESSION WITH ANXIETY: ICD-10-CM

## 2025-07-03 DIAGNOSIS — K50.00 CROHN'S DISEASE OF SMALL INTESTINE WITHOUT COMPLICATION (H): ICD-10-CM

## 2025-07-03 LAB
ALBUMIN SERPL BCG-MCNC: 4.6 G/DL (ref 3.5–5.2)
ALP SERPL-CCNC: 41 U/L (ref 40–150)
ALT SERPL W P-5'-P-CCNC: 10 U/L (ref 0–50)
ANION GAP SERPL CALCULATED.3IONS-SCNC: 10 MMOL/L (ref 7–15)
AST SERPL W P-5'-P-CCNC: 18 U/L (ref 0–45)
BASOPHILS # BLD AUTO: 0 10E3/UL (ref 0–0.2)
BASOPHILS NFR BLD AUTO: 1 %
BILIRUB SERPL-MCNC: 0.3 MG/DL
BUN SERPL-MCNC: 14 MG/DL (ref 6–20)
CALCIUM SERPL-MCNC: 9.9 MG/DL (ref 8.8–10.4)
CHLORIDE SERPL-SCNC: 104 MMOL/L (ref 98–107)
CREAT SERPL-MCNC: 0.71 MG/DL (ref 0.51–0.95)
CRP SERPL-MCNC: <3 MG/L
EGFRCR SERPLBLD CKD-EPI 2021: >90 ML/MIN/1.73M2
EOSINOPHIL # BLD AUTO: 0.1 10E3/UL (ref 0–0.7)
EOSINOPHIL NFR BLD AUTO: 2 %
ERYTHROCYTE [DISTWIDTH] IN BLOOD BY AUTOMATED COUNT: 12.4 % (ref 10–15)
GLUCOSE SERPL-MCNC: 101 MG/DL (ref 70–99)
HCO3 SERPL-SCNC: 23 MMOL/L (ref 22–29)
HCT VFR BLD AUTO: 39.3 % (ref 35–47)
HGB BLD-MCNC: 13.1 G/DL (ref 11.7–15.7)
IMM GRANULOCYTES # BLD: 0 10E3/UL
IMM GRANULOCYTES NFR BLD: 0 %
LYMPHOCYTES # BLD AUTO: 1.4 10E3/UL (ref 0.8–5.3)
LYMPHOCYTES NFR BLD AUTO: 27 %
MCH RBC QN AUTO: 33.9 PG (ref 26.5–33)
MCHC RBC AUTO-ENTMCNC: 33.3 G/DL (ref 31.5–36.5)
MCV RBC AUTO: 102 FL (ref 78–100)
MONOCYTES # BLD AUTO: 0.4 10E3/UL (ref 0–1.3)
MONOCYTES NFR BLD AUTO: 7 %
NEUTROPHILS # BLD AUTO: 3.3 10E3/UL (ref 1.6–8.3)
NEUTROPHILS NFR BLD AUTO: 64 %
PLATELET # BLD AUTO: 304 10E3/UL (ref 150–450)
POTASSIUM SERPL-SCNC: 4.5 MMOL/L (ref 3.4–5.3)
PROT SERPL-MCNC: 7.6 G/DL (ref 6.4–8.3)
RBC # BLD AUTO: 3.87 10E6/UL (ref 3.8–5.2)
SODIUM SERPL-SCNC: 137 MMOL/L (ref 135–145)
TSH SERPL DL<=0.005 MIU/L-ACNC: 1.17 UIU/ML (ref 0.3–4.2)
WBC # BLD AUTO: 5.2 10E3/UL (ref 4–11)

## 2025-07-04 ENCOUNTER — RESULTS FOLLOW-UP (OUTPATIENT)
Dept: INTERNAL MEDICINE | Facility: CLINIC | Age: 40
End: 2025-07-04

## 2025-07-11 ENCOUNTER — TELEPHONE (OUTPATIENT)
Dept: GASTROENTEROLOGY | Facility: CLINIC | Age: 40
End: 2025-07-11
Payer: COMMERCIAL

## 2025-07-11 NOTE — TELEPHONE ENCOUNTER
PA Initiation    Medication: SKYRIZI 360 MG/2.4ML SC SOCT  Insurance Company: [email or web address] - Phone [phone] Fax [phone]  Pharmacy Filling the Rx: [facility] - [place], [place] - [facility] [address]  Filling Pharmacy Phone: [phone]  Filling Pharmacy Fax: [phone]  Start Date: 7/11/2025    Key: [id number]

## 2025-07-14 NOTE — TELEPHONE ENCOUNTER
Prior Authorization Approval    Medication: SKYRIZI 360 MG/2.4ML SC SOCT  Authorization Effective Date: 7/11/2025  Authorization Expiration Date: 7/10/2026  Approved Dose/Quantity: ud   Reference #: Key: [id number]   Insurance Company: [employer] - Phone [phone] Fax [phone]  Expected CoPay: $ 0  CoPay Card Available: No    Financial Assistance Needed:    Which Pharmacy is filling the prescription: [facility] - [place], [place] - [address]  Pharmacy Notified:    Patient Notified:  renewal

## 2025-08-19 ENCOUNTER — TRANSFERRED RECORDS (OUTPATIENT)
Dept: HEALTH INFORMATION MANAGEMENT | Facility: CLINIC | Age: 40
End: 2025-08-19
Payer: COMMERCIAL

## 2025-09-02 ENCOUNTER — MYC MEDICAL ADVICE (OUTPATIENT)
Dept: INTERNAL MEDICINE | Facility: CLINIC | Age: 40
End: 2025-09-02
Payer: COMMERCIAL

## (undated) DEVICE — SU VICRYL 0 CT-1 36" J346H

## (undated) DEVICE — GLOVE ESTEEM POWDER FREE SMT 6.5  2D72PT65

## (undated) DEVICE — ENDO FORCEP SPIKED SERRATED SHAFT JUMBO 239CM G56998

## (undated) DEVICE — BASIN SET MAJOR

## (undated) DEVICE — PREP CHLORAPREP 26ML TINTED ORANGE  260815

## (undated) DEVICE — GLOVE PROTEXIS BLUE W/NEU-THERA 7.0  2D73EB70

## (undated) DEVICE — SUCTION CANISTER MEDIVAC LINER 1500ML W/LID 65651-515

## (undated) DEVICE — PACK C-SECTION LF PL15OTA83B

## (undated) DEVICE — SPECIMEN CONTAINER W/10% BUFFERED FORMALIN 120ML 591201

## (undated) DEVICE — SU VICRYL 4-0 PS-2 18" UND J496G

## (undated) DEVICE — STRAP KNEE/BODY 31143004

## (undated) DEVICE — SU VICRYL 3-0 SH 27" J316H

## (undated) DEVICE — STOCKING SLEEVE COMPRESSION CALF LG

## (undated) DEVICE — SPECIMEN CONTAINER 3OZ W/FORMALIN 59901

## (undated) DEVICE — SU VICRYL 0 CTB-1 36" UND JB946

## (undated) DEVICE — SOL WATER IRRIG 1000ML BOTTLE 07139-09

## (undated) DEVICE — SU MONOCRYL 0 CTB-1 36" YB946

## (undated) DEVICE — SOL NACL 0.9% IRRIG 1000ML BOTTLE 07138-09

## (undated) DEVICE — CATH TRAY FOLEY 16FR SILICONE 907416

## (undated) RX ORDER — NALBUPHINE HYDROCHLORIDE 10 MG/ML
INJECTION, SOLUTION INTRAMUSCULAR; INTRAVENOUS; SUBCUTANEOUS
Status: DISPENSED
Start: 2017-05-08